# Patient Record
Sex: MALE | Race: WHITE | NOT HISPANIC OR LATINO | Employment: OTHER | URBAN - METROPOLITAN AREA
[De-identification: names, ages, dates, MRNs, and addresses within clinical notes are randomized per-mention and may not be internally consistent; named-entity substitution may affect disease eponyms.]

---

## 2017-01-16 ENCOUNTER — HOSPITAL ENCOUNTER (OUTPATIENT)
Dept: LAB | Facility: MEDICAL CENTER | Age: 82
End: 2017-01-16
Attending: NURSE PRACTITIONER
Payer: MEDICARE

## 2017-01-16 LAB
25(OH)D3 SERPL-MCNC: 36 NG/ML (ref 30–100)
ALBUMIN SERPL BCP-MCNC: 4.1 G/DL (ref 3.2–4.9)
ALBUMIN/GLOB SERPL: 1.4 G/DL
ALP SERPL-CCNC: 37 U/L (ref 30–99)
ALT SERPL-CCNC: 23 U/L (ref 2–50)
ANION GAP SERPL CALC-SCNC: 6 MMOL/L (ref 0–11.9)
APPEARANCE UR: CLEAR
AST SERPL-CCNC: 17 U/L (ref 12–45)
BASOPHILS # BLD AUTO: 0.04 K/UL (ref 0–0.12)
BASOPHILS NFR BLD AUTO: 0.7 % (ref 0–1.8)
BILIRUB SERPL-MCNC: 0.7 MG/DL (ref 0.1–1.5)
BILIRUB UR QL STRIP.AUTO: NEGATIVE
BUN SERPL-MCNC: 30 MG/DL (ref 8–22)
CALCIUM SERPL-MCNC: 8.9 MG/DL (ref 8.5–10.5)
CHLORIDE SERPL-SCNC: 106 MMOL/L (ref 96–112)
CHOLEST SERPL-MCNC: 223 MG/DL (ref 100–199)
CO2 SERPL-SCNC: 27 MMOL/L (ref 20–33)
COLOR UR AUTO: YELLOW
CREAT SERPL-MCNC: 1.23 MG/DL (ref 0.5–1.4)
CREAT UR-MCNC: 141.7 MG/DL
CULTURE IF INDICATED INDCX: NO UA CULTURE
EOSINOPHIL # BLD: 0.1 K/UL (ref 0–0.51)
EOSINOPHIL NFR BLD AUTO: 1.6 % (ref 0–6.9)
ERYTHROCYTE [DISTWIDTH] IN BLOOD BY AUTOMATED COUNT: 44.7 FL (ref 35.9–50)
GLOBULIN SER CALC-MCNC: 3 G/DL (ref 1.9–3.5)
GLUCOSE SERPL-MCNC: 106 MG/DL (ref 65–99)
GLUCOSE UR STRIP.AUTO-MCNC: NEGATIVE MG/DL
HCT VFR BLD AUTO: 45.9 % (ref 42–52)
HDLC SERPL-MCNC: 43 MG/DL
HGB BLD-MCNC: 15.1 G/DL (ref 14–18)
IMM GRANULOCYTES # BLD AUTO: 0.01 K/UL (ref 0–0.11)
IMM GRANULOCYTES NFR BLD AUTO: 0.2 % (ref 0–0.9)
KETONES UR STRIP.AUTO-MCNC: NEGATIVE MG/DL
LDLC SERPL CALC-MCNC: 137 MG/DL
LEUKOCYTE ESTERASE UR QL STRIP.AUTO: NEGATIVE
LYMPHOCYTES # BLD: 2.46 K/UL (ref 1–4.8)
LYMPHOCYTES NFR BLD AUTO: 40 % (ref 22–41)
MCH RBC QN AUTO: 30.9 PG (ref 27–33)
MCHC RBC AUTO-ENTMCNC: 32.9 G/DL (ref 33.7–35.3)
MCV RBC AUTO: 94.1 FL (ref 81.4–97.8)
MICRO URNS: NORMAL
MICROALBUMIN UR-MCNC: <0.7 MG/DL
MICROALBUMIN/CREAT UR: NORMAL MG/G (ref 0–30)
MONOCYTES # BLD: 0.57 K/UL (ref 0–0.85)
MONOCYTES NFR BLD AUTO: 9.3 % (ref 0–13.4)
NEUTROPHILS # BLD: 2.97 K/UL (ref 1.82–7.42)
NEUTROPHILS NFR BLD AUTO: 48.2 % (ref 44–72)
NITRITE UR QL STRIP.AUTO: NEGATIVE
NRBC # BLD AUTO: 0 K/UL
NRBC BLD-RTO: 0 /100 WBC
PH UR: 5.5 [PH]
PLATELET # BLD AUTO: 202 K/UL (ref 164–446)
PMV BLD AUTO: 10.2 FL (ref 9–12.9)
POTASSIUM SERPL-SCNC: 4.2 MMOL/L (ref 3.6–5.5)
PROT SERPL-MCNC: 7.1 G/DL (ref 6–8.2)
PROT UR QL STRIP: NEGATIVE MG/DL
PSA SERPL DL<=0.01 NG/ML-MCNC: 0.79 NG/ML (ref 0–4)
RBC # BLD AUTO: 4.88 M/UL (ref 4.7–6.1)
RBC UR QL AUTO: NEGATIVE
SODIUM SERPL-SCNC: 139 MMOL/L (ref 135–145)
SP GR UR STRIP.AUTO: 1.02
TRIGL SERPL-MCNC: 217 MG/DL (ref 0–149)
TSH SERPL DL<=0.005 MIU/L-ACNC: 1.26 UIU/ML (ref 0.3–3.7)
WBC # BLD AUTO: 6.2 K/UL (ref 4.8–10.8)

## 2017-01-16 PROCEDURE — 80061 LIPID PANEL: CPT

## 2017-01-16 PROCEDURE — 82043 UR ALBUMIN QUANTITATIVE: CPT

## 2017-01-16 PROCEDURE — 80053 COMPREHEN METABOLIC PANEL: CPT

## 2017-01-16 PROCEDURE — 36415 COLL VENOUS BLD VENIPUNCTURE: CPT

## 2017-01-16 PROCEDURE — 85025 COMPLETE CBC W/AUTO DIFF WBC: CPT

## 2017-01-16 PROCEDURE — 84153 ASSAY OF PSA TOTAL: CPT

## 2017-01-16 PROCEDURE — 81003 URINALYSIS AUTO W/O SCOPE: CPT

## 2017-01-16 PROCEDURE — 82570 ASSAY OF URINE CREATININE: CPT

## 2017-01-16 PROCEDURE — 84443 ASSAY THYROID STIM HORMONE: CPT

## 2017-01-16 PROCEDURE — 82306 VITAMIN D 25 HYDROXY: CPT

## 2017-07-31 ENCOUNTER — HOSPITAL ENCOUNTER (OUTPATIENT)
Dept: LAB | Facility: MEDICAL CENTER | Age: 82
End: 2017-07-31
Attending: NURSE PRACTITIONER
Payer: MEDICARE

## 2017-07-31 LAB
ALBUMIN SERPL BCP-MCNC: 4.3 G/DL (ref 3.2–4.9)
ALBUMIN/GLOB SERPL: 1.3 G/DL
ALP SERPL-CCNC: 46 U/L (ref 30–99)
ALT SERPL-CCNC: 23 U/L (ref 2–50)
ANION GAP SERPL CALC-SCNC: 8 MMOL/L (ref 0–11.9)
APPEARANCE UR: CLEAR
AST SERPL-CCNC: 24 U/L (ref 12–45)
BACTERIA #/AREA URNS HPF: NEGATIVE /HPF
BILIRUB SERPL-MCNC: 0.9 MG/DL (ref 0.1–1.5)
BILIRUB UR QL STRIP.AUTO: NEGATIVE
BUN SERPL-MCNC: 19 MG/DL (ref 8–22)
CALCIUM SERPL-MCNC: 9.5 MG/DL (ref 8.5–10.5)
CHLORIDE SERPL-SCNC: 104 MMOL/L (ref 96–112)
CO2 SERPL-SCNC: 29 MMOL/L (ref 20–33)
COLOR UR: YELLOW
CREAT SERPL-MCNC: 1.1 MG/DL (ref 0.5–1.4)
CREAT UR-MCNC: 239.5 MG/DL
CULTURE IF INDICATED INDCX: NO UA CULTURE
EPI CELLS #/AREA URNS HPF: NEGATIVE /HPF
GFR SERPL CREATININE-BSD FRML MDRD: >60 ML/MIN/1.73 M 2
GLOBULIN SER CALC-MCNC: 3.2 G/DL (ref 1.9–3.5)
GLUCOSE SERPL-MCNC: 92 MG/DL (ref 65–99)
GLUCOSE UR STRIP.AUTO-MCNC: NEGATIVE MG/DL
HYALINE CASTS #/AREA URNS LPF: ABNORMAL /LPF
KETONES UR STRIP.AUTO-MCNC: NEGATIVE MG/DL
LEUKOCYTE ESTERASE UR QL STRIP.AUTO: NEGATIVE
MICRO URNS: ABNORMAL
MICROALBUMIN UR-MCNC: 3.3 MG/DL
MICROALBUMIN/CREAT UR: 14 MG/G (ref 0–30)
NITRITE UR QL STRIP.AUTO: NEGATIVE
PH UR STRIP.AUTO: 6 [PH]
POTASSIUM SERPL-SCNC: 4.1 MMOL/L (ref 3.6–5.5)
PROT SERPL-MCNC: 7.5 G/DL (ref 6–8.2)
PROT UR QL STRIP: 30 MG/DL
RBC # URNS HPF: ABNORMAL /HPF
RBC UR QL AUTO: NEGATIVE
SODIUM SERPL-SCNC: 141 MMOL/L (ref 135–145)
SP GR UR STRIP.AUTO: 1.02
UROBILINOGEN UR STRIP.AUTO-MCNC: NORMAL MG/DL
WBC #/AREA URNS HPF: ABNORMAL /HPF

## 2017-07-31 PROCEDURE — 36415 COLL VENOUS BLD VENIPUNCTURE: CPT

## 2017-07-31 PROCEDURE — 82043 UR ALBUMIN QUANTITATIVE: CPT

## 2017-07-31 PROCEDURE — 81001 URINALYSIS AUTO W/SCOPE: CPT

## 2017-07-31 PROCEDURE — 80053 COMPREHEN METABOLIC PANEL: CPT

## 2017-07-31 PROCEDURE — 82570 ASSAY OF URINE CREATININE: CPT

## 2017-11-01 ENCOUNTER — APPOINTMENT (RX ONLY)
Dept: URBAN - METROPOLITAN AREA CLINIC 4 | Facility: CLINIC | Age: 82
Setting detail: DERMATOLOGY
End: 2017-11-01

## 2017-11-01 DIAGNOSIS — L82.0 INFLAMED SEBORRHEIC KERATOSIS: ICD-10-CM

## 2017-11-01 DIAGNOSIS — L81.4 OTHER MELANIN HYPERPIGMENTATION: ICD-10-CM

## 2017-11-01 DIAGNOSIS — L82.1 OTHER SEBORRHEIC KERATOSIS: ICD-10-CM

## 2017-11-01 DIAGNOSIS — L30.9 DERMATITIS, UNSPECIFIED: ICD-10-CM

## 2017-11-01 PROBLEM — H91.90 UNSPECIFIED HEARING LOSS, UNSPECIFIED EAR: Status: ACTIVE | Noted: 2017-11-01

## 2017-11-01 PROCEDURE — ? MEDICATION COUNSELING

## 2017-11-01 PROCEDURE — ? LIQUID NITROGEN

## 2017-11-01 PROCEDURE — ? PRESCRIPTION

## 2017-11-01 PROCEDURE — ? COUNSELING

## 2017-11-01 PROCEDURE — 99202 OFFICE O/P NEW SF 15 MIN: CPT

## 2017-11-01 RX ORDER — TRIAMCINOLONE ACETONIDE 1 MG/G
CREAM TOPICAL BID
Qty: 1 | Refills: 6 | Status: ERX | COMMUNITY
Start: 2017-11-01

## 2017-11-01 RX ADMIN — TRIAMCINOLONE ACETONIDE: 1 CREAM TOPICAL at 00:00

## 2017-11-01 ASSESSMENT — LOCATION ZONE DERM
LOCATION ZONE: FACE
LOCATION ZONE: ARM
LOCATION ZONE: LEG
LOCATION ZONE: TRUNK
LOCATION ZONE: EAR

## 2017-11-01 ASSESSMENT — LOCATION DETAILED DESCRIPTION DERM
LOCATION DETAILED: LEFT ANTERIOR DISTAL THIGH
LOCATION DETAILED: LEFT CENTRAL MALAR CHEEK
LOCATION DETAILED: LEFT PROXIMAL DORSAL FOREARM
LOCATION DETAILED: RIGHT ANTERIOR DISTAL THIGH
LOCATION DETAILED: RIGHT SUPERIOR UPPER BACK
LOCATION DETAILED: RIGHT INFERIOR LATERAL MALAR CHEEK
LOCATION DETAILED: RIGHT DISTAL DORSAL FOREARM
LOCATION DETAILED: STERNUM
LOCATION DETAILED: LEFT DISTAL DORSAL FOREARM
LOCATION DETAILED: LEFT INFERIOR POSTERIOR HELIX
LOCATION DETAILED: RIGHT PROXIMAL DORSAL FOREARM
LOCATION DETAILED: LEFT SUPERIOR MEDIAL UPPER BACK
LOCATION DETAILED: LEFT MEDIAL SUPERIOR CHEST
LOCATION DETAILED: LEFT DISTAL LATERAL PRETIBIAL REGION

## 2017-11-01 ASSESSMENT — LOCATION SIMPLE DESCRIPTION DERM
LOCATION SIMPLE: LEFT EAR
LOCATION SIMPLE: RIGHT FOREARM
LOCATION SIMPLE: RIGHT CHEEK
LOCATION SIMPLE: RIGHT THIGH
LOCATION SIMPLE: CHEST
LOCATION SIMPLE: LEFT THIGH
LOCATION SIMPLE: LEFT FOREARM
LOCATION SIMPLE: LEFT UPPER BACK
LOCATION SIMPLE: LEFT LOWER LEG
LOCATION SIMPLE: LEFT CHEEK
LOCATION SIMPLE: RIGHT UPPER BACK

## 2018-01-29 ENCOUNTER — APPOINTMENT (OUTPATIENT)
Dept: RADIOLOGY | Facility: MEDICAL CENTER | Age: 83
End: 2018-01-29
Attending: EMERGENCY MEDICINE
Payer: MEDICARE

## 2018-01-29 ENCOUNTER — HOSPITAL ENCOUNTER (OUTPATIENT)
Facility: MEDICAL CENTER | Age: 83
End: 2018-01-30
Attending: EMERGENCY MEDICINE | Admitting: HOSPITALIST
Payer: MEDICARE

## 2018-01-29 ENCOUNTER — RESOLUTE PROFESSIONAL BILLING HOSPITAL PROF FEE (OUTPATIENT)
Dept: HOSPITALIST | Facility: MEDICAL CENTER | Age: 83
End: 2018-01-29
Payer: MEDICARE

## 2018-01-29 DIAGNOSIS — S43.005A DISLOCATION OF LEFT SHOULDER JOINT, INITIAL ENCOUNTER: ICD-10-CM

## 2018-01-29 DIAGNOSIS — S80.812A ABRASION OF LEFT LOWER EXTREMITY, INITIAL ENCOUNTER: ICD-10-CM

## 2018-01-29 DIAGNOSIS — I95.9 HYPOTENSION, UNSPECIFIED HYPOTENSION TYPE: ICD-10-CM

## 2018-01-29 DIAGNOSIS — R55 SYNCOPE, UNSPECIFIED SYNCOPE TYPE: ICD-10-CM

## 2018-01-29 DIAGNOSIS — M54.2 CERVICAL PAIN: ICD-10-CM
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
## 2018-01-29 PROBLEM — I10 HTN (HYPERTENSION): Status: ACTIVE | Noted: 2018-01-29

## 2018-01-29 LAB
ALBUMIN SERPL BCP-MCNC: 5 G/DL (ref 3.2–4.9)
ALBUMIN/GLOB SERPL: 2.1 G/DL
ALP SERPL-CCNC: 44 U/L (ref 30–99)
ALT SERPL-CCNC: 19 U/L (ref 2–50)
ANION GAP SERPL CALC-SCNC: 10 MMOL/L (ref 0–11.9)
APTT PPP: 29.2 SEC (ref 24.7–36)
AST SERPL-CCNC: 21 U/L (ref 12–45)
BASOPHILS # BLD AUTO: 0.2 % (ref 0–1.8)
BASOPHILS # BLD: 0.02 K/UL (ref 0–0.12)
BILIRUB SERPL-MCNC: 0.7 MG/DL (ref 0.1–1.5)
BUN SERPL-MCNC: 25 MG/DL (ref 8–22)
CALCIUM SERPL-MCNC: 9.3 MG/DL (ref 8.5–10.5)
CHLORIDE SERPL-SCNC: 104 MMOL/L (ref 96–112)
CO2 SERPL-SCNC: 23 MMOL/L (ref 20–33)
CREAT SERPL-MCNC: 1.29 MG/DL (ref 0.5–1.4)
EKG IMPRESSION: NORMAL
EOSINOPHIL # BLD AUTO: 0 K/UL (ref 0–0.51)
EOSINOPHIL NFR BLD: 0 % (ref 0–6.9)
ERYTHROCYTE [DISTWIDTH] IN BLOOD BY AUTOMATED COUNT: 45 FL (ref 35.9–50)
GLOBULIN SER CALC-MCNC: 2.4 G/DL (ref 1.9–3.5)
GLUCOSE SERPL-MCNC: 130 MG/DL (ref 65–99)
HCT VFR BLD AUTO: 48 % (ref 42–52)
HGB BLD-MCNC: 16.3 G/DL (ref 14–18)
IMM GRANULOCYTES # BLD AUTO: 0.06 K/UL (ref 0–0.11)
IMM GRANULOCYTES NFR BLD AUTO: 0.5 % (ref 0–0.9)
INR PPP: 1.02 (ref 0.87–1.13)
LYMPHOCYTES # BLD AUTO: 1.39 K/UL (ref 1–4.8)
LYMPHOCYTES NFR BLD: 11.3 % (ref 22–41)
MCH RBC QN AUTO: 31.8 PG (ref 27–33)
MCHC RBC AUTO-ENTMCNC: 34 G/DL (ref 33.7–35.3)
MCV RBC AUTO: 93.8 FL (ref 81.4–97.8)
MONOCYTES # BLD AUTO: 0.58 K/UL (ref 0–0.85)
MONOCYTES NFR BLD AUTO: 4.7 % (ref 0–13.4)
NEUTROPHILS # BLD AUTO: 10.25 K/UL (ref 1.82–7.42)
NEUTROPHILS NFR BLD: 83.3 % (ref 44–72)
NRBC # BLD AUTO: 0 K/UL
NRBC BLD-RTO: 0 /100 WBC
PLATELET # BLD AUTO: 153 K/UL (ref 164–446)
PMV BLD AUTO: 10.4 FL (ref 9–12.9)
POTASSIUM SERPL-SCNC: 4.3 MMOL/L (ref 3.6–5.5)
PROT SERPL-MCNC: 7.4 G/DL (ref 6–8.2)
PROTHROMBIN TIME: 13.1 SEC (ref 12–14.6)
RBC # BLD AUTO: 5.12 M/UL (ref 4.7–6.1)
SODIUM SERPL-SCNC: 137 MMOL/L (ref 135–145)
TROPONIN I SERPL-MCNC: 0.02 NG/ML (ref 0–0.04)
WBC # BLD AUTO: 12.3 K/UL (ref 4.8–10.8)

## 2018-01-29 PROCEDURE — G0378 HOSPITAL OBSERVATION PER HR: HCPCS

## 2018-01-29 PROCEDURE — 85730 THROMBOPLASTIN TIME PARTIAL: CPT

## 2018-01-29 PROCEDURE — 80053 COMPREHEN METABOLIC PANEL: CPT

## 2018-01-29 PROCEDURE — 73030 X-RAY EXAM OF SHOULDER: CPT | Mod: LT

## 2018-01-29 PROCEDURE — 71260 CT THORAX DX C+: CPT

## 2018-01-29 PROCEDURE — 700111 HCHG RX REV CODE 636 W/ 250 OVERRIDE (IP): Performed by: EMERGENCY MEDICINE

## 2018-01-29 PROCEDURE — 99219 PR INITIAL OBSERVATION CARE,LEVL II: CPT | Performed by: HOSPITALIST

## 2018-01-29 PROCEDURE — 99285 EMERGENCY DEPT VISIT HI MDM: CPT

## 2018-01-29 PROCEDURE — 700117 HCHG RX CONTRAST REV CODE 255: Performed by: EMERGENCY MEDICINE

## 2018-01-29 PROCEDURE — 700101 HCHG RX REV CODE 250: Performed by: EMERGENCY MEDICINE

## 2018-01-29 PROCEDURE — 93005 ELECTROCARDIOGRAM TRACING: CPT

## 2018-01-29 PROCEDURE — 99152 MOD SED SAME PHYS/QHP 5/>YRS: CPT

## 2018-01-29 PROCEDURE — 94760 N-INVAS EAR/PLS OXIMETRY 1: CPT

## 2018-01-29 PROCEDURE — 72125 CT NECK SPINE W/O DYE: CPT

## 2018-01-29 PROCEDURE — 85025 COMPLETE CBC W/AUTO DIFF WBC: CPT

## 2018-01-29 PROCEDURE — 85610 PROTHROMBIN TIME: CPT

## 2018-01-29 PROCEDURE — 23650 CLTX SHO DSLC W/MNPJ WO ANES: CPT

## 2018-01-29 PROCEDURE — 93005 ELECTROCARDIOGRAM TRACING: CPT | Performed by: EMERGENCY MEDICINE

## 2018-01-29 PROCEDURE — 84484 ASSAY OF TROPONIN QUANT: CPT

## 2018-01-29 PROCEDURE — 70450 CT HEAD/BRAIN W/O DYE: CPT

## 2018-01-29 RX ORDER — ASPIRIN 81 MG/1
81 TABLET, CHEWABLE ORAL
Status: DISCONTINUED | OUTPATIENT
Start: 2018-01-29 | End: 2018-01-30 | Stop reason: HOSPADM

## 2018-01-29 RX ORDER — POLYETHYLENE GLYCOL 3350 17 G/17G
1 POWDER, FOR SOLUTION ORAL
Status: DISCONTINUED | OUTPATIENT
Start: 2018-01-29 | End: 2018-01-30 | Stop reason: HOSPADM

## 2018-01-29 RX ORDER — HEPARIN SODIUM 5000 [USP'U]/ML
5000 INJECTION, SOLUTION INTRAVENOUS; SUBCUTANEOUS EVERY 8 HOURS
Status: DISCONTINUED | OUTPATIENT
Start: 2018-01-29 | End: 2018-01-30 | Stop reason: HOSPADM

## 2018-01-29 RX ORDER — ASPIRIN 81 MG/1
81 TABLET, CHEWABLE ORAL
COMMUNITY
End: 2018-03-13

## 2018-01-29 RX ORDER — BENAZEPRIL HYDROCHLORIDE 20 MG/1
20 TABLET ORAL 2 TIMES DAILY
Status: DISCONTINUED | OUTPATIENT
Start: 2018-01-29 | End: 2018-01-30 | Stop reason: HOSPADM

## 2018-01-29 RX ORDER — LIDOCAINE HYDROCHLORIDE 20 MG/ML
20 INJECTION, SOLUTION INFILTRATION; PERINEURAL ONCE
Status: COMPLETED | OUTPATIENT
Start: 2018-01-29 | End: 2018-01-29

## 2018-01-29 RX ORDER — BISACODYL 10 MG
10 SUPPOSITORY, RECTAL RECTAL
Status: DISCONTINUED | OUTPATIENT
Start: 2018-01-29 | End: 2018-01-30 | Stop reason: HOSPADM

## 2018-01-29 RX ORDER — ONDANSETRON 2 MG/ML
4 INJECTION INTRAMUSCULAR; INTRAVENOUS ONCE
Status: DISCONTINUED | OUTPATIENT
Start: 2018-01-29 | End: 2018-01-30 | Stop reason: HOSPADM

## 2018-01-29 RX ORDER — ONDANSETRON 4 MG/1
4 TABLET, ORALLY DISINTEGRATING ORAL EVERY 4 HOURS PRN
Status: DISCONTINUED | OUTPATIENT
Start: 2018-01-29 | End: 2018-01-30 | Stop reason: HOSPADM

## 2018-01-29 RX ORDER — MORPHINE SULFATE 4 MG/ML
4 INJECTION, SOLUTION INTRAMUSCULAR; INTRAVENOUS ONCE
Status: DISCONTINUED | OUTPATIENT
Start: 2018-01-29 | End: 2018-01-30

## 2018-01-29 RX ORDER — LORATADINE 10 MG/1
10 TABLET ORAL DAILY
COMMUNITY
End: 2018-03-13

## 2018-01-29 RX ORDER — ONDANSETRON 2 MG/ML
4 INJECTION INTRAMUSCULAR; INTRAVENOUS EVERY 4 HOURS PRN
Status: DISCONTINUED | OUTPATIENT
Start: 2018-01-29 | End: 2018-01-30 | Stop reason: HOSPADM

## 2018-01-29 RX ORDER — SODIUM CHLORIDE 9 MG/ML
INJECTION, SOLUTION INTRAVENOUS CONTINUOUS
Status: DISCONTINUED | OUTPATIENT
Start: 2018-01-29 | End: 2018-01-30 | Stop reason: HOSPADM

## 2018-01-29 RX ORDER — BENAZEPRIL HYDROCHLORIDE 20 MG/1
20 TABLET ORAL 2 TIMES DAILY
COMMUNITY
End: 2018-03-13

## 2018-01-29 RX ORDER — AMOXICILLIN 250 MG
2 CAPSULE ORAL 2 TIMES DAILY
Status: DISCONTINUED | OUTPATIENT
Start: 2018-01-29 | End: 2018-01-30 | Stop reason: HOSPADM

## 2018-01-29 RX ADMIN — PROPOFOL 80 MG: 10 INJECTION, EMULSION INTRAVENOUS at 17:56

## 2018-01-29 RX ADMIN — LIDOCAINE HYDROCHLORIDE 20 ML: 20 INJECTION, SOLUTION INFILTRATION; PERINEURAL at 17:11

## 2018-01-29 RX ADMIN — IOHEXOL 100 ML: 350 INJECTION, SOLUTION INTRAVENOUS at 20:03

## 2018-01-29 ASSESSMENT — PAIN SCALES - GENERAL: PAINLEVEL_OUTOF10: 5

## 2018-01-29 NOTE — ED TRIAGE NOTES
Alonso Holder 82 y.o. male to triage with wife via w/c for     Chief Complaint   Patient presents with   • T-5000 FALL     pt slipped in the steam room approx 2 hours ago.  Pt does not think he hit his head, unknown LOC.   • Shoulder Injury     pt has L shoulder deformity, possible dislocation.     Pt a/o x 4.  Pt slightly wet from steam room and cold.  Provided warm blankets. Charge RN aware of patient.    Pt directed to the senior lounge to await bed assignment (will receive next bed available).  Advised to return to triage desk for any changes/concerns.

## 2018-01-30 ENCOUNTER — PATIENT OUTREACH (OUTPATIENT)
Dept: HEALTH INFORMATION MANAGEMENT | Facility: OTHER | Age: 83
End: 2018-01-30

## 2018-01-30 VITALS
DIASTOLIC BLOOD PRESSURE: 72 MMHG | RESPIRATION RATE: 18 BRPM | TEMPERATURE: 98.1 F | HEIGHT: 70 IN | WEIGHT: 216.49 LBS | SYSTOLIC BLOOD PRESSURE: 133 MMHG | BODY MASS INDEX: 30.99 KG/M2 | OXYGEN SATURATION: 93 % | HEART RATE: 85 BPM

## 2018-01-30 LAB
ANION GAP SERPL CALC-SCNC: 9 MMOL/L (ref 0–11.9)
BASOPHILS # BLD AUTO: 0.3 % (ref 0–1.8)
BASOPHILS # BLD: 0.03 K/UL (ref 0–0.12)
BUN SERPL-MCNC: 23 MG/DL (ref 8–22)
CALCIUM SERPL-MCNC: 9.1 MG/DL (ref 8.5–10.5)
CHLORIDE SERPL-SCNC: 108 MMOL/L (ref 96–112)
CO2 SERPL-SCNC: 25 MMOL/L (ref 20–33)
CREAT SERPL-MCNC: 1.02 MG/DL (ref 0.5–1.4)
EOSINOPHIL # BLD AUTO: 0.02 K/UL (ref 0–0.51)
EOSINOPHIL NFR BLD: 0.2 % (ref 0–6.9)
ERYTHROCYTE [DISTWIDTH] IN BLOOD BY AUTOMATED COUNT: 45.4 FL (ref 35.9–50)
GLUCOSE SERPL-MCNC: 109 MG/DL (ref 65–99)
HCT VFR BLD AUTO: 42 % (ref 42–52)
HGB BLD-MCNC: 14.1 G/DL (ref 14–18)
IMM GRANULOCYTES # BLD AUTO: 0.02 K/UL (ref 0–0.11)
IMM GRANULOCYTES NFR BLD AUTO: 0.2 % (ref 0–0.9)
LV EJECT FRACT  99904: 60
LV EJECT FRACT MOD 2C 99903: 58.84
LV EJECT FRACT MOD 4C 99902: 57.62
LV EJECT FRACT MOD BP 99901: 58.23
LYMPHOCYTES # BLD AUTO: 1.92 K/UL (ref 1–4.8)
LYMPHOCYTES NFR BLD: 21.5 % (ref 22–41)
MCH RBC QN AUTO: 31.2 PG (ref 27–33)
MCHC RBC AUTO-ENTMCNC: 33.6 G/DL (ref 33.7–35.3)
MCV RBC AUTO: 92.9 FL (ref 81.4–97.8)
MONOCYTES # BLD AUTO: 0.92 K/UL (ref 0–0.85)
MONOCYTES NFR BLD AUTO: 10.3 % (ref 0–13.4)
NEUTROPHILS # BLD AUTO: 6.03 K/UL (ref 1.82–7.42)
NEUTROPHILS NFR BLD: 67.5 % (ref 44–72)
NRBC # BLD AUTO: 0 K/UL
NRBC BLD-RTO: 0 /100 WBC
PLATELET # BLD AUTO: 140 K/UL (ref 164–446)
PMV BLD AUTO: 10.3 FL (ref 9–12.9)
POTASSIUM SERPL-SCNC: 3.7 MMOL/L (ref 3.6–5.5)
RBC # BLD AUTO: 4.52 M/UL (ref 4.7–6.1)
SODIUM SERPL-SCNC: 142 MMOL/L (ref 135–145)
WBC # BLD AUTO: 8.9 K/UL (ref 4.8–10.8)

## 2018-01-30 PROCEDURE — 93306 TTE W/DOPPLER COMPLETE: CPT

## 2018-01-30 PROCEDURE — 99217 PR OBSERVATION CARE DISCHARGE: CPT | Performed by: INTERNAL MEDICINE

## 2018-01-30 PROCEDURE — G0378 HOSPITAL OBSERVATION PER HR: HCPCS

## 2018-01-30 PROCEDURE — A9270 NON-COVERED ITEM OR SERVICE: HCPCS | Performed by: HOSPITALIST

## 2018-01-30 PROCEDURE — 36415 COLL VENOUS BLD VENIPUNCTURE: CPT

## 2018-01-30 PROCEDURE — 93306 TTE W/DOPPLER COMPLETE: CPT | Mod: 26 | Performed by: INTERNAL MEDICINE

## 2018-01-30 PROCEDURE — 90471 IMMUNIZATION ADMIN: CPT

## 2018-01-30 PROCEDURE — 96372 THER/PROPH/DIAG INJ SC/IM: CPT

## 2018-01-30 PROCEDURE — 700102 HCHG RX REV CODE 250 W/ 637 OVERRIDE(OP): Performed by: HOSPITALIST

## 2018-01-30 PROCEDURE — 90670 PCV13 VACCINE IM: CPT | Performed by: HOSPITALIST

## 2018-01-30 PROCEDURE — 700111 HCHG RX REV CODE 636 W/ 250 OVERRIDE (IP): Performed by: HOSPITALIST

## 2018-01-30 PROCEDURE — 85025 COMPLETE CBC W/AUTO DIFF WBC: CPT

## 2018-01-30 PROCEDURE — 80048 BASIC METABOLIC PNL TOTAL CA: CPT

## 2018-01-30 PROCEDURE — 700102 HCHG RX REV CODE 250 W/ 637 OVERRIDE(OP): Performed by: INTERNAL MEDICINE

## 2018-01-30 PROCEDURE — 700105 HCHG RX REV CODE 258: Performed by: HOSPITALIST

## 2018-01-30 PROCEDURE — A9270 NON-COVERED ITEM OR SERVICE: HCPCS | Performed by: INTERNAL MEDICINE

## 2018-01-30 RX ORDER — POLYETHYLENE GLYCOL 3350 17 G/17G
POWDER, FOR SOLUTION ORAL
Refills: 3 | COMMUNITY
Start: 2018-01-30 | End: 2018-03-13

## 2018-01-30 RX ORDER — AMOXICILLIN 250 MG
2 CAPSULE ORAL 2 TIMES DAILY
Qty: 30 TAB | Refills: 0 | Status: SHIPPED | OUTPATIENT
Start: 2018-01-30 | End: 2018-03-13

## 2018-01-30 RX ORDER — OXYCODONE HYDROCHLORIDE AND ACETAMINOPHEN 5; 325 MG/1; MG/1
1 TABLET ORAL EVERY 4 HOURS PRN
Qty: 10 TAB | Refills: 0 | Status: SHIPPED | OUTPATIENT
Start: 2018-01-30 | End: 2018-02-02

## 2018-01-30 RX ORDER — OXYCODONE HYDROCHLORIDE AND ACETAMINOPHEN 5; 325 MG/1; MG/1
1 TABLET ORAL EVERY 4 HOURS PRN
Status: DISCONTINUED | OUTPATIENT
Start: 2018-01-30 | End: 2018-01-30 | Stop reason: HOSPADM

## 2018-01-30 RX ADMIN — ASPIRIN 81 MG: 81 TABLET, CHEWABLE ORAL at 00:56

## 2018-01-30 RX ADMIN — HEPARIN SODIUM 5000 UNITS: 5000 INJECTION, SOLUTION INTRAVENOUS; SUBCUTANEOUS at 00:57

## 2018-01-30 RX ADMIN — OXYCODONE HYDROCHLORIDE AND ACETAMINOPHEN 1 TABLET: 5; 325 TABLET ORAL at 08:14

## 2018-01-30 RX ADMIN — HEPARIN SODIUM 5000 UNITS: 5000 INJECTION, SOLUTION INTRAVENOUS; SUBCUTANEOUS at 06:14

## 2018-01-30 RX ADMIN — BENAZEPRIL HYDROCHLORIDE 20 MG: 20 TABLET, COATED ORAL at 08:15

## 2018-01-30 RX ADMIN — SODIUM CHLORIDE: 9 INJECTION, SOLUTION INTRAVENOUS at 00:57

## 2018-01-30 RX ADMIN — BENAZEPRIL HYDROCHLORIDE 20 MG: 20 TABLET, COATED ORAL at 00:56

## 2018-01-30 RX ADMIN — PNEUMOCOCCAL 13-VALENT CONJUGATE VACCINE 0.5 ML: 2.2; 2.2; 2.2; 2.2; 2.2; 4.4; 2.2; 2.2; 2.2; 2.2; 2.2; 2.2; 2.2 INJECTION, SUSPENSION INTRAMUSCULAR at 06:15

## 2018-01-30 ASSESSMENT — LIFESTYLE VARIABLES
HAVE YOU EVER FELT YOU SHOULD CUT DOWN ON YOUR DRINKING: NO
TOTAL SCORE: 0
HAVE PEOPLE ANNOYED YOU BY CRITICIZING YOUR DRINKING: NO
CONSUMPTION TOTAL: NEGATIVE
ALCOHOL_USE: YES
ON A TYPICAL DAY WHEN YOU DRINK ALCOHOL HOW MANY DRINKS DO YOU HAVE: 1
EVER_SMOKED: NEVER
HOW MANY TIMES IN THE PAST YEAR HAVE YOU HAD 5 OR MORE DRINKS IN A DAY: 0
TOTAL SCORE: 0
AVERAGE NUMBER OF DAYS PER WEEK YOU HAVE A DRINK CONTAINING ALCOHOL: 5
EVER HAD A DRINK FIRST THING IN THE MORNING TO STEADY YOUR NERVES TO GET RID OF A HANGOVER: NO
EVER FELT BAD OR GUILTY ABOUT YOUR DRINKING: NO
TOTAL SCORE: 0

## 2018-01-30 ASSESSMENT — PAIN SCALES - GENERAL
PAINLEVEL_OUTOF10: 7
PAINLEVEL_OUTOF10: 7
PAINLEVEL_OUTOF10: 0
PAINLEVEL_OUTOF10: 2

## 2018-01-30 ASSESSMENT — PATIENT HEALTH QUESTIONNAIRE - PHQ9
SUM OF ALL RESPONSES TO PHQ9 QUESTIONS 1 AND 2: 0
SUM OF ALL RESPONSES TO PHQ QUESTIONS 1-9: 0
2. FEELING DOWN, DEPRESSED, IRRITABLE, OR HOPELESS: NOT AT ALL
1. LITTLE INTEREST OR PLEASURE IN DOING THINGS: NOT AT ALL

## 2018-01-30 NOTE — PROGRESS NOTES
Discharge instructions given to patient at bedside, verbalizes understanding and states plans for follow-up. Telemetry monitor/IV cathlon removed. All belongings accounted for, all questions answered at this time. Pt w/o c/o. Pt taken out via WC by this RN. Wife @ side. Helped into wife's car. Denies further needs.

## 2018-01-30 NOTE — PROGRESS NOTES
Pt resting quietly in bed.  Respirations even and unlabored.  NAD noted.  Wife at bedside.  No needs to address.

## 2018-01-30 NOTE — PROGRESS NOTES
Admit profile and assessment completed.  Pt medicated per MAR.      Pt eating food from home.      LLE wound cleaned with NS and thin layer of antibiotic ointment applied.      No other needs to address at this time.

## 2018-01-30 NOTE — DISCHARGE INSTRUCTIONS
Discharge Instructions    Discharged to home by car with relative. Discharged via wheelchair, hospital escort: Yes.  Special equipment needed: Not Applicable    Be sure to schedule a follow-up appointment with your primary care doctor or any specialists as instructed.     Discharge Plan:   Diet Plan: Discussed - regular diet  Activity Level: Discussed - activity as tolerated.   Confirmed Follow up Appointment: FOLLOW UP WITH DR. DOMINGUEZ IN ONE WEEK  Confirmed Symptoms Management: Discussed  Medication Reconciliation Updated: Yes  Pneumococcal Vaccine Given - only chart on this line when given: Given - RECEIVED 1-30-18  Influenza Vaccine Indication: Not indicated: Previously immunized this influenza season and > 8 years of age    I understand that a diet low in cholesterol, fat, and sodium is recommended for good health. Unless I have been given specific instructions below for another diet, I accept this instruction as my diet prescription.       Special Instructions: Follow up with Sumanth Dominguez M.D. In 1 week. Referral for Holter monitor; referral for ENT referral for audiology and vestibular PT if vertigo persists.    · Is patient discharged on Warfarin / Coumadin?   No     Depression / Suicide Risk    As you are discharged from this [facility] facility, it is important to learn how to keep safe from harming yourself.    Recognize the warning signs:  · Abrupt changes in personality, positive or negative- including increase in energy   · Giving away possessions  · Change in eating patterns- significant weight changes-  positive or negative  · Change in sleeping patterns- unable to sleep or sleeping all the time   · Unwillingness or inability to communicate  · Depression  · Unusual sadness, discouragement and loneliness  · Talk of wanting to die  · Neglect of personal appearance   · Rebelliousness- reckless behavior  · Withdrawal from people/activities they love  · Confusion- inability to concentrate     If you or a  loved one observes any of these behaviors or has concerns about self-harm, here's what you can do:  · Talk about it- your feelings and reasons for harming yourself  · Remove any means that you might use to hurt yourself (examples: pills, rope, extension cords, firearm)  · Get professional help from the community (Mental Health, Substance Abuse, psychological counseling)  · Do not be alone:Call your Safe Contact- someone whom you trust who will be there for you.  · Call your local CRISIS HOTLINE 458-2300 or 370-050-4976  · Call your local Children's Mobile Crisis Response Team [place] (166) 050-5383 or www.Dragonplay  · Call the toll free National Suicide Prevention Hotlines   · National Suicide Prevention Lifeline 690-570-DTDI (2084)  · National Hope Line Network 800-SUICIDE (298-2144)

## 2018-01-30 NOTE — PROGRESS NOTES
Pt arrived to unit via gurney with ED RN on monitor.  NAD noted.  Pt ambulatory with stand by assist to scale then to bed.  VS stable.  Will complete admit profile and assessment.  No questions or concerns to address.

## 2018-01-30 NOTE — DISCHARGE PLANNING
Care Transition Team Assessment    Information Source  Orientation : Oriented x 4  Information Given By: Patient  Informant's Name: Alonso  Who is responsible for making decisions for patient? : Patient    Readmission Evaluation  Is this a readmission?: No    Elopement Risk  Legal Hold: No  Ambulatory or Self Mobile in Wheelchair: No-Not an Elopement Risk    Interdisciplinary Discharge Planning  Does Admitting Nurse Feel This Could be a Complex Discharge?: No  Primary Care Physician: Sumanth Fairbanks MD  Lives with - Patient's Self Care Capacity: Spouse  Support Systems: Family Member(s), Spouse / Significant Other  Housing / Facility: [address]  Do You Take your Prescribed Medications Regularly: Yes  Able to Return to Previous ADL's: Yes  Mobility Issues: No  Prior Services: None  Patient Expects to be Discharged to:: Home  Assistance Needed: No  Durable Medical Equipment: Not Applicable    Discharge Preparedness  Prior Functional Level: Ambulatory, Drives Self, Independent with Activities of Daily Living    Functional Assesment  Prior Functional Level: Ambulatory, Drives Self, Independent with Activities of Daily Living    Finances  Financial Barriers to Discharge: No  Prescription Coverage: Yes    Vision / Hearing Impairment  Vision Impairment :  (wears glasses)  Hearing Impairment :  (bilateral hearing aids (not with pt))    Values / Beliefs / Concerns  Values / Beliefs Concerns : No    Advance Directive  Advance Directive?: None  Advance Directive offered?: AD Booklet given    Domestic Abuse  Have you ever been the victim of abuse or violence?: No    Psychological Assessment  History of Substance Abuse: None  History of Psychiatric Problems: No    Discharge Risks or Barriers  Discharge risks or barriers?: No    Anticipated Discharge Information  Anticipated discharge disposition: Home  Discharge Address:  ([address] [place] [address])  Discharge Contact Phone Number:  (Madelin (spouse) [phone])

## 2018-01-30 NOTE — DISCHARGE SUMMARY
CHIEF COMPLAINT ON ADMISSION  Chief Complaint   Patient presents with   • T-5000 FALL     pt slipped in the steam room approx 2 hours ago.  Pt does not think he hit his head, unknown LOC.   • Shoulder Injury     pt has L shoulder deformity, possible dislocation.       CODE STATUS  Full Code    HPI & HOSPITAL COURSE  Please review Dr. Vidal Scott M.D. notes for further details of history of present illness, past medical/social/family histories, allergies and medications.                  Near syncope   Assessment & Plan    Presented with near syncope and dizziness  Likely from dehydration per admitting physician  Also has mild vertigo. He has been recovering from sinus infection.  Did fall so presentation complicated by pain and L shoulder.   Echo normal EF, no evidence of aortic stenosis.  CT head neg for bleed or infarct  CT C-spine neg for fracture  CT negative for injury or bleed (see below)  At discharge date, he was ambulating, tolerating food. He did not complain of vertigo but did wonder what it could be caused by. Likley viral but if it persists, advised him to follow up.   He wants to go home.  Follow up with Sumanth Fairbanks M.D. In 1 week. Defer Holter monitor; defer ENT referral for audiology and vestibular PT if vertigo persists.       HTN (hypertension)   Assessment & Plan    Stable. Continue Lotensin.  Advise Alonso Holder to check blood pressure at home at least twice a day and have a log for primary provider to review  Follow up with Sumanth Fairbanks M.D.     Dislocation of left shoulder  Reduced at the ED, sling in place, resolved  Pain control.     Discharge Physical Exam  General/Constitutional: No acute distress.   Head: Normocephalic, atraumatic  ENT: Oral mucosa is moist. No obvious pharyngeal exudates  Eyes: Pink conjunctiva, no scleral icterus  Neck: Supple, no lymphadenopathy  Cardiovascular: Normal rate and regular rhythm. S1,2 noted. No murmurs, gallops or rubs.  Pulmonary: Clear to  auscultation bilaterally. No wheezes, rales or rhonchi.  Abdominal: Soft, nontender, not distended, bowel sounds normoactive. No guarding or peritoneal signs.  Musculoskeletal: No tenderness to palpation of chest wall. Mild tenderness shoulder, sling in place. Mild rib tenderness.  Neurologic: Alert and oriented. Grossly nonfocal, moving all extremities.  Genitourinary: No gross hematuria  Skin: No obvious rash.  Psychiatric: Pleasant, cooperative.  Vitals Reviewed  Labs Reviewed  Imaging reviewed  Nursing notes reviewed    Therefore, he is discharged in good and stable condition with close outpatient follow-up.    SPECIFIC OUTPATIENT FOLLOW-UP  Follow up with Sumanth Fairbanks M.D. In 1 week. Defer Holter monitor; defer ENT referral for audiology and vestibular PT if vertigo persists.    FOLLOW UP  No future appointments.  Sumanth Fairbanks M.D.  [address] [facility] [facility]  [address]  [facility] [address]  [phone]            MEDICATIONS ON DISCHARGE   DAVIDAlonso michel   Home Medication Instructions EILEEN:[id number]    Printed on:01/30/18 1120   Medication Information                      aspirin (ASA) 81 MG Chew Tab chewable tablet  Take 81 mg by mouth every bedtime.             benazepril (LOTENSIN) 20 MG Tab  Take 20 mg by mouth 2 Times a Day.             loratadine (CLARITIN) 10 MG Tab  Take 10 mg by mouth every day.             oxycodone-acetaminophen (PERCOCET) 5-325 MG Tab  Take 1 Tab by mouth every four hours as needed for Severe Pain for up to 3 days.             polyethylene glycol/lytes (MIRALAX) Pack  Take  by mouth 1 time daily as needed (if sennosides and docusate ineffective after 24 hours).             senna-docusate (PERICOLACE OR SENOKOT S) 8.6-50 MG Tab  Take 2 Tabs by mouth 2 Times a Day.                   DIET  Orders Placed This Encounter   Procedures   • Diet Order     Standing Status:   Standing     Number of Occurrences:   1     Order Specific Question:   Diet:     Answer:   Regular [1]       ACTIVITY  No heavy  lifting more than 25 lbs  No strenuous activity      CONSULTATIONS      PROCEDURES  Ct-cspine Without Plus Recons    Result Date: 1/29/2018 1/29/2018 7:22 PM HISTORY/REASON FOR EXAM: trauma Slip and fall TECHNIQUE/EXAM DESCRIPTION: CT cervical spine without contrast, with reconstructions. The study was performed on a helical multidetector CT scanner. Thin-section helical scanning was performed from the skull base through T1. Sagittal and coronal multiplanar reconstructions were generated from the axial images. Low dose optimization technique was utilized for this CT exam including automated exposure control and adjustment of the mA and/or kV according to patient size. COMPARISON:  None. FINDINGS: Alignment is preserved. Vertebral body heights are preserved. Multilevel loss of disc height and osteophyte formation. Facets are normally aligned.  Multilevel facet hypertrophy present. Axial images show no acute fracture. Well-corticated ossific density at the tip of C7 spinous processes consistent with old injury. Cervicothoracic junction is intact. Prevertebral soft tissues are within normal limits. Visualized lung apices are unremarkable. Calcifications of the carotid arteries noted. Congenital incomplete posterior arch of C1.     1.  No acute cervical spine fracture or subluxation. 2.  Multilevel degenerative changes.    Ct-chest,abdomen,pelvis With    Result Date: 1/29/2018 1/29/2018 7:23 PM HISTORY/REASON FOR EXAM:  Pain Following Trauma. Slip and fall TECHNIQUE/EXAM DESCRIPTION: CT scan of the chest, abdomen and pelvis with contrast. Thin-section helical scanning was obtained with intravenous contrast from the lung apices through the pubic symphysis to include the chest, abdomen and pelvis.  100 mL of Omnipaque 350 nonionic contrast was administered intravenously without complication. Low dose optimization technique was utilized for this CT exam including automated exposure control and adjustment of the mA and/or  kV according to patient size. COMPARISON: None. FINDINGS: CT Chest: Thoracic aorta shows mild atherosclerotic changes.  No mediastinal hematoma. Lungs show minimal dependent atelectasis. No pleural fluid collection or pneumothorax. No displaced rib fracture. Multilevel degenerative change of thoracic spine. CT Abdomen: Liver shows no focal lesion. The spleen is unremarkable. Adrenal glands are unremarkable. LEFT kidney is atrophic. RIGHT kidney shows cortical cyst. Pancreas is atrophic. The gallbladder is unremarkable. CT Pelvis: Bladder is unremarkable. Prior LEFT inguinal hernia repair. Colonic diverticula. Appendix has a normal appearance. No peritoneal fluid or pneumoperitoneum. Diffuse described change abdominal aorta with mild ectasia. Lumbar spine degenerative changes. No pelvic fracture.     1.  No acute intrathoracic injury. 2.  No evidence for acute intra-abdominal trauma. 3.  No gross thoracic or lumbar spine fracture.    Ct-head W/o    Result Date: 1/29/2018 1/29/2018 7:22 PM HISTORY/REASON FOR EXAM:  Head Injury. Ground-level fall TECHNIQUE/EXAM DESCRIPTION AND NUMBER OF VIEWS: CT of the head without contrast. The study was performed on a helical multidetector CT scanner. Contiguous 2.5 mm axial sections were obtained from the skull base through the vertex. Up to date radiation dose reduction adjustments have been utilized to meet ALARA standards for radiation dose reduction. COMPARISON:  None available FINDINGS: Lateral ventricles are enlarged, RIGHT greater than LEFT. Cortical sulci are enlarged. No significant mass effect or midline shift. Basal cisterns are patent. No evidence for intracranial hemorrhage. Calvaria are intact. Orbits show bilateral proptosis. Visualized mastoid air cells are clear. Mild inferior frontal and ethmoid sinus mucosal thickening. Calcifications of the carotid arteries noted.     1.  Diffuse atrophy. 2.  No acute intracranial hemorrhage or territorial infarct.      Dx-shoulder 2+ Left    Result Date: 2018  HISTORY/REASON FOR EXAM:  Status post left shoulder dislocation reduction TECHNIQUE/EXAM DESCRIPTION AND NUMBER OF VIEWS:  2 views of the left shoulder, 2018 6:40 PM. COMPARISON: Exam from 5:43 PM FINDINGS: There has been reduction of previously noted left shoulder dislocation. There is probable Hill-Sachs deformity.     Reduction of left shoulder dislocation. INTERPRETING LOCATION:  [address] [place] [address] [place], [id number]    Dx-shoulder 2+ Left    Result Date: 2018 5:22 PM HISTORY/REASON FOR EXAM:  Left shoulder pain after ground level fall today TECHNIQUE/EXAM DESCRIPTION AND NUMBER OF VIEWS:  2 views of the LEFT shoulder. COMPARISON: None FINDINGS: There is acute anterior subcoracoid dislocation of the left humeral head. No fracture is identified. There appears to be a Hill-Sachs deformity of the left humeral head.     Acute anterior subcoracoid dislocation of the left humeral head.    Echocardiogram Comp W/o Cont    Result Date: 2018  Transthoracic Echo Report Echocardiography Laboratory CONCLUSIONS Left ventricular ejection fraction is visually estimated to be 60%. Normal diastolic function. Trace mitral regurgitation. Estimated right ventricular systolic pressure is 32 mmHg. Right atrial pressure is estimated to be 3 mmHg. Dilated aortic root, measuring 4.0 cm. ERUM ALEX Exam Date:         2018                    07:27 Exam Location:     Inpatient Priority:          Routine Ordering Physician:        AUSTYN FELIZ Referring Physician:       [id number] Angela Sonographer:               Love Aceves RVT, RDCS Age:    82     Gender:    M MRN:    [id number] :    [id number] BSA:    2.15   Ht (in):    70     Wt (lb):    215 Exam Type:     Complete Indications:     Syncope ICD Codes:       780.2 CPT Codes:       73048 BP:   137    /   89     HR: Technical Quality:       Fair MEASUREMENTS  (Male / Female)  Normal Values 2D ECHO LV Diastolic Diameter PLAX        3.9 cm                4.2 - 5.9 / 3.9 - 5.3 cm LV Systolic Diameter PLAX         2.5 cm                2.1 - 4.0 cm IVS Diastolic Thickness           1 cm                  LVPW Diastolic Thickness          0.92 cm               LVOT Diameter                     2 cm                  Estimated LV Ejection Fraction    60 %                  LV Ejection Fraction MOD BP       58.2 %                >= 55  % LV Ejection Fraction MOD 4C       57.6 %                LV Ejection Fraction MOD 2C       58.8 %                M-MODE Aortic Root Diameter MM           3.7 cm                DOPPLER AV Peak Velocity                  1.5 m/s               AV Peak Gradient                  8.6 mmHg              AV Mean Gradient                  4.9 mmHg              LVOT Peak Velocity                1.3 m/s               AV Area Cont Eq vti               3 cm²                 Mitral E Point Velocity           0.77 m/s              Mitral E to A Ratio               0.93                  Mitral A Duration                 135 ms                MV Pressure Half Time             85.5 ms               MV Area PHT                       2.6 cm²               MV Deceleration Time              295 ms                Pulmonary Vein Systolic Velocity  0.54 m/s              Pulmonary Vein Diastolic Velocit  0.38 m/s              Pulmonary Vein S/D Ratio          1.4                   Pulmonary Vein A Velocity         0.21 m/s              Pulmonary Vein A Duration         72.7 ms               Pulm Vein-MV A Duration           -62 ms                TV Peak E Velocity                0.49 m/s              TR Peak Velocity                  274 cm/s              PV Peak Velocity                  0.9 m/s               PV Peak Gradient                  3.2 mmHg              RVOT Peak Velocity                0.72 m/s              * Indicates values subject to auto-interpretation LV EF:  60    %  FINDINGS Left Ventricle The left ventricle was normal in size and thickness. Normal left ventricular systolic function. Normal regional wall motion. Left ventricular ejection fraction is visually estimated to be 60%. Normal diastolic function. Right Ventricle Mildly dilated right ventricle. Normal right ventricular systolic function. Right Atrium The right atrium is normal in size. Normal inferior vena cava size and inspiratory collapse. Left Atrium The left atrium is normal in size.  Left atrial volume index is 30 mL/sq m. Mitral Valve Thickened mitral valve leaflets. No mitral stenosis. Trace mitral regurgitation. Aortic Valve Tricuspid aortic valve. Aortic sclerosis without stenosis. No aortic insufficiency. Tricuspid Valve Structurally normal tricuspid valve without significant stenosis. Mild tricuspid regurgitation. Estimated right ventricular systolic pressure is 32 mmHg. Right atrial pressure is estimated to be 3 mmHg. Pulmonic Valve The pulmonic valve is not well visualized. No stenosis or regurgitation seen. Pericardium Normal pericardium without effusion. Aorta Dilated aortic root, measuring 4.0 cm. Ascending aorta diameter is 3.1 cm. Flavia Bentley M.D. (Electronically Signed) Final Date:     30 January 2018                 10:06      LABORATORY  Lab Results   Component Value Date/Time    SODIUM 142 01/30/2018 06:25 AM    POTASSIUM 3.7 01/30/2018 06:25 AM    CHLORIDE 108 01/30/2018 06:25 AM    CO2 25 01/30/2018 06:25 AM    GLUCOSE 109 (H) 01/30/2018 06:25 AM    BUN 23 (H) 01/30/2018 06:25 AM    CREATININE 1.02 01/30/2018 06:25 AM        Lab Results   Component Value Date/Time    WBC 8.9 01/30/2018 06:25 AM    HEMOGLOBIN 14.1 01/30/2018 06:25 AM    HEMATOCRIT 42.0 01/30/2018 06:25 AM    PLATELETCT 140 (L) 01/30/2018 06:25 AM        Total time of the discharge process exceeds 40 minutes Spoke with Sumanth Fairbanks M.D. And wife

## 2018-01-30 NOTE — ED PROVIDER NOTES
"ED Provider Note    Scribed for Regina Ryan M.D. by Sanjuanita Schaffer. 1/29/2018  5:02 PM    Primary care provider: Sumanth Fairbanks M.D.  Means of arrival: Walk-in  History obtained from: Patient  History limited by: None     CHIEF COMPLAINT  Chief Complaint   Patient presents with   • T-5000 FALL     pt slipped in the steam room approx 2 hours ago.  Pt does not think he hit his head, unknown LOC.   • Shoulder Injury     pt has L shoulder deformity, possible dislocation.     HPI  Alonso Holder is a 82 y.o. male who presents to the Emergency Department for left shoulder pain and deformity secondary to a fall which occurred approximately two hours ago. Per wife, the patient was at the gym and fell while on the second step in the sauna. The cause of his fall is unknown and is consistent with the patient \"passing out\" , per wife. Patient does not recall what happened. Other gentlemen in the sauna helped him up. Patient does not believe he hit his head and has no complaints of headache or contusions to his head. He also denies nausea, vomiting or numbness. Patient denies any past medical history.    REVIEW OF SYSTEMS  EYES: no vision changes. He does not remember losing consciousness but is unsure if he hit his head  NECK: Positive neck pain  CARDIAC: Positive chest pain on the left side  GI: no vomiting, nausea.   Neuro: Positive for loss of consciousness. no numbness.   Musculoskeletal: positive for left shoulder pain and deformity.   See history of present illness. All other systems have been reviewed and are negative.   C.     PAST MEDICAL HISTORY   No chronic medical conditions.     SURGICAL HISTORY  patient denies any surgical history    SOCIAL HISTORY  Patient lives with his wife.     FAMILY HISTORY  None noted during this encounter.     CURRENT MEDICATIONS  Home Medications     Reviewed by Suzi Simeon R.N. (Registered Nurse) on 01/29/18 at 1650  Med List Status: Partial   Medication Last Dose Status " "  aspirin (ASA) 81 MG Chew Tab chewable tablet 1/28/2018 Active   benazepril (LOTENSIN) 20 MG Tab 1/29/2018 Active   loratadine (CLARITIN) 10 MG Tab 1/29/2018 Active                ALLERGIES  No Known Allergies    PHYSICAL EXAM  VITAL SIGNS: /89   Pulse 72   Resp 16   Ht 1.778 m (5' 10\")   Wt 97.5 kg (215 lb)   SpO2 99%   BMI 30.85 kg/m²     Constitutional: Well developed, Well nourished, No acute distress, Non-toxic appearance. Speaking in full sentences.   HEENT: Normocephalic, Atraumatic. No contusions. No abrasions. No facial injuries. external ears normal, pharynx pink,  Mucous  Membranes moist, No rhinorrhea or mucosal edema  Eyes: PERRL, EOMI, Conjunctiva normal, No discharge.   Neck: Normal range of motion, No tenderness, Supple, No stridor.   Lymphatic: No lymphadenopathy    Cardiovascular: Regular Rate and Rhythm, No murmurs,  rubs, or gallops.   Thorax & Lungs: Lungs clear to auscultation bilaterally, No respiratory distress, No wheezes, rhales or rhonchi, No chest wall tenderness.   Abdomen: Bowel sounds normal, Soft, non tender, non distended,  No pulsatile masses., no rebound guarding or peritoneal signs.   Skin: Warm, Dry, No erythema, No rash,   Extremities: Equal, intact distal pulses, No cyanosis, No tenderness.   Musculoskeletal: Hips and pelvis stable. Normal range of motion to to right arm and bilateral lower extremities. Left shoulder with decreased range of motion and obvious deformity with anterior fullness. Normal  strength. Sensation intact. Contusion to right anterior lower leg with a small superficial abrasion about 2 centimeter's in length without active bleeding  Neurologic: Alert & awake, no focal deficits  Psychiatric: Affect normal      DIAGNOSTIC STUDIES / PROCEDURES    LABS  Results for orders placed or performed during the hospital encounter of 01/29/18   CBC WITH DIFFERENTIAL   Result Value Ref Range    WBC 12.3 (H) 4.8 - 10.8 K/uL    RBC 5.12 4.70 - 6.10 M/uL    " Hemoglobin 16.3 14.0 - 18.0 g/dL    Hematocrit 48.0 42.0 - 52.0 %    MCV 93.8 81.4 - 97.8 fL    MCH 31.8 27.0 - 33.0 pg    MCHC 34.0 33.7 - 35.3 g/dL    RDW 45.0 35.9 - 50.0 fL    Platelet Count 153 (L) 164 - 446 K/uL    MPV 10.4 9.0 - 12.9 fL    Neutrophils-Polys 83.30 (H) 44.00 - 72.00 %    Lymphocytes 11.30 (L) 22.00 - 41.00 %    Monocytes 4.70 0.00 - 13.40 %    Eosinophils 0.00 0.00 - 6.90 %    Basophils 0.20 0.00 - 1.80 %    Immature Granulocytes 0.50 0.00 - 0.90 %    Nucleated RBC 0.00 /100 WBC    Neutrophils (Absolute) 10.25 (H) 1.82 - 7.42 K/uL    Lymphs (Absolute) 1.39 1.00 - 4.80 K/uL    Monos (Absolute) 0.58 0.00 - 0.85 K/uL    Eos (Absolute) 0.00 0.00 - 0.51 K/uL    Baso (Absolute) 0.02 0.00 - 0.12 K/uL    Immature Granulocytes (abs) 0.06 0.00 - 0.11 K/uL    NRBC (Absolute) 0.00 K/uL   COMP METABOLIC PANEL   Result Value Ref Range    Sodium 137 135 - 145 mmol/L    Potassium 4.3 3.6 - 5.5 mmol/L    Chloride 104 96 - 112 mmol/L    Co2 23 20 - 33 mmol/L    Anion Gap 10.0 0.0 - 11.9    Glucose 130 (H) 65 - 99 mg/dL    Bun 25 (H) 8 - 22 mg/dL    Creatinine 1.29 0.50 - 1.40 mg/dL    Calcium 9.3 8.5 - 10.5 mg/dL    AST(SGOT) 21 12 - 45 U/L    ALT(SGPT) 19 2 - 50 U/L    Alkaline Phosphatase 44 30 - 99 U/L    Total Bilirubin 0.7 0.1 - 1.5 mg/dL    Albumin 5.0 (H) 3.2 - 4.9 g/dL    Total Protein 7.4 6.0 - 8.2 g/dL    Globulin 2.4 1.9 - 3.5 g/dL    A-G Ratio 2.1 g/dL   PROTHROMBIN TIME   Result Value Ref Range    PT 13.1 12.0 - 14.6 sec    INR 1.02 0.87 - 1.13   APTT   Result Value Ref Range    APTT 29.2 24.7 - 36.0 sec   TROPONIN   Result Value Ref Range    Troponin I 0.02 0.00 - 0.04 ng/mL   ESTIMATED GFR   Result Value Ref Range    GFR If African American >60 >60 mL/min/1.73 m 2    GFR If Non  53 (A) >60 mL/min/1.73 m 2   EKG (ER)   Result Value Ref Range    Report       [facility] Emergency Dept.    Test Date:  2018-01-29  Pt Name:    ERUM ALEX               Department:  ER  MRN:        [id number]                      Room:  Gender:     Male                         Technician: [id number]  :        [id number]                   Requested By:ER TRIAGE PROTOCOL  Order #:    [id number]                    Reading MD:    Measurements  Intervals                                Axis  Rate:       84                           P:          -33  FL:         192                          QRS:        -29  QRSD:       100                          T:          60  QT:         372  QTc:        440    Interpretive Statements  SINUS RHYTHM  ATRIAL PREMATURE COMPLEX  BORDERLINE LEFT AXIS DEVIATION  EARLY PRECORDIAL R/S TRANSITION  No previous ECG available for comparison        All labs reviewed by me.    EKG  12 lead EKG; Interpreted by emergency department physician    Rhythm: Normal Sinus with PAC  Rate: 84  Axis: Normal  R Wave: Normal R wave progression  Normal ST-T segments  ST Segments: No ST segment elevation   T waves: Normal  Q waves: None    Clinical Impression: No acute changes    RADIOLOGY  CT-CHEST,ABDOMEN,PELVIS WITH   Final Result      1.  No acute intrathoracic injury.   2.  No evidence for acute intra-abdominal trauma.   3.  No gross thoracic or lumbar spine fracture.      CT-CSPINE WITHOUT PLUS RECONS   Final Result      1.  No acute cervical spine fracture or subluxation.   2.  Multilevel degenerative changes.      CT-HEAD W/O   Final Result      1.  Diffuse atrophy.   2.  No acute intracranial hemorrhage or territorial infarct.         DX-SHOULDER 2+ LEFT   Final Result      Reduction of left shoulder dislocation.               INTERPRETING LOCATION:  [facility], located within [facility], [address]      DX-SHOULDER 2+ LEFT   Final Result      Acute anterior subcoracoid dislocation of the left humeral head.        The radiologist's interpretation of all radiological studies have been reviewed by me.    Conscious Sedation Procedure Note    Indication: shoulder dislocation    Consent: I have discussed with the  patient and/or the patient representative the indication, alternatives, and the possible risks and/or complications of the planned procedure and the anesthesia methods. The patient and/or patient representative appear to understand and agree to proceed.    Physician Involvement: The attending physician was present and supervising this procedure.    Pre-Sedation Documentation and Exam: I have personally completed a history, physical exam & review of systems for this patient (see notes).  Airway Assessment: normal    Prior History of Anesthesia Complications: none    ASA Classification: Class 1 - A normal healthy patient    Sedation/ Anesthesia Plan: intravenous sedation    Medications Used: propofol 80 mg intravenously    Monitoring and Safety: The patient was placed on a cardiac monitor and vital signs, pulse oximetry and level of consciousness were continuously evaluated throughout the procedure. The patient was closely monitored until recovery from the medications was complete and the patient had returned to baseline status. Respiratory therapy was on standby at all times during the procedure.    (The following sections must be completed)  Post-Sedation Vital Signs: Vital signs were reviewed and were stable after the procedure (see flow sheet for vitals)            Post-Sedation Exam: Lungs: clear           Complications: none    Joint Reduction Procedure Note    Indication: Joint dislocation    Consent: The patient provided verbal consent for this procedure.    Procedure: The pre-reduction exam showed distal perfusion & neurologic function to be normal. The patient was placed in the appropriate position. Anesthesia/pain control was obtained using conscious sedation -SEE CONSCIOUS SEDATION NOTE FOR DETAILS. Reduction of the left shoulder was performed by traction and counter traction. Post reduction films were obtained and revealed satisfactory reduction. A post-reduction exam revealed distal perfusion &  "neurologic function to be normal. The affected area was immobilized with a shoulder immobilizer.    The patient tolerated the procedure well.    Complications: None      COURSE & MEDICAL DECISION MAKING  Nursing notes, VS, PMSFHx reviewed in chart.     5:02 PM - Patient seen and examined at bedside. Patient will be treated with 4 mg morphine and 4 mg Zofran. Ordered CT head, CT C-s[ine, CT chest and left shoulder x-ray to evaluate his symptoms. Ordered troponin, CBC, CMP, prothrombin time, APTT and EKGSecondary to the patient's episode of loss of consciousness and mechanism, he will have a full medical work-up performed. Differential diagnoses include but not limited to: Arrhythmia, vasovagal syncope, rib fracture, shoulder dislocation vs fracture    5:10 PM Consulted with nursing to have radiology called to have shoulder x-ray obtained as soon as possible.     5:13 PM Administered xylocaine at bedside.     5:53 PM Rechecked patient at bedside. I informed the patient I will perform a joint reduction and he was agreeable to the conscious sedation and procedure. Family members were at bedside as well. Patient states he has \"one martini per night\".     5:54 PM Performed successful shoulder reduction with conscious sedation at bedside. See procedure notes above.     5:59 PM Ordered left shoulder x-ray.     7:15 PM Reviewed patient's radiology results, which are shown above.     7:22 PM Reviewed patient's EKG. See interpretation above.     7:27 PM Recheck: Patient re-evaluated at beside.     8:20 PM Awaiting patient's CT.     9:03 PM the patient's CTs are negative for acute pathology. He will be admitted to the hospitalist service for cardiac monitoring for his syncopal episode.    DISPOSITION:  Patient will be admitted to hospitalist,  in guarded condition.    FINAL IMPRESSION  1. Syncope, unspecified syncope type    2. Fall, initial encounter    3. Dislocation of left shoulder joint, initial encounter    4. Abrasion of " left lower extremity, initial encounter    5. Cervical pain    6. Hypotension, unspecified hypotension type          I, Sanjuanita Schaffer (Scribe), am scribing for, and in the presence of, Regina Ryan M.D..    Electronically signed by: Sanjuanita Schaffer (Scribe), 1/29/2018    IRegina M.D. personally performed the services described in this documentation, as scribed by Sanjuanita Schaffer in my presence, and it is both accurate and complete.    The note accurately reflects work and decisions made by me.  Regina Ryan  1/29/2018  9:03 PM

## 2018-01-30 NOTE — ED NOTES
1750.  Pt signed consent for sedation. Pt placed on full cardiac monitor. Suction at bedside, code cart at bedside. Respiratory at bedside.

## 2018-01-30 NOTE — H&P
DATE OF ADMISSION:  01/29/2018    PRIMARY CARE PHYSICIAN:  Sumanth Fairbanks MD    CHIEF COMPLAINT:  Passing out.    HISTORY OF PRESENT ILLNESS:  Patient is a very pleasant 82-year-old male that   only has a past medical history of hypertension.  He presents to the hospital   after he passed out.  Patient reports that this morning he awakened in his   normal state of health.  At approximately 8:30 a.m. he had some oatmeal with a   small amount of _____ and then at 11:00, he went to the gym and worked out   for about an hour.  This included weight training and running.  Following   exercise, he went into the hot tub for 15 minutes and following that he went   in to steam room.  While in the same room he states he was in there for about   3-5 minutes and then he passed out and landed on his left shoulder and was   subsequently brought here.  He denies any palpitations or prodrome leading to   this.  Patient does report he had some type of upper respiratory infection   over the past week, but that has subsided.  He has no other complaints at this   time.  Denies palpitations, chest pain, shortness of breath.    REVIEW OF SYSTEMS:  As per HPI.  All other systems have been reviewed and are   negative.    PAST MEDICAL HISTORY:  Hypertension.    PAST SURGICAL HISTORY:  Elbow surgery and tonsillectomy.    SOCIAL HISTORY:  Negative for tobacco or drug use, occasional alcohol use.  He   worked in casino industry many years ago.    FAMILY HISTORY:  Has been reviewed and is not pertinent to his current   presentation.    ALLERGIES:  No known drug allergies.    PHYSICAL EXAMINATION:  VITAL SIGNS:  Blood pressure 137/89, pulse of 81, respirations 15, temperature   not documented, weight is 97 kg and oxygen saturation 98% on room air.  GENERAL:  Patient is pleasant, resting comfortably, not in any acute distress.  HEENT:  Moist mucous membranes.  No oropharyngeal exudates.  Eyes, EOMI,   PERRLA.  NECK:  No lymphadenopathy, no  JVD.  CARDIOVASCULAR:  Regular rate and rhythm.  No murmurs.  LUNGS:  Clear to auscultation bilaterally.  No rales, rhonchi, or wheezes.  ABDOMEN:  Positive bowel sounds, soft, nontender, nondistended.  NEUROLOGIC:  Awake, alert, and oriented to person, place, time, and situation.  MUSCULOSKELETAL AND EXTREMITIES:  No clubbing, cyanosis, or edema.  He has   swelling in his left shoulder.  He fell dislocated it.  Decreased range of   motion secondary to pain.    LABORATORY DATA:  CBC is unremarkable.  CMP is remarkable for a GFR of 53.    ASSESSMENT AND PLAN:  Patient is an 82-year-old male that was brought to the   hospital after he had a syncopal event while at the gym.  1.  Syncope.  This is likely secondary to dehydration.  He does appear to be   slightly dry.  We will treat him with IV fluids and monitor him on telemetry   overnight.  We will obtain an echocardiogram as well for tomorrow.  We will   also check orthostatic vital signs.  He worked up for an hour and then was in   the hot tub for 15 minutes and then went into the steam room.  I advised him   against doing that again.  2.  History of hypertension, continue with his home medications.  3.  Deep venous thrombosis prophylaxis, heparin 5000 units t.i.d.  4.  He is a full code.       ____________________________________     MD MERCED Joseph / ERNESTINE    DD:  01/29/2018 21:30:47  DT:  01/29/2018 21:52:25    D#:  [id number]  Job#:  [id number]

## 2018-01-30 NOTE — PROGRESS NOTES
Pt medicated per MAR.  Blood drawn, labeled at bedside, and sent to lab.  Wife at bedside.  No further needs to address.

## 2018-01-30 NOTE — ASSESSMENT & PLAN NOTE
Presented with near syncope and dizziness  Likely from dehydration per admitting physician  Also has mild vertigo. He has been recovering from sinus infection.   Echo normal EF, no evidence of aortic stenosis.  CT head neg for bleed or infarct  CT C-spine neg for fracture  CT negative for injury or bleed (see below)  At discharge date, he was ambulating, tolerating food. He did not complain of vertigo but did wonder what it could be caused by. Likley viral but if it persists, advised him to follow up.   He wants to go home.  Follow up with Sumanth Fairbanks M.D. In 1 week. Defer Holter monitor; defer ENT referral for audiology and vestibular PT if vertigo persists.

## 2018-01-30 NOTE — PROGRESS NOTES
Received care of pt from NOC RN this am. Pt is A+O. Medicated for pain control. Awaiting ECHO, hospitalist rounds.

## 2018-02-05 ENCOUNTER — HOSPITAL ENCOUNTER (OUTPATIENT)
Dept: LAB | Facility: MEDICAL CENTER | Age: 83
End: 2018-02-05
Attending: NURSE PRACTITIONER
Payer: MEDICARE

## 2018-02-05 LAB
25(OH)D3 SERPL-MCNC: 18 NG/ML (ref 30–100)
ALBUMIN SERPL BCP-MCNC: 4.6 G/DL (ref 3.2–4.9)
ALBUMIN/GLOB SERPL: 1.7 G/DL
ALP SERPL-CCNC: 43 U/L (ref 30–99)
ALT SERPL-CCNC: 18 U/L (ref 2–50)
ANION GAP SERPL CALC-SCNC: 6 MMOL/L (ref 0–11.9)
APPEARANCE UR: CLEAR
AST SERPL-CCNC: 17 U/L (ref 12–45)
BASOPHILS # BLD AUTO: 0.8 % (ref 0–1.8)
BASOPHILS # BLD: 0.05 K/UL (ref 0–0.12)
BILIRUB SERPL-MCNC: 1.2 MG/DL (ref 0.1–1.5)
BILIRUB UR QL STRIP.AUTO: NEGATIVE
BUN SERPL-MCNC: 24 MG/DL (ref 8–22)
CALCIUM SERPL-MCNC: 9.4 MG/DL (ref 8.5–10.5)
CHLORIDE SERPL-SCNC: 104 MMOL/L (ref 96–112)
CHOLEST SERPL-MCNC: 195 MG/DL (ref 100–199)
CO2 SERPL-SCNC: 29 MMOL/L (ref 20–33)
COLOR UR: YELLOW
CREAT SERPL-MCNC: 1.22 MG/DL (ref 0.5–1.4)
CREAT UR-MCNC: 132.9 MG/DL
CULTURE IF INDICATED INDCX: NO UA CULTURE
EOSINOPHIL # BLD AUTO: 0.13 K/UL (ref 0–0.51)
EOSINOPHIL NFR BLD: 2 % (ref 0–6.9)
ERYTHROCYTE [DISTWIDTH] IN BLOOD BY AUTOMATED COUNT: 45.1 FL (ref 35.9–50)
FOLATE SERPL-MCNC: 6.9 NG/ML
GLOBULIN SER CALC-MCNC: 2.7 G/DL (ref 1.9–3.5)
GLUCOSE SERPL-MCNC: 101 MG/DL (ref 65–99)
GLUCOSE UR STRIP.AUTO-MCNC: NEGATIVE MG/DL
HCT VFR BLD AUTO: 48.5 % (ref 42–52)
HDLC SERPL-MCNC: 41 MG/DL
HGB BLD-MCNC: 15.6 G/DL (ref 14–18)
IMM GRANULOCYTES # BLD AUTO: 0.01 K/UL (ref 0–0.11)
IMM GRANULOCYTES NFR BLD AUTO: 0.2 % (ref 0–0.9)
KETONES UR STRIP.AUTO-MCNC: NEGATIVE MG/DL
LDLC SERPL CALC-MCNC: 106 MG/DL
LEUKOCYTE ESTERASE UR QL STRIP.AUTO: NEGATIVE
LYMPHOCYTES # BLD AUTO: 1.79 K/UL (ref 1–4.8)
LYMPHOCYTES NFR BLD: 27 % (ref 22–41)
MCH RBC QN AUTO: 30.5 PG (ref 27–33)
MCHC RBC AUTO-ENTMCNC: 32.2 G/DL (ref 33.7–35.3)
MCV RBC AUTO: 94.9 FL (ref 81.4–97.8)
MICRO URNS: NORMAL
MICROALBUMIN UR-MCNC: 1.4 MG/DL
MICROALBUMIN/CREAT UR: 11 MG/G (ref 0–30)
MONOCYTES # BLD AUTO: 0.51 K/UL (ref 0–0.85)
MONOCYTES NFR BLD AUTO: 7.7 % (ref 0–13.4)
NEUTROPHILS # BLD AUTO: 4.14 K/UL (ref 1.82–7.42)
NEUTROPHILS NFR BLD: 62.3 % (ref 44–72)
NITRITE UR QL STRIP.AUTO: NEGATIVE
NRBC # BLD AUTO: 0 K/UL
NRBC BLD-RTO: 0 /100 WBC
PH UR STRIP.AUTO: 5.5 [PH]
PLATELET # BLD AUTO: 202 K/UL (ref 164–446)
PMV BLD AUTO: 10.7 FL (ref 9–12.9)
POTASSIUM SERPL-SCNC: 4.3 MMOL/L (ref 3.6–5.5)
PROT SERPL-MCNC: 7.3 G/DL (ref 6–8.2)
PROT UR QL STRIP: NEGATIVE MG/DL
PSA SERPL-MCNC: 1.2 NG/ML (ref 0–4)
RBC # BLD AUTO: 5.11 M/UL (ref 4.7–6.1)
RBC UR QL AUTO: NEGATIVE
SODIUM SERPL-SCNC: 139 MMOL/L (ref 135–145)
SP GR UR STRIP.AUTO: 1.02
TRIGL SERPL-MCNC: 239 MG/DL (ref 0–149)
TSH SERPL DL<=0.005 MIU/L-ACNC: 1.92 UIU/ML (ref 0.38–5.33)
UROBILINOGEN UR STRIP.AUTO-MCNC: 1 MG/DL
VIT B12 SERPL-MCNC: 563 PG/ML (ref 211–911)
WBC # BLD AUTO: 6.6 K/UL (ref 4.8–10.8)

## 2018-02-05 PROCEDURE — 82607 VITAMIN B-12: CPT

## 2018-02-05 PROCEDURE — 82570 ASSAY OF URINE CREATININE: CPT

## 2018-02-05 PROCEDURE — 82746 ASSAY OF FOLIC ACID SERUM: CPT

## 2018-02-05 PROCEDURE — 84207 ASSAY OF VITAMIN B-6: CPT

## 2018-02-05 PROCEDURE — 36415 COLL VENOUS BLD VENIPUNCTURE: CPT

## 2018-02-05 PROCEDURE — 80053 COMPREHEN METABOLIC PANEL: CPT

## 2018-02-05 PROCEDURE — 84425 ASSAY OF VITAMIN B-1: CPT

## 2018-02-05 PROCEDURE — 82306 VITAMIN D 25 HYDROXY: CPT

## 2018-02-05 PROCEDURE — 82043 UR ALBUMIN QUANTITATIVE: CPT

## 2018-02-05 PROCEDURE — 80061 LIPID PANEL: CPT

## 2018-02-05 PROCEDURE — 85025 COMPLETE CBC W/AUTO DIFF WBC: CPT

## 2018-02-05 PROCEDURE — 84443 ASSAY THYROID STIM HORMONE: CPT

## 2018-02-05 PROCEDURE — 81003 URINALYSIS AUTO W/O SCOPE: CPT

## 2018-02-05 PROCEDURE — 84153 ASSAY OF PSA TOTAL: CPT

## 2018-02-08 LAB — VIT B6 SERPL-MCNC: <2 NMOL/L (ref 20–125)

## 2018-02-09 LAB — VIT B1 BLD-MCNC: 102 NMOL/L (ref 70–180)

## 2018-02-15 ENCOUNTER — HOSPITAL ENCOUNTER (OUTPATIENT)
Dept: RADIOLOGY | Facility: MEDICAL CENTER | Age: 83
End: 2018-02-15
Attending: INTERNAL MEDICINE
Payer: MEDICARE

## 2018-02-15 DIAGNOSIS — R42 DIZZINESS AND GIDDINESS: ICD-10-CM

## 2018-02-15 PROCEDURE — 93880 EXTRACRANIAL BILAT STUDY: CPT

## 2018-03-13 ENCOUNTER — OFFICE VISIT (OUTPATIENT)
Dept: CARDIOLOGY | Facility: MEDICAL CENTER | Age: 83
End: 2018-03-13
Payer: MEDICARE

## 2018-03-13 VITALS
DIASTOLIC BLOOD PRESSURE: 70 MMHG | HEIGHT: 70 IN | BODY MASS INDEX: 30.35 KG/M2 | SYSTOLIC BLOOD PRESSURE: 130 MMHG | WEIGHT: 212 LBS

## 2018-03-13 DIAGNOSIS — I10 ESSENTIAL HYPERTENSION: ICD-10-CM

## 2018-03-13 DIAGNOSIS — E86.0 DEHYDRATION: ICD-10-CM
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
## 2018-03-13 PROCEDURE — 99204 OFFICE O/P NEW MOD 45 MIN: CPT | Performed by: INTERNAL MEDICINE

## 2018-03-13 RX ORDER — UBIDECARENONE 50 MG
CAPSULE ORAL
COMMUNITY
End: 2020-02-05

## 2018-03-13 RX ORDER — TADALAFIL 20 MG/1
20 TABLET ORAL PRN
COMMUNITY
End: 2020-03-19

## 2018-03-13 RX ORDER — CHOLECALCIFEROL (VITAMIN D3) 125 MCG
CAPSULE ORAL
COMMUNITY
End: 2020-02-05

## 2018-03-13 RX ORDER — BENAZEPRIL HYDROCHLORIDE 20 MG/1
20 TABLET ORAL 2 TIMES DAILY
COMMUNITY
End: 2020-02-05

## 2018-03-13 NOTE — PROGRESS NOTES
Subjective:   Alonso Holder is a 82 y.o. male who presents today for initial consultation regarding a history of fall. His active healthy and has no precocious coronary disease in his family, is a nonsmoker who drinks 1 alcoholic beverage per night. No other drug use. Presented to the emergency department after mechanical fall when he bent over while dehydrated after being in and out of the hot tub and the steam room. He had no prodrome. He dislocated his shoulder. He has not felt poorly otherwise. Evaluation in the hospital was unremarkable with a normal echocardiogram PACs and an otherwise normal ECG. Currently feels well and is back to exercising.    Past Medical History:   Diagnosis Date   • Essential hypertension 1/29/2018     History reviewed. No pertinent surgical history.  Family History   Problem Relation Age of Onset   • Heart Disease Neg Hx      History   Smoking Status   • Never Smoker   Smokeless Tobacco   • Never Used     No Known Allergies  Outpatient Encounter Prescriptions as of 3/13/2018   Medication Sig Dispense Refill   • benazepril (LOTENSIN) 20 MG Tab Take 20 mg by mouth 2 Times a Day.     • Coenzyme Q10 (CO Q 10) 100 MG Cap Take  by mouth.     • Cholecalciferol (VITAMIN D3) 2000 units Tab Take  by mouth.     • Probiotic Product (PROBIOTIC ADVANCED PO) Take  by mouth.     • Red Yeast Rice 600 MG Tab Take  by mouth.     • CALCIUM PO Take 600 mg by mouth every day.     • aspirin 81 MG tablet Take 81 mg by mouth every day.     • tadalafil (CIALIS) 20 MG tablet Take 20 mg by mouth as needed for Erectile Dysfunction.     • [DISCONTINUED] senna-docusate (PERICOLACE OR SENOKOT S) 8.6-50 MG Tab Take 2 Tabs by mouth 2 Times a Day. 30 Tab 0   • [DISCONTINUED] polyethylene glycol/lytes (MIRALAX) Pack Take  by mouth 1 time daily as needed (if sennosides and docusate ineffective after 24 hours).  3   • [DISCONTINUED] aspirin (ASA) 81 MG Chew Tab chewable tablet Take 81 mg by mouth every bedtime.    "  • [DISCONTINUED] benazepril (LOTENSIN) 20 MG Tab Take 20 mg by mouth 2 Times a Day.     • [DISCONTINUED] loratadine (CLARITIN) 10 MG Tab Take 10 mg by mouth every day.       No facility-administered encounter medications on file as of 3/13/2018.      Review of Systems   All other systems reviewed and are negative.       Objective:   /70   Ht 1.778 m (5' 10\")   Wt 96.2 kg (212 lb)   BMI 30.42 kg/m²     Physical Exam   Constitutional: He is oriented to person, place, and time. He appears well-developed and well-nourished. No distress.   HENT:   Head: Normocephalic and atraumatic.   Mouth/Throat: Oropharynx is clear and moist.   Eyes: Conjunctivae are normal. Pupils are equal, round, and reactive to light. No scleral icterus.   Neck: No JVD present. No tracheal deviation present. No thyromegaly present.   Cardiovascular: Normal rate, regular rhythm, S1 normal, normal heart sounds and intact distal pulses.   Extrasystoles are present. PMI is not displaced.  Exam reveals no gallop and no friction rub.    No murmur heard.  Pulses:       Carotid pulses are 2+ on the right side, and 2+ on the left side.       Radial pulses are 2+ on the right side, and 2+ on the left side.        Dorsalis pedis pulses are 2+ on the right side, and 2+ on the left side.        Posterior tibial pulses are 2+ on the right side, and 2+ on the left side.   Pulmonary/Chest: Effort normal and breath sounds normal. He has no wheezes. He has no rales.   Abdominal: Soft. Bowel sounds are normal. He exhibits no distension and no pulsatile midline mass. There is no tenderness. There is no guarding.   Musculoskeletal: He exhibits no edema.   Neurological: He is alert and oriented to person, place, and time. No cranial nerve deficit (cranial nerves II through XII grossly intact).   Skin: Skin is warm and dry. No rash noted. He is not diaphoretic. No erythema.   Psychiatric: He has a normal mood and affect. His behavior is normal. Thought " content normal.     EKG (1/29/2018):  I have personally reviewed the EKG this visit and discussed with the patient. It shows sinus rhythm with premature atrial contractions.    ECHO CONCLUSIONS (1/30/2018):  Left ventricular ejection fraction is visually estimated to be 60%.   Normal diastolic function.   Trace mitral regurgitation.  Estimated right ventricular systolic pressure is 32 mmHg. Right atrial   pressure is estimated to be 3 mmHg.   Dilated aortic root, measuring 4.0 cm.        Assessment:     1. Essential hypertension     2. Dehydration     3. Fall, sequela         Medical Decision Making:  Today's Assessment / Status / Plan:     He had an episode of near syncope while profoundly dehydrated. He has not had a recurrence and is paying closer attention to his hydration status. His cardiac evaluation is completely benign aside from PACs. I do recommend cholesterol management and he is on red yeast Rice due to statin intolerance which is appropriate given his nonobstructive carotid plaquing. Otherwise she should follow guidelines directed recommendations for cholesterol and hypertension management with his primary care physician and return to cardiology if needed.

## 2018-06-07 ENCOUNTER — HOSPITAL ENCOUNTER (OUTPATIENT)
Dept: LAB | Facility: MEDICAL CENTER | Age: 83
End: 2018-06-07
Attending: NURSE PRACTITIONER
Payer: MEDICARE

## 2018-06-07 LAB
25(OH)D3 SERPL-MCNC: 41 NG/ML (ref 30–100)
ALBUMIN SERPL BCP-MCNC: 4.2 G/DL (ref 3.2–4.9)
ALBUMIN/GLOB SERPL: 1.5 G/DL
ALP SERPL-CCNC: 48 U/L (ref 30–99)
ALT SERPL-CCNC: 17 U/L (ref 2–50)
ANION GAP SERPL CALC-SCNC: 6 MMOL/L (ref 0–11.9)
APPEARANCE UR: CLEAR
AST SERPL-CCNC: 18 U/L (ref 12–45)
BASOPHILS # BLD AUTO: 0.6 % (ref 0–1.8)
BASOPHILS # BLD: 0.03 K/UL (ref 0–0.12)
BILIRUB SERPL-MCNC: 0.8 MG/DL (ref 0.1–1.5)
BILIRUB UR QL STRIP.AUTO: NEGATIVE
BUN SERPL-MCNC: 20 MG/DL (ref 8–22)
CALCIUM SERPL-MCNC: 9.3 MG/DL (ref 8.5–10.5)
CHLORIDE SERPL-SCNC: 107 MMOL/L (ref 96–112)
CHOLEST SERPL-MCNC: 197 MG/DL (ref 100–199)
CO2 SERPL-SCNC: 28 MMOL/L (ref 20–33)
COLOR UR: YELLOW
CREAT SERPL-MCNC: 1.21 MG/DL (ref 0.5–1.4)
CREAT UR-MCNC: 136.8 MG/DL
EOSINOPHIL # BLD AUTO: 0.09 K/UL (ref 0–0.51)
EOSINOPHIL NFR BLD: 1.7 % (ref 0–6.9)
ERYTHROCYTE [DISTWIDTH] IN BLOOD BY AUTOMATED COUNT: 46.5 FL (ref 35.9–50)
GLOBULIN SER CALC-MCNC: 2.8 G/DL (ref 1.9–3.5)
GLUCOSE SERPL-MCNC: 99 MG/DL (ref 65–99)
GLUCOSE UR STRIP.AUTO-MCNC: NEGATIVE MG/DL
HCT VFR BLD AUTO: 45.8 % (ref 42–52)
HDLC SERPL-MCNC: 43 MG/DL
HGB BLD-MCNC: 15.3 G/DL (ref 14–18)
IMM GRANULOCYTES # BLD AUTO: 0.01 K/UL (ref 0–0.11)
IMM GRANULOCYTES NFR BLD AUTO: 0.2 % (ref 0–0.9)
KETONES UR STRIP.AUTO-MCNC: NEGATIVE MG/DL
LDLC SERPL CALC-MCNC: 112 MG/DL
LEUKOCYTE ESTERASE UR QL STRIP.AUTO: NEGATIVE
LYMPHOCYTES # BLD AUTO: 1.8 K/UL (ref 1–4.8)
LYMPHOCYTES NFR BLD: 33.6 % (ref 22–41)
MCH RBC QN AUTO: 32 PG (ref 27–33)
MCHC RBC AUTO-ENTMCNC: 33.4 G/DL (ref 33.7–35.3)
MCV RBC AUTO: 95.8 FL (ref 81.4–97.8)
MICRO URNS: NORMAL
MICROALBUMIN UR-MCNC: 1.7 MG/DL
MICROALBUMIN/CREAT UR: 12 MG/G (ref 0–30)
MONOCYTES # BLD AUTO: 0.51 K/UL (ref 0–0.85)
MONOCYTES NFR BLD AUTO: 9.5 % (ref 0–13.4)
NEUTROPHILS # BLD AUTO: 2.91 K/UL (ref 1.82–7.42)
NEUTROPHILS NFR BLD: 54.4 % (ref 44–72)
NITRITE UR QL STRIP.AUTO: NEGATIVE
NRBC # BLD AUTO: 0 K/UL
NRBC BLD-RTO: 0 /100 WBC
PH UR STRIP.AUTO: 6 [PH]
PLATELET # BLD AUTO: 140 K/UL (ref 164–446)
PMV BLD AUTO: 11.7 FL (ref 9–12.9)
POTASSIUM SERPL-SCNC: 5 MMOL/L (ref 3.6–5.5)
PROT SERPL-MCNC: 7 G/DL (ref 6–8.2)
PROT UR QL STRIP: NEGATIVE MG/DL
PSA SERPL-MCNC: 1.51 NG/ML (ref 0–4)
RBC # BLD AUTO: 4.78 M/UL (ref 4.7–6.1)
RBC UR QL AUTO: NEGATIVE
SODIUM SERPL-SCNC: 141 MMOL/L (ref 135–145)
SP GR UR STRIP.AUTO: 1.02
T3 SERPL-MCNC: 91.3 NG/DL (ref 60–181)
T4 FREE SERPL-MCNC: 0.68 NG/DL (ref 0.53–1.43)
T4 SERPL-MCNC: 5.5 UG/DL (ref 4–12)
TRIGL SERPL-MCNC: 210 MG/DL (ref 0–149)
TSH SERPL DL<=0.005 MIU/L-ACNC: 0.95 UIU/ML (ref 0.38–5.33)
URATE SERPL-MCNC: 8.5 MG/DL (ref 2.5–8.3)
UROBILINOGEN UR STRIP.AUTO-MCNC: 0.2 MG/DL
WBC # BLD AUTO: 5.4 K/UL (ref 4.8–10.8)

## 2018-06-07 PROCEDURE — 82043 UR ALBUMIN QUANTITATIVE: CPT

## 2018-06-07 PROCEDURE — 85025 COMPLETE CBC W/AUTO DIFF WBC: CPT

## 2018-06-07 PROCEDURE — 36415 COLL VENOUS BLD VENIPUNCTURE: CPT

## 2018-06-07 PROCEDURE — 83036 HEMOGLOBIN GLYCOSYLATED A1C: CPT

## 2018-06-07 PROCEDURE — 84550 ASSAY OF BLOOD/URIC ACID: CPT

## 2018-06-07 PROCEDURE — 80061 LIPID PANEL: CPT

## 2018-06-07 PROCEDURE — 84439 ASSAY OF FREE THYROXINE: CPT

## 2018-06-07 PROCEDURE — 82306 VITAMIN D 25 HYDROXY: CPT

## 2018-06-07 PROCEDURE — 84153 ASSAY OF PSA TOTAL: CPT

## 2018-06-07 PROCEDURE — 84443 ASSAY THYROID STIM HORMONE: CPT

## 2018-06-07 PROCEDURE — 80053 COMPREHEN METABOLIC PANEL: CPT

## 2018-06-07 PROCEDURE — 81003 URINALYSIS AUTO W/O SCOPE: CPT

## 2018-06-07 PROCEDURE — 84480 ASSAY TRIIODOTHYRONINE (T3): CPT

## 2018-06-07 PROCEDURE — 82570 ASSAY OF URINE CREATININE: CPT

## 2018-06-08 LAB
EST. AVERAGE GLUCOSE BLD GHB EST-MCNC: 114 MG/DL
HBA1C MFR BLD: 5.6 % (ref 0–5.6)

## 2018-08-28 ENCOUNTER — APPOINTMENT (RX ONLY)
Dept: URBAN - METROPOLITAN AREA CLINIC 4 | Facility: CLINIC | Age: 83
Setting detail: DERMATOLOGY
End: 2018-08-28

## 2018-08-28 DIAGNOSIS — L82.1 OTHER SEBORRHEIC KERATOSIS: ICD-10-CM

## 2018-08-28 PROCEDURE — ? COUNSELING

## 2018-08-28 PROCEDURE — 99212 OFFICE O/P EST SF 10 MIN: CPT

## 2018-08-28 ASSESSMENT — LOCATION ZONE DERM: LOCATION ZONE: LEG

## 2018-08-28 ASSESSMENT — LOCATION SIMPLE DESCRIPTION DERM: LOCATION SIMPLE: RIGHT PRETIBIAL REGION

## 2018-08-28 ASSESSMENT — LOCATION DETAILED DESCRIPTION DERM: LOCATION DETAILED: RIGHT PROXIMAL PRETIBIAL REGION

## 2018-09-11 ENCOUNTER — HOSPITAL ENCOUNTER (OUTPATIENT)
Dept: LAB | Facility: MEDICAL CENTER | Age: 83
End: 2018-09-11
Attending: NURSE PRACTITIONER
Payer: MEDICARE

## 2018-09-11 LAB
25(OH)D3 SERPL-MCNC: 50 NG/ML (ref 30–100)
ALBUMIN SERPL BCP-MCNC: 4.2 G/DL (ref 3.2–4.9)
ALBUMIN/GLOB SERPL: 1.4 G/DL
ALP SERPL-CCNC: 50 U/L (ref 30–99)
ALT SERPL-CCNC: 16 U/L (ref 2–50)
ANION GAP SERPL CALC-SCNC: 7 MMOL/L (ref 0–11.9)
APPEARANCE UR: CLEAR
AST SERPL-CCNC: 16 U/L (ref 12–45)
BASOPHILS # BLD AUTO: 0.7 % (ref 0–1.8)
BASOPHILS # BLD: 0.04 K/UL (ref 0–0.12)
BILIRUB SERPL-MCNC: 1 MG/DL (ref 0.1–1.5)
BILIRUB UR QL STRIP.AUTO: NEGATIVE
BUN SERPL-MCNC: 28 MG/DL (ref 8–22)
CALCIUM SERPL-MCNC: 8.9 MG/DL (ref 8.5–10.5)
CHLORIDE SERPL-SCNC: 104 MMOL/L (ref 96–112)
CHOLEST SERPL-MCNC: 208 MG/DL (ref 100–199)
CO2 SERPL-SCNC: 27 MMOL/L (ref 20–33)
COLOR UR: YELLOW
CREAT SERPL-MCNC: 1.33 MG/DL (ref 0.5–1.4)
EOSINOPHIL # BLD AUTO: 0.07 K/UL (ref 0–0.51)
EOSINOPHIL NFR BLD: 1.3 % (ref 0–6.9)
ERYTHROCYTE [DISTWIDTH] IN BLOOD BY AUTOMATED COUNT: 43.8 FL (ref 35.9–50)
FASTING STATUS PATIENT QL REPORTED: NORMAL
GLOBULIN SER CALC-MCNC: 3 G/DL (ref 1.9–3.5)
GLUCOSE SERPL-MCNC: 100 MG/DL (ref 65–99)
GLUCOSE UR STRIP.AUTO-MCNC: NEGATIVE MG/DL
HCT VFR BLD AUTO: 45.6 % (ref 42–52)
HDLC SERPL-MCNC: 40 MG/DL
HGB BLD-MCNC: 15.1 G/DL (ref 14–18)
IMM GRANULOCYTES # BLD AUTO: 0.02 K/UL (ref 0–0.11)
IMM GRANULOCYTES NFR BLD AUTO: 0.4 % (ref 0–0.9)
KETONES UR STRIP.AUTO-MCNC: NEGATIVE MG/DL
LDLC SERPL CALC-MCNC: 123 MG/DL
LEUKOCYTE ESTERASE UR QL STRIP.AUTO: NEGATIVE
LYMPHOCYTES # BLD AUTO: 1.98 K/UL (ref 1–4.8)
LYMPHOCYTES NFR BLD: 35.4 % (ref 22–41)
MCH RBC QN AUTO: 31.2 PG (ref 27–33)
MCHC RBC AUTO-ENTMCNC: 33.1 G/DL (ref 33.7–35.3)
MCV RBC AUTO: 94.2 FL (ref 81.4–97.8)
MICRO URNS: NORMAL
MONOCYTES # BLD AUTO: 0.54 K/UL (ref 0–0.85)
MONOCYTES NFR BLD AUTO: 9.6 % (ref 0–13.4)
NEUTROPHILS # BLD AUTO: 2.95 K/UL (ref 1.82–7.42)
NEUTROPHILS NFR BLD: 52.6 % (ref 44–72)
NITRITE UR QL STRIP.AUTO: NEGATIVE
NRBC # BLD AUTO: 0 K/UL
NRBC BLD-RTO: 0 /100 WBC
PH UR STRIP.AUTO: 6 [PH]
PLATELET # BLD AUTO: 158 K/UL (ref 164–446)
PMV BLD AUTO: 11 FL (ref 9–12.9)
POTASSIUM SERPL-SCNC: 4.3 MMOL/L (ref 3.6–5.5)
PROT SERPL-MCNC: 7.2 G/DL (ref 6–8.2)
PROT UR QL STRIP: NEGATIVE MG/DL
RBC # BLD AUTO: 4.84 M/UL (ref 4.7–6.1)
RBC UR QL AUTO: NEGATIVE
SODIUM SERPL-SCNC: 138 MMOL/L (ref 135–145)
SP GR UR STRIP.AUTO: 1.02
TRIGL SERPL-MCNC: 223 MG/DL (ref 0–149)
URATE SERPL-MCNC: 10.1 MG/DL (ref 2.5–8.3)
UROBILINOGEN UR STRIP.AUTO-MCNC: 0.2 MG/DL
WBC # BLD AUTO: 5.6 K/UL (ref 4.8–10.8)

## 2018-09-11 PROCEDURE — 80061 LIPID PANEL: CPT

## 2018-09-11 PROCEDURE — 85025 COMPLETE CBC W/AUTO DIFF WBC: CPT

## 2018-09-11 PROCEDURE — 81003 URINALYSIS AUTO W/O SCOPE: CPT

## 2018-09-11 PROCEDURE — 84153 ASSAY OF PSA TOTAL: CPT

## 2018-09-11 PROCEDURE — 36415 COLL VENOUS BLD VENIPUNCTURE: CPT

## 2018-09-11 PROCEDURE — 82306 VITAMIN D 25 HYDROXY: CPT

## 2018-09-11 PROCEDURE — 84550 ASSAY OF BLOOD/URIC ACID: CPT

## 2018-09-11 PROCEDURE — 80053 COMPREHEN METABOLIC PANEL: CPT

## 2018-09-12 LAB — PSA SERPL-MCNC: 2.06 NG/ML (ref 0–4)

## 2019-04-19 ENCOUNTER — HOSPITAL ENCOUNTER (OUTPATIENT)
Dept: LAB | Facility: MEDICAL CENTER | Age: 84
End: 2019-04-19
Attending: NURSE PRACTITIONER
Payer: MEDICARE

## 2019-04-19 LAB
25(OH)D3 SERPL-MCNC: 44 NG/ML (ref 30–100)
ALBUMIN SERPL BCP-MCNC: 4.2 G/DL (ref 3.2–4.9)
ALBUMIN/GLOB SERPL: 1.5 G/DL
ALP SERPL-CCNC: 48 U/L (ref 30–99)
ALT SERPL-CCNC: 17 U/L (ref 2–50)
ANION GAP SERPL CALC-SCNC: 6 MMOL/L (ref 0–11.9)
APPEARANCE UR: CLEAR
AST SERPL-CCNC: 14 U/L (ref 12–45)
BASOPHILS # BLD AUTO: 0.3 % (ref 0–1.8)
BASOPHILS # BLD: 0.02 K/UL (ref 0–0.12)
BILIRUB SERPL-MCNC: 0.5 MG/DL (ref 0.1–1.5)
BILIRUB UR QL STRIP.AUTO: NEGATIVE
BUN SERPL-MCNC: 33 MG/DL (ref 8–22)
CALCIUM SERPL-MCNC: 9.3 MG/DL (ref 8.5–10.5)
CHLORIDE SERPL-SCNC: 106 MMOL/L (ref 96–112)
CHOLEST SERPL-MCNC: 213 MG/DL (ref 100–199)
CO2 SERPL-SCNC: 28 MMOL/L (ref 20–33)
COLOR UR: YELLOW
CREAT SERPL-MCNC: 1.38 MG/DL (ref 0.5–1.4)
EOSINOPHIL # BLD AUTO: 0.1 K/UL (ref 0–0.51)
EOSINOPHIL NFR BLD: 1.6 % (ref 0–6.9)
ERYTHROCYTE [DISTWIDTH] IN BLOOD BY AUTOMATED COUNT: 49.4 FL (ref 35.9–50)
EST. AVERAGE GLUCOSE BLD GHB EST-MCNC: 120 MG/DL
FOLATE SERPL-MCNC: 11.9 NG/ML
GLOBULIN SER CALC-MCNC: 2.8 G/DL (ref 1.9–3.5)
GLUCOSE SERPL-MCNC: 110 MG/DL (ref 65–99)
GLUCOSE UR STRIP.AUTO-MCNC: NEGATIVE MG/DL
HBA1C MFR BLD: 5.8 % (ref 0–5.6)
HCT VFR BLD AUTO: 48.1 % (ref 42–52)
HDLC SERPL-MCNC: 35 MG/DL
HGB BLD-MCNC: 15.4 G/DL (ref 14–18)
IMM GRANULOCYTES # BLD AUTO: 0.02 K/UL (ref 0–0.11)
IMM GRANULOCYTES NFR BLD AUTO: 0.3 % (ref 0–0.9)
KETONES UR STRIP.AUTO-MCNC: NEGATIVE MG/DL
LDLC SERPL CALC-MCNC: ABNORMAL MG/DL
LEUKOCYTE ESTERASE UR QL STRIP.AUTO: NEGATIVE
LYMPHOCYTES # BLD AUTO: 2.24 K/UL (ref 1–4.8)
LYMPHOCYTES NFR BLD: 36.4 % (ref 22–41)
MCH RBC QN AUTO: 32.2 PG (ref 27–33)
MCHC RBC AUTO-ENTMCNC: 32 G/DL (ref 33.7–35.3)
MCV RBC AUTO: 100.6 FL (ref 81.4–97.8)
MICRO URNS: NORMAL
MONOCYTES # BLD AUTO: 0.55 K/UL (ref 0–0.85)
MONOCYTES NFR BLD AUTO: 8.9 % (ref 0–13.4)
NEUTROPHILS # BLD AUTO: 3.23 K/UL (ref 1.82–7.42)
NEUTROPHILS NFR BLD: 52.5 % (ref 44–72)
NITRITE UR QL STRIP.AUTO: NEGATIVE
NRBC # BLD AUTO: 0 K/UL
NRBC BLD-RTO: 0 /100 WBC
PH UR STRIP.AUTO: 5.5 [PH]
PLATELET # BLD AUTO: 159 K/UL (ref 164–446)
PMV BLD AUTO: 11.2 FL (ref 9–12.9)
POTASSIUM SERPL-SCNC: 4.5 MMOL/L (ref 3.6–5.5)
PROT SERPL-MCNC: 7 G/DL (ref 6–8.2)
PROT UR QL STRIP: NEGATIVE MG/DL
PSA SERPL-MCNC: 1.77 NG/ML (ref 0–4)
RBC # BLD AUTO: 4.78 M/UL (ref 4.7–6.1)
RBC UR QL AUTO: NEGATIVE
SODIUM SERPL-SCNC: 140 MMOL/L (ref 135–145)
SP GR UR STRIP.AUTO: 1.02
T3FREE SERPL-MCNC: 3.55 PG/ML (ref 2.4–4.2)
TRIGL SERPL-MCNC: 402 MG/DL (ref 0–149)
TSH SERPL DL<=0.005 MIU/L-ACNC: 1.5 UIU/ML (ref 0.38–5.33)
URATE SERPL-MCNC: 10 MG/DL (ref 2.5–8.3)
UROBILINOGEN UR STRIP.AUTO-MCNC: 0.2 MG/DL
VIT B12 SERPL-MCNC: 412 PG/ML (ref 211–911)
WBC # BLD AUTO: 6.2 K/UL (ref 4.8–10.8)

## 2019-04-19 PROCEDURE — 84207 ASSAY OF VITAMIN B-6: CPT

## 2019-04-19 PROCEDURE — 80053 COMPREHEN METABOLIC PANEL: CPT

## 2019-04-19 PROCEDURE — 82607 VITAMIN B-12: CPT

## 2019-04-19 PROCEDURE — 82306 VITAMIN D 25 HYDROXY: CPT

## 2019-04-19 PROCEDURE — 85025 COMPLETE CBC W/AUTO DIFF WBC: CPT

## 2019-04-19 PROCEDURE — 84439 ASSAY OF FREE THYROXINE: CPT

## 2019-04-19 PROCEDURE — 80061 LIPID PANEL: CPT

## 2019-04-19 PROCEDURE — 82746 ASSAY OF FOLIC ACID SERUM: CPT

## 2019-04-19 PROCEDURE — 83036 HEMOGLOBIN GLYCOSYLATED A1C: CPT

## 2019-04-19 PROCEDURE — 36415 COLL VENOUS BLD VENIPUNCTURE: CPT

## 2019-04-19 PROCEDURE — 84550 ASSAY OF BLOOD/URIC ACID: CPT

## 2019-04-19 PROCEDURE — 81003 URINALYSIS AUTO W/O SCOPE: CPT

## 2019-04-19 PROCEDURE — 84481 FREE ASSAY (FT-3): CPT

## 2019-04-19 PROCEDURE — 84425 ASSAY OF VITAMIN B-1: CPT

## 2019-04-19 PROCEDURE — 84153 ASSAY OF PSA TOTAL: CPT

## 2019-04-19 PROCEDURE — 84443 ASSAY THYROID STIM HORMONE: CPT

## 2019-04-23 LAB
VIT B1 BLD-MCNC: 49 NMOL/L (ref 70–180)
VIT B6 SERPL-MCNC: 49.4 NMOL/L (ref 20–125)

## 2019-04-24 LAB — T4 FREE SERPL DIALY-MCNC: 1.5 NG/DL (ref 1.1–2.4)

## 2019-06-05 ENCOUNTER — APPOINTMENT (OUTPATIENT)
Dept: RADIOLOGY | Facility: MEDICAL CENTER | Age: 84
End: 2019-06-05
Attending: NURSE PRACTITIONER
Payer: MEDICARE

## 2019-06-27 ENCOUNTER — APPOINTMENT (OUTPATIENT)
Dept: RADIOLOGY | Facility: MEDICAL CENTER | Age: 84
End: 2019-06-27
Attending: NURSE PRACTITIONER
Payer: MEDICARE

## 2019-07-23 ENCOUNTER — HOSPITAL ENCOUNTER (OUTPATIENT)
Dept: LAB | Facility: MEDICAL CENTER | Age: 84
End: 2019-07-23
Attending: NURSE PRACTITIONER
Payer: MEDICARE

## 2019-07-23 LAB
ALBUMIN SERPL BCP-MCNC: 4 G/DL (ref 3.2–4.9)
ALBUMIN/GLOB SERPL: 1.3 G/DL
ALP SERPL-CCNC: 44 U/L (ref 30–99)
ALT SERPL-CCNC: 18 U/L (ref 2–50)
ANION GAP SERPL CALC-SCNC: 9 MMOL/L (ref 0–11.9)
AST SERPL-CCNC: 20 U/L (ref 12–45)
BILIRUB SERPL-MCNC: 0.7 MG/DL (ref 0.1–1.5)
BUN SERPL-MCNC: 20 MG/DL (ref 8–22)
CALCIUM SERPL-MCNC: 8.8 MG/DL (ref 8.5–10.5)
CHLORIDE SERPL-SCNC: 108 MMOL/L (ref 96–112)
CO2 SERPL-SCNC: 25 MMOL/L (ref 20–33)
CREAT SERPL-MCNC: 1.46 MG/DL (ref 0.5–1.4)
GLOBULIN SER CALC-MCNC: 3 G/DL (ref 1.9–3.5)
GLUCOSE SERPL-MCNC: 94 MG/DL (ref 65–99)
POTASSIUM SERPL-SCNC: 4 MMOL/L (ref 3.6–5.5)
PROT SERPL-MCNC: 7 G/DL (ref 6–8.2)
SODIUM SERPL-SCNC: 142 MMOL/L (ref 135–145)

## 2019-07-23 PROCEDURE — 80053 COMPREHEN METABOLIC PANEL: CPT

## 2019-07-23 PROCEDURE — 36415 COLL VENOUS BLD VENIPUNCTURE: CPT

## 2019-12-09 ENCOUNTER — HOSPITAL ENCOUNTER (OUTPATIENT)
Dept: LAB | Facility: MEDICAL CENTER | Age: 84
End: 2019-12-09
Attending: NURSE PRACTITIONER
Payer: MEDICARE

## 2019-12-09 LAB
ALBUMIN SERPL BCP-MCNC: 4 G/DL (ref 3.2–4.9)
ALBUMIN/GLOB SERPL: 1.5 G/DL
ALP SERPL-CCNC: 50 U/L (ref 30–99)
ALT SERPL-CCNC: 34 U/L (ref 2–50)
ANION GAP SERPL CALC-SCNC: 7 MMOL/L (ref 0–11.9)
AST SERPL-CCNC: 26 U/L (ref 12–45)
BASOPHILS # BLD AUTO: 0.8 % (ref 0–1.8)
BASOPHILS # BLD: 0.05 K/UL (ref 0–0.12)
BILIRUB SERPL-MCNC: 0.7 MG/DL (ref 0.1–1.5)
BUN SERPL-MCNC: 17 MG/DL (ref 8–22)
CALCIUM SERPL-MCNC: 9.2 MG/DL (ref 8.5–10.5)
CHLORIDE SERPL-SCNC: 105 MMOL/L (ref 96–112)
CHOLEST SERPL-MCNC: 189 MG/DL (ref 100–199)
CO2 SERPL-SCNC: 30 MMOL/L (ref 20–33)
CREAT SERPL-MCNC: 1.29 MG/DL (ref 0.5–1.4)
CREAT UR-MCNC: 113.4 MG/DL
EOSINOPHIL # BLD AUTO: 0.2 K/UL (ref 0–0.51)
EOSINOPHIL NFR BLD: 3.3 % (ref 0–6.9)
ERYTHROCYTE [DISTWIDTH] IN BLOOD BY AUTOMATED COUNT: 47.4 FL (ref 35.9–50)
EST. AVERAGE GLUCOSE BLD GHB EST-MCNC: 120 MG/DL
GLOBULIN SER CALC-MCNC: 2.6 G/DL (ref 1.9–3.5)
GLUCOSE SERPL-MCNC: 107 MG/DL (ref 65–99)
HBA1C MFR BLD: 5.8 % (ref 0–5.6)
HCT VFR BLD AUTO: 43.1 % (ref 42–52)
HDLC SERPL-MCNC: 35 MG/DL
HGB BLD-MCNC: 14.1 G/DL (ref 14–18)
IMM GRANULOCYTES # BLD AUTO: 0.01 K/UL (ref 0–0.11)
IMM GRANULOCYTES NFR BLD AUTO: 0.2 % (ref 0–0.9)
LDLC SERPL CALC-MCNC: 103 MG/DL
LYMPHOCYTES # BLD AUTO: 2.31 K/UL (ref 1–4.8)
LYMPHOCYTES NFR BLD: 38.6 % (ref 22–41)
MCH RBC QN AUTO: 31.5 PG (ref 27–33)
MCHC RBC AUTO-ENTMCNC: 32.7 G/DL (ref 33.7–35.3)
MCV RBC AUTO: 96.2 FL (ref 81.4–97.8)
MICROALBUMIN UR-MCNC: 3.4 MG/DL
MICROALBUMIN/CREAT UR: 30 MG/G (ref 0–30)
MONOCYTES # BLD AUTO: 0.66 K/UL (ref 0–0.85)
MONOCYTES NFR BLD AUTO: 11 % (ref 0–13.4)
NEUTROPHILS # BLD AUTO: 2.75 K/UL (ref 1.82–7.42)
NEUTROPHILS NFR BLD: 46.1 % (ref 44–72)
NRBC # BLD AUTO: 0 K/UL
NRBC BLD-RTO: 0 /100 WBC
PLATELET # BLD AUTO: 160 K/UL (ref 164–446)
PMV BLD AUTO: 10.7 FL (ref 9–12.9)
POTASSIUM SERPL-SCNC: 3.7 MMOL/L (ref 3.6–5.5)
PROT SERPL-MCNC: 6.6 G/DL (ref 6–8.2)
RBC # BLD AUTO: 4.48 M/UL (ref 4.7–6.1)
SODIUM SERPL-SCNC: 142 MMOL/L (ref 135–145)
TRIGL SERPL-MCNC: 255 MG/DL (ref 0–149)
URATE SERPL-MCNC: 8.8 MG/DL (ref 2.5–8.3)
WBC # BLD AUTO: 6 K/UL (ref 4.8–10.8)

## 2019-12-09 PROCEDURE — 83036 HEMOGLOBIN GLYCOSYLATED A1C: CPT

## 2019-12-09 PROCEDURE — 82570 ASSAY OF URINE CREATININE: CPT

## 2019-12-09 PROCEDURE — 84550 ASSAY OF BLOOD/URIC ACID: CPT

## 2019-12-09 PROCEDURE — 85025 COMPLETE CBC W/AUTO DIFF WBC: CPT

## 2019-12-09 PROCEDURE — 80061 LIPID PANEL: CPT

## 2019-12-09 PROCEDURE — 82043 UR ALBUMIN QUANTITATIVE: CPT

## 2019-12-09 PROCEDURE — 36415 COLL VENOUS BLD VENIPUNCTURE: CPT

## 2019-12-09 PROCEDURE — 84425 ASSAY OF VITAMIN B-1: CPT

## 2019-12-09 PROCEDURE — 80053 COMPREHEN METABOLIC PANEL: CPT

## 2019-12-13 LAB — VIT B1 BLD-MCNC: 79 NMOL/L (ref 70–180)

## 2020-01-07 PROCEDURE — 93005 ELECTROCARDIOGRAM TRACING: CPT | Performed by: INTERNAL MEDICINE

## 2020-01-23 ENCOUNTER — HOSPITAL ENCOUNTER (OUTPATIENT)
Dept: RADIOLOGY | Facility: MEDICAL CENTER | Age: 85
End: 2020-01-23
Attending: NURSE PRACTITIONER
Payer: MEDICARE

## 2020-01-23 ENCOUNTER — HOSPITAL ENCOUNTER (OUTPATIENT)
Dept: CARDIOLOGY | Facility: MEDICAL CENTER | Age: 85
End: 2020-01-23
Attending: NURSE PRACTITIONER
Payer: MEDICARE

## 2020-01-23 DIAGNOSIS — I48.91 ATRIAL FIBRILLATION, UNSPECIFIED TYPE (HCC): ICD-10-CM

## 2020-01-23 DIAGNOSIS — R94.31 NONSPECIFIC ABNORMAL ELECTROCARDIOGRAM (ECG) (EKG): ICD-10-CM

## 2020-01-23 LAB
LV EJECT FRACT  99904: 35
LV EJECT FRACT MOD 2C 99903: 10.76
LV EJECT FRACT MOD 4C 99902: 30.87
LV EJECT FRACT MOD BP 99901: 27.11

## 2020-01-23 PROCEDURE — 93306 TTE W/DOPPLER COMPLETE: CPT | Mod: 26 | Performed by: INTERNAL MEDICINE

## 2020-01-23 PROCEDURE — 700111 HCHG RX REV CODE 636 W/ 250 OVERRIDE (IP)

## 2020-01-23 PROCEDURE — 93306 TTE W/DOPPLER COMPLETE: CPT

## 2020-01-23 PROCEDURE — A9502 TC99M TETROFOSMIN: HCPCS

## 2020-01-23 RX ORDER — REGADENOSON 0.08 MG/ML
INJECTION, SOLUTION INTRAVENOUS
Status: COMPLETED
Start: 2020-01-23 | End: 2020-01-23

## 2020-01-23 RX ORDER — REGADENOSON 0.08 MG/ML
0.4 INJECTION, SOLUTION INTRAVENOUS ONCE
Status: COMPLETED | OUTPATIENT
Start: 2020-01-23 | End: 2020-01-23

## 2020-01-23 RX ADMIN — REGADENOSON 0.4 MG: 0.08 INJECTION, SOLUTION INTRAVENOUS at 10:44

## 2020-01-28 ENCOUNTER — TELEPHONE (OUTPATIENT)
Dept: CARDIOLOGY | Facility: MEDICAL CENTER | Age: 85
End: 2020-01-28

## 2020-01-28 ENCOUNTER — OFFICE VISIT (OUTPATIENT)
Dept: CARDIOLOGY | Facility: MEDICAL CENTER | Age: 85
End: 2020-01-28
Payer: MEDICARE

## 2020-01-28 VITALS
HEIGHT: 70 IN | BODY MASS INDEX: 33.04 KG/M2 | DIASTOLIC BLOOD PRESSURE: 66 MMHG | OXYGEN SATURATION: 94 % | WEIGHT: 230.8 LBS | SYSTOLIC BLOOD PRESSURE: 132 MMHG | HEART RATE: 100 BPM

## 2020-01-28 DIAGNOSIS — I10 ESSENTIAL HYPERTENSION: ICD-10-CM

## 2020-01-28 DIAGNOSIS — I48.19 PERSISTENT ATRIAL FIBRILLATION (HCC): ICD-10-CM

## 2020-01-28 DIAGNOSIS — I42.9 CARDIOMYOPATHY, UNSPECIFIED TYPE (HCC): ICD-10-CM

## 2020-01-28 PROCEDURE — 99215 OFFICE O/P EST HI 40 MIN: CPT | Performed by: INTERNAL MEDICINE

## 2020-01-28 RX ORDER — METHYLPREDNISOLONE 4 MG/1
TABLET ORAL
COMMUNITY
Start: 2019-11-12 | End: 2020-02-05

## 2020-01-28 RX ORDER — AMLODIPINE BESYLATE 5 MG/1
TABLET ORAL
COMMUNITY
Start: 2019-11-12 | End: 2020-01-28

## 2020-01-28 RX ORDER — METOPROLOL SUCCINATE 25 MG/1
TABLET, EXTENDED RELEASE ORAL
COMMUNITY
Start: 2019-12-18 | End: 2020-02-05

## 2020-01-28 RX ORDER — RIVAROXABAN 20 MG/1
TABLET, FILM COATED ORAL DAILY
COMMUNITY
Start: 2020-01-09 | End: 2020-03-19 | Stop reason: SDUPTHER

## 2020-01-28 RX ORDER — BENAZEPRIL HYDROCHLORIDE 10 MG/1
10 TABLET ORAL DAILY
Qty: 180 TAB | Refills: 3 | Status: ON HOLD
Start: 2020-01-28 | End: 2020-12-16

## 2020-01-28 RX ORDER — AZITHROMYCIN 250 MG/1
TABLET, FILM COATED ORAL
COMMUNITY
Start: 2019-11-13 | End: 2020-02-05

## 2020-01-28 RX ORDER — AMIODARONE HYDROCHLORIDE 200 MG/1
200 TABLET ORAL DAILY
Qty: 90 TAB | Refills: 3 | Status: SHIPPED | OUTPATIENT
Start: 2020-01-28 | End: 2021-01-26

## 2020-01-28 NOTE — PATIENT INSTRUCTIONS
DX: ATRIAL FIBRILLATION, CARDIOMYOPATHY    1. STOP AMLODIPINE/NORVASC    2. START AMIODARONE    3. START BENAZEPRIL    4. CONTINUE XARELTO AND TOPROL XL    5. CARDIOVERSION    6. ANGIOGRAM AFTER ABOVE

## 2020-01-28 NOTE — TELEPHONE ENCOUNTER
Patient is scheduled on 2-28-20 for a [facility] w/poss with Dr. Blas. Patient was told to hold cialis for 72hrs prior and to hold xarelto for 48hrs prior. Patient to check in at 6:30 for an 8:30 procedure. H&P was done on 1-28-20 by Dr. Blas. Pre admit to call patient.

## 2020-01-28 NOTE — TELEPHONE ENCOUNTER
Patient is scheduled on 2-7-20 for a Cardioversion with Dr. Blas with anesthesia. Carlat was told to hold cialis for 72hrs prior and to check in at 6:30 for an 8:30 procedure,. H&P was done on 1-28-20 by . Pre admit to call patient.

## 2020-01-28 NOTE — PROGRESS NOTES
Subjective:   Alonso Holder is a 84 y.o. male who presents today for initial evaluation of atrial fibrillation.  I saw him years ago for evaluation of PACs and syncope which was related to dehydration.  He has not had recurrent syncope.  He went on a long trip 3 months ago and subsequently has felt significantly fatigued.  This also coincided with a change in his medications with discontinuation of his ACE inhibitor for renal dysfunction and initiation of amlodipine.  Subsequently he was noted to be in atrial fibrillation with rapid ventricular spots for an unknown duration of time.  Before arrival today he received a stress test and echocardiogram.  Stress was wed is no ischemia but there may be infarctions on the imaging was suboptimal, read by radiology, and the echocardiogram shows moderate to mild mitral vegetation and a new reduction in EF from normal to 35%.  Atrial fibrillation is present.  He was initiated on Xarelto and beta-blocker.  He is tolerating these well.  He does have a history of bleeding problems with even minor surgery such as polypectomy and skin removal from his back.  They are interested in a watchman device as well.  He has NYHA class II-III on a dyspnea on exertion and fatigue.  These are new.    Past Medical History:   Diagnosis Date   • Essential hypertension 1/29/2018   • Persistent atrial fibrillation 1/28/2020     History reviewed. No pertinent surgical history.  Family History   Problem Relation Age of Onset   • Heart Disease Neg Hx      Social History     Socioeconomic History   • Marital status:      Spouse name: Not on file   • Number of children: Not on file   • Years of education: Not on file   • Highest education level: Not on file   Occupational History   • Not on file   Social Needs   • Financial resource strain: Not on file   • Food insecurity:     Worry: Not on file     Inability: Not on file   • Transportation needs:     Medical: Not on file      Non-medical: Not on file   Tobacco Use   • Smoking status: Never Smoker   • Smokeless tobacco: Never Used   Substance and Sexual Activity   • Alcohol use: Yes     Alcohol/week: 0.6 oz     Types: 1 Cans of beer per week     Comment: Not every day.   • Drug use: No   • Sexual activity: Not on file   Lifestyle   • Physical activity:     Days per week: Not on file     Minutes per session: Not on file   • Stress: Not on file   Relationships   • Social connections:     Talks on phone: Not on file     Gets together: Not on file     Attends Manda[employer] service: Not on file     Active member of club or organization: Not on file     Attends meetings of clubs or organizations: Not on file     Relationship status: Not on file   • Intimate partner violence:     Fear of current or ex partner: Not on file     Emotionally abused: Not on file     Physically abused: Not on file     Forced sexual activity: Not on file   Other Topics Concern   • Not on file   Social History Narrative   • Not on file     No Known Allergies  Outpatient Encounter Medications as of 1/28/2020   Medication Sig Dispense Refill   • XARELTO 20 MG Tab tablet      • metoprolol SR (TOPROL XL) 25 MG TABLET SR 24 HR      • benazepril (LOTENSIN) 10 MG Tab Take 1 Tab by mouth every day. 180 Tab 3   • amiodarone (CORDARONE) 200 MG Tab Take 1 Tab by mouth every day. 90 Tab 3   • aspirin 81 MG tablet Take 81 mg by mouth every day.     • methylPREDNISolone (MEDROL DOSEPAK) 4 MG Tablet Therapy Pack      • azithromycin (ZITHROMAX) 250 MG Tab      • [DISCONTINUED] amLODIPine (NORVASC) 5 MG Tab      • benazepril (LOTENSIN) 20 MG Tab Take 20 mg by mouth 2 Times a Day.     • Coenzyme Q10 (CO Q 10) 100 MG Cap Take  by mouth.     • Cholecalciferol (VITAMIN D3) 2000 units Tab Take  by mouth.     • Probiotic Product (PROBIOTIC ADVANCED PO) Take  by mouth.     • Red Yeast Rice 600 MG Tab Take  by mouth.     • CALCIUM PO Take 600 mg by mouth every day.     • tadalafil (CIALIS) 20 MG  "tablet Take 20 mg by mouth as needed for Erectile Dysfunction.       No facility-administered encounter medications on file as of 1/28/2020.      Review of Systems   All other systems reviewed and are negative.       Objective:   /66 (BP Location: Left arm, Patient Position: Sitting, BP Cuff Size: Adult)   Pulse 100   Ht 1.778 m (5' 10\")   Wt 104.7 kg (230 lb 12.8 oz)   SpO2 94%   BMI 33.12 kg/m²     Physical Exam   Constitutional: He is oriented to person, place, and time. He appears well-developed and well-nourished. No distress.   HENT:   Head: Normocephalic and atraumatic.   Right Ear: External ear normal.   Left Ear: External ear normal.   Eyes: Pupils are equal, round, and reactive to light. Conjunctivae and EOM are normal. Right eye exhibits no discharge. Left eye exhibits no discharge. No scleral icterus.   Neck: Normal range of motion. Neck supple. No JVD present. No tracheal deviation present. No thyromegaly present.   Cardiovascular: Intact distal pulses. An irregularly irregular rhythm present. Tachycardia present. PMI is not displaced. Exam reveals no gallop and no friction rub.   Murmur ( Soft 2 out of 6 systolic murmur) heard.  Pulses:       Carotid pulses are 2+ on the right side and 2+ on the left side.       Radial pulses are 2+ on the left side.        Popliteal pulses are 2+ on the right side and 2+ on the left side.        Dorsalis pedis pulses are 2+ on the right side and 2+ on the left side.        Posterior tibial pulses are 2+ on the right side and 2+ on the left side.   Pulmonary/Chest: Effort normal and breath sounds normal. No respiratory distress. He has no wheezes. He has no rales. He exhibits no tenderness.   Abdominal: Soft. Bowel sounds are normal. He exhibits no distension. There is no tenderness.   Musculoskeletal: Normal range of motion.         General: Edema ( 1+ bilateral lower extremities to knees) present. No tenderness or deformity.   Neurological: He is alert and " oriented to person, place, and time. No cranial nerve deficit (cranial nerves II through XII grossly intact). Coordination normal.   Skin: Skin is warm and dry. No rash noted. He is not diaphoretic. No erythema. No pallor.   Psychiatric: He has a normal mood and affect. His behavior is normal. Thought content normal.   Vitals reviewed.    LABS:  Lab Results   Component Value Date/Time    CHOLSTRLTOT 189 12/09/2019 08:17 AM     (H) 12/09/2019 08:17 AM    HDL 35 (A) 12/09/2019 08:17 AM    TRIGLYCERIDE 255 (H) 12/09/2019 08:17 AM       Lab Results   Component Value Date/Time    WBC 6.0 12/09/2019 08:17 AM    RBC 4.48 (L) 12/09/2019 08:17 AM    HEMOGLOBIN 14.1 12/09/2019 08:17 AM    HEMATOCRIT 43.1 12/09/2019 08:17 AM    MCV 96.2 12/09/2019 08:17 AM    NEUTSPOLYS 46.10 12/09/2019 08:17 AM    LYMPHOCYTES 38.60 12/09/2019 08:17 AM    MONOCYTES 11.00 12/09/2019 08:17 AM    EOSINOPHILS 3.30 12/09/2019 08:17 AM    BASOPHILS 0.80 12/09/2019 08:17 AM     Lab Results   Component Value Date/Time    SODIUM 142 12/09/2019 08:17 AM    POTASSIUM 3.7 12/09/2019 08:17 AM    CHLORIDE 105 12/09/2019 08:17 AM    CO2 30 12/09/2019 08:17 AM    GLUCOSE 107 (H) 12/09/2019 08:17 AM    BUN 17 12/09/2019 08:17 AM    CREATININE 1.29 12/09/2019 08:17 AM     Lab Results   Component Value Date    HBA1C 5.8 (H) 12/09/2019      Lab Results   Component Value Date/Time    ALKPHOSPHAT 50 12/09/2019 08:17 AM    ASTSGOT 26 12/09/2019 08:17 AM    ALTSGPT 34 12/09/2019 08:17 AM    TBILIRUBIN 0.7 12/09/2019 08:17 AM      No results found for: BNPBTYPENAT   No results found for: TSH  Lab Results   Component Value Date/Time    PROTHROMBTM 13.1 01/29/2018 05:48 PM    INR 1.02 01/29/2018 05:48 PM        ECHO CONCLUSIONS (1/23/2020):  Compared to the report of the study done 01/30/2018, EF is now   moderately reduced, patient noted to be in atrial fibrillation.  Moderately reduced left ventricular systolic function.  Left ventricular ejection fraction is  visually estimated to be 35%.  Global hypokinesis with severe hypokinesis of the basal to mid inferior   wall and inferior septum.  Diastolic function is difficult to assess with atrial fibrillation.  Mildly dilated left atrium.  Mild mitral regurgitation.  Aortic sclerosis without stenosis.  Mild to moderate tricuspid regurgitation.  Estimated right ventricular systolic pressure  is 33 mmHg.  Ascending aorta diameter is 3.9 cm, borderline mildly dilated.  Pt has appointment with cardiology.  Personally reviewed this visit 2020     STRESS (2020):   No evidence of significant jeopardized viable myocardium or prior myocardial    infarction. LVEF low at 23%.  Personally reviewed this visit 2020.  There are areas of fixed photopenia in the inferior base to mid and anterior septum.  It is a suboptimal study.  This was official read by radiology not cardiology.      Assessment:     1. Persistent atrial fibrillation  CL-CARDIOVERSION    benazepril (LOTENSIN) 10 MG Tab    amiodarone (CORDARONE) 200 MG Tab    Comp Metabolic Panel   2. Essential hypertension  Comp Metabolic Panel   3. Cardiomyopathy, unspecified type (HCC)  CL-LEFT HEART CATHETERIZATION WITH POSSIBLE INTERVENTION    Comp Metabolic Panel       Medical Decision Making:  Today's Assessment / Status / Plan:     New onset atrial fibrillation.  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  Suboptimal stress testing result and multiple risk factors I would recommend further ruling out ischemic underlying etiology as well.  Currently his heart rates are somewhat fast and he feels fatigued which may be a combination of medical therapy, low EF and the presence of A. fib.  We discussed these things a great length.  Recommend the followin.  Discontinue Norvasc  2.  Add benazepril 10 mg twice daily which is a lower dose than previous  3.  Continue Xarelto and Toprol-XL  4.  Add amiodarone 200 mg daily  5.  CMP 2 weeks  after initiation of therapy  6.  Anesthesia assisted DC cardioversion without SCOTTIE after amiodarone loading.  7.  Recommend coronary angiography with possible PCI 2 to 3 weeks after DCCV  8.  Recommend reassessing LV function 3 months after the above interventions.    Follow-up after testing.    I spent 45 minutes with Aolnso Holder, over fifty percent was spent counseling the patient on their condition, best management practices, reviewing test results, risks and benefits of treatment and coordinating care.

## 2020-02-05 DIAGNOSIS — Z01.812 PRE-OPERATIVE LABORATORY EXAMINATION: ICD-10-CM

## 2020-02-05 LAB
ANION GAP SERPL CALC-SCNC: 9 MMOL/L (ref 0–11.9)
BUN SERPL-MCNC: 24 MG/DL (ref 8–22)
CALCIUM SERPL-MCNC: 9.5 MG/DL (ref 8.5–10.5)
CHLORIDE SERPL-SCNC: 107 MMOL/L (ref 96–112)
CO2 SERPL-SCNC: 25 MMOL/L (ref 20–33)
CREAT SERPL-MCNC: 1.28 MG/DL (ref 0.5–1.4)
ERYTHROCYTE [DISTWIDTH] IN BLOOD BY AUTOMATED COUNT: 45.5 FL (ref 35.9–50)
GLUCOSE SERPL-MCNC: 97 MG/DL (ref 65–99)
HCT VFR BLD AUTO: 44.9 % (ref 42–52)
HGB BLD-MCNC: 15 G/DL (ref 14–18)
INR PPP: 2.89 (ref 0.87–1.13)
MCH RBC QN AUTO: 31.3 PG (ref 27–33)
MCHC RBC AUTO-ENTMCNC: 33.4 G/DL (ref 33.7–35.3)
MCV RBC AUTO: 93.7 FL (ref 81.4–97.8)
PLATELET # BLD AUTO: 180 K/UL (ref 164–446)
PMV BLD AUTO: 10.1 FL (ref 9–12.9)
POTASSIUM SERPL-SCNC: 4.2 MMOL/L (ref 3.6–5.5)
PROTHROMBIN TIME: 31.3 SEC (ref 12–14.6)
RBC # BLD AUTO: 4.79 M/UL (ref 4.7–6.1)
SODIUM SERPL-SCNC: 141 MMOL/L (ref 135–145)
WBC # BLD AUTO: 6.2 K/UL (ref 4.8–10.8)

## 2020-02-05 PROCEDURE — 36415 COLL VENOUS BLD VENIPUNCTURE: CPT

## 2020-02-05 PROCEDURE — 80048 BASIC METABOLIC PNL TOTAL CA: CPT

## 2020-02-05 PROCEDURE — 85610 PROTHROMBIN TIME: CPT

## 2020-02-05 PROCEDURE — 85027 COMPLETE CBC AUTOMATED: CPT

## 2020-02-05 SDOH — HEALTH STABILITY: MENTAL HEALTH: HOW MANY STANDARD DRINKS CONTAINING ALCOHOL DO YOU HAVE ON A TYPICAL DAY?: 1 OR 2

## 2020-02-05 SDOH — HEALTH STABILITY: MENTAL HEALTH: HOW OFTEN DO YOU HAVE A DRINK CONTAINING ALCOHOL?: 4 OR MORE TIMES A WEEK

## 2020-02-07 ENCOUNTER — HOSPITAL ENCOUNTER (OUTPATIENT)
Facility: MEDICAL CENTER | Age: 85
End: 2020-02-07
Attending: INTERNAL MEDICINE | Admitting: INTERNAL MEDICINE
Payer: MEDICARE

## 2020-02-07 ENCOUNTER — APPOINTMENT (OUTPATIENT)
Dept: CARDIOLOGY | Facility: MEDICAL CENTER | Age: 85
End: 2020-02-07
Attending: INTERNAL MEDICINE
Payer: MEDICARE

## 2020-02-07 VITALS
RESPIRATION RATE: 18 BRPM | SYSTOLIC BLOOD PRESSURE: 136 MMHG | WEIGHT: 230.6 LBS | TEMPERATURE: 97.2 F | BODY MASS INDEX: 33.01 KG/M2 | OXYGEN SATURATION: 96 % | HEIGHT: 70 IN | HEART RATE: 74 BPM | DIASTOLIC BLOOD PRESSURE: 79 MMHG

## 2020-02-07 DIAGNOSIS — I48.19 PERSISTENT ATRIAL FIBRILLATION (HCC): ICD-10-CM

## 2020-02-07 DIAGNOSIS — I42.9 CARDIOMYOPATHY, UNSPECIFIED TYPE (HCC): ICD-10-CM

## 2020-02-07 LAB
EKG IMPRESSION: NORMAL
EKG IMPRESSION: NORMAL
INR PPP: 2.22 (ref 0.87–1.13)
PROTHROMBIN TIME: 25.4 SEC (ref 12–14.6)

## 2020-02-07 PROCEDURE — 92960 CARDIOVERSION ELECTRIC EXT: CPT | Performed by: INTERNAL MEDICINE

## 2020-02-07 PROCEDURE — 700111 HCHG RX REV CODE 636 W/ 250 OVERRIDE (IP)

## 2020-02-07 PROCEDURE — 93010 ELECTROCARDIOGRAM REPORT: CPT | Mod: 59 | Performed by: INTERNAL MEDICINE

## 2020-02-07 PROCEDURE — 93005 ELECTROCARDIOGRAM TRACING: CPT | Performed by: INTERNAL MEDICINE

## 2020-02-07 PROCEDURE — 85610 PROTHROMBIN TIME: CPT

## 2020-02-07 PROCEDURE — 700101 HCHG RX REV CODE 250

## 2020-02-07 PROCEDURE — 92960 CARDIOVERSION ELECTRIC EXT: CPT

## 2020-02-07 PROCEDURE — 160002 HCHG RECOVERY MINUTES (STAT)

## 2020-02-07 NOTE — PROCEDURES
REFERRING PHYSICIAN: Dr. Blas    PREOPERATIVE DIAGNOSIS:  1. Atrial fibrillation     POSTOPERATIVE DIAGNOSIS:  1. Successful cardioversion from atrial fibrillation to sinus rhythm    DESCRIPTION OF PROCEDURE:    I have discussed the risks and benefits of electrical cardioversion as well as the procedure itself, rationale and appropriateness in detail with the patient today. Complications including but not limited to death, stroke, MI, ACLS, aspiration and complications related to anesthesia were explained to the patient. The potential outcomes associated with the procedure were also discussed at length. The patient agrees to proceed.    The patient was transported to the procedure area in the fasting state. The anticoagulation status of the patient was verified prior to the procedure. Anterior-posterior cardioversion pads were placed. With the assistance of anesthesia, the patient was sedated. Following achievement of adequate sedation, a single  synchronized 200 J shock was administered with immediate reversion to sinus rhythm. Patient recovered well from anesthesia. No immediate complications were noted.    CONCLUSIONS:  1. Successful cardioversion from atrial fibrillation to sinus rhythm

## 2020-02-07 NOTE — OR NURSING
0841 Pt arrived to PPU s/p cardioversion. Pt A&O x 4. No C/O pain, no N/V at this time. VSS. Plan for recovery and DC discussed.     0900 Personal belongings returned to pt. Pt tolerating PO fluid intake with no c/o N/V.     0920 Pt resting comfortably at this time.     0940 Message left for pt's contact regarding ride home.     1000 Family to bedside.    1035 Pt meets discharge criteria. Discharge instructions reviewed with pt and family. Pt verbalizes understanding of all education and plan for F/U. PIV removed. RN to transport pt off unit.

## 2020-02-07 NOTE — DISCHARGE INSTRUCTIONS
ACTIVITY: Rest and take it easy for the first 24 hours.  A responsible adult is recommended to remain with you during that time.  It is normal to feel sleepy.  We encourage you to not do anything that requires balance, judgment or coordination.    MILD FLU-LIKE SYMPTOMS ARE NORMAL. YOU MAY EXPERIENCE GENERALIZED MUSCLE ACHES, THROAT IRRITATION, HEADACHE AND/OR SOME NAUSEA.    FOR 24 HOURS DO NOT:  Drive, operate machinery or run household appliances.  Drink beer or alcoholic beverages.   Make important decisions or sign legal documents.    SPECIAL INSTRUCTIONS:   See attached paper for instructions post-cardioversion.   Resume home medications as you normally take them.  For skin tenderness, ok to use hydrocortisone cream, aloe vera gel, or lotion for sunburns.     DIET: To avoid nausea, slowly advance diet as tolerated, avoiding spicy or greasy foods for the first day.  Add more substantial food to your diet according to your physician's instructions.  Babies can be fed formula or breast milk as soon as they are hungry.  INCREASE FLUIDS AND FIBER TO AVOID CONSTIPATION.    SURGICAL DRESSING/BATHING: n/a    FOLLOW-UP APPOINTMENT:  A follow-up appointment should be arranged with your doctor; call to schedule.    You should CALL YOUR PHYSICIAN if you develop:  Fever greater than 101 degrees F.  Pain not relieved by medication, or persistent nausea or vomiting.  Excessive bleeding (blood soaking through dressing) or unexpected drainage from the wound.  Extreme redness or swelling around the incision site, drainage of pus or foul smelling drainage.  Inability to urinate or empty your bladder within 8 hours.  Problems with breathing or chest pain.    You should call 911 if you develop problems with breathing or chest pain.  If you are unable to contact your doctor or surgical center, you should go to the nearest emergency room or urgent care center.  Physician's telephone #: [phone]    If any questions arise, call  your doctor.  If your doctor is not available, please feel free to call the Surgical Center at [phone].  The Center is open Monday through Friday from 7AM to 7PM.  You can also call the HEALTH HOTLINE open 24 hours/day, 7 days/week and speak to a nurse at [phone], or toll free at [phone].    A registered nurse may call you a few days after your surgery to see how you are doing after your procedure.    MEDICATIONS: Resume taking daily medication.  Take prescribed pain medication with food.  If no medication is prescribed, you may take non-aspirin pain medication if needed.  PAIN MEDICATION CAN BE VERY CONSTIPATING.  Take a stool softener or laxative such as senokot, pericolace, or milk of magnesia if needed.      If your physician has prescribed pain medication that includes Acetaminophen (Tylenol), do not take additional Acetaminophen (Tylenol) while taking the prescribed medication.    Depression / Suicide Risk    As you are discharged from this [facility] facility, it is important to learn how to keep safe from harming yourself.    Recognize the warning signs:  · Abrupt changes in personality, positive or negative- including increase in energy   · Giving away possessions  · Change in eating patterns- significant weight changes-  positive or negative  · Change in sleeping patterns- unable to sleep or sleeping all the time   · Unwillingness or inability to communicate  · Depression  · Unusual sadness, discouragement and loneliness  · Talk of wanting to die  · Neglect of personal appearance   · Rebelliousness- reckless behavior  · Withdrawal from people/activities they love  · Confusion- inability to concentrate     If you or a loved one observes any of these behaviors or has concerns about self-harm, here's what you can do:  · Talk about it- your feelings and reasons for harming yourself  · Remove any means that you might use to hurt yourself (examples: pills, rope, extension cords,  firearm)  · Get professional help from the community (Mental Health, Substance Abuse, psychological counseling)  · Do not be alone:Call your Safe Contact- someone whom you trust who will be there for you.  · Call your local CRISIS HOTLINE 797-0909 or 857-767-8253  · Call your local Children's Mobile Crisis Response Team [place] (215) 410-3333 or www.mySociety  · Call the toll free National Suicide Prevention Hotlines   · National Suicide Prevention Lifeline 896-107-LQXQ (9576)  · National Hope Line Network 800-SUICIDE (801-1767)

## 2020-02-27 DIAGNOSIS — Z01.810 PRE-OPERATIVE CARDIOVASCULAR EXAMINATION: ICD-10-CM

## 2020-02-27 DIAGNOSIS — Z01.812 PRE-PROCEDURAL LABORATORY EXAMINATION: ICD-10-CM

## 2020-02-27 LAB
ALBUMIN SERPL BCP-MCNC: 4.2 G/DL (ref 3.2–4.9)
ALBUMIN/GLOB SERPL: 1.6 G/DL
ALP SERPL-CCNC: 50 U/L (ref 30–99)
ALT SERPL-CCNC: 31 U/L (ref 2–50)
ANION GAP SERPL CALC-SCNC: 5 MMOL/L (ref 0–11.9)
APTT PPP: 34 SEC (ref 24.7–36)
AST SERPL-CCNC: 32 U/L (ref 12–45)
BILIRUB SERPL-MCNC: 0.7 MG/DL (ref 0.1–1.5)
BUN SERPL-MCNC: 23 MG/DL (ref 8–22)
CALCIUM SERPL-MCNC: 9 MG/DL (ref 8.5–10.5)
CHLORIDE SERPL-SCNC: 106 MMOL/L (ref 96–112)
CO2 SERPL-SCNC: 29 MMOL/L (ref 20–33)
CREAT SERPL-MCNC: 1.25 MG/DL (ref 0.5–1.4)
EKG IMPRESSION: NORMAL
ERYTHROCYTE [DISTWIDTH] IN BLOOD BY AUTOMATED COUNT: 47.1 FL (ref 35.9–50)
GLOBULIN SER CALC-MCNC: 2.7 G/DL (ref 1.9–3.5)
GLUCOSE SERPL-MCNC: 108 MG/DL (ref 65–99)
HCT VFR BLD AUTO: 41.5 % (ref 42–52)
HGB BLD-MCNC: 13.7 G/DL (ref 14–18)
INR PPP: 1.02 (ref 0.87–1.13)
MCH RBC QN AUTO: 31.5 PG (ref 27–33)
MCHC RBC AUTO-ENTMCNC: 33 G/DL (ref 33.7–35.3)
MCV RBC AUTO: 95.4 FL (ref 81.4–97.8)
PLATELET # BLD AUTO: 152 K/UL (ref 164–446)
PMV BLD AUTO: 9.9 FL (ref 9–12.9)
POTASSIUM SERPL-SCNC: 4 MMOL/L (ref 3.6–5.5)
PROT SERPL-MCNC: 6.9 G/DL (ref 6–8.2)
PROTHROMBIN TIME: 13.7 SEC (ref 12–14.6)
RBC # BLD AUTO: 4.35 M/UL (ref 4.7–6.1)
SODIUM SERPL-SCNC: 140 MMOL/L (ref 135–145)
WBC # BLD AUTO: 6.4 K/UL (ref 4.8–10.8)

## 2020-02-27 PROCEDURE — 85027 COMPLETE CBC AUTOMATED: CPT

## 2020-02-27 PROCEDURE — 93010 ELECTROCARDIOGRAM REPORT: CPT | Performed by: INTERNAL MEDICINE

## 2020-02-27 PROCEDURE — 93005 ELECTROCARDIOGRAM TRACING: CPT

## 2020-02-27 PROCEDURE — 85730 THROMBOPLASTIN TIME PARTIAL: CPT

## 2020-02-27 PROCEDURE — 85610 PROTHROMBIN TIME: CPT

## 2020-02-27 PROCEDURE — 36415 COLL VENOUS BLD VENIPUNCTURE: CPT

## 2020-02-27 PROCEDURE — 80053 COMPREHEN METABOLIC PANEL: CPT

## 2020-02-28 ENCOUNTER — APPOINTMENT (OUTPATIENT)
Dept: RADIOLOGY | Facility: MEDICAL CENTER | Age: 85
End: 2020-02-28
Attending: CLINICAL NURSE SPECIALIST
Payer: MEDICARE

## 2020-02-28 ENCOUNTER — APPOINTMENT (OUTPATIENT)
Dept: CARDIOLOGY | Facility: MEDICAL CENTER | Age: 85
End: 2020-02-28
Attending: INTERNAL MEDICINE
Payer: MEDICARE

## 2020-02-28 ENCOUNTER — HOSPITAL ENCOUNTER (OUTPATIENT)
Facility: MEDICAL CENTER | Age: 85
End: 2020-02-28
Attending: INTERNAL MEDICINE | Admitting: INTERNAL MEDICINE
Payer: MEDICARE

## 2020-02-28 VITALS
HEART RATE: 62 BPM | RESPIRATION RATE: 16 BRPM | OXYGEN SATURATION: 96 % | BODY MASS INDEX: 33.3 KG/M2 | HEIGHT: 70 IN | SYSTOLIC BLOOD PRESSURE: 111 MMHG | DIASTOLIC BLOOD PRESSURE: 79 MMHG | WEIGHT: 232.59 LBS | TEMPERATURE: 97.6 F

## 2020-02-28 DIAGNOSIS — I42.9 CARDIOMYOPATHY, UNSPECIFIED TYPE (HCC): ICD-10-CM

## 2020-02-28 PROCEDURE — 93970 EXTREMITY STUDY: CPT

## 2020-02-28 PROCEDURE — 700105 HCHG RX REV CODE 258: Performed by: INTERNAL MEDICINE

## 2020-02-28 PROCEDURE — 99205 OFFICE O/P NEW HI 60 MIN: CPT | Performed by: THORACIC SURGERY (CARDIOTHORACIC VASCULAR SURGERY)

## 2020-02-28 PROCEDURE — 700111 HCHG RX REV CODE 636 W/ 250 OVERRIDE (IP)

## 2020-02-28 PROCEDURE — 93880 EXTRACRANIAL BILAT STUDY: CPT | Mod: 26 | Performed by: INTERNAL MEDICINE

## 2020-02-28 PROCEDURE — 700102 HCHG RX REV CODE 250 W/ 637 OVERRIDE(OP)

## 2020-02-28 PROCEDURE — 99152 MOD SED SAME PHYS/QHP 5/>YRS: CPT | Performed by: INTERNAL MEDICINE

## 2020-02-28 PROCEDURE — 160002 HCHG RECOVERY MINUTES (STAT)

## 2020-02-28 PROCEDURE — A9270 NON-COVERED ITEM OR SERVICE: HCPCS

## 2020-02-28 PROCEDURE — 93880 EXTRACRANIAL BILAT STUDY: CPT

## 2020-02-28 PROCEDURE — 700101 HCHG RX REV CODE 250

## 2020-02-28 PROCEDURE — 700117 HCHG RX CONTRAST REV CODE 255: Performed by: INTERNAL MEDICINE

## 2020-02-28 PROCEDURE — 93458 L HRT ARTERY/VENTRICLE ANGIO: CPT | Mod: 26 | Performed by: INTERNAL MEDICINE

## 2020-02-28 PROCEDURE — 99152 MOD SED SAME PHYS/QHP 5/>YRS: CPT

## 2020-02-28 PROCEDURE — 93970 EXTREMITY STUDY: CPT | Mod: 26 | Performed by: INTERNAL MEDICINE

## 2020-02-28 RX ORDER — HEPARIN SODIUM,PORCINE 1000/ML
VIAL (ML) INJECTION
Status: COMPLETED
Start: 2020-02-28 | End: 2020-02-28

## 2020-02-28 RX ORDER — LIDOCAINE HYDROCHLORIDE 20 MG/ML
INJECTION, SOLUTION INFILTRATION; PERINEURAL
Status: COMPLETED
Start: 2020-02-28 | End: 2020-02-28

## 2020-02-28 RX ORDER — MIDAZOLAM HYDROCHLORIDE 1 MG/ML
INJECTION INTRAMUSCULAR; INTRAVENOUS
Status: COMPLETED
Start: 2020-02-28 | End: 2020-02-28

## 2020-02-28 RX ORDER — SODIUM CHLORIDE 9 MG/ML
INJECTION, SOLUTION INTRAVENOUS CONTINUOUS
Status: DISCONTINUED | OUTPATIENT
Start: 2020-02-28 | End: 2020-02-28 | Stop reason: HOSPADM

## 2020-02-28 RX ORDER — VERAPAMIL HYDROCHLORIDE 2.5 MG/ML
INJECTION, SOLUTION INTRAVENOUS
Status: COMPLETED
Start: 2020-02-28 | End: 2020-02-28

## 2020-02-28 RX ORDER — HEPARIN SODIUM 200 [USP'U]/100ML
INJECTION, SOLUTION INTRAVENOUS
Status: COMPLETED
Start: 2020-02-28 | End: 2020-02-28

## 2020-02-28 RX ORDER — SODIUM CHLORIDE 9 MG/ML
INJECTION, SOLUTION INTRAVENOUS
Status: DISCONTINUED | OUTPATIENT
Start: 2020-02-28 | End: 2020-02-28 | Stop reason: HOSPADM

## 2020-02-28 RX ORDER — ASPIRIN 81 MG/1
TABLET, CHEWABLE ORAL
Status: COMPLETED
Start: 2020-02-28 | End: 2020-02-28

## 2020-02-28 RX ADMIN — HEPARIN SODIUM: 1000 INJECTION, SOLUTION INTRAVENOUS; SUBCUTANEOUS at 08:57

## 2020-02-28 RX ADMIN — NITROGLYCERIN 10 ML: 20 INJECTION INTRAVENOUS at 08:57

## 2020-02-28 RX ADMIN — HEPARIN SODIUM 2000 UNITS: 200 INJECTION, SOLUTION INTRAVENOUS at 08:57

## 2020-02-28 RX ADMIN — IOHEXOL 200 ML: 350 INJECTION, SOLUTION INTRAVENOUS at 08:57

## 2020-02-28 RX ADMIN — SODIUM CHLORIDE: 9 INJECTION, SOLUTION INTRAVENOUS at 07:13

## 2020-02-28 RX ADMIN — VERAPAMIL HYDROCHLORIDE 2.5 MG: 2.5 INJECTION, SOLUTION INTRAVENOUS at 08:57

## 2020-02-28 RX ADMIN — ASPIRIN 81 MG 324 MG: 81 TABLET ORAL at 08:58

## 2020-02-28 RX ADMIN — FENTANYL CITRATE 50 MCG: 0.05 INJECTION, SOLUTION INTRAMUSCULAR; INTRAVENOUS at 09:02

## 2020-02-28 RX ADMIN — MIDAZOLAM HYDROCHLORIDE 1 MG: 1 INJECTION, SOLUTION INTRAMUSCULAR; INTRAVENOUS at 09:02

## 2020-02-28 RX ADMIN — LIDOCAINE HYDROCHLORIDE: 20 INJECTION, SOLUTION INFILTRATION; PERINEURAL at 08:57

## 2020-02-28 ASSESSMENT — ENCOUNTER SYMPTOMS
SHORTNESS OF BREATH: 1
NEUROLOGICAL NEGATIVE: 1
PND: 0
PALPITATIONS: 1
MUSCULOSKELETAL NEGATIVE: 1
ORTHOPNEA: 0
EYES NEGATIVE: 1
GASTROINTESTINAL NEGATIVE: 1
CLAUDICATION: 0
PSYCHIATRIC NEGATIVE: 1
PALPITATIONS: 0

## 2020-02-28 NOTE — OR NURSING
0930 Assumed care of pt and report received from SRIDHAR Perry.     1000 No C/O pain, no N/V at this time. TR band in place, site assessed, CDI. CMS intact. VSS. Family at bedside. Plan for recovery and DC discussed.     1010 Personal belongings returned to pt. Pt tolerating PO fluid intake with no c/o N/V. Lunch box given.     1106 1cc air removed from TR band. No s/s bleeding noted at this time.     1120 2cc air removed from TR band.     1135 2cc air removed from TR band.     1150 2cc air removed from TR band.     1205 3cc air removed from TR band.     1220 3cc air removed from TR band.     1238 2cc air removed from TR band.     1300 TR band removed. Gauze and tegaderm placed over site.  Pt meets discharge criteria. Awaiting consult from Dr. Lemus prior to DC. Discharge instructions reviewed with pt and family. Pt verbalizes understanding of all education and plan for F/U. PIV removed.    1340 MD to bedside; family present in room.     1355 RN to transport pt off unit.

## 2020-02-28 NOTE — DISCHARGE INSTRUCTIONS
ACTIVITY: Rest and take it easy for the first 24 hours.  A responsible adult is recommended to remain with you during that time.  It is normal to feel sleepy.  We encourage you to not do anything that requires balance, judgment or coordination.    MILD FLU-LIKE SYMPTOMS ARE NORMAL. YOU MAY EXPERIENCE GENERALIZED MUSCLE ACHES, THROAT IRRITATION, HEADACHE AND/OR SOME NAUSEA.    FOR 24 HOURS DO NOT:  Drive, operate machinery or run household appliances.  Drink beer or alcoholic beverages.   Make important decisions or sign legal documents.    DIET: To avoid nausea, slowly advance diet as tolerated, avoiding spicy or greasy foods for the first day.  Add more substantial food to your diet according to your physician's instructions.  Babies can be fed formula or breast milk as soon as they are hungry.  INCREASE FLUIDS AND FIBER TO AVOID CONSTIPATION.    SURGICAL DRESSING/BATHING: Keep dressing dry for 24 hours. May remove dressing and shower after 12pm on 2/19, do not need to replace dressing. Do not submerge in water or bath for 7 days.     FOLLOW-UP APPOINTMENT:  A follow-up appointment should be arranged with your doctor; call to schedule.    You should CALL YOUR PHYSICIAN if you develop:  Fever greater than 101 degrees F.  Pain not relieved by medication, or persistent nausea or vomiting.  Excessive bleeding (blood soaking through dressing) or unexpected drainage from the wound.  Extreme redness or swelling around the incision site, drainage of pus or foul smelling drainage.  Inability to urinate or empty your bladder within 8 hours.  Problems with breathing or chest pain.    You should call 911 if you develop problems with breathing or chest pain.  If you are unable to contact your doctor or surgical center, you should go to the nearest emergency room or urgent care center.  Physician's telephone #: [phone] Cardiology    If any questions arise, call your doctor.  If your doctor is not available, please feel free  to call the Surgical Center at [phone].  The Center is open Monday through Friday from 7AM to 7PM.  You can also call the HEALTH HOTLINE open 24 hours/day, 7 days/week and speak to a nurse at [phone], or toll free at [phone].    A registered nurse may call you a few days after your surgery to see how you are doing after your procedure.    MEDICATIONS: Resume taking daily medication.  Take prescribed pain medication with food.  If no medication is prescribed, you may take non-aspirin pain medication if needed.  PAIN MEDICATION CAN BE VERY CONSTIPATING.  Take a stool softener or laxative such as senokot, pericolace, or milk of magnesia if needed.    If your physician has prescribed pain medication that includes Acetaminophen (Tylenol), do not take additional Acetaminophen (Tylenol) while taking the prescribed medication.    Depression / Suicide Risk    As you are discharged from this [facility] facility, it is important to learn how to keep safe from harming yourself.    Recognize the warning signs:  · Abrupt changes in personality, positive or negative- including increase in energy   · Giving away possessions  · Change in eating patterns- significant weight changes-  positive or negative  · Change in sleeping patterns- unable to sleep or sleeping all the time   · Unwillingness or inability to communicate  · Depression  · Unusual sadness, discouragement and loneliness  · Talk of wanting to die  · Neglect of personal appearance   · Rebelliousness- reckless behavior  · Withdrawal from people/activities they love  · Confusion- inability to concentrate     If you or a loved one observes any of these behaviors or has concerns about self-harm, here's what you can do:  · Talk about it- your feelings and reasons for harming yourself  · Remove any means that you might use to hurt yourself (examples: pills, rope, extension cords, firearm)  · Get professional help from the community (Mental Health,  Substance Abuse, psychological counseling)  · Do not be alone:Call your Safe Contact- someone whom you trust who will be there for you.  · Call your local CRISIS HOTLINE 605-6926 or 723-584-2211  · Call your local Children's Mobile Crisis Response Team [place] (670) 523-1392 or www.Experiment  · Call the toll free National Suicide Prevention Hotlines   · National Suicide Prevention Lifeline 628-047-KZGP (8349)  · [facility] Line Network 800-SUICIDE (135-0623)    POST ANGIOGRAM  General Care Instructions  • Maintain a bandage over the incision site for 24 hours.  It's normal to find a small bruise or dime-sized lump at the insertion site. This should disappear within a few weeks.  • Do not apply lotions or powders to the site.  Do not immerse the catheter insertion site in water (bathtub/swimming) for five days. It is ok to shower 24 hours after the procedure.  • You may resume your normal diet immediately; on the day of your procedure, drink 6-10 glasses of water to help flush the contrast liquid out of your system.  • If the doctor inserted the catheter in your arm:  o For 24 hours, DO NOT lift anything heavier than 10 pounds (approximately a gallon of milk). DO NOT do any heavy pushing, pulling, or twisting.    Medications  • If your current medications need to be changed, you will be provided with an updated list of your medications prior to discharge.  • If you take warfarin (Coumadin), resume taking your usual dose the evening after the procedure.  • DO NOT STOP taking prescribed blood thinning (anti-platelet) medications unless instructed by your cardiologist.  These medications include:  o Aspirin, Clopidogrel (Plavix), Ticagrelor (Brilinta), or Prasugrel (Effient)   • If you take one of the following anticoagulants, RESUME 24 HOURS after your procedure:  o Apixiban (Eliquis), Rivaroxaban (Xarelto), Dabigatran (Pradaxa), Edoxaban (Savaysa)  • If you take metformin (Glucophage), RESUME 48 HOURS  after your procedure.    When to call your healthcare provider  Call your cardiologist right away at [phone] if you have any of the following:  ?  Problems/Concerns taking any of your prescribed heart medicines.  ?  The insertion site has increasing pain, swelling, redness, bleeding, or drainage.  ?  Your arm or leg below where the insertion site changes color, is cool, or is numb.  ?  You have chest pain or shortness of breath that does not go away with rest.  ?  Your pulse feels irregular -- very slow (less than 60 beats/minute) or very fast (over 100 beats/minute).  ?  You have dizziness, fainting, or you are very tired.  ?  You are coughing up blood or yellow or green mucus.  ?  You have chills or a fever over 101°F (38.3°C).   ?  If there is bleeding at the catheter insertion site, apply pressure for 10 minutes.  If bleeding persists, call 911, and continue to hold pressure until advanced medical support arrives.        Exercising Safely After Percutaneous Coronary Intervention (PCI)  After percutaneous coronary intervention (PCI), which involves angioplasty and often stenting, it's important to focus on your heart health. Exercise can help strengthen your heart. It can also help you feel good and improve your overall health. Talk with your health care provider or cardiac rehab team member about good options for you.  • Start slowly. Work up to more vigorous exercise as you get stronger. Aim for at least 150 minutes of exercise each week.  • Include aerobic activities. These make the heart beat faster. They work the heart and lungs, and improve the body's ability to use oxygen. Good choices include walking, swimming, and biking .  Always follow your doctor's recommendation for exercise.   You have been referred to cardiac rehabilitation, which is important for your recovery.  You may contact [facility] at [phone] to learn more and schedule a visit.        Lifestyle Management  After Percutaneous Coronary Intervention (PCI)  Percutaneous coronary intervention (PCI)  involves angioplasty and often stenting. This procedure can open arteries and relieve symptoms. But, it doesn't cure coronary artery disease. New blockages can still form. You need to take steps to prevent this by managing risk factors. Doing so will help make your heart and arteries healthier. Your doctor may prescribe cardiac rehabilitation to help with this lifelong process.  Understanding risk factors  Some risk factors for coronary artery disease can be controlled. These include smoking, high blood pressure, cholesterol, diabetes, and obesity. They can be managed with medication, diet, and exercise. Support and counseling can also play a role. The effort will pay off! Managing risk factors can help you be more active, feel better, and reduce the risk of heart attack.    If you smoke, quit!  If your doctor has been urging you to quit smoking, it's for good reasons. Smoking damages your heart, blood vessels, and lungs. The good news is that quitting can halt or even reverse the damage of smoking. To quit now:  • Get medical help. Ask your doctor for advice on stop-smoking programs. Also ask about medications or nicotine replacement therapy products that may help you quit smoking.  • Get support. Join a support group. Ask for help from your family and friends.  • Don't give up. It often takes several tries to succeed in quitting smoking.  • Avoid secondhand smoke. Ask family and friends not to smoke around you.

## 2020-02-28 NOTE — PROCEDURES
PREOPERATIVE DIAGNOSIS:  1.  Cardiomyopathy  2.  Atrial fibrillation  3.  Hyperlipidemia    POSTOPERATIVE DIAGNOSIS:  1.  Severe multivessel coronary artery disease  2.  LVEF 30%      PROCEDURE PERFORMED:  Selective coronary angiography of the native vessels  Left heart catheterization  Left ventriculogram    DESCRIPTION OF PROCEDURE:  The risks and benefits of cardiac catheterization as well as the procedure itself, rationale and appropriateness were discussed with the patient today. Complications including but not limited to death, stroke, MI, urgent bypass surgery, contrast nephropathy, vascular complications, bleeding and infection were explained to the patient. The potential outcomes associated with the procedure (possible PCI, possible CABG, possible medical Rx only) were also discussed at length. The patient agreed to proceed.    The patient was transported to the catheterization laboratory in the fasting state. The right radial area and right groin were prepped and draped in the usual fashion. Right radial area was entered with a single through and through puncture under direct ultrasound guidance and a 6F glide sheath was placed. An intra-arterial cocktail of heparin, verapamil and nitroglycerine was administered. Over a wire, a 5F Ariana catheter was passed to the aortic root and used to engage the right and left coronaries without difficulty. Contrast was administered and multiple images obtained. This catheter was then used to cross the aortic valve for LHC and contrast was administered at 8cc/s for 24cc's for left ventriculogram. All catheters and guidewires were removed and a TR band was applied to achieve patent hemostasis. Patient left the cath lab in stable condition.      Moderate sedation directly monitored by me during the case while supervising the administration of the sedation medication by an independent trained RN to assist in the monitoring of the patient's level of consciousness and  physiological status. I, the supervising physician was present the entire time from beginning of medication administration until the end of the procedure from 0 850 until 0901. For detailed administration records please see the moderate sedation documentation in the median tab.      FINDINGS:  I. HEMODYNAMICS:    Ao: 113/83 mmHg   LEDP: 20 mmHg   Gradient on LV pullback: No    II. LEFT VENTRICULOGRAM:   LVEF SAN PROJECTION: 30 %     III. CORONARY ANGIOGRAPHY:  Left Main: Large vessel bifurcating no CAD.  Left Anterior Descending: Large vessel diffusely diseased.  80% moderate length eccentric mid LAD stenosis and a [place] 1, 1, 1 configuration with a  of the D1.  There are ipsilateral collaterals.  Left Circumflex: Large caliber vessel with a large OM1.  The OM1 has a 98% focal concentric stenosis in its proximal portion.    Right Coronary: Moderate caliber vessel.  100% chronic totally occluded in its proximal portion with an unfavorable  crossing profile.  There are ipsilateral right to right collaterals as well as contralateral left to right collaterals.    COMPLICATIONS: none apparent    CONCLUSIONS:  1.  Severe multivessel coronary artery disease (mid LAD, mid large OM1,  D1,  RCA)  2.  LVEF 30%    RECOMMENDATIONS:  CABG  If he is not found to be a reasonable CABG candidate due to age and comorbidities, then I would recommend an interval staged PCI of the LAD and OM1.  His  of the diagonal and RCA are unfavorable for successful  PCI.

## 2020-03-04 ENCOUNTER — TELEPHONE (OUTPATIENT)
Dept: CARDIOLOGY | Facility: MEDICAL CENTER | Age: 85
End: 2020-03-04

## 2020-03-04 DIAGNOSIS — I42.9 CARDIOMYOPATHY, UNSPECIFIED TYPE (HCC): ICD-10-CM

## 2020-03-04 NOTE — TELEPHONE ENCOUNTER
pt called regarding angio with stent   Received: Yesterday   Message Contents   Claudia Caal, Abhilash Ass't  Tati Lai R.N.             Pt called and left message in regards to being scheduled for a Angio with Stent ? I have no information on this patient in regards to this procedure. Please check with TW to see what he wants to do.  Thank you.          Called pt to discuss, spoke with spouse Madelin. She states after pt's [facility] procedure on 02/28/20, they were advised to speak with Dr. Lemus for evaluation for stent placement. Per Madelin, Dr. Lemus stated pt is a good candidate for stent placement and so they called and LM to see if procedure will need to be scheduled. She is unsure if TW was made aware and she is inquiring what the next step is for plan of care.    To Dr. Blas - please advise. Thank you!

## 2020-03-04 NOTE — TELEPHONE ENCOUNTER
Pt's wife Madelin requesting a call back about procedure, no additional info provided. #[phone]

## 2020-03-05 ENCOUNTER — TELEPHONE (OUTPATIENT)
Dept: CARDIOLOGY | Facility: MEDICAL CENTER | Age: 85
End: 2020-03-05

## 2020-03-05 ENCOUNTER — APPOINTMENT (OUTPATIENT)
Dept: CARDIOTHORACIC SURGERY | Facility: MEDICAL CENTER | Age: 85
End: 2020-03-05
Payer: MEDICARE

## 2020-03-05 NOTE — TELEPHONE ENCOUNTER
Pt is scheduled for a  C w/PCI on 3/10/2020 with Dr. Blas.  Pt to check in at 8:30am for a 10:30am procedure.  PT instructed to to stop Cialis 3 days before procedure.  H & P done by ALEJA Dean on 2/28/2020.  Pre-admit scheduled for 3/9/2020 check in 8:45am.

## 2020-03-05 NOTE — TELEPHONE ENCOUNTER
Per TW, pt should be scheduled for LHC with PCI next week. Order placed and discussed with Elizabeth to call pt for scheduling.

## 2020-03-05 NOTE — TELEPHONE ENCOUNTER
Called pt's wife Madelin. She states that they have been waiting for recommendations since Friday after pt's LHC and evaluation with Dr. Lemus. She is eager for follow up and is getting upset that they have not received recommendations yet. TigerText sent to Dr. Blas.

## 2020-03-09 DIAGNOSIS — Z01.810 PRE-OPERATIVE CARDIOVASCULAR EXAMINATION: ICD-10-CM

## 2020-03-09 DIAGNOSIS — Z01.812 PRE-OPERATIVE LABORATORY EXAMINATION: ICD-10-CM

## 2020-03-09 LAB
ALBUMIN SERPL BCP-MCNC: 4.2 G/DL (ref 3.2–4.9)
ALBUMIN/GLOB SERPL: 1.4 G/DL
ALP SERPL-CCNC: 52 U/L (ref 30–99)
ALT SERPL-CCNC: 24 U/L (ref 2–50)
ANION GAP SERPL CALC-SCNC: 7 MMOL/L (ref 0–11.9)
APTT PPP: 36.6 SEC (ref 24.7–36)
AST SERPL-CCNC: 22 U/L (ref 12–45)
BILIRUB SERPL-MCNC: 0.7 MG/DL (ref 0.1–1.5)
BUN SERPL-MCNC: 24 MG/DL (ref 8–22)
CALCIUM SERPL-MCNC: 9.5 MG/DL (ref 8.5–10.5)
CHLORIDE SERPL-SCNC: 107 MMOL/L (ref 96–112)
CO2 SERPL-SCNC: 27 MMOL/L (ref 20–33)
CREAT SERPL-MCNC: 1.61 MG/DL (ref 0.5–1.4)
EKG IMPRESSION: NORMAL
ERYTHROCYTE [DISTWIDTH] IN BLOOD BY AUTOMATED COUNT: 46.5 FL (ref 35.9–50)
GLOBULIN SER CALC-MCNC: 3 G/DL (ref 1.9–3.5)
GLUCOSE SERPL-MCNC: 111 MG/DL (ref 65–99)
HCT VFR BLD AUTO: 42.6 % (ref 42–52)
HGB BLD-MCNC: 14.2 G/DL (ref 14–18)
INR PPP: 1.07 (ref 0.87–1.13)
MCH RBC QN AUTO: 31.1 PG (ref 27–33)
MCHC RBC AUTO-ENTMCNC: 33.3 G/DL (ref 33.7–35.3)
MCV RBC AUTO: 93.2 FL (ref 81.4–97.8)
PLATELET # BLD AUTO: 156 K/UL (ref 164–446)
PMV BLD AUTO: 10.1 FL (ref 9–12.9)
POTASSIUM SERPL-SCNC: 4.4 MMOL/L (ref 3.6–5.5)
PROT SERPL-MCNC: 7.2 G/DL (ref 6–8.2)
PROTHROMBIN TIME: 14.2 SEC (ref 12–14.6)
RBC # BLD AUTO: 4.57 M/UL (ref 4.7–6.1)
SODIUM SERPL-SCNC: 141 MMOL/L (ref 135–145)
WBC # BLD AUTO: 5.2 K/UL (ref 4.8–10.8)

## 2020-03-09 PROCEDURE — 93010 ELECTROCARDIOGRAM REPORT: CPT | Performed by: INTERNAL MEDICINE

## 2020-03-09 PROCEDURE — 85730 THROMBOPLASTIN TIME PARTIAL: CPT

## 2020-03-09 PROCEDURE — 85027 COMPLETE CBC AUTOMATED: CPT

## 2020-03-09 PROCEDURE — 36415 COLL VENOUS BLD VENIPUNCTURE: CPT

## 2020-03-09 PROCEDURE — 80053 COMPREHEN METABOLIC PANEL: CPT

## 2020-03-09 PROCEDURE — 93005 ELECTROCARDIOGRAM TRACING: CPT

## 2020-03-09 PROCEDURE — 85610 PROTHROMBIN TIME: CPT

## 2020-03-09 ASSESSMENT — FIBROSIS 4 INDEX: FIB4 SCORE: 3.14

## 2020-03-10 ENCOUNTER — APPOINTMENT (OUTPATIENT)
Dept: CARDIOLOGY | Facility: MEDICAL CENTER | Age: 85
End: 2020-03-10
Attending: INTERNAL MEDICINE
Payer: MEDICARE

## 2020-03-10 ENCOUNTER — HOSPITAL ENCOUNTER (OUTPATIENT)
Facility: MEDICAL CENTER | Age: 85
End: 2020-03-11
Attending: INTERNAL MEDICINE | Admitting: INTERNAL MEDICINE
Payer: MEDICARE

## 2020-03-10 DIAGNOSIS — I42.9 CARDIOMYOPATHY, UNSPECIFIED TYPE (HCC): ICD-10-CM

## 2020-03-10 LAB — EKG IMPRESSION: NORMAL

## 2020-03-10 PROCEDURE — A9270 NON-COVERED ITEM OR SERVICE: HCPCS | Performed by: PHYSICIAN ASSISTANT

## 2020-03-10 PROCEDURE — 160002 HCHG RECOVERY MINUTES (STAT)

## 2020-03-10 PROCEDURE — 700101 HCHG RX REV CODE 250

## 2020-03-10 PROCEDURE — 92978 ENDOLUMINL IVUS OCT C 1ST: CPT | Mod: 26,LD | Performed by: INTERNAL MEDICINE

## 2020-03-10 PROCEDURE — 93005 ELECTROCARDIOGRAM TRACING: CPT | Performed by: INTERNAL MEDICINE

## 2020-03-10 PROCEDURE — 93010 ELECTROCARDIOGRAM REPORT: CPT | Mod: 59 | Performed by: INTERNAL MEDICINE

## 2020-03-10 PROCEDURE — 700105 HCHG RX REV CODE 258: Performed by: INTERNAL MEDICINE

## 2020-03-10 PROCEDURE — 99152 MOD SED SAME PHYS/QHP 5/>YRS: CPT | Performed by: INTERNAL MEDICINE

## 2020-03-10 PROCEDURE — G0378 HOSPITAL OBSERVATION PER HR: HCPCS

## 2020-03-10 PROCEDURE — 700117 HCHG RX CONTRAST REV CODE 255: Performed by: INTERNAL MEDICINE

## 2020-03-10 PROCEDURE — A9270 NON-COVERED ITEM OR SERVICE: HCPCS

## 2020-03-10 PROCEDURE — 700111 HCHG RX REV CODE 636 W/ 250 OVERRIDE (IP)

## 2020-03-10 PROCEDURE — 700102 HCHG RX REV CODE 250 W/ 637 OVERRIDE(OP): Performed by: PHYSICIAN ASSISTANT

## 2020-03-10 PROCEDURE — 700102 HCHG RX REV CODE 250 W/ 637 OVERRIDE(OP)

## 2020-03-10 PROCEDURE — 92978 ENDOLUMINL IVUS OCT C 1ST: CPT

## 2020-03-10 PROCEDURE — 92928 PRQ TCAT PLMT NTRAC ST 1 LES: CPT | Mod: 59,LD | Performed by: INTERNAL MEDICINE

## 2020-03-10 PROCEDURE — 93452 LEFT HRT CATH W/VENTRCLGRPHY: CPT | Mod: 26,59 | Performed by: INTERNAL MEDICINE

## 2020-03-10 RX ORDER — LIDOCAINE HYDROCHLORIDE 20 MG/ML
INJECTION, SOLUTION INFILTRATION; PERINEURAL
Status: COMPLETED
Start: 2020-03-10 | End: 2020-03-10

## 2020-03-10 RX ORDER — SODIUM CHLORIDE 9 MG/ML
1000 INJECTION, SOLUTION INTRAVENOUS
Status: DISCONTINUED | OUTPATIENT
Start: 2020-03-10 | End: 2020-03-10

## 2020-03-10 RX ORDER — BIVALIRUDIN 250 MG/5ML
INJECTION, POWDER, LYOPHILIZED, FOR SOLUTION INTRAVENOUS
Status: COMPLETED
Start: 2020-03-10 | End: 2020-03-10

## 2020-03-10 RX ORDER — HEPARIN SODIUM,PORCINE 1000/ML
VIAL (ML) INJECTION
Status: COMPLETED
Start: 2020-03-10 | End: 2020-03-10

## 2020-03-10 RX ORDER — CLOPIDOGREL 300 MG/1
TABLET, FILM COATED ORAL
Status: COMPLETED
Start: 2020-03-10 | End: 2020-03-10

## 2020-03-10 RX ORDER — CLOPIDOGREL 300 MG/1
600 TABLET, FILM COATED ORAL ONCE
Status: DISCONTINUED | OUTPATIENT
Start: 2020-03-10 | End: 2020-03-10

## 2020-03-10 RX ORDER — AMIODARONE HYDROCHLORIDE 200 MG/1
200 TABLET ORAL DAILY
Status: DISCONTINUED | OUTPATIENT
Start: 2020-03-11 | End: 2020-03-11 | Stop reason: HOSPADM

## 2020-03-10 RX ORDER — VERAPAMIL HYDROCHLORIDE 2.5 MG/ML
INJECTION, SOLUTION INTRAVENOUS
Status: COMPLETED
Start: 2020-03-10 | End: 2020-03-10

## 2020-03-10 RX ORDER — HEPARIN SODIUM 200 [USP'U]/100ML
INJECTION, SOLUTION INTRAVENOUS
Status: COMPLETED
Start: 2020-03-10 | End: 2020-03-10

## 2020-03-10 RX ORDER — CLOPIDOGREL BISULFATE 75 MG/1
75 TABLET ORAL DAILY
Status: DISCONTINUED | OUTPATIENT
Start: 2020-03-11 | End: 2020-03-11 | Stop reason: HOSPADM

## 2020-03-10 RX ORDER — ASPIRIN 81 MG/1
TABLET, CHEWABLE ORAL
Status: COMPLETED
Start: 2020-03-10 | End: 2020-03-10

## 2020-03-10 RX ORDER — BENAZEPRIL HYDROCHLORIDE 10 MG/1
10 TABLET ORAL DAILY
Status: DISCONTINUED | OUTPATIENT
Start: 2020-03-11 | End: 2020-03-11 | Stop reason: HOSPADM

## 2020-03-10 RX ORDER — ACETAMINOPHEN 325 MG/1
650 TABLET ORAL EVERY 6 HOURS PRN
Status: DISCONTINUED | OUTPATIENT
Start: 2020-03-10 | End: 2020-03-11 | Stop reason: HOSPADM

## 2020-03-10 RX ORDER — SODIUM CHLORIDE 9 MG/ML
INJECTION, SOLUTION INTRAVENOUS CONTINUOUS
Status: ACTIVE | OUTPATIENT
Start: 2020-03-10 | End: 2020-03-10

## 2020-03-10 RX ORDER — MIDAZOLAM HYDROCHLORIDE 1 MG/ML
INJECTION INTRAMUSCULAR; INTRAVENOUS
Status: COMPLETED
Start: 2020-03-10 | End: 2020-03-10

## 2020-03-10 RX ADMIN — SODIUM CHLORIDE 1000 ML: 9 INJECTION, SOLUTION INTRAVENOUS at 07:50

## 2020-03-10 RX ADMIN — ASPIRIN 81 MG 81 MG: 81 TABLET ORAL at 08:40

## 2020-03-10 RX ADMIN — MIDAZOLAM HYDROCHLORIDE 2 MG: 1 INJECTION, SOLUTION INTRAMUSCULAR; INTRAVENOUS at 09:14

## 2020-03-10 RX ADMIN — BIVALIRUDIN 250 MG: 250 INJECTION INTRACAVERNOUS at 09:14

## 2020-03-10 RX ADMIN — HEPARIN SODIUM: 1000 INJECTION, SOLUTION INTRAVENOUS; SUBCUTANEOUS at 08:39

## 2020-03-10 RX ADMIN — IOHEXOL 102 ML: 350 INJECTION, SOLUTION INTRAVENOUS at 09:44

## 2020-03-10 RX ADMIN — BIVALIRUDIN 0.2 MG/KG/HR: 250 INJECTION, POWDER, LYOPHILIZED, FOR SOLUTION INTRAVENOUS at 09:44

## 2020-03-10 RX ADMIN — HEPARIN SODIUM 2000 UNITS: 200 INJECTION, SOLUTION INTRAVENOUS at 08:39

## 2020-03-10 RX ADMIN — METOPROLOL TARTRATE 25 MG: 25 TABLET ORAL at 18:13

## 2020-03-10 RX ADMIN — VERAPAMIL HYDROCHLORIDE 2.5 MG: 2.5 INJECTION, SOLUTION INTRAVENOUS at 08:39

## 2020-03-10 RX ADMIN — NITROGLYCERIN 10 ML: 20 INJECTION INTRAVENOUS at 08:38

## 2020-03-10 RX ADMIN — FENTANYL CITRATE 100 MCG: 50 INJECTION, SOLUTION INTRAMUSCULAR; INTRAVENOUS at 09:37

## 2020-03-10 RX ADMIN — LIDOCAINE HYDROCHLORIDE: 20 INJECTION, SOLUTION INFILTRATION; PERINEURAL at 08:38

## 2020-03-10 RX ADMIN — CLOPIDOGREL BISULFATE 600 MG: 300 TABLET, FILM COATED ORAL at 09:50

## 2020-03-10 ASSESSMENT — COGNITIVE AND FUNCTIONAL STATUS - GENERAL
DAILY ACTIVITIY SCORE: 24
MOBILITY SCORE: 24
SUGGESTED CMS G CODE MODIFIER MOBILITY: CH
SUGGESTED CMS G CODE MODIFIER DAILY ACTIVITY: CH

## 2020-03-10 ASSESSMENT — LIFESTYLE VARIABLES
EVER_SMOKED: NEVER
CONSUMPTION TOTAL: NEGATIVE
HAVE PEOPLE ANNOYED YOU BY CRITICIZING YOUR DRINKING: NO
EVER_SMOKED: NEVER
EVER FELT BAD OR GUILTY ABOUT YOUR DRINKING: NO
AVERAGE NUMBER OF DAYS PER WEEK YOU HAVE A DRINK CONTAINING ALCOHOL: 7
ALCOHOL_USE: YES
TOTAL SCORE: 0
DOES PATIENT WANT TO STOP DRINKING: NO
HOW MANY TIMES IN THE PAST YEAR HAVE YOU HAD 5 OR MORE DRINKS IN A DAY: 0
TOTAL SCORE: 0
ON A TYPICAL DAY WHEN YOU DRINK ALCOHOL HOW MANY DRINKS DO YOU HAVE: 1
TOTAL SCORE: 0
HAVE YOU EVER FELT YOU SHOULD CUT DOWN ON YOUR DRINKING: NO
EVER HAD A DRINK FIRST THING IN THE MORNING TO STEADY YOUR NERVES TO GET RID OF A HANGOVER: NO

## 2020-03-10 ASSESSMENT — PATIENT HEALTH QUESTIONNAIRE - PHQ9
SUM OF ALL RESPONSES TO PHQ9 QUESTIONS 1 AND 2: 0
2. FEELING DOWN, DEPRESSED, IRRITABLE, OR HOPELESS: NOT AT ALL
1. LITTLE INTEREST OR PLEASURE IN DOING THINGS: NOT AT ALL

## 2020-03-10 NOTE — PROCEDURES
REFERRING PHYSICIAN: Dr. Anderson cardiothoracic surgery    PREOPERATIVE DIAGNOSIS:  1.  Multivessel CAD  2.  Ischemic cardiomyopathy  3.  Surgical turndown    POSTOPERATIVE DIAGNOSIS:  1.  Successful PCI 90% OM 1 stenosis (2.5 x 30 mm [place] GREG), excellent result  2.  Successful PCI 80% long proximal and ostial LAD stenosis (3.5 x 38 mm [place] GREG, postdilated 3.75 mm, IVUS guided), excellent result  3.  LVEDP 15 mmHg      PROCEDURE PERFORMED:  Left heart catheterization  Supervision moderate sedation  Intravascular ultrasound assessment LAD  PCI of LAD GREG  PCI OM/LCx GREG    DESCRIPTION OF PROCEDURE:  The risks and benefits of cardiac catheterization as well as the procedure itself, rationale and appropriateness were discussed with the patient today. Complications including but not limited to death, stroke, MI, urgent bypass surgery, contrast nephropathy, vascular complications, bleeding and infection were explained to the patient. The potential outcomes associated with the procedure (possible PCI, possible CABG, possible medical Rx only) were also discussed at length. The patient agreed to proceed.    The patient was transported to the catheterization laboratory in the fasting state. The right radial area and right groin were prepped and draped in the usual fashion. Right radial area was entered with a single through and through puncture under direct ultrasound guidance and a 6F glide sheath was placed. An intra-arterial cocktail of heparin, verapamil and nitroglycerine was administered. Over a wire, a 4 [place] pigtail catheter was used to cross the aortic valve for LHC.  Following this we exchanged over wire for an EBU 3.5 6 [place] guide seated in the left main.  Running views were obtained confirming presence of priorly known stenoses.     DESCRIPTION OF PCI:  Bivalirudin bolus infusion was initiated.   A BMW 0.014 floppy tip wire was navigated across the OM/LCx stenosis. A 2.5 mm balloon was used to predilate the  lesion. A 2.5 x 30 mm [place] GREG was then positioned and deployed at nominal pressure. Following this a 2.5 mm noncompliant balloon was used to post dilate the stent. There was an excellent angiographic result with MAGDA-3 flow and no residual stenosis in the stented segment.     Attention was then turned to the LAD.  The wire was renegotiated into the distal LAD past the stenosis.  A 3.0 mm balloon was used to predilate the lesion.  Following this an [place] eye intravascular sound catheter was passed distal to the lesion and manual pullback was recorded for vessel sizing.  Following this a 3.5 x 38 mm [place] GREG was positioned and applied a high pressure.  Following this a 3.5 mm noncompliant balloon was used to post dilate the distal segment.  Subsequently a 3.75 mm noncompliant balloon was used to post dilate the ostial and proximal segments.  The Hillman eye KVNG catheter was then reintroduced and manual pullback again recorded showing excellent stent expansion and apposition.  There was an excellent angiographic result with MAGDA-3 flow.  All catheters and guidewires were removed and the patient left the cath lab in stable condition.   All catheters and guidewires were removed and a TR band was applied to achieve patent hemostasis. Patient left the cath lab in stable condition.      Moderate sedation directly monitored by me during the case while supervising the administration of the sedation medication by an independent trained RN to assist in the monitoring of the patient's level of consciousness and physiological status. I, the supervising physician was present the entire time from beginning of medication administration until the end of the procedure from 0 855 until 0 942. For detailed administration records please see the moderate sedation documentation in the median tab.      FINDINGS:  I. HEMODYNAMICS:    Ao: 107/79 mmHg   LEDP: 15 mmHg   Gradient on LV pullback: No    COMPLICATIONS: none  apparent    CONCLUSIONS:  1.  Successful PCI 90% OM 1 stenosis (2.5 x 30 mm [place] GREG), excellent result  2.  Successful PCI 80% long proximal and ostial LAD stenosis (3.5 x 38 mm [place] GREG, postdilated 3.75 mm, IVUS guided), excellent result  3.  LVEDP 15 mmHg    RECOMMENDATIONS:  Postoperative IVF  DAPT  OMT CAD  Admit for hobs overnight

## 2020-03-10 NOTE — OR NURSING
1004: [facility] stent placement. Patient is awake. VSS. RUE CMS intact. R radial TR band intact. Vascular access care management per MD order (see assessment). No c/o pain or nausea at this time. Will monitor closely. Vital signs per MD order. Angiomax infusing at 4.2 ml/hr x 4 hrs (started in cath lab at 0945).  1020: Patient tolerated PO fluids well.   1115: EKG done at bedside. R radial TR band intact.   1125: Air completely off hemoband per MD order. Gauze and tegaderm dressing to R radial site. RUE circulation, movement and sensation intact.   1200: VSS. Patient met criteria to transfer out of PPU. R radial site intact. Angiomax infusing.

## 2020-03-10 NOTE — OR NURSING
1400: VSS. R radial site intact. Wife at bedside. Angiomax gtt stopped.   1500: Patient ambulated to and from the bathroom without a problem. Wife at stand by.

## 2020-03-11 VITALS
TEMPERATURE: 97.1 F | SYSTOLIC BLOOD PRESSURE: 136 MMHG | DIASTOLIC BLOOD PRESSURE: 98 MMHG | WEIGHT: 230.82 LBS | RESPIRATION RATE: 18 BRPM | OXYGEN SATURATION: 95 % | HEIGHT: 70 IN | BODY MASS INDEX: 33.05 KG/M2 | HEART RATE: 71 BPM

## 2020-03-11 PROBLEM — Z95.5 S/P DRUG ELUTING CORONARY STENT PLACEMENT: Status: ACTIVE | Noted: 2020-03-11

## 2020-03-11 PROBLEM — I25.83 CORONARY ARTERY DISEASE DUE TO LIPID RICH PLAQUE: Status: ACTIVE | Noted: 2020-03-11

## 2020-03-11 PROBLEM — I25.10 CORONARY ARTERY DISEASE DUE TO LIPID RICH PLAQUE: Status: ACTIVE | Noted: 2020-03-11

## 2020-03-11 PROBLEM — I25.5 ISCHEMIC CARDIOMYOPATHY: Status: ACTIVE | Noted: 2020-01-28

## 2020-03-11 LAB
ANION GAP SERPL CALC-SCNC: 7 MMOL/L (ref 0–11.9)
BUN SERPL-MCNC: 19 MG/DL (ref 8–22)
CALCIUM SERPL-MCNC: 9 MG/DL (ref 8.5–10.5)
CHLORIDE SERPL-SCNC: 105 MMOL/L (ref 96–112)
CO2 SERPL-SCNC: 26 MMOL/L (ref 20–33)
CREAT SERPL-MCNC: 1.42 MG/DL (ref 0.5–1.4)
ERYTHROCYTE [DISTWIDTH] IN BLOOD BY AUTOMATED COUNT: 45.4 FL (ref 35.9–50)
GLUCOSE SERPL-MCNC: 103 MG/DL (ref 65–99)
HCT VFR BLD AUTO: 39.2 % (ref 42–52)
HGB BLD-MCNC: 13.3 G/DL (ref 14–18)
MCH RBC QN AUTO: 31.1 PG (ref 27–33)
MCHC RBC AUTO-ENTMCNC: 33.9 G/DL (ref 33.7–35.3)
MCV RBC AUTO: 91.6 FL (ref 81.4–97.8)
PLATELET # BLD AUTO: 139 K/UL (ref 164–446)
PMV BLD AUTO: 10.1 FL (ref 9–12.9)
POTASSIUM SERPL-SCNC: 4.1 MMOL/L (ref 3.6–5.5)
RBC # BLD AUTO: 4.28 M/UL (ref 4.7–6.1)
SODIUM SERPL-SCNC: 138 MMOL/L (ref 135–145)
WBC # BLD AUTO: 7.7 K/UL (ref 4.8–10.8)

## 2020-03-11 PROCEDURE — 700102 HCHG RX REV CODE 250 W/ 637 OVERRIDE(OP): Performed by: INTERNAL MEDICINE

## 2020-03-11 PROCEDURE — A9270 NON-COVERED ITEM OR SERVICE: HCPCS | Performed by: INTERNAL MEDICINE

## 2020-03-11 PROCEDURE — A9270 NON-COVERED ITEM OR SERVICE: HCPCS | Performed by: PHYSICIAN ASSISTANT

## 2020-03-11 PROCEDURE — 700102 HCHG RX REV CODE 250 W/ 637 OVERRIDE(OP): Performed by: PHYSICIAN ASSISTANT

## 2020-03-11 PROCEDURE — 80048 BASIC METABOLIC PNL TOTAL CA: CPT

## 2020-03-11 PROCEDURE — G0378 HOSPITAL OBSERVATION PER HR: HCPCS

## 2020-03-11 PROCEDURE — 36415 COLL VENOUS BLD VENIPUNCTURE: CPT

## 2020-03-11 PROCEDURE — 85027 COMPLETE CBC AUTOMATED: CPT

## 2020-03-11 RX ORDER — CLOPIDOGREL BISULFATE 75 MG/1
75 TABLET ORAL DAILY
Qty: 30 TAB | Refills: 11 | Status: SHIPPED | OUTPATIENT
Start: 2020-03-12 | End: 2020-03-19 | Stop reason: SDUPTHER

## 2020-03-11 RX ADMIN — ASPIRIN 81 MG: 81 TABLET, COATED ORAL at 05:46

## 2020-03-11 RX ADMIN — CLOPIDOGREL BISULFATE 75 MG: 75 TABLET ORAL at 05:47

## 2020-03-11 RX ADMIN — AMIODARONE HYDROCHLORIDE 200 MG: 200 TABLET ORAL at 05:46

## 2020-03-11 RX ADMIN — BENAZEPRIL HYDROCHLORIDE 10 MG: 10 TABLET ORAL at 05:47

## 2020-03-11 RX ADMIN — METOPROLOL TARTRATE 25 MG: 25 TABLET ORAL at 05:47

## 2020-03-11 NOTE — CARE PLAN
Problem: Knowledge Deficit  Goal: Knowledge of disease process/condition, treatment plan, diagnostic tests, and medications will improve  Note: Pt mobility assessed at beginning of shift. Pt is up self. Fall precautions in place. Non-slip socks on. Bed in lowest locked position. Bed alarm on. Call light within reach.   Goal: Knowledge of the prescribed therapeutic regimen will improve  Note: Pt educated about disease process. Reason why medications are taken. And informed about treatment plan.

## 2020-03-11 NOTE — DISCHARGE SUMMARY
[facility] Discharge Summary    Admission Date: 3/10/20  Discharge Date:3/11/2020    CHIEF COMPLAINT ON ADMISSION: CMO, CAD    DISCHARGE DIAGNOSIS:  1. Ischemic cardiomyopathy, EF 30%  2. Coronary artery disease  3. S/p stent to LAD and OM1 3/10/20  4. Persistent atrial fibrillation, despite amio and DCCV on 2/28/20.  5. Chronic anticoagulation, Xarelto  6. Essential hypertension.  7. Dyslipidemia    PROCEDURES:  [facility] 3/10/2020  POSTOPERATIVE DIAGNOSIS:  1.  Successful PCI 90% OM 1 stenosis (2.5 x 30 mm David GREG), excellent result  2.  Successful PCI 80% long proximal and ostial LAD stenosis (3.5 x 38 mm David GREG, postdilated 3.75 mm, IVUS guided), excellent result  3.  LVEDP 15 mmHg    HPI & HOSPITAL COURSE:  Mr Holder has PMH as outlined above.  He presented for elective coronary angiogram.  Found to have significant double vessel disease, underwent GREG to the OM1 and ostial, proximal LAD.  He is doing well post procedure without complication.  He will continue on triple therapy with ASA, Plavix and Xarelto x 1 month.  Patient will be d/c to home in stable condition this morning.  He has follow up with us in 1-2 weeks.     CONSULTATIONS:  none    MEDICATIONS ON DISCHARGE   Alonso Holder   Home Medication Instructions EILEEN:[id number]    Printed on:03/11/20 3754   Medication Information                      amiodarone (CORDARONE) 200 MG Tab  Take 1 Tab by mouth every day.             aspirin 81 MG tablet  Take 81 mg by mouth every day.             benazepril (LOTENSIN) 10 MG Tab  Take 1 Tab by mouth every day.             cholecalciferol (VITAMIN D3) 400 UNIT Tab  Take 400 Units by mouth every day.             clopidogrel (PLAVIX) 75 MG Tab  Take 1 Tab by mouth every day.             coenzyme Q-10 30 MG capsule  Take 60 mg by mouth every day.             metoprolol (LOPRESSOR) 25 MG Tab  Take 25 mg by mouth 2 times a day.             tadalafil (CIALIS) 20 MG tablet  Take 20 mg by mouth as needed for Erectile  "Dysfunction.             XARELTO 20 MG Tab tablet  every day.                 PHYSICAL EXAM  Body mass index is 33.12 kg/m². /98   Pulse 71   Temp 36.2 °C (97.1 °F) (Temporal)   Resp 18   Ht 1.778 m (5' 10\")   Wt 104.7 kg (230 lb 13.2 oz)   SpO2 95%    Vitals:    03/10/20 2054 03/11/20 0000 03/11/20 0400 03/11/20 0800   BP: 122/88 124/86 139/98 136/98   Pulse: 71 80 91 71   Resp: 18 18 18 18   Temp: 36.7 °C (98 °F) 36.6 °C (97.9 °F) 36.9 °C (98.4 °F) 36.2 °C (97.1 °F)   TempSrc: Temporal Temporal Temporal Temporal   SpO2: 95% 95% 96% 95%   Weight:       Height:        Oxygen Therapy:  Pulse Oximetry: 95 %, O2 (LPM): 0, O2 Delivery Device: None - Room Air    General: no apparent distress.  Lungs: normal respiratory effort,  without crackles, no wheezing or rhonchi.  Heart: normal rate, irregularly irregular rhythm, no murmur.  EXT: no edema bilateral lower extremities.  Right radial access site soft, non-tender, no hematoma. 2+ R radial pulse palpable.  Abdomen: soft, non tender, non distended,  Neurological: No focal deficits, no facial asymmetry.  Normal speech.  Skin: Warm extremities, no rash.    LABORATORY:  Lab Results   Component Value Date/Time    SODIUM 138 03/11/2020 05:28 AM    POTASSIUM 4.1 03/11/2020 05:28 AM    CHLORIDE 105 03/11/2020 05:28 AM    CO2 26 03/11/2020 05:28 AM    GLUCOSE 103 (H) 03/11/2020 05:28 AM    BUN 19 03/11/2020 05:28 AM    CREATININE 1.42 (H) 03/11/2020 05:28 AM        Lab Results   Component Value Date/Time    WBC 7.7 03/11/2020 05:28 AM    HEMOGLOBIN 13.3 (L) 03/11/2020 05:28 AM    HEMATOCRIT 39.2 (L) 03/11/2020 05:28 AM    PLATELETCT 139 (L) 03/11/2020 05:28 AM        FOLLOW UP:  Future Appointments   Date Time Provider Department Center   3/19/2020 10:45 AM Patricia Reardon P.A.-C. [facility] None       DISCHARGE DISPOSITION: Home in stable condition.    DISCHARGE INSTRUCTIONS  -  Continue DAPT + Xarelto x 1 month, then d/c aspirin.  -  Patient will be referred to Phase II " out patient cardiac rehab.  -  Access site care instructions were discussed with patient; patient voiced understanding.      Patricia Reardon PA-C  3/11/2020

## 2020-03-11 NOTE — PROGRESS NOTES
Discharge instructions give to patient at bedside. Pt verbalizes understanding and states plans for follow up. New and home medications reviewed, post discharge activity level and worsening of symptoms needing follow up care discussed. Telemetry monitor and IV cathlon removed. All belongings accounted for, all questions answered at this time. Pt walked with wife to car.

## 2020-03-11 NOTE — DISCHARGE INSTRUCTIONS
Discharge Instructions    Discharged to home by car with relative. Discharged via walking, hospital escort: Refused.  Special equipment needed: Not Applicable    Be sure to schedule a follow-up appointment with your primary care doctor or any specialists as instructed.     Discharge Plan:   Diet Plan: Discussed  Activity Level: Discussed  Confirmed Follow up Appointment: Appointment Scheduled  Confirmed Symptoms Management: Discussed  Medication Reconciliation Updated: Yes  Influenza Vaccine Indication: Not indicated: Previously immunized this influenza season and > 8 years of age    I understand that a diet low in cholesterol, fat, and sodium is recommended for good health. Unless I have been given specific instructions below for another diet, I accept this instruction as my diet prescription.       Special Instructions: None    · Is patient discharged on Warfarin / Coumadin?   No       Coronary Angiogram With Stent  Coronary angiogram with stent placement is a procedure to widen or open a narrow blood vessel of the heart (coronary artery). Arteries may become blocked by cholesterol buildup (plaques) in the lining or wall. When a coronary artery becomes partially blocked, blood flow to that area decreases. This may lead to chest pain or a heart attack (myocardial infarction).  A stent is a small piece of metal that looks like mesh or a spring. Stent placement may be done as treatment for a heart attack or right after a coronary angiogram in which a blocked artery is found.  Let your health care provider know about:  · Any allergies you have.  · All medicines you are taking, including vitamins, herbs, eye drops, creams, and over-the-counter medicines.  · Any problems you or family members have had with anesthetic medicines.  · Any blood disorders you have.  · Any surgeries you have had.  · Any medical conditions you have.  · Whether you are pregnant or may be pregnant.  What are the risks?  Generally, this is a safe  procedure. However, problems may occur, including:  · Damage to the heart or its blood vessels.  · A return of blockage.  · Bleeding, infection, or bruising at the insertion site.  · A collection of blood under the skin (hematoma) at the insertion site.  · A blood clot in another part of the body.  · Kidney injury.  · Allergic reaction to the dye or contrast that is used.  · Bleeding into the abdomen (retroperitoneal bleeding).  What happens before the procedure?  Staying hydrated   Follow instructions from your health care provider about hydration, which may include:  · Up to 2 hours before the procedure - you may continue to drink clear liquids, such as water, clear fruit juice, black coffee, and plain tea.  Eating and drinking restrictions   Follow instructions from your health care provider about eating and drinking, which may include:  · 8 hours before the procedure - stop eating heavy meals or foods such as meat, fried foods, or fatty foods.  · 6 hours before the procedure - stop eating light meals or foods, such as toast or cereal.  · 2 hours before the procedure - stop drinking clear liquids.  Ask your health care provider about:  · Changing or stopping your regular medicines. This is especially important if you are taking diabetes medicines or blood thinners.  · Taking medicines such as ibuprofen. These medicines can thin your blood. Do not take these medicines before your procedure if your health care provider instructs you not to. Generally, aspirin is recommended before a procedure of passing a small, thin tube (catheter) through a blood vessel and into the heart (cardiac catheterization).  What happens during the procedure?  · An IV tube will be inserted into one of your veins.  · You will be given one or more of the following:  ¨ A medicine to help you relax (sedative).  ¨ A medicine to numb the area where the catheter will be inserted into an artery (local anesthetic).  · To reduce your risk of  infection:  ¨ Your health care team will wash or sanitize their hands.  ¨ Your skin will be washed with soap.  ¨ Hair may be removed from the area where the catheter will be inserted.  · Using a guide wire, the catheter will be inserted into an artery. The location may be in your groin, in your wrist, or in the fold of your arm (near your elbow).  · A type of X-ray (fluoroscopy) will be used to help guide the catheter to the opening of the arteries in the heart.  · A dye will be injected into the catheter, and X-rays will be taken. The dye will help to show where any narrowing or blockages are located in the arteries.  · A tiny wire will be guided to the blocked spot, and a balloon will be inflated to make the artery wider.  · The stent will be expanded and will crush the plaques into the wall of the vessel. The stent will hold the area open and improve the blood flow. Most stents have a drug coating to reduce the risk of the stent narrowing over time.  · The artery may be made wider using a drill, laser, or other tools to remove plaques.  · When the blood flow is better, the catheter will be removed. The lining of the artery will grow over the stent, which stays where it was placed.  This procedure may vary among health care providers and hospitals.  What happens after the procedure?  · If the procedure is done through the leg, you will be kept in bed lying flat for about 6 hours. You will be instructed to not bend and not cross your legs.  · The insertion site will be checked frequently.  · The pulse in your foot or wrist will be checked frequently.  · You may have additional blood tests, X-rays, and a test that records the electrical activity of your heart (electrocardiogram, or ECG).  This information is not intended to replace advice given to you by your health care provider. Make sure you discuss any questions you have with your health care provider.  Document Released: 06/23/2004 Document Revised: 08/17/2017  Document Reviewed: 07/23/2017  [employer] Interactive Patient Education © 2017 [employer]      Rivaroxaban oral tablets  What is this medicine?  RIVAROXABAN (ri va MANA a ban) is an anticoagulant (blood thinner). It is used to treat blood clots in the lungs or in the veins. It is also used after knee or hip surgeries to prevent blood clots. It is also used to lower the chance of stroke in people with a medical condition called atrial fibrillation.  This medicine may be used for other purposes; ask your health care provider or pharmacist if you have questions.  COMMON BRAND NAME(S): Xarelto, Xarelto Starter Pack  What should I tell my health care provider before I take this medicine?  They need to know if you have any of these conditions:  -bleeding disorders  -bleeding in the brain  -blood in your stools (black or tarry stools) or if you have blood in your vomit  -history of stomach bleeding  -kidney disease  -liver disease  -low blood counts, like low white cell, platelet, or red cell counts  -recent or planned spinal or epidural procedure  -take medicines that treat or prevent blood clots  -an unusual or allergic reaction to rivaroxaban, other medicines, foods, dyes, or preservatives  -pregnant or trying to get pregnant  -breast-feeding  How should I use this medicine?  Take this medicine by mouth with a glass of water. Follow the directions on the prescription label. Take your medicine at regular intervals. Do not take it more often than directed. Do not stop taking except on your doctor's advice. Stopping this medicine may increase your risk of a blood clot. Be sure to refill your prescription before you run out of medicine.  If you are taking this medicine after hip or knee replacement surgery, take it with or without food. If you are taking this medicine for atrial fibrillation, take it with your evening meal. If you are taking this medicine to treat blood clots, take it with food at the same time each day. If  you are unable to swallow your tablet, you may crush the tablet and mix it in applesauce. Then, immediately eat the applesauce. You should eat more food right after you eat the applesauce containing the crushed tablet.  Talk to your pediatrician regarding the use of this medicine in children. Special care may be needed.  Overdosage: If you think you have taken too much of this medicine contact a poison control center or emergency room at once.  NOTE: This medicine is only for you. Do not share this medicine with others.  What if I miss a dose?  If you take your medicine once a day and miss a dose, take the missed dose as soon as you remember. If you take your medicine twice a day and miss a dose, take the missed dose immediately. In this instance, 2 tablets may be taken at the same time. The next day you should take 1 tablet twice a day as directed.  What may interact with this medicine?  Do not take this medicine with any of the following medications:  -defibrotide  This medicine may also interact with the following medications:  -aspirin and aspirin-like medicines  -certain antibiotics like erythromycin, azithromycin, and clarithromycin  -certain medicines for fungal infections like ketoconazole and itraconazole  -certain medicines for irregular heart beat like amiodarone, quinidine, dronedarone  -certain medicines for seizures like carbamazepine, phenytoin  -certain medicines that treat or prevent blood clots like warfarin, enoxaparin, and dalteparin  -conivaptan  -diltiazem  -felodipine  -indinavir  -lopinavir; ritonavir  -NSAIDS, medicines for pain and inflammation, like ibuprofen or naproxen  -ranolazine  -rifampin  -ritonavir  -SNRIs, medicines for depression, like desvenlafaxine, duloxetine, levomilnacipran, venlafaxine  -SSRIs, medicines for depression, like citalopram, escitalopram, fluoxetine, fluvoxamine, paroxetine, sertraline  -Lisman's wort  -verapamil  This list may not describe all possible  interactions. Give your health care provider a list of all the medicines, herbs, non-prescription drugs, or dietary supplements you use. Also tell them if you smoke, drink alcohol, or use illegal drugs. Some items may interact with your medicine.  What should I watch for while using this medicine?  Visit your doctor or health care professional for regular checks on your progress.  Notify your doctor or health care professional and seek emergency treatment if you develop breathing problems; changes in vision; chest pain; severe, sudden headache; pain, swelling, warmth in the leg; trouble speaking; sudden numbness or weakness of the face, arm or leg. These can be signs that your condition has gotten worse.  If you are going to have surgery or other procedure, tell your doctor that you are taking this medicine.  What side effects may I notice from receiving this medicine?  Side effects that you should report to your doctor or health care professional as soon as possible:  -allergic reactions like skin rash, itching or hives, swelling of the face, lips, or tongue  -back pain  -redness, blistering, peeling or loosening of the skin, including inside the mouth  -signs and symptoms of bleeding such as bloody or black, tarry stools; red or dark-brown urine; spitting up blood or brown material that looks like coffee grounds; red spots on the skin; unusual bruising or bleeding from the eye, gums, or nose  Side effects that usually do not require medical attention (report to your doctor or health care professional if they continue or are bothersome):  -dizziness  -muscle pain  This list may not describe all possible side effects. Call your doctor for medical advice about side effects. You may report side effects to FDA at 6-206-FDA-1077.  Where should I keep my medicine?  Keep out of the reach of children.  Store at room temperature between 15 and 30 degrees C (59 and 86 degrees F). Throw away any unused medicine after the  expiration date.  NOTE: This sheet is a summary. It may not cover all possible information. If you have questions about this medicine, talk to your doctor, pharmacist, or health care provider.  © 2018 [employer] (2017-09-06 16:29:33)      Clopidogrel tablets  What is this medicine?  CLOPIDOGREL (kloh PID oh grel) helps to prevent blood clots. This medicine is used to prevent heart attack, stroke, or other vascular events in people who are at high risk.  This medicine may be used for other purposes; ask your health care provider or pharmacist if you have questions.  COMMON BRAND NAME(S): Plavix  What should I tell my health care provider before I take this medicine?  They need to know if you have any of the following conditions:  -bleeding disorders  -bleeding in the brain  -having surgery  -history of stomach bleeding  -an unusual or allergic reaction to clopidogrel, other medicines, foods, dyes, or preservatives  -pregnant or trying to get pregnant  -breast-feeding  How should I use this medicine?  Take this medicine by mouth with a glass of water. Follow the directions on the prescription label. You may take this medicine with or without food. If it upsets your stomach, take it with food. Take your medicine at regular intervals. Do not take it more often than directed. Do not stop taking except on your doctor's advice.  A special MedGuide will be given to you by the pharmacist with each prescription and refill. Be sure to read this information carefully each time.  Talk to your pediatrician regarding the use of this medicine in children. Special care may be needed.  Overdosage: If you think you have taken too much of this medicine contact a poison control center or emergency room at once.  NOTE: This medicine is only for you. Do not share this medicine with others.  What if I miss a dose?  If you miss a dose, take it as soon as you can. If it is almost time for your next dose, take only that dose. Do  not take double or extra doses.  What may interact with this medicine?  Do not take this medicine with the following medications:  -dasabuvir; ombitasvir; paritaprevir; ritonavir  -defibrotide  This medicine may also interact with the following medications:  -antiviral medicines for HIV or AIDS  -aspirin  -certain medicines for depression like citalopram, fluoxetine, fluvoxamine  -certain medicines for fungal infections like ketoconazole, fluconazole, voriconazole  -certain medicines for seizures like felbamate, oxcarbazepine, phenytoin  -certain medicines for stomach problems like cimetidine, omeprazole, esomeprazole  -certain medicines that treat or prevent blood clots like warfarin, enoxaparin, dalteparin, apixaban, dabigatran, rivaroxaban, ticlopidine  -chloramphenicol  -cilostazol  -fluvastatin  -isoniazid  -modafinil  -nicardipine  -NSAIDS, medicines for pain and inflammation, like ibuprofen or naproxen  -quinine  -repaglinide  -tamoxifen  -tolbutamide  -topiramate  -torsemide  This list may not describe all possible interactions. Give your health care provider a list of all the medicines, herbs, non-prescription drugs, or dietary supplements you use. Also tell them if you smoke, drink alcohol, or use illegal drugs. Some items may interact with your medicine.  What should I watch for while using this medicine?  Visit your doctor or health care professional for regular check ups. Do not stop taking your medicine unless your doctor tells you to.  Notify your doctor or health care professional and seek emergency treatment if you develop breathing problems; changes in vision; chest pain; severe, sudden headache; pain, swelling, warmth in the leg; trouble speaking; sudden numbness or weakness of the face, arm or leg. These can be signs that your condition has gotten worse.  If you are going to have surgery or dental work, tell your doctor or health care professional that you are taking this medicine.  Certain  genetic factors may reduce the effect of this medicine. Your doctor may use genetic tests to determine treatment.  What side effects may I notice from receiving this medicine?  Side effects that you should report to your doctor or health care professional as soon as possible:  -allergic reactions like skin rash, itching or hives, swelling of the face, lips, or tongue  -signs and symptoms of bleeding such as bloody or black, tarry stools; red or dark-brown urine; spitting up blood or brown material that looks like coffee grounds; red spots on the skin; unusual bruising or bleeding from the eye, gums, or nose  -signs and symptoms of a blood clot such as breathing problems; changes in vision; chest pain; severe, sudden headache; pain, swelling, warmth in the leg; trouble speaking; sudden numbness or weakness of the face, arm or leg  Side effects that usually do not require medical attention (report to your doctor or health care professional if they continue or are bothersome):  -constipation  -diarrhea  -headache  -upset stomach  This list may not describe all possible side effects. Call your doctor for medical advice about side effects. You may report side effects to FDA at 1-380-FDA-8137.  Where should I keep my medicine?  Keep out of the reach of children.  Store at room temperature of 59 to 86 degrees F (15 to 30 degrees C). Throw away any unused medicine after the expiration date.  NOTE: This sheet is a summary. It may not cover all possible information. If you have questions about this medicine, talk to your doctor, pharmacist, or health care provider.  © 2018 [employer] Standard (2016-09-22 10:00:44)        Depression / Suicide Risk    As you are discharged from this [facility] facility, it is important to learn how to keep safe from harming yourself.    Recognize the warning signs:  · Abrupt changes in personality, positive or negative- including increase in energy   · Giving away possessions  · Change in  eating patterns- significant weight changes-  positive or negative  · Change in sleeping patterns- unable to sleep or sleeping all the time   · Unwillingness or inability to communicate  · Depression  · Unusual sadness, discouragement and loneliness  · Talk of wanting to die  · Neglect of personal appearance   · Rebelliousness- reckless behavior  · Withdrawal from people/activities they love  · Confusion- inability to concentrate     If you or a loved one observes any of these behaviors or has concerns about self-harm, here's what you can do:  · Talk about it- your feelings and reasons for harming yourself  · Remove any means that you might use to hurt yourself (examples: pills, rope, extension cords, firearm)  · Get professional help from the community (Mental Health, Substance Abuse, psychological counseling)  · Do not be alone:Call your Safe Contact- someone whom you trust who will be there for you.  · Call your local CRISIS HOTLINE 592-8942 or 804-806-0742  · Call your local Children's Mobile Crisis Response Team [place] (633) 052-1297 or wwwLocalMaven.com  · Call the toll free National Suicide Prevention Hotlines   · National Suicide Prevention Lifeline 990-486-NYML (1367)  · National Hope Line Network 800-SUICIDE (578-5300)    1.   Resume Xarelto tomorrow (Thursday) morning.    2.  Continue Aspirin, Plavix and Xarelto x 1 month, then stop aspirin.  Continue plavix (clopidogrel) and Xarelto.

## 2020-03-11 NOTE — PROGRESS NOTES
PT arrived to unit via gurney escorted by PPU RN. VSS Pt is in a.fib. PT A&Ox4 on RA. Monitor leads in place. Monitor room notified. PT is orientated to the unit, call light, phone system, fall precautions in place, appropriate signs in place, bed in low and locked positions, bed alarm on. Pt educated regarded fall precautions and importance of calling staff for assistance. Pt denies further needs at the time. Will continue to monitor. Right radial cath site is CDI no hematoma noted.

## 2020-03-12 ENCOUNTER — TELEPHONE (OUTPATIENT)
Dept: CARDIOLOGY | Facility: MEDICAL CENTER | Age: 85
End: 2020-03-12

## 2020-03-12 NOTE — TELEPHONE ENCOUNTER
Received message pharmacist had questions regarding plan for DAPT/NOAC.  I called pharmacy at [facility].   Instructed pharmacist patient is to take - ASA 81, Plavix 75 and Xarelto 20 x 1 month, then stop aspirin.    This was instructed to patient and written in AVS 3/10/20.  However, after the review of the AVS, there are so many pages and instructions, I am sure the patient could have trouble finding clear instructions regarding these meds.     I called patient and also re-iterated instructions.   Will assure these instructions are clearly outlined in the AVS in up-coming appointment next week.     He will be followed closely.    Patricia Reardon PA-C  3/12/2020

## 2020-03-18 ENCOUNTER — PATIENT OUTREACH (OUTPATIENT)
Dept: HEALTH INFORMATION MANAGEMENT | Facility: OTHER | Age: 85
End: 2020-03-18

## 2020-03-18 NOTE — PROGRESS NOTES
Cardiology Clinic Follow-up Note    Date of note:    3/19/2020  Primary Care Provider: Sumanth Fairbanks M.D.    Name:             Alonso Holder  YOB: 1935  MRN:               [id number]    CC: CMO, CAD, AFib    Primary Cardiologist: Dr. Blas    Patient HPI:   Alonso Holder is a 84 y.o. male patient who was recently seen by Dr. Blas on 1/28/20 with symptoms of fatigue.  He underwent echo and stress test, EF found to be 35% (which is new).  He was referred for C on 3/10/20 and found to have CAD s/p stents to the OM and LAD.  He was also recently dx with atrial fibrillation, had been started on Xarelto.    Mr Holder is here for follow up after d/c from the hospital.    He is doing quite well since d/c.   He had some confusion about taking his metoprolol, had only been taking it once a day.    Asks for 90 days with refills [place].   Denies LE swelling, no CP, no shortness of breath  Would like to start walking with his wife.   Denies palpitations, no syncope or pre-syncope.      Review of Systems   Constitution: Negative for decreased appetite, diaphoresis, fever and malaise/fatigue.   Eyes: Negative for blurred vision.   Cardiovascular: Negative for chest pain, dyspnea on exertion, leg swelling, near-syncope, orthopnea, palpitations and syncope.   Respiratory: Negative for shortness of breath and snoring.    Hematologic/Lymphatic: Negative for bleeding problem. Does not bruise/bleed easily.   Skin: Negative for rash.   Musculoskeletal: Negative for joint pain and myalgias.   Gastrointestinal: Negative for bloating, abdominal pain, nausea and vomiting.   Genitourinary: Negative for frequency.   Neurological: Negative for dizziness and weakness.       Past Medical History:   Diagnosis Date   • Arthritis     rheumatoid hands   • Essential hypertension 1/29/2018   • Hemorrhagic disorder (HCC)     xarelto   • High cholesterol    •  Persistent atrial fibrillation 1/28/2020       Family History   Problem Relation Age of Onset   • Heart Disease Neg Hx        Social History     Socioeconomic History   • Marital status:      Spouse name: Not on file   • Number of children: Not on file   • Years of education: Not on file   • Highest education level: Not on file   Occupational History   • Not on file   Social Needs   • Financial resource strain: Not on file   • Food insecurity     Worry: Not on file     Inability: Not on file   • Transportation needs     Medical: Not on file     Non-medical: Not on file   Tobacco Use   • Smoking status: Never Smoker   • Smokeless tobacco: Never Used   Substance and Sexual Activity   • Alcohol use: Yes     Alcohol/week: 0.6 oz     Types: 1 Cans of beer per week     Frequency: 4 or more times a week     Drinks per session: 1 or 2   • Drug use: No   • Sexual activity: Not on file   Lifestyle   • Physical activity     Days per week: Not on file     Minutes per session: Not on file   • Stress: Not on file   Relationships   • Social connections     Talks on phone: Not on file     Gets together: Not on file     Attends Advent service: Not on file     Active member of club or organization: Not on file     Attends meetings of clubs or organizations: Not on file     Relationship status: Not on file   • Intimate partner violence     Fear of current or ex partner: Not on file     Emotionally abused: Not on file     Physically abused: Not on file     Forced sexual activity: Not on file   Other Topics Concern   • Not on file   Social History Narrative   • Not on file       Current Outpatient Medications   Medication Sig Dispense Refill   • clopidogrel (PLAVIX) 75 MG Tab Take 1 Tab by mouth every day. 30 Tab 11   • cholecalciferol (VITAMIN D3) 400 UNIT Tab Take 400 Units by mouth every day.     • metoprolol (LOPRESSOR) 25 MG Tab Take 25 mg by mouth 2 times a day.     • XARELTO 20 MG Tab tablet every day.     • benazepril  "(LOTENSIN) 10 MG Tab Take 1 Tab by mouth every day. 180 Tab 3   • amiodarone (CORDARONE) 200 MG Tab Take 1 Tab by mouth every day. 90 Tab 3   • aspirin 81 MG tablet Take 81 mg by mouth every day.       No current facility-administered medications for this visit.        Allergies   Allergen Reactions   • Codeine      Nausea         Physical Exam:  Ambulatory Vitals  /80 (BP Location: Left arm, Patient Position: Sitting)   Pulse 88   Ht 1.778 m (5' 10\")   Wt 102.5 kg (226 lb)   SpO2 96%    BP Readings from Last 4 Encounters:   03/19/20 116/80   03/11/20 136/98   02/28/20 111/79   02/07/20 136/79       Weight/BMI: Body mass index is 32.43 kg/m².  Wt Readings from Last 4 Encounters:   03/19/20 102.5 kg (226 lb)   03/09/20 104.7 kg (230 lb 13.2 oz)   02/28/20 105.5 kg (232 lb 9.4 oz)   02/07/20 104.6 kg (230 lb 9.6 oz)       General: no apparent distress  Eyes: normal conjunctiva  ENT: OP clear  Neck: no JVD   Lungs: normal respiratory effort, clear without crackles, no wheezing or rhonchi.  Heart: normal rate, irregular rhythm, no murmur, no rub or gallop,   Ext:  no edema bilateral lower extremities. no cyanosis. Right radial access site soft, non-tender, no hematoma. 2+ R radial pulse palpable.  Abdomen: soft, non tender, non distended,  Neurological: No focal deficits, no facial asymmetry.  Normal speech.  Psychiatric: Appropriate affect, alert and oriented x 3.   Skin: Warm extremities, no rash.      Lab Data Review:  Lab Results   Component Value Date/Time    CHOLSTRLTOT 189 12/09/2019 08:17 AM     (H) 12/09/2019 08:17 AM    HDL 35 (A) 12/09/2019 08:17 AM    TRIGLYCERIDE 255 (H) 12/09/2019 08:17 AM       Lab Results   Component Value Date/Time    SODIUM 138 03/11/2020 05:28 AM    POTASSIUM 4.1 03/11/2020 05:28 AM    CHLORIDE 105 03/11/2020 05:28 AM    CO2 26 03/11/2020 05:28 AM    GLUCOSE 103 (H) 03/11/2020 05:28 AM    BUN 19 03/11/2020 05:28 AM    CREATININE 1.42 (H) 03/11/2020 05:28 AM     Lab " Results   Component Value Date/Time    ALKPHOSPHAT 52 2020 09:02 AM    ASTSGOT 22 2020 09:02 AM    ALTSGPT 24 2020 09:02 AM    TBILIRUBIN 0.7 2020 09:02 AM      Lab Results   Component Value Date/Time    WBC 7.7 2020 05:28 AM         Cardiac Imaging and Procedures Review:      Echo 20:  CONCLUSIONS  Compared to the report of the study done 2018, EF is now   moderately reduced, patient noted to be in atrial fibrillation.  Moderately reduced left ventricular systolic function.  Left ventricular ejection fraction is visually estimated to be 35%.  Global hypokinesis with severe hypokinesis of the basal to mid inferior   wall and inferior septum.  Diastolic function is difficult to assess with atrial fibrillation.  Mildly dilated left atrium.  Mild mitral regurgitation.  Aortic sclerosis without stenosis.  Mild to moderate tricuspid regurgitation.  Estimated right ventricular systolic pressure  is 33 mmHg.  Ascending aorta diameter is 3.9 cm, borderline mildly dilated.  Pt has appointment with cardiology.    Stress Test 20:   NUCLEAR IMAGING INTERPRETATION   No evidence of significant jeopardized viable myocardium or prior myocardial    infarction. LVEF low at 23%.    [facility] 3/10/20:  [facility] 3/10/2020  POSTOPERATIVE DIAGNOSIS:  1.  Successful PCI 90% OM 1 stenosis (2.5 x 30 mm [place] GREG), excellent result  2.  Successful PCI 80% long proximal and ostial LAD stenosis (3.5 x 38 mm [place] GREG, postdilated 3.75 mm, IVUS guided), excellent result  3.  LVEDP 15 mmHg        Assessment and Clinical Decision Makin. Ischemic cardiomyopathy, EF 30%.  Continue GDMT with benzapril 10 mg, I changed lopressor to Toprol XL 50.  Re-assess EF in approx 3 months.  Briefly discussed indication for AICD for primary prevention, if EF does not improved despite revascularization and maximally tolerated GDMT at 3 months.    2. Coronary artery disease.   Known  of the RCA, declined for surgery, thus  recent stents to LAD and OM 3/10/20.    3. S/p GREG to LAD and OM1 3/10/20.  Doing well post procedure.  Continue Plavix and Xarelto.  D/c aspirin now.   He has propensity for bleeding per his wife.  But denies any issues at this time.  Continue BB, but no statin due to hx of intolerance.    4. Persistent atrial fibrillation, despite DCCV on 2/28/20, on amio.  Continue amiodarone 200 mg daily for now.  We may consider repeat DCCV in the future? given he has been revascularized and loaded with amio.  Can discuss this further with Dr. Blas at his follow up.     5. Chronic anticoagulation, Xarelto 20 mg.    6. Essential hypertension.  BP at goal 116/80.  Continue Toprol XL 50, benzapril 10 mg.      7. Dyslipidemia.    12/2019.  He is not on statin as he has tried 2 different HMG CoA reductase inhibitors (specifice names unknown) in the past - both causes severe myalgias, leg cramping and he just felt unwell while taking them.    Recommend follow up in 3 months with Dr. Blas.      Future Appointments   Date Time Provider Department Center   6/24/2020 10:00 AM Rober Blas M.D. [facility] None       Patricia Reardon PA-C     [facility]  [place] [place] [facility]  [address] [address]  Phone: [phone]  Fax: [phone]    Collaborating Physician: Dr. Blas.

## 2020-03-19 ENCOUNTER — OFFICE VISIT (OUTPATIENT)
Dept: CARDIOLOGY | Facility: MEDICAL CENTER | Age: 85
End: 2020-03-19
Payer: MEDICARE

## 2020-03-19 VITALS
BODY MASS INDEX: 32.35 KG/M2 | SYSTOLIC BLOOD PRESSURE: 116 MMHG | DIASTOLIC BLOOD PRESSURE: 80 MMHG | HEART RATE: 88 BPM | HEIGHT: 70 IN | OXYGEN SATURATION: 96 % | WEIGHT: 226 LBS

## 2020-03-19 DIAGNOSIS — Z95.5 S/P DRUG ELUTING CORONARY STENT PLACEMENT: ICD-10-CM

## 2020-03-19 DIAGNOSIS — I48.19 PERSISTENT ATRIAL FIBRILLATION (HCC): ICD-10-CM

## 2020-03-19 DIAGNOSIS — I25.10 CORONARY ARTERY DISEASE DUE TO LIPID RICH PLAQUE: ICD-10-CM

## 2020-03-19 DIAGNOSIS — Z78.9 STATIN INTOLERANCE: ICD-10-CM

## 2020-03-19 DIAGNOSIS — I25.5 ISCHEMIC CARDIOMYOPATHY: ICD-10-CM

## 2020-03-19 DIAGNOSIS — I10 ESSENTIAL HYPERTENSION: ICD-10-CM

## 2020-03-19 DIAGNOSIS — I25.83 CORONARY ARTERY DISEASE DUE TO LIPID RICH PLAQUE: ICD-10-CM

## 2020-03-19 PROCEDURE — 99214 OFFICE O/P EST MOD 30 MIN: CPT | Performed by: PHYSICIAN ASSISTANT

## 2020-03-19 RX ORDER — METOPROLOL SUCCINATE 50 MG/1
50 TABLET, EXTENDED RELEASE ORAL DAILY
Qty: 90 TAB | Refills: 3 | Status: ON HOLD
Start: 2020-03-19 | End: 2020-12-16

## 2020-03-19 RX ORDER — RIVAROXABAN 20 MG/1
20 TABLET, FILM COATED ORAL DAILY
Qty: 90 TAB | Refills: 3 | Status: SHIPPED | OUTPATIENT
Start: 2020-03-19 | End: 2021-02-23 | Stop reason: SDUPTHER

## 2020-03-19 RX ORDER — CLOPIDOGREL BISULFATE 75 MG/1
75 TABLET ORAL DAILY
Qty: 90 TAB | Refills: 3 | Status: SHIPPED | OUTPATIENT
Start: 2020-03-19 | End: 2021-05-26

## 2020-03-19 ASSESSMENT — ENCOUNTER SYMPTOMS
SYNCOPE: 0
BLURRED VISION: 0
NEAR-SYNCOPE: 0
DECREASED APPETITE: 0
DIAPHORESIS: 0
MYALGIAS: 0
BLOATING: 0
SNORING: 0
FEVER: 0
ORTHOPNEA: 0
DYSPNEA ON EXERTION: 0
BRUISES/BLEEDS EASILY: 0
PALPITATIONS: 0
ABDOMINAL PAIN: 0
DIZZINESS: 0
NAUSEA: 0
VOMITING: 0
WEAKNESS: 0
SHORTNESS OF BREATH: 0

## 2020-03-19 ASSESSMENT — FIBROSIS 4 INDEX: FIB4 SCORE: 2.68

## 2020-03-19 NOTE — PATIENT INSTRUCTIONS
1   Stop aspirin now.     2   Will need to continue Xarelto and plavix (clopidogrel)    3  Change to long acting Toprol XL 50 mg.

## 2020-04-10 ENCOUNTER — PATIENT OUTREACH (OUTPATIENT)
Dept: HEALTH INFORMATION MANAGEMENT | Facility: OTHER | Age: 85
End: 2020-04-10

## 2020-04-15 NOTE — PROGRESS NOTES
An 84-year-old male was an elective admission to [facility] from 3/10/2020 to 3/11/2020 to treat Cardiomyopathy, unspecified. The Patient Navigator did not visit the patient bedside. The patient was discharged Home.     The patient was ordered to start/continue to take the following medication: Clopidogrel Bisulfate (Plavix). The patient successfully filled all medications.     The patient was ordered to follow-up with Specialist and PCP. The patient had the following appointments:     1) 3/17/2020 @ 12:00 Sumanth Fairbanks, internal medicine - PATIENT CANCELLED     2) 3/19/2020 @ 10:45 Patricia Reardon, cardiology - CONFIRMED AS KEPT  The patient has no future appointments scheduled.    The Patient Navigator identified that the patient had Healthcare Benefits barriers. Information Provided to Patient for General Inquiry with obtaining 90 days supply of his medications from his pharmacy. The Patient Navigator informed him how to obtain 90 days supply from his cardiologist to fulfill his request from his pharmacy.     The Patient Navigator followed the patient for a total of 30 days. In summary, the Patient Navigator Provided education to patient (health edu, benefits, resources, etc.). As a result, IHD removed Medication Barriers and Patient successfully recovered or avoided further complications.

## 2020-06-24 ENCOUNTER — OFFICE VISIT (OUTPATIENT)
Dept: CARDIOLOGY | Facility: MEDICAL CENTER | Age: 85
End: 2020-06-24
Payer: MEDICARE

## 2020-06-24 ENCOUNTER — TELEPHONE (OUTPATIENT)
Dept: CARDIOLOGY | Facility: MEDICAL CENTER | Age: 85
End: 2020-06-24

## 2020-06-24 VITALS
HEIGHT: 70 IN | BODY MASS INDEX: 32.04 KG/M2 | WEIGHT: 223.8 LBS | OXYGEN SATURATION: 96 % | DIASTOLIC BLOOD PRESSURE: 80 MMHG | SYSTOLIC BLOOD PRESSURE: 130 MMHG | HEART RATE: 87 BPM

## 2020-06-24 DIAGNOSIS — Z79.01 ON CONTINUOUS ORAL ANTICOAGULATION: ICD-10-CM

## 2020-06-24 DIAGNOSIS — Z79.899 HIGH RISK MEDICATION USE: ICD-10-CM

## 2020-06-24 DIAGNOSIS — Z78.9 STATIN INTOLERANCE: ICD-10-CM

## 2020-06-24 DIAGNOSIS — N18.30 STAGE 3 CHRONIC KIDNEY DISEASE: ICD-10-CM

## 2020-06-24 DIAGNOSIS — Z95.5 S/P DRUG ELUTING CORONARY STENT PLACEMENT: ICD-10-CM

## 2020-06-24 DIAGNOSIS — I10 ESSENTIAL HYPERTENSION: ICD-10-CM

## 2020-06-24 DIAGNOSIS — I48.19 PERSISTENT ATRIAL FIBRILLATION (HCC): ICD-10-CM

## 2020-06-24 DIAGNOSIS — R53.83 FATIGUE, UNSPECIFIED TYPE: ICD-10-CM

## 2020-06-24 DIAGNOSIS — I25.5 ISCHEMIC CARDIOMYOPATHY: ICD-10-CM

## 2020-06-24 PROBLEM — R55 NEAR SYNCOPE: Status: RESOLVED | Noted: 2018-01-29 | Resolved: 2020-06-24

## 2020-06-24 PROCEDURE — 99215 OFFICE O/P EST HI 40 MIN: CPT | Performed by: INTERNAL MEDICINE

## 2020-06-24 ASSESSMENT — FIBROSIS 4 INDEX: FIB4 SCORE: 2.68

## 2020-06-24 NOTE — PROGRESS NOTES
Subjective:   Alonso Holder is a 84 y.o. male who presents today for follow-up of atrial fibrillation and newly noted ischemic cardiomyopathy.  Underwent evaluation and status post DCCV which was initially successful but reversion to sinus rhythm now on amiodarone, positive stress testing and subsequent coronary angiography revealing multivessel CAD.  Declined CABG and underwent multivessel PCI.  He is following up after this and notes ongoing NYHA class II dyspnea on exertion and mostly fatigue.  Remains in atrial fibrillation.  Taking his medications including anticoagulants and antiplatelets as directed.    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     Family History   Problem Relation Age of Onset   • Heart Disease Neg Hx      Social History     Socioeconomic History   • Marital status:      Spouse name: Not on file   • Number of children: Not on file   • Years of education: Not on file   • Highest education level: Not on file   Occupational History   • Not on file   Social Needs   • Financial resource strain: Not on file   • Food insecurity     Worry: Not on file     Inability: Not on file   • Transportation needs     Medical: Not on file     Non-medical: Not on file   Tobacco Use   • Smoking status: Never Smoker   • Smokeless tobacco: Never Used   Substance and Sexual Activity   • Alcohol use: Yes     Alcohol/week: 0.6 oz     Types: 1 Cans of beer per week     Frequency: 4 or more times a week     Drinks per session: 1 or 2   • Drug use: No   • Sexual activity: Not on file   Lifestyle   • Physical activity     Days per week: Not on file     Minutes per session: Not on file   • Stress: Not on file   Relationships   •  "Social connections     Talks on phone: Not on file     Gets together: Not on file     Attends Hinduism service: Not on file     Active member of club or organization: Not on file     Attends meetings of clubs or organizations: Not on file     Relationship status: Not on file   • Intimate partner violence     Fear of current or ex partner: Not on file     Emotionally abused: Not on file     Physically abused: Not on file     Forced sexual activity: Not on file   Other Topics Concern   • Not on file   Social History Narrative   • Not on file     Allergies   Allergen Reactions   • Atorvastatin      Severe myalgias, generally feeling unwell.   • Codeine      Nausea     Outpatient Encounter Medications as of 6/24/2020   Medication Sig Dispense Refill   • metoprolol SR (TOPROL XL) 50 MG TABLET SR 24 HR Take 1 Tab by mouth every day. 90 Tab 3   • clopidogrel (PLAVIX) 75 MG Tab Take 1 Tab by mouth every day. 90 Tab 3   • XARELTO 20 MG Tab tablet Take 1 Tab by mouth every day. 90 Tab 3   • cholecalciferol (VITAMIN D3) 400 UNIT Tab Take 400 Units by mouth every day.     • benazepril (LOTENSIN) 10 MG Tab Take 1 Tab by mouth every day. 180 Tab 3   • amiodarone (CORDARONE) 200 MG Tab Take 1 Tab by mouth every day. 90 Tab 3     No facility-administered encounter medications on file as of 6/24/2020.      Review of Systems   All other systems reviewed and are negative.       Objective:   /80 (BP Location: Left arm, Patient Position: Sitting, BP Cuff Size: Adult)   Pulse 87   Ht 1.778 m (5' 10\")   Wt 101.5 kg (223 lb 12.8 oz)   SpO2 96%   BMI 32.11 kg/m²     Physical Exam   Constitutional: He is oriented to person, place, and time. He appears well-developed and well-nourished. No distress.   HENT:   Head: Normocephalic and atraumatic.   Right Ear: External ear normal.   Left Ear: External ear normal.   Eyes: Pupils are equal, round, and reactive to light. Conjunctivae and EOM are normal. Right eye exhibits no discharge. " Left eye exhibits no discharge. No scleral icterus.   Neck: Normal range of motion. Neck supple. No JVD present. No tracheal deviation present. No thyromegaly present.   Cardiovascular: Intact distal pulses. An irregularly irregular rhythm present. Tachycardia present. PMI is not displaced. Exam reveals no gallop and no friction rub.   Murmur ( Soft 2 out of 6 systolic murmur) heard.  Pulses:       Carotid pulses are 2+ on the right side and 2+ on the left side.       Radial pulses are 2+ on the left side.        Popliteal pulses are 2+ on the right side and 2+ on the left side.        Dorsalis pedis pulses are 2+ on the right side and 2+ on the left side.        Posterior tibial pulses are 2+ on the right side and 2+ on the left side.   Pulmonary/Chest: Effort normal and breath sounds normal. No respiratory distress. He has no wheezes. He has no rales. He exhibits no tenderness.   Abdominal: Soft. Bowel sounds are normal. He exhibits no distension. There is no abdominal tenderness.   Musculoskeletal: Normal range of motion.         General: Edema (1+ left lower extremity, trace right lower extremity.  Improved from prior visit.) present. No tenderness or deformity.   Neurological: He is alert and oriented to person, place, and time. No cranial nerve deficit (cranial nerves II through XII grossly intact). Coordination normal.   Skin: Skin is warm and dry. No rash noted. He is not diaphoretic. No erythema. No pallor.   Psychiatric: He has a normal mood and affect. His behavior is normal. Thought content normal.   Vitals reviewed.    LABS:  Lab Results   Component Value Date/Time    CHOLSTRLTOT 189 12/09/2019 08:17 AM     (H) 12/09/2019 08:17 AM    HDL 35 (A) 12/09/2019 08:17 AM    TRIGLYCERIDE 255 (H) 12/09/2019 08:17 AM       Lab Results   Component Value Date/Time    WBC 7.7 03/11/2020 05:28 AM    RBC 4.28 (L) 03/11/2020 05:28 AM    HEMOGLOBIN 13.3 (L) 03/11/2020 05:28 AM    HEMATOCRIT 39.2 (L) 03/11/2020  05:28 AM    MCV 91.6 03/11/2020 05:28 AM    NEUTSPOLYS 46.10 12/09/2019 08:17 AM    LYMPHOCYTES 38.60 12/09/2019 08:17 AM    MONOCYTES 11.00 12/09/2019 08:17 AM    EOSINOPHILS 3.30 12/09/2019 08:17 AM    BASOPHILS 0.80 12/09/2019 08:17 AM     Lab Results   Component Value Date/Time    SODIUM 138 03/11/2020 05:28 AM    POTASSIUM 4.1 03/11/2020 05:28 AM    CHLORIDE 105 03/11/2020 05:28 AM    CO2 26 03/11/2020 05:28 AM    GLUCOSE 103 (H) 03/11/2020 05:28 AM    BUN 19 03/11/2020 05:28 AM    CREATININE 1.42 (H) 03/11/2020 05:28 AM     Lab Results   Component Value Date    HBA1C 5.8 (H) 12/09/2019      Lab Results   Component Value Date/Time    ALKPHOSPHAT 52 03/09/2020 09:02 AM    ASTSGOT 22 03/09/2020 09:02 AM    ALTSGPT 24 03/09/2020 09:02 AM    TBILIRUBIN 0.7 03/09/2020 09:02 AM      No results found for: BNPBTYPENAT   No results found for: TSH  Lab Results   Component Value Date/Time    PROTHROMBTM 14.2 03/09/2020 09:02 AM    INR 1.07 03/09/2020 09:02 AM      CORONARY ANGIOGRAM(3/10/2020):  1.  Successful PCI 90% OM 1 stenosis (2.5 x 30 mm [place] GREG), excellent result  2.  Successful PCI 80% long proximal and ostial LAD stenosis (3.5 x 38 mm [place] GREG, postdilated 3.75 mm, IVUS guided), excellent result  3.  LVEDP 15 mmHg    ECHO CONCLUSIONS (1/23/2020):  Compared to the report of the study done 01/30/2018, EF is now   moderately reduced, patient noted to be in atrial fibrillation.  Moderately reduced left ventricular systolic function.  Left ventricular ejection fraction is visually estimated to be 35%.  Global hypokinesis with severe hypokinesis of the basal to mid inferior   wall and inferior septum.  Diastolic function is difficult to assess with atrial fibrillation.  Mildly dilated left atrium.  Mild mitral regurgitation.  Aortic sclerosis without stenosis.  Mild to moderate tricuspid regurgitation.  Estimated right ventricular systolic pressure  is 33 mmHg.  Ascending aorta diameter is 3.9 cm, borderline mildly  dilated.  Pt has appointment with cardiology.  Personally reviewed this visit 2020     STRESS (2020):   No evidence of significant jeopardized viable myocardium or prior myocardial    infarction. LVEF low at 23%.  Personally reviewed this visit 2020.  There are areas of fixed photopenia in the inferior base to mid and anterior septum.  It is a suboptimal study.  This was official read by radiology not cardiology.      Assessment:     1. Ischemic cardiomyopathy  OVERNIGHT PULSE OXIMETRY   2. S/P drug eluting coronary stent placement     3. Persistent atrial fibrillation (HCC)  OVERNIGHT PULSE OXIMETRY    CL-CARDIOVERSION   4. Essential hypertension     5. Statin intolerance     6. Fatigue, unspecified type  OVERNIGHT PULSE OXIMETRY   7. On continuous oral anticoagulation     8. High risk medication use     9. Stage 3 chronic kidney disease (HCC)         Medical Decision Making:  Today's Assessment / Status / Plan:     We have not reevaluated his ejection fraction but he remains in atrial fibrillation although it is now rate controlled.  He likely has an ischemic and tachycardia mediated cardiomyopathy.  It is unclear how much of this is going to recover however I am hopeful that with appropriate medical therapy and hopefully restoration of sinus rhythm he will have the optimal chances for improved LV function and adduction of symptoms.  We discussed the possibilities for his ongoing fatigue.  Recommend the followin.  Continue amiodarone, Xarelto and Plavix.  Full dose Xarelto remains appropriate given his renal function.  2.  Second attempt at cardioversion now that he is on antiarrhythmic therapy maintenance.  Should he maintain sinus rhythm for duration and feel improved and this will be the goal, if he feels no different with improved heart rhythm then atrial fibrillation would be currently noncontributory as long as it is rate controlled.  3.  Continue other cardiac medications.  We  discussed that metoprolol may be making him fatigued although his fatigue did predate the initiation of these medications.  4.  Overnight oximetry  5.  Recommend follow-up in 3 months with a repeat echocardiogram to assess for left-ventricular recovery and need for primary prevention ICD.  6.  We discussed his upcoming hearing aid initiation and have encouraged him to use it.    Follow-up in 3 months with an echo

## 2020-06-24 NOTE — TELEPHONE ENCOUNTER
OPO order completed and faxed to [facility] to f# [phone] P# [phone]. Confirmation fax sent to scan

## 2020-06-30 ENCOUNTER — TELEPHONE (OUTPATIENT)
Dept: CARDIOLOGY | Facility: MEDICAL CENTER | Age: 85
End: 2020-06-30

## 2020-06-30 NOTE — TELEPHONE ENCOUNTER
Patient is scheduled for a Cardioversion w/Anesthesia on 7/20/2020 with Dr. Maynard.  Patient to check in at 10am for a 12 noon procedure.  No medications for pt to stop.  Updated H & P done by TW on 6/24/2020.  Pre-admit to call pt.

## 2020-07-15 ENCOUNTER — OFFICE VISIT (OUTPATIENT)
Dept: ADMISSIONS | Facility: MEDICAL CENTER | Age: 85
End: 2020-07-15
Attending: INTERNAL MEDICINE
Payer: MEDICARE

## 2020-07-15 DIAGNOSIS — Z01.812 PRE-OPERATIVE LABORATORY EXAMINATION: ICD-10-CM

## 2020-07-15 LAB
ANION GAP SERPL CALC-SCNC: 13 MMOL/L (ref 7–16)
BUN SERPL-MCNC: 23 MG/DL (ref 8–22)
CALCIUM SERPL-MCNC: 8.7 MG/DL (ref 8.5–10.5)
CHLORIDE SERPL-SCNC: 106 MMOL/L (ref 96–112)
CO2 SERPL-SCNC: 24 MMOL/L (ref 20–33)
COVID ORDER STATUS COVID19: NORMAL
CREAT SERPL-MCNC: 1.41 MG/DL (ref 0.5–1.4)
ERYTHROCYTE [DISTWIDTH] IN BLOOD BY AUTOMATED COUNT: 52.4 FL (ref 35.9–50)
GLUCOSE SERPL-MCNC: 136 MG/DL (ref 65–99)
HCT VFR BLD AUTO: 43.2 % (ref 42–52)
HGB BLD-MCNC: 13.9 G/DL (ref 14–18)
INR PPP: 2.54 (ref 0.87–1.13)
MCH RBC QN AUTO: 30 PG (ref 27–33)
MCHC RBC AUTO-ENTMCNC: 32.2 G/DL (ref 33.7–35.3)
MCV RBC AUTO: 93.1 FL (ref 81.4–97.8)
PLATELET # BLD AUTO: 168 K/UL (ref 164–446)
PMV BLD AUTO: 10.1 FL (ref 9–12.9)
POTASSIUM SERPL-SCNC: 4 MMOL/L (ref 3.6–5.5)
PROTHROMBIN TIME: 28.2 SEC (ref 12–14.6)
RBC # BLD AUTO: 4.64 M/UL (ref 4.7–6.1)
SODIUM SERPL-SCNC: 143 MMOL/L (ref 135–145)
WBC # BLD AUTO: 5.8 K/UL (ref 4.8–10.8)

## 2020-07-15 PROCEDURE — U0003 INFECTIOUS AGENT DETECTION BY NUCLEIC ACID (DNA OR RNA); SEVERE ACUTE RESPIRATORY SYNDROME CORONAVIRUS 2 (SARS-COV-2) (CORONAVIRUS DISEASE [COVID-19]), AMPLIFIED PROBE TECHNIQUE, MAKING USE OF HIGH THROUGHPUT TECHNOLOGIES AS DESCRIBED BY CMS-2020-01-R: HCPCS

## 2020-07-15 PROCEDURE — 85027 COMPLETE CBC AUTOMATED: CPT

## 2020-07-15 PROCEDURE — 85610 PROTHROMBIN TIME: CPT

## 2020-07-15 PROCEDURE — 80048 BASIC METABOLIC PNL TOTAL CA: CPT

## 2020-07-15 PROCEDURE — 36415 COLL VENOUS BLD VENIPUNCTURE: CPT

## 2020-07-15 SDOH — HEALTH STABILITY: MENTAL HEALTH: HOW MANY STANDARD DRINKS CONTAINING ALCOHOL DO YOU HAVE ON A TYPICAL DAY?: NOT ASKED

## 2020-07-15 SDOH — HEALTH STABILITY: MENTAL HEALTH: HOW OFTEN DO YOU HAVE A DRINK CONTAINING ALCOHOL?: NOT ASKED

## 2020-07-15 ASSESSMENT — FIBROSIS 4 INDEX: FIB4 SCORE: 2.71

## 2020-07-16 LAB
SARS-COV-2 RNA RESP QL NAA+PROBE: NOTDETECTED
SPECIMEN SOURCE: NORMAL

## 2020-07-20 ENCOUNTER — APPOINTMENT (OUTPATIENT)
Dept: CARDIOLOGY | Facility: MEDICAL CENTER | Age: 85
End: 2020-07-20
Attending: INTERNAL MEDICINE
Payer: MEDICARE

## 2020-07-20 ENCOUNTER — ANESTHESIA EVENT (OUTPATIENT)
Dept: CARDIOLOGY | Facility: MEDICAL CENTER | Age: 85
End: 2020-07-20
Payer: MEDICARE

## 2020-07-20 ENCOUNTER — ANESTHESIA (OUTPATIENT)
Dept: CARDIOLOGY | Facility: MEDICAL CENTER | Age: 85
End: 2020-07-20
Payer: MEDICARE

## 2020-07-20 ENCOUNTER — HOSPITAL ENCOUNTER (OUTPATIENT)
Facility: MEDICAL CENTER | Age: 85
End: 2020-07-20
Attending: INTERNAL MEDICINE | Admitting: INTERNAL MEDICINE
Payer: MEDICARE

## 2020-07-20 VITALS
OXYGEN SATURATION: 98 % | DIASTOLIC BLOOD PRESSURE: 79 MMHG | SYSTOLIC BLOOD PRESSURE: 133 MMHG | WEIGHT: 224.65 LBS | TEMPERATURE: 97.3 F | BODY MASS INDEX: 32.16 KG/M2 | HEART RATE: 50 BPM | HEIGHT: 70 IN | RESPIRATION RATE: 16 BRPM

## 2020-07-20 DIAGNOSIS — I48.19 PERSISTENT ATRIAL FIBRILLATION (HCC): ICD-10-CM

## 2020-07-20 LAB
EKG IMPRESSION: NORMAL
EKG IMPRESSION: NORMAL

## 2020-07-20 PROCEDURE — 93010 ELECTROCARDIOGRAM REPORT: CPT | Mod: 59,76 | Performed by: INTERNAL MEDICINE

## 2020-07-20 PROCEDURE — 700111 HCHG RX REV CODE 636 W/ 250 OVERRIDE (IP): Performed by: ANESTHESIOLOGY

## 2020-07-20 PROCEDURE — 700102 HCHG RX REV CODE 250 W/ 637 OVERRIDE(OP)

## 2020-07-20 PROCEDURE — 93005 ELECTROCARDIOGRAM TRACING: CPT | Performed by: INTERNAL MEDICINE

## 2020-07-20 PROCEDURE — 160002 HCHG RECOVERY MINUTES (STAT)

## 2020-07-20 PROCEDURE — 700105 HCHG RX REV CODE 258: Performed by: INTERNAL MEDICINE

## 2020-07-20 PROCEDURE — A9270 NON-COVERED ITEM OR SERVICE: HCPCS | Performed by: INTERNAL MEDICINE

## 2020-07-20 PROCEDURE — 700102 HCHG RX REV CODE 250 W/ 637 OVERRIDE(OP): Performed by: INTERNAL MEDICINE

## 2020-07-20 PROCEDURE — A9270 NON-COVERED ITEM OR SERVICE: HCPCS

## 2020-07-20 PROCEDURE — 93010 ELECTROCARDIOGRAM REPORT: CPT | Mod: 59 | Performed by: INTERNAL MEDICINE

## 2020-07-20 PROCEDURE — 92960 CARDIOVERSION ELECTRIC EXT: CPT | Performed by: INTERNAL MEDICINE

## 2020-07-20 PROCEDURE — 92960 CARDIOVERSION ELECTRIC EXT: CPT

## 2020-07-20 RX ORDER — SODIUM CHLORIDE, SODIUM LACTATE, POTASSIUM CHLORIDE, CALCIUM CHLORIDE 600; 310; 30; 20 MG/100ML; MG/100ML; MG/100ML; MG/100ML
INJECTION, SOLUTION INTRAVENOUS CONTINUOUS
Status: DISCONTINUED | OUTPATIENT
Start: 2020-07-20 | End: 2020-07-20 | Stop reason: HOSPADM

## 2020-07-20 RX ADMIN — POVIDONE-IODINE 15 ML: 10 SOLUTION TOPICAL at 11:15

## 2020-07-20 RX ADMIN — POVIDONE-IODINE 15 ML: 10 SOLUTION TOPICAL at 11:00

## 2020-07-20 RX ADMIN — SODIUM CHLORIDE, POTASSIUM CHLORIDE, SODIUM LACTATE AND CALCIUM CHLORIDE: 600; 310; 30; 20 INJECTION, SOLUTION INTRAVENOUS at 11:00

## 2020-07-20 RX ADMIN — PROPOFOL 70 MG: 10 INJECTION, EMULSION INTRAVENOUS at 12:26

## 2020-07-20 ASSESSMENT — FIBROSIS 4 INDEX: FIB4 SCORE: 2.25

## 2020-07-20 ASSESSMENT — PAIN SCALES - GENERAL: PAIN_LEVEL: 0

## 2020-07-20 NOTE — ANESTHESIA POSTPROCEDURE EVALUATION
Patient: Alonso Holder    Procedure Summary     Date:  07/20/20 Room / Location:  [facility] - ECHOCARDIOLOGY [facility]    Anesthesia Start:  1223 Anesthesia Stop:  1239    Procedure:  CL-CARDIOVERSION Diagnosis:       Persistent atrial fibrillation (HCC)      (With Dr. Blas in 3 weeks or so)    Scheduled Providers:  Nicolás Maynard M.D.; Arslan Mccormick M.D. Responsible Provider:  Arslan Mccormick M.D.    Anesthesia Type:  general ASA Status:  3          Final Anesthesia Type: general  Last vitals  BP   Blood Pressure : 143/84    Temp   36.1 °C (97 °F)    Pulse   Pulse: 84   Resp   16    SpO2   95 %      Anesthesia Post Evaluation    Patient location during evaluation: PACU  Patient participation: complete - patient participated  Level of consciousness: awake and alert  Pain score: 0    Airway patency: patent  Anesthetic complications: no  Cardiovascular status: hemodynamically stable  Respiratory status: acceptable  Hydration status: euvolemic    PONV: none           Nurse Pain Score: 0 (NPRS)

## 2020-07-20 NOTE — ANESTHESIA QCDR
2019 [facility] Clinical Data Registry (for Quality Improvement)     Postoperative nausea/vomiting risk protocol (Adult = 18 yrs and Pediatric 3-17 yrs)- (430 and 463)  General inhalation anesthetic (NOT TIVA) with PONV risk factors: No  Provision of anti-emetic therapy with at least 2 different classes of agents: N/A  Patient DID NOT receive anti-emetic therapy and reason is documented in Medical Record: N/A    Multimodal Pain Management- (477)  Non-emergent surgery AND patient age >= 18: No  Use of Multimodal Pain Management, two or more drugs and/or interventions, NOT including systemic opioids:   Exception: Documented allergy to multiple classes of analgesics:     Smoking Abstinence (404)  Patient is current smoker (cigarette, pipe, e-cig, marijuanna): No  Elective Surgery:   Abstinence instructions provided prior to day of surgery:   Patient abstained from smoking on day of surgery:     Pre-Op Beta-Blocker in Isolated CABG (44)  Isolated CABG AND patient age >= 18: No  Beta-blocker admin within 24 hours of surgical incision:   Exception:of medical reason(s) for not administering beta blocker within 24 hours prior to surgical incision (e.g., not  indicated,other medical reason):     PACU assessment of acute postoperative pain prior to Anesthesia Care End- Applies to Patients Age = 18- (ABG7)  Initial PACU pain score is which of the following: < 7/10  Patient unable to report pain score: N/A    Post-anesthetic transfer of care checklist/protocol to PACU/ICU- (426 and 427)  Upon conclusion of case, patient transferred to which of the following locations: PACU/Non-ICU  Use of transfer checklist/protocol: Yes  Exclusion: Service Performed in Patient Hospital Room (and thus did not require transfer): N/A  Unplanned admission to ICU related to anesthesia service up through end of PACU care- (MD51)  Unplanned admission to ICU (not initially anticipated at anesthesia start time): No

## 2020-07-20 NOTE — PROCEDURES
PROCEDURE: Electrical Cardioversion    INDICATION: Symptomatic atrial fibrillation    PROCEDURE: After establishing consistent anticoagulation, informed consent was obtained.  The patient received sedation by Dr. Arslan Mccormick, anesetheiologist with IV propofol. The patient then received a single synchonized  shock of 200 joules with return to normal sinus rhythm.  The patient recovered uneventfully.  Post procedure ECG is pending.

## 2020-07-20 NOTE — ANESTHESIA PREPROCEDURE EVALUATION
Relevant Problems   CARDIAC   (+) Coronary artery disease due to lipid rich plaque   (+) Essential hypertension   (+) Persistent atrial fibrillation (HCC)         (+) Stage 3 chronic kidney disease (HCC)       Physical Exam    Airway   Mallampati: II  TM distance: >3 FB  Neck ROM: full       Cardiovascular - normal exam  Rhythm: irregular  Rate: normal  (-) murmur     Dental - normal exam           Pulmonary - normal exam  Breath sounds clear to auscultation     Abdominal    Neurological - normal exam                 Anesthesia Plan    ASA 3   ASA physical status 3 criteria: moderate reduction of ejection fraction    Plan - general       Airway plan will be natural airway      Plan Factors:   Patient was previously instructed to abstain from smoking on day of procedure.  Patient did not smoke on day of procedure.      Induction: intravenous          Informed Consent:    Anesthetic plan and risks discussed with patient.    Use of blood products discussed with: patient whom.

## 2020-07-20 NOTE — ANESTHESIA TIME REPORT
Anesthesia Start and Stop Event Times     Date Time Event    7/20/2020 1211 Ready for Procedure     1223 Anesthesia Start     1239 Anesthesia Stop        Responsible Staff  07/20/20    Name Role Begin End    Arslan Mccormick M.D. Anesth 1223 1239        Preop Diagnosis (Free Text):  Pre-op Diagnosis             Preop Diagnosis (Codes):    Post op Diagnosis  Atrial fibrillation (HCC)      Premium Reason  Non-Premium    Comments:

## 2020-07-20 NOTE — DISCHARGE INSTRUCTIONS
ACTIVITY: Rest and take it easy for the first 24 hours.  A responsible adult is recommended to remain with you during that time.  It is normal to feel sleepy.  We encourage you to not do anything that requires balance, judgment or coordination.    MILD FLU-LIKE SYMPTOMS ARE NORMAL. YOU MAY EXPERIENCE GENERALIZED MUSCLE ACHES, THROAT IRRITATION, HEADACHE AND/OR SOME NAUSEA.    FOR 24 HOURS DO NOT:  Drive, operate machinery or run household appliances.  Drink beer or alcoholic beverages.   Make important decisions or sign legal documents.    SPECIAL INSTRUCTIONS:     Electrical Cardioversion, Care After  This sheet gives you information about how to care for yourself after your procedure. Your health care provider may also give you more specific instructions. If you have problems or questions, contact your health care provider.  What can I expect after the procedure?  After the procedure, it is common to have:  · Some redness on the skin where the shocks were given.  Follow these instructions at home:    · Do not drive for 24 hours if you were given a medicine to help you relax (sedative).  · Take over-the-counter and prescription medicines only as told by your health care provider.  · Ask your health care provider how to check your pulse. Check it often.  · Rest for 48 hours after the procedure or as told by your health care provider.  · Avoid or limit your caffeine use as told by your health care provider.  Contact a health care provider if:  · You feel like your heart is beating too quickly or your pulse is not regular.  · You have a serious muscle cramp that does not go away.  Get help right away if:    · You have discomfort in your chest.  · You are dizzy or you feel faint.  · You have trouble breathing or you are short of breath.  · Your speech is slurred.  · You have trouble moving an arm or leg on one side of your body.  · Your fingers or toes turn cold or blue.  This information is not intended to replace  advice given to you by your health care provider. Make sure you discuss any questions you have with your health care provider.  Document Released: 10/08/2014 Document Revised: 11/30/2018 Document Reviewed: 06/23/2017  [employer] Patient Education © 2020 [employer]      DIET: To avoid nausea, slowly advance diet as tolerated, avoiding spicy or greasy foods for the first day.  Add more substantial food to your diet according to your physician's instructions.  INCREASE FLUIDS AND FIBER TO AVOID CONSTIPATION.      FOLLOW-UP APPOINTMENT:  A follow-up appointment should be arranged with your cardiologist; call to schedule.    You should CALL YOUR PHYSICIAN if you develop:  Fever greater than 101 degrees F.  Pain not relieved by medication, or persistent nausea or vomiting.  Excessive bleeding (blood soaking through dressing) or unexpected drainage from the wound.  Extreme redness or swelling around the incision site, drainage of pus or foul smelling drainage.  Inability to urinate or empty your bladder within 8 hours.  Problems with breathing or chest pain.    You should call 911 if you develop problems with breathing or chest pain.  If you are unable to contact your doctor or surgical center, you should go to the nearest emergency room or urgent care center.  Physician's telephone #: [phone]    If any questions arise, call your doctor.  If your doctor is not available, please feel free to call the Surgical Center at [phone].  The Center is open Monday through Friday from 7AM to 7PM.  You can also call the HEALTH HOTLINE open 24 hours/day, 7 days/week and speak to a nurse at [phone], or toll free at [phone].    A registered nurse may call you a few days after your surgery to see how you are doing after your procedure.    MEDICATIONS: Resume taking daily medication.  Take prescribed pain medication with food.  If no medication is prescribed, you may take non-aspirin pain medication if needed.  PAIN  MEDICATION CAN BE VERY CONSTIPATING.  Take a stool softener or laxative such as senokot, pericolace, or milk of magnesia if needed.    If your physician has prescribed pain medication that includes Acetaminophen (Tylenol), do not take additional Acetaminophen (Tylenol) while taking the prescribed medication.    Depression / Suicide Risk    As you are discharged from this [facility] facility, it is important to learn how to keep safe from harming yourself.    Recognize the warning signs:  · Abrupt changes in personality, positive or negative- including increase in energy   · Giving away possessions  · Change in eating patterns- significant weight changes-  positive or negative  · Change in sleeping patterns- unable to sleep or sleeping all the time   · Unwillingness or inability to communicate  · Depression  · Unusual sadness, discouragement and loneliness  · Talk of wanting to die  · Neglect of personal appearance   · Rebelliousness- reckless behavior  · Withdrawal from people/activities they love  · Confusion- inability to concentrate     If you or a loved one observes any of these behaviors or has concerns about self-harm, here's what you can do:  · Talk about it- your feelings and reasons for harming yourself  · Remove any means that you might use to hurt yourself (examples: pills, rope, extension cords, firearm)  · Get professional help from the community (Mental Health, Substance Abuse, psychological counseling)  · Do not be alone:Call your Safe Contact- someone whom you trust who will be there for you.  · Call your local CRISIS HOTLINE 475-6741 or 624-449-1417  · Call your local Children's Mobile Crisis Response Team [place] (015) 639-4269 or www.alaTest  · Call the toll free National Suicide Prevention Hotlines   · National Suicide Prevention Lifeline 232-587-KHGK (0969)  · National Hope Line Network 800-SUICIDE (201-0690)

## 2020-09-18 ENCOUNTER — OFFICE VISIT (OUTPATIENT)
Dept: CARDIOLOGY | Facility: MEDICAL CENTER | Age: 85
End: 2020-09-18
Payer: MEDICARE

## 2020-09-18 VITALS
HEIGHT: 70 IN | DIASTOLIC BLOOD PRESSURE: 62 MMHG | HEART RATE: 59 BPM | OXYGEN SATURATION: 92 % | SYSTOLIC BLOOD PRESSURE: 118 MMHG | WEIGHT: 229 LBS | BODY MASS INDEX: 32.78 KG/M2

## 2020-09-18 DIAGNOSIS — I10 ESSENTIAL HYPERTENSION: ICD-10-CM

## 2020-09-18 DIAGNOSIS — N18.30 STAGE 3 CHRONIC KIDNEY DISEASE: ICD-10-CM

## 2020-09-18 DIAGNOSIS — I48.19 PERSISTENT ATRIAL FIBRILLATION (HCC): ICD-10-CM

## 2020-09-18 DIAGNOSIS — I48.91 ATRIAL FIBRILLATION, UNSPECIFIED TYPE (HCC): ICD-10-CM

## 2020-09-18 DIAGNOSIS — Z79.01 ON CONTINUOUS ORAL ANTICOAGULATION: ICD-10-CM

## 2020-09-18 DIAGNOSIS — I25.5 ISCHEMIC CARDIOMYOPATHY: ICD-10-CM

## 2020-09-18 PROCEDURE — 99215 OFFICE O/P EST HI 40 MIN: CPT | Performed by: INTERNAL MEDICINE

## 2020-09-18 PROCEDURE — 93000 ELECTROCARDIOGRAM COMPLETE: CPT | Performed by: INTERNAL MEDICINE

## 2020-09-18 ASSESSMENT — FIBROSIS 4 INDEX: FIB4 SCORE: 2.25

## 2020-09-18 NOTE — PROGRESS NOTES
Subjective:   Alonso Holder is a 85y.o. male who presents today for follow-up of atrial fibrillation and newly noted ischemic cardiomyopathy.  Underwent evaluation and status post DCCV which was initially successful but reversion to AF now on amiodarone, positive stress testing and subsequent coronary angiography revealing multivessel CAD.  Declined CABG and underwent multivessel PCI.  He is following up after this and notes ongoing NYHA class II dyspnea on exertion and mostly fatigue.  Subsequently was loaded with his antiarrhythmic and repeat cardioversion was recently completed and has been successful in maintaining sinus rhythm with PACs and PVCs.      In the interim he feels better continues to maintain sinus rhythm on amiodarone therapy with slightly bradycardic rate but good heart rate responsiveness and improved symptomology.  He has not had a repeat check of his ejection fraction since he has had revascularization and restoration of sinus rhythm.    Past Medical History:   Diagnosis Date   • Arthritis     finger   • Essential hypertension 1/29/2018   • Hemorrhagic disorder (HCC)     xarelto   • High cholesterol    • Persistent atrial fibrillation (HCC) 1/28/2020    stents   • Snoring     no sleep study     Past Surgical History:   Procedure Laterality Date   • OTHER  1961    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   • TONSILLECTOMY       Family History   Problem Relation Age of Onset   • Heart Disease Neg Hx      Social History     Socioeconomic History   • Marital status:      Spouse name: Not on file   • Number of children: Not on file   • Years of education: Not on file   • Highest education level: Not on file   Occupational History   • Not on file   Social Needs   • Financial resource strain: Not on file   • Food insecurity     Worry: Not on file     Inability: Not on file   • Transportation needs     Medical: Not on file     Non-medical: Not on file   Tobacco Use   • Smoking status: Never Smoker   •  "Smokeless tobacco: Never Used   Substance and Sexual Activity   • Alcohol use: Yes     Comment: reports 1/day   • Drug use: No   • Sexual activity: Not on file   Lifestyle   • Physical activity     Days per week: Not on file     Minutes per session: Not on file   • Stress: Not on file   Relationships   • Social connections     Talks on phone: Not on file     Gets together: Not on file     Attends [employer] service: Not on file     Active member of club or organization: Not on file     Attends meetings of clubs or organizations: Not on file     Relationship status: Not on file   • Intimate partner violence     Fear of current or ex partner: Not on file     Emotionally abused: Not on file     Physically abused: Not on file     Forced sexual activity: Not on file   Other Topics Concern   • Not on file   Social History Narrative   • Not on file     Allergies   Allergen Reactions   • Atorvastatin Myalgia     Severe myalgias, generally feeling unwell.   • Codeine Nausea     Nausea     Outpatient Encounter Medications as of 9/18/2020   Medication Sig Dispense Refill   • metoprolol SR (TOPROL XL) 50 MG TABLET SR 24 HR Take 1 Tab by mouth every day. 90 Tab 3   • clopidogrel (PLAVIX) 75 MG Tab Take 1 Tab by mouth every day. 90 Tab 3   • XARELTO 20 MG Tab tablet Take 1 Tab by mouth every day. 90 Tab 3   • cholecalciferol (VITAMIN D3) 400 UNIT Tab Take 400 Units by mouth every day.     • benazepril (LOTENSIN) 10 MG Tab Take 1 Tab by mouth every day. 180 Tab 3   • amiodarone (CORDARONE) 200 MG Tab Take 1 Tab by mouth every day. 90 Tab 3     No facility-administered encounter medications on file as of 9/18/2020.      Review of Systems   All other systems reviewed and are negative.       Objective:   /62 (BP Location: Left arm, Patient Position: Sitting, BP Cuff Size: Adult)   Pulse (!) 59   Ht 1.778 m (5' 10\")   Wt 103.9 kg (229 lb)   SpO2 92%   BMI 32.86 kg/m²     Physical Exam   Constitutional: He is oriented to " person, place, and time. He appears well-developed and well-nourished. No distress.   HENT:   Head: Normocephalic and atraumatic.   Right Ear: External ear normal.   Left Ear: External ear normal.   Eyes: Pupils are equal, round, and reactive to light. Conjunctivae and EOM are normal. Right eye exhibits no discharge. Left eye exhibits no discharge. No scleral icterus.   Neck: Normal range of motion. Neck supple. No JVD present. No tracheal deviation present. No thyromegaly present.   Cardiovascular: Regular rhythm and intact distal pulses. Frequent extrasystoles are present. Bradycardia present. PMI is not displaced. Exam reveals no gallop and no friction rub.   Murmur ( Soft 2 out of 6 systolic murmur) heard.  Pulses:       Carotid pulses are 2+ on the right side and 2+ on the left side.       Radial pulses are 2+ on the left side.        Popliteal pulses are 2+ on the right side and 2+ on the left side.        Dorsalis pedis pulses are 2+ on the right side and 2+ on the left side.        Posterior tibial pulses are 2+ on the right side and 2+ on the left side.   Pulmonary/Chest: Effort normal and breath sounds normal. No respiratory distress. He has no wheezes. He has no rales. He exhibits no tenderness.   Abdominal: Soft. Bowel sounds are normal. He exhibits no distension. There is no abdominal tenderness.   Musculoskeletal: Normal range of motion.         General: Edema (1+ left lower extremity, trace right lower extremity.  Improved from prior visit.) present. No tenderness or deformity.   Neurological: He is alert and oriented to person, place, and time. No cranial nerve deficit (cranial nerves II through XII grossly intact). Coordination normal.   Skin: Skin is warm and dry. No rash noted. He is not diaphoretic. No erythema. No pallor.   Psychiatric: He has a normal mood and affect. His behavior is normal. Thought content normal.   Vitals reviewed.    LABS:  Lab Results   Component Value Date/Time     CHOLSTRLTOT 189 12/09/2019 08:17 AM     (H) 12/09/2019 08:17 AM    HDL 35 (A) 12/09/2019 08:17 AM    TRIGLYCERIDE 255 (H) 12/09/2019 08:17 AM       Lab Results   Component Value Date/Time    WBC 5.8 07/15/2020 01:10 PM    RBC 4.64 (L) 07/15/2020 01:10 PM    HEMOGLOBIN 13.9 (L) 07/15/2020 01:10 PM    HEMATOCRIT 43.2 07/15/2020 01:10 PM    MCV 93.1 07/15/2020 01:10 PM    NEUTSPOLYS 46.10 12/09/2019 08:17 AM    LYMPHOCYTES 38.60 12/09/2019 08:17 AM    MONOCYTES 11.00 12/09/2019 08:17 AM    EOSINOPHILS 3.30 12/09/2019 08:17 AM    BASOPHILS 0.80 12/09/2019 08:17 AM     Lab Results   Component Value Date/Time    SODIUM 143 07/15/2020 01:10 PM    POTASSIUM 4.0 07/15/2020 01:10 PM    CHLORIDE 106 07/15/2020 01:10 PM    CO2 24 07/15/2020 01:10 PM    GLUCOSE 136 (H) 07/15/2020 01:10 PM    BUN 23 (H) 07/15/2020 01:10 PM    CREATININE 1.41 (H) 07/15/2020 01:10 PM     Lab Results   Component Value Date    HBA1C 5.8 (H) 12/09/2019      Lab Results   Component Value Date/Time    ALKPHOSPHAT 52 03/09/2020 09:02 AM    ASTSGOT 22 03/09/2020 09:02 AM    ALTSGPT 24 03/09/2020 09:02 AM    TBILIRUBIN 0.7 03/09/2020 09:02 AM      No results found for: BNPBTYPENAT   No results found for: TSH  Lab Results   Component Value Date/Time    PROTHROMBTM 28.2 (H) 07/15/2020 01:10 PM    INR 2.54 (H) 07/15/2020 01:10 PM      EKG (9/18/2020):  I have personally reviewed the EKG this visit and discussed with the patient.  Sinus bradycardia PACs, PVCs.      CORONARY ANGIOGRAM(3/10/2020):  1.  Successful PCI 90% OM 1 stenosis (2.5 x 30 mm [place] GREG), excellent result  2.  Successful PCI 80% long proximal and ostial LAD stenosis (3.5 x 38 mm [place] GREG, postdilated 3.75 mm, IVUS guided), excellent result  3.  LVEDP 15 mmHg    ECHO CONCLUSIONS (1/23/2020):  Compared to the report of the study done 01/30/2018, EF is now   moderately reduced, patient noted to be in atrial fibrillation.  Moderately reduced left ventricular systolic function.  Left  ventricular ejection fraction is visually estimated to be 35%.  Global hypokinesis with severe hypokinesis of the basal to mid inferior   wall and inferior septum.  Diastolic function is difficult to assess with atrial fibrillation.  Mildly dilated left atrium.  Mild mitral regurgitation.  Aortic sclerosis without stenosis.  Mild to moderate tricuspid regurgitation.  Estimated right ventricular systolic pressure  is 33 mmHg.  Ascending aorta diameter is 3.9 cm, borderline mildly dilated.  Pt has appointment with cardiology.  Personally reviewed this visit 1/28/2020     STRESS (1/23/2020):   No evidence of significant jeopardized viable myocardium or prior myocardial    infarction. LVEF low at 23%.  Personally reviewed this visit 1/28/2020.  There are areas of fixed photopenia in the inferior base to mid and anterior septum.  It is a suboptimal study.  This was official read by radiology not cardiology.      Assessment:     1. Ischemic cardiomyopathy  EKG    EC-ECHOCARDIOGRAM COMPLETE W/O CONT    EKG   2. Persistent atrial fibrillation (HCC)  EKG    EKG   3. Essential hypertension     4. On continuous oral anticoagulation     5. Stage 3 chronic kidney disease (HCC)     6. Atrial fibrillation, unspecified type (HCC)         Medical Decision Making:  Today's Assessment / Status / Plan:     Renal function stable.  Xarelto appropriate dose.  Continue oral anticoagulation.  Recheck echocardiogram.  Continue antiarrhythmic therapy.  Continue other medical therapy.  He does feel better when in sinus rhythm compared with atrial fibrillation and we will endeavor to pursue a rhythm management strategy.

## 2020-09-19 LAB — EKG IMPRESSION: NORMAL

## 2020-10-13 ENCOUNTER — HOSPITAL ENCOUNTER (OUTPATIENT)
Dept: CARDIOLOGY | Facility: MEDICAL CENTER | Age: 85
End: 2020-10-13
Attending: INTERNAL MEDICINE
Payer: MEDICARE

## 2020-10-13 DIAGNOSIS — I25.5 ISCHEMIC CARDIOMYOPATHY: ICD-10-CM

## 2020-10-13 PROCEDURE — 93306 TTE W/DOPPLER COMPLETE: CPT

## 2020-10-14 LAB
LV EJECT FRACT  99904: 55
LV EJECT FRACT MOD 2C 99903: 55.49
LV EJECT FRACT MOD 4C 99902: 54.59
LV EJECT FRACT MOD BP 99901: 56.35

## 2020-10-14 PROCEDURE — 93306 TTE W/DOPPLER COMPLETE: CPT | Mod: 26 | Performed by: INTERNAL MEDICINE

## 2020-11-04 ENCOUNTER — HOSPITAL ENCOUNTER (INPATIENT)
Facility: MEDICAL CENTER | Age: 85
LOS: 4 days | DRG: 177 | End: 2020-11-08
Attending: EMERGENCY MEDICINE | Admitting: HOSPITALIST
Payer: MEDICARE

## 2020-11-04 ENCOUNTER — APPOINTMENT (OUTPATIENT)
Dept: RADIOLOGY | Facility: MEDICAL CENTER | Age: 85
DRG: 177 | End: 2020-11-04
Attending: EMERGENCY MEDICINE
Payer: MEDICARE

## 2020-11-04 DIAGNOSIS — Z95.5 S/P DRUG ELUTING CORONARY STENT PLACEMENT: ICD-10-CM

## 2020-11-04 DIAGNOSIS — J96.01 ACUTE RESPIRATORY FAILURE WITH HYPOXIA (HCC): ICD-10-CM

## 2020-11-04 DIAGNOSIS — E66.9 OBESITY (BMI 30.0-34.9): ICD-10-CM

## 2020-11-04 DIAGNOSIS — I10 ESSENTIAL HYPERTENSION: ICD-10-CM

## 2020-11-04 DIAGNOSIS — Z79.899 HIGH RISK MEDICATION USE: ICD-10-CM

## 2020-11-04 DIAGNOSIS — J22 LOWER RESPIRATORY TRACT INFECTION DUE TO COVID-19 VIRUS: ICD-10-CM

## 2020-11-04 DIAGNOSIS — Z78.9 STATIN INTOLERANCE: ICD-10-CM

## 2020-11-04 DIAGNOSIS — J12.82 PNEUMONIA DUE TO COVID-19 VIRUS: ICD-10-CM

## 2020-11-04 DIAGNOSIS — U07.1 COVID-19 VIRUS INFECTION: ICD-10-CM

## 2020-11-04 DIAGNOSIS — Z79.01 ON CONTINUOUS ORAL ANTICOAGULATION: ICD-10-CM

## 2020-11-04 DIAGNOSIS — U07.1 LOWER RESPIRATORY TRACT INFECTION DUE TO COVID-19 VIRUS: ICD-10-CM

## 2020-11-04 DIAGNOSIS — N18.31 STAGE 3A CHRONIC KIDNEY DISEASE: ICD-10-CM

## 2020-11-04 DIAGNOSIS — I25.5 ISCHEMIC CARDIOMYOPATHY: ICD-10-CM

## 2020-11-04 DIAGNOSIS — I48.19 PERSISTENT ATRIAL FIBRILLATION (HCC): ICD-10-CM

## 2020-11-04 DIAGNOSIS — R09.02 HYPOXIA: ICD-10-CM

## 2020-11-04 DIAGNOSIS — U07.1 PNEUMONIA DUE TO COVID-19 VIRUS: ICD-10-CM

## 2020-11-04 LAB
ALBUMIN SERPL BCP-MCNC: 3.3 G/DL (ref 3.2–4.9)
ALBUMIN/GLOB SERPL: 0.9 G/DL
ALP SERPL-CCNC: 47 U/L (ref 30–99)
ALT SERPL-CCNC: 26 U/L (ref 2–50)
ANION GAP SERPL CALC-SCNC: 11 MMOL/L (ref 7–16)
AST SERPL-CCNC: 41 U/L (ref 12–45)
BASOPHILS # BLD AUTO: 0.2 % (ref 0–1.8)
BASOPHILS # BLD: 0.01 K/UL (ref 0–0.12)
BILIRUB SERPL-MCNC: 0.7 MG/DL (ref 0.1–1.5)
BUN SERPL-MCNC: 22 MG/DL (ref 8–22)
CALCIUM SERPL-MCNC: 8.4 MG/DL (ref 8.4–10.2)
CHLORIDE SERPL-SCNC: 101 MMOL/L (ref 96–112)
CO2 SERPL-SCNC: 24 MMOL/L (ref 20–33)
COVID ORDER STATUS COVID19: NORMAL
CREAT SERPL-MCNC: 1.53 MG/DL (ref 0.5–1.4)
EKG IMPRESSION: NORMAL
EOSINOPHIL # BLD AUTO: 0 K/UL (ref 0–0.51)
EOSINOPHIL NFR BLD: 0 % (ref 0–6.9)
ERYTHROCYTE [DISTWIDTH] IN BLOOD BY AUTOMATED COUNT: 47.9 FL (ref 35.9–50)
GLOBULIN SER CALC-MCNC: 3.6 G/DL (ref 1.9–3.5)
GLUCOSE SERPL-MCNC: 108 MG/DL (ref 65–99)
HCT VFR BLD AUTO: 41.9 % (ref 42–52)
HGB BLD-MCNC: 14 G/DL (ref 14–18)
IMM GRANULOCYTES # BLD AUTO: 0.02 K/UL (ref 0–0.11)
IMM GRANULOCYTES NFR BLD AUTO: 0.4 % (ref 0–0.9)
LACTATE BLD-SCNC: 1.5 MMOL/L (ref 0.5–2)
LYMPHOCYTES # BLD AUTO: 1.2 K/UL (ref 1–4.8)
LYMPHOCYTES NFR BLD: 23.2 % (ref 22–41)
MCH RBC QN AUTO: 30.9 PG (ref 27–33)
MCHC RBC AUTO-ENTMCNC: 33.4 G/DL (ref 33.7–35.3)
MCV RBC AUTO: 92.5 FL (ref 81.4–97.8)
MONOCYTES # BLD AUTO: 0.5 K/UL (ref 0–0.85)
MONOCYTES NFR BLD AUTO: 9.7 % (ref 0–13.4)
NEUTROPHILS # BLD AUTO: 3.44 K/UL (ref 1.82–7.42)
NEUTROPHILS NFR BLD: 66.5 % (ref 44–72)
NRBC # BLD AUTO: 0 K/UL
NRBC BLD-RTO: 0 /100 WBC
PLATELET # BLD AUTO: 121 K/UL (ref 164–446)
PMV BLD AUTO: 9.7 FL (ref 9–12.9)
POTASSIUM SERPL-SCNC: 4.1 MMOL/L (ref 3.6–5.5)
PROT SERPL-MCNC: 6.9 G/DL (ref 6–8.2)
RBC # BLD AUTO: 4.53 M/UL (ref 4.7–6.1)
SARS-COV-2 RNA RESP QL NAA+PROBE: DETECTED
SODIUM SERPL-SCNC: 136 MMOL/L (ref 135–145)
SPECIMEN SOURCE: ABNORMAL
WBC # BLD AUTO: 5.2 K/UL (ref 4.8–10.8)

## 2020-11-04 PROCEDURE — 93005 ELECTROCARDIOGRAM TRACING: CPT | Performed by: EMERGENCY MEDICINE

## 2020-11-04 PROCEDURE — C9803 HOPD COVID-19 SPEC COLLECT: HCPCS | Performed by: EMERGENCY MEDICINE

## 2020-11-04 PROCEDURE — A9270 NON-COVERED ITEM OR SERVICE: HCPCS | Performed by: HOSPITALIST

## 2020-11-04 PROCEDURE — U0003 INFECTIOUS AGENT DETECTION BY NUCLEIC ACID (DNA OR RNA); SEVERE ACUTE RESPIRATORY SYNDROME CORONAVIRUS 2 (SARS-COV-2) (CORONAVIRUS DISEASE [COVID-19]), AMPLIFIED PROBE TECHNIQUE, MAKING USE OF HIGH THROUGHPUT TECHNOLOGIES AS DESCRIBED BY CMS-2020-01-R: HCPCS

## 2020-11-04 PROCEDURE — 99285 EMERGENCY DEPT VISIT HI MDM: CPT

## 2020-11-04 PROCEDURE — 83605 ASSAY OF LACTIC ACID: CPT

## 2020-11-04 PROCEDURE — 96367 TX/PROPH/DG ADDL SEQ IV INF: CPT

## 2020-11-04 PROCEDURE — 700102 HCHG RX REV CODE 250 W/ 637 OVERRIDE(OP): Performed by: HOSPITALIST

## 2020-11-04 PROCEDURE — 700105 HCHG RX REV CODE 258: Performed by: EMERGENCY MEDICINE

## 2020-11-04 PROCEDURE — 85025 COMPLETE CBC W/AUTO DIFF WBC: CPT

## 2020-11-04 PROCEDURE — 87040 BLOOD CULTURE FOR BACTERIA: CPT

## 2020-11-04 PROCEDURE — 700102 HCHG RX REV CODE 250 W/ 637 OVERRIDE(OP): Performed by: EMERGENCY MEDICINE

## 2020-11-04 PROCEDURE — 71045 X-RAY EXAM CHEST 1 VIEW: CPT

## 2020-11-04 PROCEDURE — 93005 ELECTROCARDIOGRAM TRACING: CPT

## 2020-11-04 PROCEDURE — 36415 COLL VENOUS BLD VENIPUNCTURE: CPT

## 2020-11-04 PROCEDURE — 96365 THER/PROPH/DIAG IV INF INIT: CPT

## 2020-11-04 PROCEDURE — 770021 HCHG ROOM/CARE - ISO PRIVATE

## 2020-11-04 PROCEDURE — 99221 1ST HOSP IP/OBS SF/LOW 40: CPT | Mod: AI | Performed by: HOSPITALIST

## 2020-11-04 PROCEDURE — 80053 COMPREHEN METABOLIC PANEL: CPT

## 2020-11-04 PROCEDURE — A9270 NON-COVERED ITEM OR SERVICE: HCPCS | Performed by: EMERGENCY MEDICINE

## 2020-11-04 PROCEDURE — 700111 HCHG RX REV CODE 636 W/ 250 OVERRIDE (IP): Performed by: EMERGENCY MEDICINE

## 2020-11-04 RX ORDER — ACETAMINOPHEN 325 MG/1
650 TABLET ORAL EVERY 6 HOURS PRN
Status: DISCONTINUED | OUTPATIENT
Start: 2020-11-04 | End: 2020-11-08 | Stop reason: HOSPADM

## 2020-11-04 RX ORDER — ZINC SULFATE 50(220)MG
220 CAPSULE ORAL DAILY
Status: DISCONTINUED | OUTPATIENT
Start: 2020-11-04 | End: 2020-11-08 | Stop reason: HOSPADM

## 2020-11-04 RX ORDER — CLOPIDOGREL BISULFATE 75 MG/1
75 TABLET ORAL DAILY
Status: DISCONTINUED | OUTPATIENT
Start: 2020-11-04 | End: 2020-11-08 | Stop reason: HOSPADM

## 2020-11-04 RX ORDER — GUAIFENESIN/DEXTROMETHORPHAN 100-10MG/5
5 SYRUP ORAL EVERY 6 HOURS PRN
Status: DISCONTINUED | OUTPATIENT
Start: 2020-11-04 | End: 2020-11-04

## 2020-11-04 RX ORDER — ERGOCALCIFEROL 1.25 MG/1
50000 CAPSULE ORAL
Status: DISCONTINUED | OUTPATIENT
Start: 2020-11-04 | End: 2020-11-08 | Stop reason: HOSPADM

## 2020-11-04 RX ORDER — ACETAMINOPHEN 500 MG
1000 TABLET ORAL ONCE
Status: COMPLETED | OUTPATIENT
Start: 2020-11-04 | End: 2020-11-04

## 2020-11-04 RX ORDER — ONDANSETRON 2 MG/ML
4 INJECTION INTRAMUSCULAR; INTRAVENOUS EVERY 6 HOURS PRN
Status: DISCONTINUED | OUTPATIENT
Start: 2020-11-04 | End: 2020-11-08 | Stop reason: HOSPADM

## 2020-11-04 RX ORDER — AZITHROMYCIN 500 MG/1
500 INJECTION, POWDER, LYOPHILIZED, FOR SOLUTION INTRAVENOUS ONCE
Status: COMPLETED | OUTPATIENT
Start: 2020-11-04 | End: 2020-11-04

## 2020-11-04 RX ORDER — AMIODARONE HYDROCHLORIDE 200 MG/1
200 TABLET ORAL DAILY
Status: DISCONTINUED | OUTPATIENT
Start: 2020-11-04 | End: 2020-11-08 | Stop reason: HOSPADM

## 2020-11-04 RX ORDER — METOPROLOL SUCCINATE 25 MG/1
50 TABLET, EXTENDED RELEASE ORAL DAILY
Status: DISCONTINUED | OUTPATIENT
Start: 2020-11-04 | End: 2020-11-08 | Stop reason: HOSPADM

## 2020-11-04 RX ORDER — ONDANSETRON 4 MG/1
4 TABLET, ORALLY DISINTEGRATING ORAL EVERY 6 HOURS PRN
Status: DISCONTINUED | OUTPATIENT
Start: 2020-11-04 | End: 2020-11-08 | Stop reason: HOSPADM

## 2020-11-04 RX ORDER — BENAZEPRIL HYDROCHLORIDE 10 MG/1
10 TABLET ORAL DAILY
Status: DISCONTINUED | OUTPATIENT
Start: 2020-11-04 | End: 2020-11-08 | Stop reason: HOSPADM

## 2020-11-04 RX ORDER — ASCORBIC ACID 500 MG
1000 TABLET ORAL 2 TIMES DAILY
Status: DISCONTINUED | OUTPATIENT
Start: 2020-11-04 | End: 2020-11-08 | Stop reason: HOSPADM

## 2020-11-04 RX ORDER — GUAIFENESIN/DEXTROMETHORPHAN 100-10MG/5
5 SYRUP ORAL EVERY 6 HOURS PRN
Status: DISCONTINUED | OUTPATIENT
Start: 2020-11-04 | End: 2020-11-06

## 2020-11-04 RX ADMIN — RIVAROXABAN 15 MG: 15 TABLET, FILM COATED ORAL at 20:13

## 2020-11-04 RX ADMIN — ACETAMINOPHEN 1000 MG: 500 TABLET, FILM COATED ORAL at 15:38

## 2020-11-04 RX ADMIN — GUAIFENESIN AND DEXTROMETHORPHAN 5 ML: 100; 10 SYRUP ORAL at 23:20

## 2020-11-04 RX ADMIN — AZITHROMYCIN DIHYDRATE 500 MG: 500 INJECTION, POWDER, LYOPHILIZED, FOR SOLUTION INTRAVENOUS at 17:12

## 2020-11-04 RX ADMIN — CLOPIDOGREL BISULFATE 75 MG: 75 TABLET ORAL at 20:14

## 2020-11-04 RX ADMIN — OXYCODONE HYDROCHLORIDE AND ACETAMINOPHEN 1000 MG: 500 TABLET ORAL at 20:10

## 2020-11-04 RX ADMIN — AMPICILLIN SODIUM AND SULBACTAM SODIUM 3 G: 2; 1 INJECTION, POWDER, FOR SOLUTION INTRAMUSCULAR; INTRAVENOUS at 16:40

## 2020-11-04 RX ADMIN — ZINC SULFATE 220 MG (50 MG) CAPSULE 220 MG: CAPSULE at 20:09

## 2020-11-04 RX ADMIN — METOPROLOL SUCCINATE 50 MG: 25 TABLET, EXTENDED RELEASE ORAL at 20:11

## 2020-11-04 RX ADMIN — ERGOCALCIFEROL 50000 UNITS: 1.25 CAPSULE ORAL at 20:09

## 2020-11-04 SDOH — ECONOMIC STABILITY: FOOD INSECURITY: WITHIN THE PAST 12 MONTHS, YOU WORRIED THAT YOUR FOOD WOULD RUN OUT BEFORE YOU GOT MONEY TO BUY MORE.: NEVER TRUE

## 2020-11-04 SDOH — ECONOMIC STABILITY: FOOD INSECURITY: WITHIN THE PAST 12 MONTHS, THE FOOD YOU BOUGHT JUST DIDN'T LAST AND YOU DIDN'T HAVE MONEY TO GET MORE.: NEVER TRUE

## 2020-11-04 SDOH — ECONOMIC STABILITY: TRANSPORTATION INSECURITY
IN THE PAST 12 MONTHS, HAS LACK OF TRANSPORTATION KEPT YOU FROM MEETINGS, WORK, OR FROM GETTING THINGS NEEDED FOR DAILY LIVING?: NO

## 2020-11-04 SDOH — ECONOMIC STABILITY: TRANSPORTATION INSECURITY
IN THE PAST 12 MONTHS, HAS THE LACK OF TRANSPORTATION KEPT YOU FROM MEDICAL APPOINTMENTS OR FROM GETTING MEDICATIONS?: NO

## 2020-11-04 ASSESSMENT — ENCOUNTER SYMPTOMS
SHORTNESS OF BREATH: 1
PSYCHIATRIC NEGATIVE: 1
DIAPHORESIS: 0
DIARRHEA: 0
COUGH: 0
FEVER: 0
DIZZINESS: 0
BACK PAIN: 1
FOCAL WEAKNESS: 0
CONSTIPATION: 0
PALPITATIONS: 0
EYES NEGATIVE: 1
ABDOMINAL PAIN: 0
WHEEZING: 0
BRUISES/BLEEDS EASILY: 0
HEMOPTYSIS: 0
VOMITING: 0
DOUBLE VISION: 0
GASTROINTESTINAL NEGATIVE: 1
HEADACHES: 1
SEIZURES: 0
HEARTBURN: 0
LOSS OF CONSCIOUSNESS: 0
NERVOUS/ANXIOUS: 0
BLOOD IN STOOL: 0
CHILLS: 0
CARDIOVASCULAR NEGATIVE: 1
NAUSEA: 0

## 2020-11-04 ASSESSMENT — PATIENT HEALTH QUESTIONNAIRE - PHQ9
2. FEELING DOWN, DEPRESSED, IRRITABLE, OR HOPELESS: NOT AT ALL
SUM OF ALL RESPONSES TO PHQ9 QUESTIONS 1 AND 2: 0
1. LITTLE INTEREST OR PLEASURE IN DOING THINGS: NOT AT ALL

## 2020-11-04 ASSESSMENT — COGNITIVE AND FUNCTIONAL STATUS - GENERAL
SUGGESTED CMS G CODE MODIFIER DAILY ACTIVITY: CH
SUGGESTED CMS G CODE MODIFIER MOBILITY: CH
MOBILITY SCORE: 24
DAILY ACTIVITIY SCORE: 24

## 2020-11-04 ASSESSMENT — LIFESTYLE VARIABLES
HOW MANY TIMES IN THE PAST YEAR HAVE YOU HAD 5 OR MORE DRINKS IN A DAY: 0
HAVE YOU EVER FELT YOU SHOULD CUT DOWN ON YOUR DRINKING: NO
AVERAGE NUMBER OF DAYS PER WEEK YOU HAVE A DRINK CONTAINING ALCOHOL: 1
TOTAL SCORE: 0
ON A TYPICAL DAY WHEN YOU DRINK ALCOHOL HOW MANY DRINKS DO YOU HAVE: 1
CONSUMPTION TOTAL: NEGATIVE
HAVE PEOPLE ANNOYED YOU BY CRITICIZING YOUR DRINKING: NO
EVER HAD A DRINK FIRST THING IN THE MORNING TO STEADY YOUR NERVES TO GET RID OF A HANGOVER: NO
EVER FELT BAD OR GUILTY ABOUT YOUR DRINKING: NO
ALCOHOL_USE: YES
TOTAL SCORE: 0
TOTAL SCORE: 0

## 2020-11-04 ASSESSMENT — FIBROSIS 4 INDEX: FIB4 SCORE: 5.58

## 2020-11-05 ENCOUNTER — PATIENT OUTREACH (OUTPATIENT)
Dept: HEALTH INFORMATION MANAGEMENT | Facility: OTHER | Age: 85
End: 2020-11-05

## 2020-11-05 PROCEDURE — 700102 HCHG RX REV CODE 250 W/ 637 OVERRIDE(OP): Performed by: STUDENT IN AN ORGANIZED HEALTH CARE EDUCATION/TRAINING PROGRAM

## 2020-11-05 PROCEDURE — A9270 NON-COVERED ITEM OR SERVICE: HCPCS | Performed by: STUDENT IN AN ORGANIZED HEALTH CARE EDUCATION/TRAINING PROGRAM

## 2020-11-05 PROCEDURE — 700102 HCHG RX REV CODE 250 W/ 637 OVERRIDE(OP): Performed by: HOSPITALIST

## 2020-11-05 PROCEDURE — 770020 HCHG ROOM/CARE - TELE (206)

## 2020-11-05 PROCEDURE — A9270 NON-COVERED ITEM OR SERVICE: HCPCS | Performed by: HOSPITALIST

## 2020-11-05 PROCEDURE — 99232 SBSQ HOSP IP/OBS MODERATE 35: CPT | Performed by: STUDENT IN AN ORGANIZED HEALTH CARE EDUCATION/TRAINING PROGRAM

## 2020-11-05 PROCEDURE — HZ2ZZZZ DETOXIFICATION SERVICES FOR SUBSTANCE ABUSE TREATMENT: ICD-10-PCS | Performed by: STUDENT IN AN ORGANIZED HEALTH CARE EDUCATION/TRAINING PROGRAM

## 2020-11-05 PROCEDURE — 700111 HCHG RX REV CODE 636 W/ 250 OVERRIDE (IP): Performed by: STUDENT IN AN ORGANIZED HEALTH CARE EDUCATION/TRAINING PROGRAM

## 2020-11-05 RX ORDER — LORAZEPAM 1 MG/1
4 TABLET ORAL
Status: DISCONTINUED | OUTPATIENT
Start: 2020-11-05 | End: 2020-11-08 | Stop reason: HOSPADM

## 2020-11-05 RX ORDER — LORAZEPAM 0.5 MG/1
0.5 TABLET ORAL EVERY 4 HOURS PRN
Status: DISCONTINUED | OUTPATIENT
Start: 2020-11-05 | End: 2020-11-08 | Stop reason: HOSPADM

## 2020-11-05 RX ORDER — LORAZEPAM 2 MG/ML
1.5 INJECTION INTRAMUSCULAR
Status: DISCONTINUED | OUTPATIENT
Start: 2020-11-05 | End: 2020-11-08 | Stop reason: HOSPADM

## 2020-11-05 RX ORDER — LORAZEPAM 1 MG/1
3 TABLET ORAL
Status: DISCONTINUED | OUTPATIENT
Start: 2020-11-05 | End: 2020-11-08 | Stop reason: HOSPADM

## 2020-11-05 RX ORDER — LORAZEPAM 1 MG/1
1 TABLET ORAL EVERY 4 HOURS PRN
Status: DISCONTINUED | OUTPATIENT
Start: 2020-11-05 | End: 2020-11-08 | Stop reason: HOSPADM

## 2020-11-05 RX ORDER — LORAZEPAM 1 MG/1
2 TABLET ORAL
Status: DISCONTINUED | OUTPATIENT
Start: 2020-11-05 | End: 2020-11-08 | Stop reason: HOSPADM

## 2020-11-05 RX ORDER — LORAZEPAM 2 MG/ML
2 INJECTION INTRAMUSCULAR
Status: DISCONTINUED | OUTPATIENT
Start: 2020-11-05 | End: 2020-11-08 | Stop reason: HOSPADM

## 2020-11-05 RX ORDER — LORAZEPAM 2 MG/ML
0.5 INJECTION INTRAMUSCULAR EVERY 4 HOURS PRN
Status: DISCONTINUED | OUTPATIENT
Start: 2020-11-05 | End: 2020-11-08 | Stop reason: HOSPADM

## 2020-11-05 RX ORDER — LORAZEPAM 2 MG/ML
1 INJECTION INTRAMUSCULAR
Status: DISCONTINUED | OUTPATIENT
Start: 2020-11-05 | End: 2020-11-08 | Stop reason: HOSPADM

## 2020-11-05 RX ADMIN — ACETAMINOPHEN 650 MG: 325 TABLET, FILM COATED ORAL at 20:32

## 2020-11-05 RX ADMIN — ACETAMINOPHEN 650 MG: 325 TABLET, FILM COATED ORAL at 02:32

## 2020-11-05 RX ADMIN — OXYCODONE HYDROCHLORIDE AND ACETAMINOPHEN 1000 MG: 500 TABLET ORAL at 17:46

## 2020-11-05 RX ADMIN — AMIODARONE HYDROCHLORIDE 200 MG: 200 TABLET ORAL at 17:46

## 2020-11-05 RX ADMIN — LORAZEPAM 1.5 MG: 2 INJECTION INTRAMUSCULAR; INTRAVENOUS at 11:42

## 2020-11-05 RX ADMIN — OXYCODONE HYDROCHLORIDE AND ACETAMINOPHEN 1000 MG: 500 TABLET ORAL at 06:14

## 2020-11-05 RX ADMIN — LORAZEPAM 1 MG: 1 TABLET ORAL at 20:32

## 2020-11-05 RX ADMIN — GUAIFENESIN AND DEXTROMETHORPHAN 5 ML: 100; 10 SYRUP ORAL at 14:14

## 2020-11-05 RX ADMIN — METOPROLOL SUCCINATE 50 MG: 25 TABLET, EXTENDED RELEASE ORAL at 17:47

## 2020-11-05 RX ADMIN — ZINC SULFATE 220 MG (50 MG) CAPSULE 220 MG: CAPSULE at 06:14

## 2020-11-05 RX ADMIN — RIVAROXABAN 15 MG: 15 TABLET, FILM COATED ORAL at 17:47

## 2020-11-05 RX ADMIN — LORAZEPAM 0.5 MG: 0.5 TABLET ORAL at 17:46

## 2020-11-05 RX ADMIN — GUAIFENESIN AND DEXTROMETHORPHAN 5 ML: 100; 10 SYRUP ORAL at 10:09

## 2020-11-05 RX ADMIN — ACETAMINOPHEN 650 MG: 325 TABLET, FILM COATED ORAL at 10:03

## 2020-11-05 RX ADMIN — CLOPIDOGREL BISULFATE 75 MG: 75 TABLET ORAL at 17:46

## 2020-11-05 ASSESSMENT — LIFESTYLE VARIABLES
AUDITORY DISTURBANCES: NOT PRESENT
AGITATION: NORMAL ACTIVITY
PAROXYSMAL SWEATS: *
VISUAL DISTURBANCES: NOT PRESENT
NAUSEA AND VOMITING: NO NAUSEA AND NO VOMITING
NAUSEA AND VOMITING: *
AUDITORY DISTURBANCES: NOT PRESENT
ORIENTATION AND CLOUDING OF SENSORIUM: ORIENTED AND CAN DO SERIAL ADDITIONS
HEADACHE, FULLNESS IN HEAD: NOT PRESENT
TOTAL SCORE: 8
NAUSEA AND VOMITING: NO NAUSEA AND NO VOMITING
AGITATION: *
TREMOR: MODERATE TREMOR WITH ARMS EXTENDED
AUDITORY DISTURBANCES: NOT PRESENT
PAROXYSMAL SWEATS: NO SWEAT VISIBLE
HEADACHE, FULLNESS IN HEAD: NOT PRESENT
VISUAL DISTURBANCES: NOT PRESENT
ORIENTATION AND CLOUDING OF SENSORIUM: ORIENTED AND CAN DO SERIAL ADDITIONS
ANXIETY: MILDLY ANXIOUS
AGITATION: NORMAL ACTIVITY
ORIENTATION AND CLOUDING OF SENSORIUM: ORIENTED AND CAN DO SERIAL ADDITIONS
VISUAL DISTURBANCES: NOT PRESENT
ANXIETY: MODERATELY ANXIOUS OR GUARDED, SO ANXIETY IS INFERRED
TOTAL SCORE: 6
PAROXYSMAL SWEATS: BARELY PERCEPTIBLE SWEATING. PALMS MOIST
ANXIETY: MILDLY ANXIOUS
NAUSEA AND VOMITING: NO NAUSEA AND NO VOMITING
AGITATION: NORMAL ACTIVITY
VISUAL DISTURBANCES: NOT PRESENT
TREMOR: *
TOTAL SCORE: 19
TREMOR: *
ANXIETY: MILDLY ANXIOUS
HEADACHE, FULLNESS IN HEAD: MODERATE
TREMOR: MODERATE TREMOR WITH ARMS EXTENDED
AUDITORY DISTURBANCES: NOT PRESENT
PAROXYSMAL SWEATS: *
TOTAL SCORE: 5
ORIENTATION AND CLOUDING OF SENSORIUM: ORIENTED AND CAN DO SERIAL ADDITIONS
HEADACHE, FULLNESS IN HEAD: NOT PRESENT

## 2020-11-05 ASSESSMENT — CHA2DS2 SCORE
VASCULAR DISEASE: NO
CHA2DS2 VASC SCORE: 6
PRIOR STROKE OR TIA OR THROMBOEMBOLISM: YES
AGE 65 TO 74: NO
AGE 75 OR GREATER: YES
DIABETES: NO
HYPERTENSION: YES
CHF OR LEFT VENTRICULAR DYSFUNCTION: YES
SEX: MALE

## 2020-11-05 ASSESSMENT — FIBROSIS 4 INDEX: FIB4 SCORE: 5.58

## 2020-11-05 NOTE — ASSESSMENT & PLAN NOTE
Patient has stage III chronic kidney disease continue to monitor renal functions most recently creatinine is 1.53.  We will not be giving judicious fluid resuscitation with the patient's current condition.

## 2020-11-05 NOTE — ASSESSMENT & PLAN NOTE
Patient has an extensive history of coronary artery disease he does have a stent in place.  Continue at this point with metoprolol XL, benazepril and amiodarone.  Continue with Plavix

## 2020-11-05 NOTE — ASSESSMENT & PLAN NOTE
Persistent atrial fibrillation continue at this point with rate control with a combination amiodarone and metoprolol and anticoagulation using Xarelto.

## 2020-11-05 NOTE — PROGRESS NOTES
2 RN skin check complete.   Devices in place nasal cannula.  Skin assessed under devices: yes wdl.  Confirmed pressure ulcers found on: n/a.  New potential pressure ulcers noted on n/a. Wound consult placed n/a.  The following interventions in place: Pt ambulatory and able to turn self.     Skin WDL

## 2020-11-05 NOTE — ASSESSMENT & PLAN NOTE
Patient will need outpatient weight loss management program  Continue at this point with monitoring of his weight while in house.

## 2020-11-05 NOTE — CARE PLAN
Problem: Communication  Goal: The ability to communicate needs accurately and effectively will improve  Outcome: PROGRESSING AS EXPECTED  Note: AO4 able to verbalize needs clearly. Demonstrates appropriate use of call light.        Problem: Safety  Goal: Will remain free from falls  Outcome: PROGRESSING AS EXPECTED  Intervention: Assess risk factors for falls  Flowsheets (Taken 11/5/2020 0150)  Pt Calls for Assistance: Yes  Fall Risk: Risk to Fall -  0 - 1 point  History of fall: 0  Mobility Status Assessment: 1-1 Healthcare Provider Required for Assistance with Ambulation & Transfer  Risk for Injury-Any positive answers results in the pt being at high risk for fall related injury: Not Applicable  Intervention: Implement fall precautions  Flowsheets (Taken 11/5/2020 0150)  Bed Alarm: Alarm Not On  Environmental Precautions:   Treaded Slipper Socks on Patient   Personal Belongings, Wastebasket, Call Bell etc. in Easy Reach   Transferred to Stronger Side   Bed in Low Position   Report Given to Other Health Care Providers Regarding Fall Risk   Communication Sign for Patients & Families   Mobility Assessed & Appropriate Sign Placed

## 2020-11-05 NOTE — PROGRESS NOTES
"Medicated pt PRN per MAR for cough. Pt appears more comfortable, less agitated, and calmer. Pt is sitting up in bed with reading glasses on while reading on his phone. Pt states, \"Wow, you're gonna fix 2 of my problems, huh?\" Pt states he feels better. Titrated pt's O2  Down to 2.5 L. Pt at 94%. Eager to go home  "

## 2020-11-05 NOTE — PROGRESS NOTES
Received patient from NOC RN. Assessment complete. A&Ox4, but care team has to keep repeating the questions several times. Denies pain. Tmax 102.7F, medicated PRN per MAR. MD aware. Pt's NC is only in one nostril, O2 89%. NC now in place. Pt SpO2 93%. Pt is positive for bilat UE tremors. The floor has large amount of spilled water. POC discussed. Call light within reach. Bed in low, locked position. All needs attended to at this time.

## 2020-11-05 NOTE — ED NOTES
Patient resting in bed, no noted needs at this time. Will continue to monitor. Patient tolerating NC 4L well, vital signs stable.

## 2020-11-05 NOTE — ED NOTES
PATIENT TAKEN UPSTAIRS VIA GURNEY, WITH O2 AND MONITOR. PATIENT TOLERATED WELL. PATIENT AMBULATED FROM GURNEY TO BED WITH ONE ASSIST. PATIENT PLACED BACK ON OXYGEN 4LNC. NO ADDITIONAL QUESTIONS FROM ADMISSION RN.

## 2020-11-05 NOTE — PROGRESS NOTES
2 RN skin check complete.   Devices in place nasal cannula.  Skin assessed under devices: yes wdl.  Confirmed pressure ulcers found on: n/a.  New potential pressure ulcers noted on n/a. Wound consult placed n/a.  The following interventions in place: Pt ambulatory and able to turn self.     Skin WDL      Second RN Cosign

## 2020-11-05 NOTE — ED NOTES
PATIENT'S MEDICATIONS WERE VERIFIED WITH PATIENT. PATIENT WAS ABLE TO VERBALIZED MEDICATIONS AND DOSAGES THAT HE TAKES AS WELL AS THE TIME OF DAY. PATIENT VERBALIZED UNDERSTANDING OF SELF CARE AND POSSIBLE NEEDS AT THIS TIME. SOME MEDICATIONS HELD RELATED TO PATIENT'S VITAL SIGNS, PATIENT HEART RATE HAS DECREASED SINCE ARRIVE. WILL CONTINUE TO MONITOR.

## 2020-11-05 NOTE — PROGRESS NOTES
[facility] Medicine Daily Progress Note    Date of Service  11/5/2020    Chief Complaint  85 y.o. male who presented 11/4/2020 with shortness of breath that started 2 days ago, back pain that started yesterday, headache that started today.  Patient is diagnosed with COVID-19 pneumonia.  Patient is also hypoxic on room air.  Patient will be admitted for oxygen therapy.  Patient be also given zinc vitamin C vitamin D for his COVID-19 infection.    Hospital Course  No notes on file    Interval Problem Update  On 4L, acting somewhat confused, possible ascites,   CIWA protocol initiated today  Covid-19 positive receiving supportive care.    Consultants/Specialty  None    Code Status  Full Code    Disposition  TBD    Review of Systems  ROS   Review of Systems   Constitutional: Positive for malaise/fatigue. Negative for chills, diaphoresis and fever.   HENT: Negative.    Eyes: Negative.  Negative for double vision.   Respiratory: Positive for shortness of breath. Negative for cough, hemoptysis and wheezing.    Cardiovascular: Negative.  Negative for chest pain, palpitations and leg swelling.   Gastrointestinal: Negative.  Negative for abdominal pain, blood in stool, constipation, diarrhea, heartburn, nausea and vomiting.   Genitourinary: Negative.  Negative for frequency, hematuria and urgency.   Musculoskeletal: Positive for back pain. Negative for joint pain.   Skin: Negative.  Negative for itching and rash.   Neurological: Positive for headaches. Negative for dizziness, focal weakness, seizures and loss of consciousness.   Endo/Heme/Allergies: Negative.  Does not bruise/bleed easily.   Psychiatric/Behavioral: Negative.  Negative for suicidal ideas. The patient is not nervous/anxious.    All other systems reviewed and are negative.    Physical Exam  Temp:  [36.3 °C (97.3 °F)-39.7 °C (103.4 °F)] 39.3 °C (102.7 °F)  Pulse:  [53-74] 60  Resp:  [13-37] 18  BP: (117-172)/(64-89) 139/72  SpO2:  [86 %-100 %] 95 %    Physical  Exam  Physical Exam  Vitals signs and nursing note reviewed.   Constitutional:       Appearance: He is well-developed. He is obese. He is ill-appearing.   HENT:      Head: Normocephalic and atraumatic.      Right Ear: External ear normal.      Left Ear: External ear normal.      Nose: Nose normal.      Mouth/Throat:      Mouth: Mucous membranes are moist.      Pharynx: Oropharynx is clear.   Eyes:      Extraocular Movements: Extraocular movements intact.      Conjunctiva/sclera: Conjunctivae normal.      Pupils: Pupils are equal, round, and reactive to light.   Neck:      Musculoskeletal: Normal range of motion and neck supple.      Thyroid: No thyromegaly.      Vascular: No JVD.   Cardiovascular:      Rate and Rhythm: Normal rate and regular rhythm.      Heart sounds: Murmur present.   Pulmonary:      Effort: Accessory muscle usage present.      Breath sounds: Decreased air movement present. Examination of the right-upper field reveals decreased breath sounds. Examination of the left-upper field reveals decreased breath sounds. Examination of the right-middle field reveals decreased breath sounds and rales. Examination of the left-middle field reveals decreased breath sounds and rales. Examination of the right-lower field reveals decreased breath sounds and rales. Examination of the left-lower field reveals decreased breath sounds and rales. Decreased breath sounds and rales present.   Chest:      Chest wall: No tenderness.   Abdominal:      General: Abdomen is flat. Bowel sounds are normal. There is no distension.      Palpations: Abdomen is soft. There is no mass.      Tenderness: There is no abdominal tenderness. There is no guarding or rebound.   Musculoskeletal:      Lumbar back: He exhibits decreased range of motion and tenderness.   Lymphadenopathy:      Cervical: No cervical adenopathy.   Skin:     General: Skin is warm and dry.      Capillary Refill: Capillary refill takes more than 3 seconds.       Findings: No rash.   Neurological:      General: No focal deficit present.      Mental Status: He is alert and oriented to person, place, and time. Mental status is at baseline.      Cranial Nerves: No cranial nerve deficit.      Deep Tendon Reflexes: Reflexes are normal and symmetric.   Psychiatric:         Mood and Affect: Mood normal.         Behavior: Behavior normal.         Thought Content: Thought content normal.         Judgment: Judgment normal.        Fluids    Intake/Output Summary (Last 24 hours) at 11/5/2020 1115  Last data filed at 11/5/2020 0200  Gross per 24 hour   Intake --   Output 300 ml   Net -300 ml       Laboratory  Recent Labs     11/04/20  1514   WBC 5.2   RBC 4.53*   HEMOGLOBIN 14.0   HEMATOCRIT 41.9*   MCV 92.5   MCH 30.9   MCHC 33.4*   RDW 47.9   PLATELETCT 121*   MPV 9.7     Recent Labs     11/04/20  1514   SODIUM 136   POTASSIUM 4.1   CHLORIDE 101   CO2 24   GLUCOSE 108*   BUN 22   CREATININE 1.53*   CALCIUM 8.4                   Imaging  DX-CHEST-PORTABLE (1 VIEW)   Final Result      1.  Patchy bilateral ill-defined peripheral pulmonary infiltrates. Imaging features can be seen with COVID-19 pneumonia, though are nonspecific and can occur with a variety of infectious and noninfectious processes.      2.  Cardiomegaly.           Assessment/Plan  * Pneumonia due to COVID-19 virus  Assessment & Plan  Patient says for the past couple of days he has not been feeling well.  He is developed back pain headache and shortness of breath.  He first developed the shortness of breath 2 days ago.  The back pain and came on yesterday and had a came on today.  The patient is came to the hospital for evaluation is found to have COVID-19 infection with pneumonia bilaterally.  Patient will be placed on Rocephin azithromycin as a secondary backup antibiotic management to prevent secondary infections.  Patient will be placed on oxygen as needed.  We will initiate him on vitamin D, zinc, vitamin C.  He will  be provided acetaminophen as needed for fever or pain.    Coronary artery disease due to lipid rich plaque- (present on admission)  Assessment & Plan  Patient has an extensive history of coronary artery disease he does have a stent in place.  Continue at this point with metoprolol XL, benazepril and amiodarone.  Continue with Plavix    Persistent atrial fibrillation (HCC)- (present on admission)  Assessment & Plan  Persistent atrial fibrillation continue at this point with rate control with a combination amiodarone and metoprolol and anticoagulation using Xarelto.    Essential hypertension- (present on admission)  Assessment & Plan  Blood pressure management optimization continue with benazepril and Toprol-XL    Obesity (BMI 30.0-34.9)  Assessment & Plan  Patient will need outpatient weight loss management program  Continue at this point with monitoring of his weight while in house.    Stage 3 chronic kidney disease- (present on admission)  Assessment & Plan  Patient has stage III chronic kidney disease continue to monitor renal functions most recently creatinine is 1.53.  We will not be giving judicious fluid resuscitation with the patient's current condition.         VTE prophylaxis: Clopidogrel 75mg, Xarelto 15mg

## 2020-11-05 NOTE — ED NOTES
Patient in bed; Patient updated on plan of care. Patient stated that his wife was on the phone with him prior to coming into the room. Vital signs stable at this time. Patient denies having any additional needs at this time. Will continue to monitor.    left

## 2020-11-05 NOTE — PROGRESS NOTES
"RN heard side table move abruptly. RN opened pt door. Pt is standing at the foot of the bed swaying left/right, then leaning back. RN grabbed pt by arm and sat pt down EOB. Pt uable to sit still. Pt is compliant with commands, but pt is fidgety. RN inquired about pt's etoh intake and frequency. Pt is avoiding inquiry and is hesitant to answer. RN educated pt on withdrawal s/s. Pt states he \"only has one martini a night\". Returned call to spouse, Madelin who is a per evelyn nurse at rehab.   "

## 2020-11-05 NOTE — ASSESSMENT & PLAN NOTE
Patient says for the past couple of days he has not been feeling well.  He is developed back pain headache and shortness of breath.  He first developed the shortness of breath 2 days ago.  The back pain and came on yesterday and had a came on today.  The patient is came to the hospital for evaluation is found to have COVID-19 infection with pneumonia bilaterally.  Patient will be placed on Rocephin azithromycin as a secondary backup antibiotic management to prevent secondary infections.  Patient will be placed on oxygen as needed.  We will initiate him on vitamin D, zinc, vitamin C.  He will be provided acetaminophen as needed for fever or pain.

## 2020-11-05 NOTE — H&P
"Hospital Medicine History & Physical Note    Date of Service  11/4/2020    Primary Care Physician  Sumanth Fairbanks M.D.    Consultants  None    Code Status  Full Code    Chief Complaint  Chief Complaint   Patient presents with   • Shortness of Breath     Denies CP.   • Cough     Reports being tested for COVID 1 month ago and \"never heard results\".    • Sore Throat     Reports fever and scratchy throat today.        History of Presenting Illness  85 y.o. male who presented 11/4/2020 with shortness of breath that started 2 days ago, back pain that started yesterday, headache that started today.  Patient is diagnosed with COVID-19 pneumonia.  Patient is also hypoxic on room air.  Patient will be admitted for oxygen therapy.  Patient be also given zinc vitamin C vitamin D for his COVID-19 infection.    Review of Systems  Review of Systems   Constitutional: Positive for malaise/fatigue. Negative for chills, diaphoresis and fever.   HENT: Negative.    Eyes: Negative.  Negative for double vision.   Respiratory: Positive for shortness of breath. Negative for cough, hemoptysis and wheezing.    Cardiovascular: Negative.  Negative for chest pain, palpitations and leg swelling.   Gastrointestinal: Negative.  Negative for abdominal pain, blood in stool, constipation, diarrhea, heartburn, nausea and vomiting.   Genitourinary: Negative.  Negative for frequency, hematuria and urgency.   Musculoskeletal: Positive for back pain. Negative for joint pain.   Skin: Negative.  Negative for itching and rash.   Neurological: Positive for headaches. Negative for dizziness, focal weakness, seizures and loss of consciousness.   Endo/Heme/Allergies: Negative.  Does not bruise/bleed easily.   Psychiatric/Behavioral: Negative.  Negative for suicidal ideas. The patient is not nervous/anxious.    All other systems reviewed and are negative.      Past Medical History   has a past medical history of Arthritis, Essential hypertension (1/29/2018), " Hemorrhagic disorder (HCC), High cholesterol, Persistent atrial fibrillation (HCC) (1/28/2020), and Snoring.    Surgical History   has a past surgical history that includes other (1961) and tonsillectomy.     Family History  Family history is reviewed and is noncontributory.    Social History   reports that he has never smoked. He has never used smokeless tobacco. He reports current alcohol use. He reports that he does not use drugs.    Allergies  Allergies   Allergen Reactions   • Atorvastatin Myalgia     Severe myalgias, generally feeling unwell.   • Codeine Nausea     Nausea       Medications  Prior to Admission Medications   Prescriptions Last Dose Informant Patient Reported? Taking?   Acetaminophen (TYLENOL PO) 11/4/2020 at 0800 Patient Yes Yes   Sig: Take 2 Tabs by mouth as needed (For pain). Pt is not sure the strength   Cholecalciferol (D3 VITAMIN PO) 11/3/2020 at 2000 Patient Yes Yes   Sig: Take 1 Cap by mouth every evening.   XARELTO 20 MG Tab tablet 11/3/2020 at 2000 Patient No No   Sig: Take 1 Tab by mouth every day.   Patient taking differently: Take 20 mg by mouth every evening.   amiodarone (CORDARONE) 200 MG Tab 11/3/2020 at 2000 Patient No No   Sig: Take 1 Tab by mouth every day.   Patient taking differently: Take 200 mg by mouth every evening.   benazepril (LOTENSIN) 10 MG Tab 11/3/2020 at 2000 Patient No No   Sig: Take 1 Tab by mouth every day.   Patient taking differently: Take 10 mg by mouth every evening.   clopidogrel (PLAVIX) 75 MG Tab 11/3/2020 at 2000 Patient No No   Sig: Take 1 Tab by mouth every day.   Patient taking differently: Take 75 mg by mouth every evening.   metoprolol SR (TOPROL XL) 50 MG TABLET SR 24 HR 11/3/2020 at 2000 Patient No No   Sig: Take 1 Tab by mouth every day.   Patient taking differently: Take 50 mg by mouth every evening.      Facility-Administered Medications: None       Physical Exam  Temp:  [37.1 °C (98.8 °F)-39.7 °C (103.4 °F)] 37.1 °C (98.8 °F)  Pulse:   [63-74] 63  Resp:  [14-37] 14  BP: (140-172)/(64-89) 161/67  SpO2:  [87 %-98 %] 97 %    Physical Exam  Vitals signs and nursing note reviewed.   Constitutional:       Appearance: He is well-developed. He is obese. He is ill-appearing.   HENT:      Head: Normocephalic and atraumatic.      Right Ear: External ear normal.      Left Ear: External ear normal.      Nose: Nose normal.      Mouth/Throat:      Mouth: Mucous membranes are moist.      Pharynx: Oropharynx is clear.   Eyes:      Extraocular Movements: Extraocular movements intact.      Conjunctiva/sclera: Conjunctivae normal.      Pupils: Pupils are equal, round, and reactive to light.   Neck:      Musculoskeletal: Normal range of motion and neck supple.      Thyroid: No thyromegaly.      Vascular: No JVD.   Cardiovascular:      Rate and Rhythm: Normal rate and regular rhythm.      Heart sounds: Murmur present.   Pulmonary:      Effort: Accessory muscle usage present.      Breath sounds: Decreased air movement present. Examination of the right-upper field reveals decreased breath sounds. Examination of the left-upper field reveals decreased breath sounds. Examination of the right-middle field reveals decreased breath sounds and rales. Examination of the left-middle field reveals decreased breath sounds and rales. Examination of the right-lower field reveals decreased breath sounds and rales. Examination of the left-lower field reveals decreased breath sounds and rales. Decreased breath sounds and rales present.   Chest:      Chest wall: No tenderness.   Abdominal:      General: Abdomen is flat. Bowel sounds are normal. There is no distension.      Palpations: Abdomen is soft. There is no mass.      Tenderness: There is no abdominal tenderness. There is no guarding or rebound.   Musculoskeletal:      Lumbar back: He exhibits decreased range of motion and tenderness.   Lymphadenopathy:      Cervical: No cervical adenopathy.   Skin:     General: Skin is warm and  dry.      Capillary Refill: Capillary refill takes more than 3 seconds.      Findings: No rash.   Neurological:      General: No focal deficit present.      Mental Status: He is alert and oriented to person, place, and time. Mental status is at baseline.      Cranial Nerves: No cranial nerve deficit.      Deep Tendon Reflexes: Reflexes are normal and symmetric.   Psychiatric:         Mood and Affect: Mood normal.         Behavior: Behavior normal.         Thought Content: Thought content normal.         Judgment: Judgment normal.         Laboratory:  Recent Labs     11/04/20  1514   WBC 5.2   RBC 4.53*   HEMOGLOBIN 14.0   HEMATOCRIT 41.9*   MCV 92.5   MCH 30.9   MCHC 33.4*   RDW 47.9   PLATELETCT 121*   MPV 9.7     Recent Labs     11/04/20  1514   SODIUM 136   POTASSIUM 4.1   CHLORIDE 101   CO2 24   GLUCOSE 108*   BUN 22   CREATININE 1.53*   CALCIUM 8.4     Recent Labs     11/04/20  1514   ALTSGPT 26   ASTSGOT 41   ALKPHOSPHAT 47   TBILIRUBIN 0.7   GLUCOSE 108*         No results for input(s): NTPROBNP in the last 72 hours.      No results for input(s): TROPONINT in the last 72 hours.    Imaging:  DX-CHEST-PORTABLE (1 VIEW)   Final Result      1.  Patchy bilateral ill-defined peripheral pulmonary infiltrates. Imaging features can be seen with COVID-19 pneumonia, though are nonspecific and can occur with a variety of infectious and noninfectious processes.      2.  Cardiomegaly.            Assessment/Plan:  I anticipate this patient will require at least two midnights for appropriate medical management, necessitating inpatient admission.    * Pneumonia due to COVID-19 virus  Assessment & Plan  Patient says for the past couple of days he has not been feeling well.  He is developed back pain headache and shortness of breath.  He first developed the shortness of breath 2 days ago.  The back pain and came on yesterday and had a came on today.  The patient is came to the hospital for evaluation is found to have COVID-19  infection with pneumonia bilaterally.  Patient will be placed on Rocephin azithromycin as a secondary backup antibiotic management to prevent secondary infections.  Patient will be placed on oxygen as needed.  We will initiate him on vitamin D, zinc, vitamin C.  He will be provided acetaminophen as needed for fever or pain.    Coronary artery disease due to lipid rich plaque- (present on admission)  Assessment & Plan  Patient has an extensive history of coronary artery disease he does have a stent in place.  Continue at this point with metoprolol XL, benazepril and amiodarone.  Continue with Plavix    Persistent atrial fibrillation (HCC)- (present on admission)  Assessment & Plan  Persistent atrial fibrillation continue at this point with rate control with a combination amiodarone and metoprolol and anticoagulation using Xarelto.    Essential hypertension- (present on admission)  Assessment & Plan  Blood pressure management optimization continue with benazepril and Toprol-XL    Obesity (BMI 30.0-34.9)  Assessment & Plan  Patient will need outpatient weight loss management program  Continue at this point with monitoring of his weight while in house.    Stage 3 chronic kidney disease- (present on admission)  Assessment & Plan  Patient has stage III chronic kidney disease continue to monitor renal functions most recently creatinine is 1.53.  We will not be giving judicious fluid resuscitation with the patient's current condition.

## 2020-11-05 NOTE — ED PROVIDER NOTES
"ED Provider Note    ED Provider    Means of arrival:  History obtained from: Patient  History limited by: None    CHIEF COMPLAINT  Chief Complaint   Patient presents with   • Shortness of Breath     Denies CP.   • Cough     Reports being tested for COVID 1 month ago and \"never heard results\".    • Sore Throat     Reports fever and scratchy throat today.        HPI  Alonso Holder is a 85 y.o. male who presents with complaints of cough, shortness of breath, cough is been ongoing for 3 days.  Spelt felt feverish.  He has generalized weakness he is unable to get up and walk at this time.  He lives at home with family.    He has no known recent exposure to Covid.  He has had no nausea vomiting diarrhea.  He does complain of lightheadedness.    REVIEW OF SYSTEMS  See HPI for further details. All other systems are negative.     PAST MEDICAL HISTORY   has a past medical history of Arthritis, Essential hypertension (1/29/2018), Hemorrhagic disorder (HCC), High cholesterol, Persistent atrial fibrillation (HCC) (1/28/2020), and Snoring.    SOCIAL HISTORY  Social History     Tobacco Use   • Smoking status: Never Smoker   • Smokeless tobacco: Never Used   Substance and Sexual Activity   • Alcohol use: Yes     Comment: reports 1/day   • Drug use: No   • Sexual activity: Not on file       SURGICAL HISTORY   has a past surgical history that includes other (1961) and tonsillectomy.    CURRENT MEDICATIONS  Home Medications     Reviewed by Nikita Meza M.D. (Physician) on 11/04/20 at 1720  Med List Status: Complete   Medication Last Dose Status   Acetaminophen (TYLENOL PO) 11/4/2020 Active   amiodarone (CORDARONE) 200 MG Tab 11/3/2020 Active   benazepril (LOTENSIN) 10 MG Tab 11/3/2020 Active   Cholecalciferol (D3 VITAMIN PO) 11/3/2020 Active   clopidogrel (PLAVIX) 75 MG Tab 11/3/2020 Active   metoprolol SR (TOPROL XL) 50 MG TABLET SR 24 HR 11/3/2020 Active   XARELTO 20 MG Tab tablet 11/3/2020 Active          " "      ALLERGIES  Allergies   Allergen Reactions   • Atorvastatin Myalgia     Severe myalgias, generally feeling unwell.   • Codeine Nausea     Nausea       PHYSICAL EXAM  VITAL SIGNS: BP (!) 161/67   Pulse 63   Temp 37.1 °C (98.8 °F) (Temporal)   Resp 14   Ht 1.778 m (5' 10\")   SpO2 97%   BMI 32.86 kg/m²   Constitutional: Alert in no apparent distress.  HENT: No signs of trauma, Mucous membranes are moist   Eyes:  Conjunctiva normal, Non-icteric.   Neck: Normal range of motion, No tenderness, Supple,  Lymphatic: No lymphadenopathy noted.   Cardiovascular: Regular rate and rhythm, no murmurs.   Thorax & Lungs: Normal breath sounds, No respiratory distress, No wheezing, No chest tenderness.   Abdomen: Bowel sounds normal, Soft, No tenderness, No masses, No pulsatile masses. No peritoneal signs.  Skin: Warm, Dry,Normal color  Back: No bony tenderness, No CVA tenderness.   Extremities:No edema, No tenderness, No cyanosis,    Musculoskeletal: Good range of motion in all major joints. No tenderness to palpation or major deformities noted.   Neurologic: Alert ,Oriented x4, Normal motor function, Normal sensory function, No focal deficits noted.   Psychiatric: Affect normal, Judgment normal, Mood normal.       MEDICAL DECISION MAKING  This is a 85 y.o. male who presents with a fever, cough and congestion.  Concerns for pneumonia, sepsis UTI pneumonia and Covid are all considered.  Lab tests are being done for this.  He presented he was hypoxic, with O2 at 4 L via nasal cannula saturations are 96%.  He has no respiratory distress at this time.    DIAGNOSTIC STUDIES / PROCEDURES    EKG      LABS  Results for orders placed or performed during the hospital encounter of 11/04/20   CBC WITH DIFFERENTIAL   Result Value Ref Range    WBC 5.2 4.8 - 10.8 K/uL    RBC 4.53 (L) 4.70 - 6.10 M/uL    Hemoglobin 14.0 14.0 - 18.0 g/dL    Hematocrit 41.9 (L) 42.0 - 52.0 %    MCV 92.5 81.4 - 97.8 fL    MCH 30.9 27.0 - 33.0 pg    MCHC 33.4 " (L) 33.7 - 35.3 g/dL    RDW 47.9 35.9 - 50.0 fL    Platelet Count 121 (L) 164 - 446 K/uL    MPV 9.7 9.0 - 12.9 fL    Neutrophils-Polys 66.50 44.00 - 72.00 %    Lymphocytes 23.20 22.00 - 41.00 %    Monocytes 9.70 0.00 - 13.40 %    Eosinophils 0.00 0.00 - 6.90 %    Basophils 0.20 0.00 - 1.80 %    Immature Granulocytes 0.40 0.00 - 0.90 %    Nucleated RBC 0.00 /100 WBC    Neutrophils (Absolute) 3.44 1.82 - 7.42 K/uL    Lymphs (Absolute) 1.20 1.00 - 4.80 K/uL    Monos (Absolute) 0.50 0.00 - 0.85 K/uL    Eos (Absolute) 0.00 0.00 - 0.51 K/uL    Baso (Absolute) 0.01 0.00 - 0.12 K/uL    Immature Granulocytes (abs) 0.02 0.00 - 0.11 K/uL    NRBC (Absolute) 0.00 K/uL   COMP METABOLIC PANEL   Result Value Ref Range    Sodium 136 135 - 145 mmol/L    Potassium 4.1 3.6 - 5.5 mmol/L    Chloride 101 96 - 112 mmol/L    Co2 24 20 - 33 mmol/L    Anion Gap 11.0 7.0 - 16.0    Glucose 108 (H) 65 - 99 mg/dL    Bun 22 8 - 22 mg/dL    Creatinine 1.53 (H) 0.50 - 1.40 mg/dL    Calcium 8.4 8.4 - 10.2 mg/dL    AST(SGOT) 41 12 - 45 U/L    ALT(SGPT) 26 2 - 50 U/L    Alkaline Phosphatase 47 30 - 99 U/L    Total Bilirubin 0.7 0.1 - 1.5 mg/dL    Albumin 3.3 3.2 - 4.9 g/dL    Total Protein 6.9 6.0 - 8.2 g/dL    Globulin 3.6 (H) 1.9 - 3.5 g/dL    A-G Ratio 0.9 g/dL   COVID/SARS CoV-2 PCR    Specimen: Nasopharyngeal; Respirate   Result Value Ref Range    COVID Order Status Received    LACTIC ACID   Result Value Ref Range    Lactic Acid 1.5 0.5 - 2.0 mmol/L   SARS-CoV-2, PCR (In-House)   Result Value Ref Range    SARS-CoV-2 Source NP Swab     SARS-CoV-2 by PCR DETECTED (AA)    ESTIMATED GFR   Result Value Ref Range    GFR If  53 (A) >60 mL/min/1.73 m 2    GFR If Non  44 (A) >60 mL/min/1.73 m 2   EKG   Result Value Ref Range    Report       [facility] Emergency Dept.    Test Date:  2020-11-04  Pt Name:    ERUM ALEX               Department: [facility]  MRN:        [id number]                       Room:  Gender:     Male                         Technician: ANAIS  :        [id number]                   Requested By:ER TRIAGE PROTOCOL  Order #:    [id number]                    Reading MD: JASSI ARMSTRONG D.O.    Measurements  Intervals                                Axis  Rate:       72                           P:          -21  UT:         202                          QRS:        -34  QRSD:       103                          T:          41  QT:         388  QTc:        425    Interpretive Statements  Sinus rhythm  Compared to ECG 2020 13:46:17  Sinus bradycardia no longer present  First degree AV block no longer present  Electronically Signed On 2020 16:59:51 PST by JASSI ARMSTRONG D.O.           RADIOLOGY  DX-CHEST-PORTABLE (1 VIEW)   Final Result      1.  Patchy bilateral ill-defined peripheral pulmonary infiltrates. Imaging features can be seen with COVID-19 pneumonia, though are nonspecific and can occur with a variety of infectious and noninfectious processes.      2.  Cardiomegaly.      Radiology films were independently visualized by me    COURSE  Pertinent Labs & Imaging studies reviewed. (See chart for details)    5:02 PM - Patient seen and examined at bedside. Discussed plan of care,    Patient is concerns for sepsis, he is hypoxic and responds well to oxygen.  Due to concerns of Covid, fluids have been withheld.    His chest x-ray is consistent with Covid.  With pneumonia antibiotics were empirically initiated.  He has not been hypotensive, or tachycardic.    He was febrile and medicated with Tylenol and this seems to improve his symptoms.  I spoke with hospitalist for admission he will be admitted for further evaluation and treatment.    Critical care time 30 minutes        Admitted in guarded condition    FINAL IMPRESSION  1. Hypoxia    2. Acute respiratory failure with hypoxia (HCC)    3. COVID-19 virus infection    4. Lower respiratory tract infection due to COVID-19 virus         The note accurately reflects work and decisions made by me.  Jose Pérez D.O.  11/4/2020  5:23 PM

## 2020-11-06 ENCOUNTER — APPOINTMENT (OUTPATIENT)
Dept: RADIOLOGY | Facility: MEDICAL CENTER | Age: 85
DRG: 177 | End: 2020-11-06
Attending: STUDENT IN AN ORGANIZED HEALTH CARE EDUCATION/TRAINING PROGRAM
Payer: MEDICARE

## 2020-11-06 LAB
ALBUMIN SERPL BCP-MCNC: 2.9 G/DL (ref 3.2–4.9)
ALBUMIN/GLOB SERPL: 0.8 G/DL
ALP SERPL-CCNC: 46 U/L (ref 30–99)
ALT SERPL-CCNC: 26 U/L (ref 2–50)
ANION GAP SERPL CALC-SCNC: 10 MMOL/L (ref 7–16)
AST SERPL-CCNC: 50 U/L (ref 12–45)
BASOPHILS # BLD AUTO: 0.2 % (ref 0–1.8)
BASOPHILS # BLD: 0.01 K/UL (ref 0–0.12)
BILIRUB SERPL-MCNC: 0.7 MG/DL (ref 0.1–1.5)
BUN SERPL-MCNC: 23 MG/DL (ref 8–22)
CALCIUM SERPL-MCNC: 8.4 MG/DL (ref 8.4–10.2)
CHLORIDE SERPL-SCNC: 100 MMOL/L (ref 96–112)
CO2 SERPL-SCNC: 27 MMOL/L (ref 20–33)
CREAT SERPL-MCNC: 1.3 MG/DL (ref 0.5–1.4)
D DIMER PPP IA.FEU-MCNC: 1.35 UG/ML (FEU) (ref 0–0.5)
EOSINOPHIL # BLD AUTO: 0 K/UL (ref 0–0.51)
EOSINOPHIL NFR BLD: 0 % (ref 0–6.9)
ERYTHROCYTE [DISTWIDTH] IN BLOOD BY AUTOMATED COUNT: 50 FL (ref 35.9–50)
GGT SERPL-CCNC: 19 U/L (ref 7–51)
GLOBULIN SER CALC-MCNC: 3.6 G/DL (ref 1.9–3.5)
GLUCOSE SERPL-MCNC: 109 MG/DL (ref 65–99)
HCT VFR BLD AUTO: 39.9 % (ref 42–52)
HGB BLD-MCNC: 13 G/DL (ref 14–18)
IMM GRANULOCYTES # BLD AUTO: 0.02 K/UL (ref 0–0.11)
IMM GRANULOCYTES NFR BLD AUTO: 0.4 % (ref 0–0.9)
LYMPHOCYTES # BLD AUTO: 1.04 K/UL (ref 1–4.8)
LYMPHOCYTES NFR BLD: 18.3 % (ref 22–41)
MCH RBC QN AUTO: 31.1 PG (ref 27–33)
MCHC RBC AUTO-ENTMCNC: 32.6 G/DL (ref 33.7–35.3)
MCV RBC AUTO: 95.5 FL (ref 81.4–97.8)
MONOCYTES # BLD AUTO: 0.33 K/UL (ref 0–0.85)
MONOCYTES NFR BLD AUTO: 5.8 % (ref 0–13.4)
NEUTROPHILS # BLD AUTO: 4.29 K/UL (ref 1.82–7.42)
NEUTROPHILS NFR BLD: 75.3 % (ref 44–72)
NRBC # BLD AUTO: 0 K/UL
NRBC BLD-RTO: 0 /100 WBC
PLATELET # BLD AUTO: 138 K/UL (ref 164–446)
PMV BLD AUTO: 9.5 FL (ref 9–12.9)
POTASSIUM SERPL-SCNC: 3.9 MMOL/L (ref 3.6–5.5)
PROT SERPL-MCNC: 6.5 G/DL (ref 6–8.2)
RBC # BLD AUTO: 4.18 M/UL (ref 4.7–6.1)
SODIUM SERPL-SCNC: 137 MMOL/L (ref 135–145)
WBC # BLD AUTO: 5.7 K/UL (ref 4.8–10.8)

## 2020-11-06 PROCEDURE — A9270 NON-COVERED ITEM OR SERVICE: HCPCS | Performed by: HOSPITALIST

## 2020-11-06 PROCEDURE — 700102 HCHG RX REV CODE 250 W/ 637 OVERRIDE(OP): Performed by: HOSPITALIST

## 2020-11-06 PROCEDURE — 80053 COMPREHEN METABOLIC PANEL: CPT

## 2020-11-06 PROCEDURE — 82977 ASSAY OF GGT: CPT

## 2020-11-06 PROCEDURE — 700102 HCHG RX REV CODE 250 W/ 637 OVERRIDE(OP): Performed by: STUDENT IN AN ORGANIZED HEALTH CARE EDUCATION/TRAINING PROGRAM

## 2020-11-06 PROCEDURE — 85379 FIBRIN DEGRADATION QUANT: CPT

## 2020-11-06 PROCEDURE — 70450 CT HEAD/BRAIN W/O DYE: CPT

## 2020-11-06 PROCEDURE — A9270 NON-COVERED ITEM OR SERVICE: HCPCS | Performed by: STUDENT IN AN ORGANIZED HEALTH CARE EDUCATION/TRAINING PROGRAM

## 2020-11-06 PROCEDURE — 770020 HCHG ROOM/CARE - TELE (206)

## 2020-11-06 PROCEDURE — 700111 HCHG RX REV CODE 636 W/ 250 OVERRIDE (IP): Performed by: STUDENT IN AN ORGANIZED HEALTH CARE EDUCATION/TRAINING PROGRAM

## 2020-11-06 PROCEDURE — 99232 SBSQ HOSP IP/OBS MODERATE 35: CPT | Performed by: STUDENT IN AN ORGANIZED HEALTH CARE EDUCATION/TRAINING PROGRAM

## 2020-11-06 PROCEDURE — 85025 COMPLETE CBC W/AUTO DIFF WBC: CPT

## 2020-11-06 RX ORDER — BENZONATATE 100 MG/1
100 CAPSULE ORAL 3 TIMES DAILY PRN
Status: DISCONTINUED | OUTPATIENT
Start: 2020-11-06 | End: 2020-11-08 | Stop reason: HOSPADM

## 2020-11-06 RX ORDER — GUAIFENESIN/DEXTROMETHORPHAN 100-10MG/5
5 SYRUP ORAL EVERY 4 HOURS PRN
Status: DISCONTINUED | OUTPATIENT
Start: 2020-11-06 | End: 2020-11-08 | Stop reason: HOSPADM

## 2020-11-06 RX ORDER — DEXAMETHASONE 4 MG/1
6 TABLET ORAL DAILY
Status: DISCONTINUED | OUTPATIENT
Start: 2020-11-06 | End: 2020-11-08 | Stop reason: HOSPADM

## 2020-11-06 RX ADMIN — LORAZEPAM 0.5 MG: 2 INJECTION INTRAMUSCULAR; INTRAVENOUS at 06:00

## 2020-11-06 RX ADMIN — CLOPIDOGREL BISULFATE 75 MG: 75 TABLET ORAL at 17:00

## 2020-11-06 RX ADMIN — RIVAROXABAN 15 MG: 15 TABLET, FILM COATED ORAL at 16:59

## 2020-11-06 RX ADMIN — GUAIFENESIN AND DEXTROMETHORPHAN 5 ML: 100; 10 SYRUP ORAL at 12:10

## 2020-11-06 RX ADMIN — OXYCODONE HYDROCHLORIDE AND ACETAMINOPHEN 1000 MG: 500 TABLET ORAL at 16:58

## 2020-11-06 RX ADMIN — DEXAMETHASONE 6 MG: 4 TABLET ORAL at 15:46

## 2020-11-06 RX ADMIN — BENZONATATE 100 MG: 100 CAPSULE ORAL at 17:00

## 2020-11-06 RX ADMIN — ACETAMINOPHEN 650 MG: 325 TABLET, FILM COATED ORAL at 08:35

## 2020-11-06 RX ADMIN — OXYCODONE HYDROCHLORIDE AND ACETAMINOPHEN 1000 MG: 500 TABLET ORAL at 05:25

## 2020-11-06 RX ADMIN — BENZOCAINE AND MENTHOL 1 LOZENGE: 15; 3.6 LOZENGE ORAL at 15:47

## 2020-11-06 RX ADMIN — METOPROLOL SUCCINATE 50 MG: 25 TABLET, EXTENDED RELEASE ORAL at 16:59

## 2020-11-06 RX ADMIN — ACETAMINOPHEN 650 MG: 325 TABLET, FILM COATED ORAL at 20:42

## 2020-11-06 RX ADMIN — GUAIFENESIN AND DEXTROMETHORPHAN 5 ML: 100; 10 SYRUP ORAL at 16:58

## 2020-11-06 RX ADMIN — GUAIFENESIN AND DEXTROMETHORPHAN 5 ML: 100; 10 SYRUP ORAL at 02:12

## 2020-11-06 RX ADMIN — AMIODARONE HYDROCHLORIDE 200 MG: 200 TABLET ORAL at 17:00

## 2020-11-06 RX ADMIN — ZINC SULFATE 220 MG (50 MG) CAPSULE 220 MG: CAPSULE at 05:26

## 2020-11-06 RX ADMIN — GUAIFENESIN AND DEXTROMETHORPHAN 5 ML: 100; 10 SYRUP ORAL at 08:14

## 2020-11-06 ASSESSMENT — LIFESTYLE VARIABLES
ANXIETY: MILDLY ANXIOUS
ORIENTATION AND CLOUDING OF SENSORIUM: ORIENTED AND CAN DO SERIAL ADDITIONS
HEADACHE, FULLNESS IN HEAD: NOT PRESENT
ORIENTATION AND CLOUDING OF SENSORIUM: ORIENTED AND CAN DO SERIAL ADDITIONS
NAUSEA AND VOMITING: NO NAUSEA AND NO VOMITING
TREMOR: TREMOR NOT VISIBLE BUT CAN BE FELT, FINGERTIP TO FINGERTIP
ORIENTATION AND CLOUDING OF SENSORIUM: ORIENTED AND CAN DO SERIAL ADDITIONS
TREMOR: NO TREMOR
PAROXYSMAL SWEATS: BARELY PERCEPTIBLE SWEATING. PALMS MOIST
AGITATION: NORMAL ACTIVITY
PAROXYSMAL SWEATS: BARELY PERCEPTIBLE SWEATING. PALMS MOIST
ANXIETY: NO ANXIETY (AT EASE)
VISUAL DISTURBANCES: NOT PRESENT
VISUAL DISTURBANCES: NOT PRESENT
TOTAL SCORE: 8
HEADACHE, FULLNESS IN HEAD: NOT PRESENT
TOTAL SCORE: 2
VISUAL DISTURBANCES: NOT PRESENT
HEADACHE, FULLNESS IN HEAD: NOT PRESENT
VISUAL DISTURBANCES: NOT PRESENT
ANXIETY: MILDLY ANXIOUS
PAROXYSMAL SWEATS: *
AUDITORY DISTURBANCES: NOT PRESENT
TREMOR: NO TREMOR
TOTAL SCORE: 2
AGITATION: NORMAL ACTIVITY
NAUSEA AND VOMITING: NO NAUSEA AND NO VOMITING
TOTAL SCORE: 7
PAROXYSMAL SWEATS: BARELY PERCEPTIBLE SWEATING. PALMS MOIST
AUDITORY DISTURBANCES: NOT PRESENT
TREMOR: NO TREMOR
VISUAL DISTURBANCES: NOT PRESENT
ORIENTATION AND CLOUDING OF SENSORIUM: ORIENTED AND CAN DO SERIAL ADDITIONS
TOTAL SCORE: 2
VISUAL DISTURBANCES: NOT PRESENT
AGITATION: NORMAL ACTIVITY
AUDITORY DISTURBANCES: NOT PRESENT
PAROXYSMAL SWEATS: BARELY PERCEPTIBLE SWEATING. PALMS MOIST
AGITATION: NORMAL ACTIVITY
HEADACHE, FULLNESS IN HEAD: NOT PRESENT
TREMOR: TREMOR NOT VISIBLE BUT CAN BE FELT, FINGERTIP TO FINGERTIP
HEADACHE, FULLNESS IN HEAD: NOT PRESENT
ANXIETY: NO ANXIETY (AT EASE)
ORIENTATION AND CLOUDING OF SENSORIUM: ORIENTED AND CAN DO SERIAL ADDITIONS
AUDITORY DISTURBANCES: NOT PRESENT
NAUSEA AND VOMITING: *
AGITATION: MODERATELY FIDGETY AND RESTLESS
AUDITORY DISTURBANCES: NOT PRESENT
ANXIETY: MILDLY ANXIOUS
NAUSEA AND VOMITING: NO NAUSEA AND NO VOMITING
TREMOR: NO TREMOR
ORIENTATION AND CLOUDING OF SENSORIUM: ORIENTED AND CAN DO SERIAL ADDITIONS
AUDITORY DISTURBANCES: NOT PRESENT
PAROXYSMAL SWEATS: BARELY PERCEPTIBLE SWEATING. PALMS MOIST
TOTAL SCORE: 2
NAUSEA AND VOMITING: NO NAUSEA AND NO VOMITING
ANXIETY: MILDLY ANXIOUS
AGITATION: NORMAL ACTIVITY
HEADACHE, FULLNESS IN HEAD: NOT PRESENT
NAUSEA AND VOMITING: NO NAUSEA AND NO VOMITING

## 2020-11-06 NOTE — PROGRESS NOTES
11/6 REFUGIO Olivas bedside call to pt due to COVID restrictions. He asked me to contact his wife for assessment.   11/9/2020 REFUGIO Olivas follow up call to pt wife Kaela for out patient assessment and SDOH screening. She states Alonso has a great support system in place as she (a nurse) and her daughters (also nurses) are managing his care. She will schedule Alonso's follow up visit with PCP when neccessary. She declined the need for CCM services/resources/ referral to [facility] at this time. Pt lives at home with his wife and is required the use of oxygen. She denies any home care follow up at this time. Kaela also denies the need for assistance with food/housing/transportation.Completed AVS review with Kaela and no questions asked.CCM contact info left with pt. She thanked me for the call.    Community Health Worker Intake  • Social determinates of health intake completed  • Identified barriers to none.   • Contact information provided to Alonso Cannon [place]. Yes   • Has PCP appointment scheduled for. Kaela ( pt wife) will schedule follow up when neccessary.   • Scheduled Food Delivery/Home Visit/Outpatient Visit: Declined  • Accepted/Declined Med's-To-Beds. N/A  • Inpatient/Outpatient assessment completed. Outpatient   • Did the patient receive medications post discharge: N/A    Plan:  D/C pt from [facility] services as all needs met.

## 2020-11-06 NOTE — PROGRESS NOTES
Report received by Guadalupe WALLER. Plan of care discussed. Safety measures in place, call light in reach.    Assessment complete. Patient found lying in bed without o2 in place, sao2 at 83%. Replaced nasal cannula at 4L, sao2 improved to 96%. Returned to 2L, sao2 maintained 95%. Patient AOx4 and answers all questions appropriately, however, requires more time for response.

## 2020-11-06 NOTE — PROGRESS NOTES
Telemetry Shift Summary    Rhythm SB/SR  HR Range 55-70  Ectopy rPVC  Measurements 0.20/0.08/0.40 Per Lupe, MT    Normal Values  Rhythm SR  HR Range    Measurements 0.12-0.20 / 0.06-0.10  / 0.30-0.52

## 2020-11-06 NOTE — CARE PLAN
Problem: Safety  Goal: Will remain free from injury  Outcome: PROGRESSING AS EXPECTED     Problem: Knowledge Deficit  Goal: Knowledge of disease process/condition, treatment plan, diagnostic tests, and medications will improve  Outcome: PROGRESSING AS EXPECTED     Pt will learn about disease process and all questions addressed and answered. Educated pt on safety precautions and pt has verbalized understanding. Pt has demonstrated proper use of the call light and calls appropriately. Pt is wearing non slip socks, in bed in the low locked position and the call light is within reach

## 2020-11-06 NOTE — CARE PLAN
Problem: Safety  Goal: Will remain free from injury  Outcome: PROGRESSING AS EXPECTED     Problem: Knowledge Deficit  Goal: Knowledge of disease process/condition, treatment plan, diagnostic tests, and medications will improve  Outcome: PROGRESSING AS EXPECTED     Problem: Respiratory:  Goal: Respiratory status will improve  Outcome: PROGRESSING AS EXPECTED    Assess pt's work of breathing during each pt interaction. Assess rate rhythm, depth, and effort; monitor SpO2. Apply O2 as needed. Encourage to turn, cough, and deep breathe. Encourage use of incentive spirometer.   Educated pt on safety precautions and pt has verbalized understanding. Pt has demonstrated proper use of the call light and calls appropriately. Pt is wearing non slip socks, in bed in the low locked position and the call light is within reach  Pt will learn about disease process and all questions addressed and answered.

## 2020-11-06 NOTE — PROGRESS NOTES
Telemetry summary:  Rhythm: SR     HR Range: 70s      Measurements from strip printed at 15:00:36   GA: 0.20   QRS 0.10   QT 0.34     Ectopies: None.

## 2020-11-06 NOTE — PROGRESS NOTES
Received patient from NOC RN. Assessment complete. A&Ox4. C/o pain. POC discussed. Call light within reach. Bed in low, locked position. All needs attended to at this time.

## 2020-11-07 LAB
ANION GAP SERPL CALC-SCNC: 11 MMOL/L (ref 7–16)
BASOPHILS # BLD AUTO: 0 % (ref 0–1.8)
BASOPHILS # BLD: 0 K/UL (ref 0–0.12)
BUN SERPL-MCNC: 24 MG/DL (ref 8–22)
CALCIUM SERPL-MCNC: 8.9 MG/DL (ref 8.4–10.2)
CHLORIDE SERPL-SCNC: 99 MMOL/L (ref 96–112)
CO2 SERPL-SCNC: 27 MMOL/L (ref 20–33)
CREAT SERPL-MCNC: 1.07 MG/DL (ref 0.5–1.4)
EKG IMPRESSION: NORMAL
EOSINOPHIL # BLD AUTO: 0 K/UL (ref 0–0.51)
EOSINOPHIL NFR BLD: 0 % (ref 0–6.9)
ERYTHROCYTE [DISTWIDTH] IN BLOOD BY AUTOMATED COUNT: 49.9 FL (ref 35.9–50)
GLUCOSE SERPL-MCNC: 178 MG/DL (ref 65–99)
HCT VFR BLD AUTO: 43.5 % (ref 42–52)
HGB BLD-MCNC: 13.7 G/DL (ref 14–18)
LG PLATELETS BLD QL SMEAR: NORMAL
LYMPHOCYTES # BLD AUTO: 0.95 K/UL (ref 1–4.8)
LYMPHOCYTES NFR BLD: 21 % (ref 22–41)
MANUAL DIFF BLD: ABNORMAL
MCH RBC QN AUTO: 30.6 PG (ref 27–33)
MCHC RBC AUTO-ENTMCNC: 31.5 G/DL (ref 33.7–35.3)
MCV RBC AUTO: 97.1 FL (ref 81.4–97.8)
MONOCYTES # BLD AUTO: 0.23 K/UL (ref 0–0.85)
MONOCYTES NFR BLD AUTO: 5 % (ref 0–13.4)
NEUTROPHILS # BLD AUTO: 3.33 K/UL (ref 1.82–7.42)
NEUTROPHILS NFR BLD: 48 % (ref 44–72)
NEUTS BAND NFR BLD MANUAL: 26 % (ref 0–10)
NRBC # BLD AUTO: 0 K/UL
NRBC BLD-RTO: 0 /100 WBC
PLATELET # BLD AUTO: 151 K/UL (ref 164–446)
PLATELET BLD QL SMEAR: NORMAL
PMV BLD AUTO: 9.7 FL (ref 9–12.9)
POTASSIUM SERPL-SCNC: 4.5 MMOL/L (ref 3.6–5.5)
RBC # BLD AUTO: 4.48 M/UL (ref 4.7–6.1)
RBC BLD AUTO: NORMAL
SODIUM SERPL-SCNC: 137 MMOL/L (ref 135–145)
TOXIC GRANULES BLD QL SMEAR: SLIGHT
VARIANT LYMPHS BLD QL SMEAR: NORMAL
WBC # BLD AUTO: 4.5 K/UL (ref 4.8–10.8)

## 2020-11-07 PROCEDURE — 85027 COMPLETE CBC AUTOMATED: CPT

## 2020-11-07 PROCEDURE — 80048 BASIC METABOLIC PNL TOTAL CA: CPT

## 2020-11-07 PROCEDURE — 85007 BL SMEAR W/DIFF WBC COUNT: CPT

## 2020-11-07 PROCEDURE — 93010 ELECTROCARDIOGRAM REPORT: CPT | Performed by: INTERNAL MEDICINE

## 2020-11-07 PROCEDURE — 700102 HCHG RX REV CODE 250 W/ 637 OVERRIDE(OP): Performed by: STUDENT IN AN ORGANIZED HEALTH CARE EDUCATION/TRAINING PROGRAM

## 2020-11-07 PROCEDURE — A9270 NON-COVERED ITEM OR SERVICE: HCPCS | Performed by: STUDENT IN AN ORGANIZED HEALTH CARE EDUCATION/TRAINING PROGRAM

## 2020-11-07 PROCEDURE — 93005 ELECTROCARDIOGRAM TRACING: CPT | Performed by: STUDENT IN AN ORGANIZED HEALTH CARE EDUCATION/TRAINING PROGRAM

## 2020-11-07 PROCEDURE — 700102 HCHG RX REV CODE 250 W/ 637 OVERRIDE(OP): Performed by: HOSPITALIST

## 2020-11-07 PROCEDURE — 99232 SBSQ HOSP IP/OBS MODERATE 35: CPT | Performed by: STUDENT IN AN ORGANIZED HEALTH CARE EDUCATION/TRAINING PROGRAM

## 2020-11-07 PROCEDURE — A9270 NON-COVERED ITEM OR SERVICE: HCPCS | Performed by: HOSPITALIST

## 2020-11-07 PROCEDURE — 770020 HCHG ROOM/CARE - TELE (206)

## 2020-11-07 RX ADMIN — BENZONATATE 100 MG: 100 CAPSULE ORAL at 19:12

## 2020-11-07 RX ADMIN — ACETAMINOPHEN 650 MG: 325 TABLET, FILM COATED ORAL at 12:23

## 2020-11-07 RX ADMIN — DEXAMETHASONE 6 MG: 4 TABLET ORAL at 05:21

## 2020-11-07 RX ADMIN — OXYCODONE HYDROCHLORIDE AND ACETAMINOPHEN 1000 MG: 500 TABLET ORAL at 19:12

## 2020-11-07 RX ADMIN — GUAIFENESIN AND DEXTROMETHORPHAN 5 ML: 100; 10 SYRUP ORAL at 19:12

## 2020-11-07 RX ADMIN — BENZONATATE 100 MG: 100 CAPSULE ORAL at 12:23

## 2020-11-07 RX ADMIN — CLOPIDOGREL BISULFATE 75 MG: 75 TABLET ORAL at 19:12

## 2020-11-07 RX ADMIN — GUAIFENESIN AND DEXTROMETHORPHAN 5 ML: 100; 10 SYRUP ORAL at 05:26

## 2020-11-07 RX ADMIN — RIVAROXABAN 20 MG: 20 TABLET, FILM COATED ORAL at 19:12

## 2020-11-07 RX ADMIN — AMIODARONE HYDROCHLORIDE 200 MG: 200 TABLET ORAL at 19:12

## 2020-11-07 RX ADMIN — METOPROLOL SUCCINATE 50 MG: 25 TABLET, EXTENDED RELEASE ORAL at 19:12

## 2020-11-07 RX ADMIN — ZINC SULFATE 220 MG (50 MG) CAPSULE 220 MG: CAPSULE at 05:21

## 2020-11-07 RX ADMIN — GUAIFENESIN AND DEXTROMETHORPHAN 5 ML: 100; 10 SYRUP ORAL at 12:23

## 2020-11-07 RX ADMIN — OXYCODONE HYDROCHLORIDE AND ACETAMINOPHEN 1000 MG: 500 TABLET ORAL at 05:21

## 2020-11-07 RX ADMIN — ACETAMINOPHEN 650 MG: 325 TABLET, FILM COATED ORAL at 19:12

## 2020-11-07 ASSESSMENT — LIFESTYLE VARIABLES
ORIENTATION AND CLOUDING OF SENSORIUM: ORIENTED AND CAN DO SERIAL ADDITIONS
VISUAL DISTURBANCES: NOT PRESENT
TREMOR: NO TREMOR
AGITATION: NORMAL ACTIVITY
ANXIETY: MILDLY ANXIOUS
NAUSEA AND VOMITING: NO NAUSEA AND NO VOMITING
TOTAL SCORE: 4
AUDITORY DISTURBANCES: NOT PRESENT
HEADACHE, FULLNESS IN HEAD: NOT PRESENT
NAUSEA AND VOMITING: NO NAUSEA AND NO VOMITING
AGITATION: NORMAL ACTIVITY
ORIENTATION AND CLOUDING OF SENSORIUM: ORIENTED AND CAN DO SERIAL ADDITIONS
PAROXYSMAL SWEATS: BARELY PERCEPTIBLE SWEATING. PALMS MOIST
PAROXYSMAL SWEATS: BARELY PERCEPTIBLE SWEATING. PALMS MOIST
AUDITORY DISTURBANCES: NOT PRESENT
ANXIETY: MILDLY ANXIOUS
TREMOR: *
TOTAL SCORE: 2
VISUAL DISTURBANCES: NOT PRESENT
HEADACHE, FULLNESS IN HEAD: NOT PRESENT

## 2020-11-07 ASSESSMENT — FIBROSIS 4 INDEX: FIB4 SCORE: 5.45

## 2020-11-07 NOTE — PROGRESS NOTES
"RN updated spouse. Spouse requests MD to give pt steroids. MD notified. This writer stated BS RN will update again at 1500 for further updates. Spouse states she is sending a family friend to check on pt. Family friend is Dr. Eastman. Mgr notified. Per MT mgr, pt is only allowed to have one visitor per hospital stay.     1150- Notified Dr. Eastman is on unit. RN did not see or interact with him.  1328- Pt's spouse called again. She states \"I haven't received any updates at all!\"     1401- Spouse called again and states she would like an update. RN informed spouse, hospitalist said he was planning on calling spouse after rounds.    1430- MD on phone w spouse.  "

## 2020-11-07 NOTE — PROGRESS NOTES
Notified by MT of rhythm changes. Pt denies s/s. Pt just returned to bed from sitting up in chair for lunch. Monitor strip printed and shown to MD. New orders received.     1335- EKG shown to MD. Pt still denies any s/s. Pt is resting in bed

## 2020-11-07 NOTE — PROGRESS NOTES
Pt weaned off O2. Sat 91%. Pt returned from CT.  Back in bed. Pt has coughing fit. Sats down to 80% RA. Placed pt back on O2 4L NC. Admin prn meds as ordered. MD notified. New orders received.    1645 Pt attempting to ambulate without assistance. Pt is climbing out of bed backwards. Bilat feet touching the ground. PPE on, RN to BS. RN lifted pt back in bed. Pt appears confused, but is A/Ox 4. Pt's actions appear as sundowning. MD notified and aware of confusion.     1700- Updated spouse on new orders RN reviewed medications. Spouse is upset d/t her inability to be BS while pt is inpatient. Spouse asked if decadron was given. RN repeated pt received decadron.

## 2020-11-07 NOTE — PROGRESS NOTES
[facility] Medicine Daily Progress Note    Date of Service  11/7/2020    Chief Complaint  85 y.o. male who presented 11/4/2020 with shortness of breath that started 2 days ago, back pain that started yesterday, headache that started today.  Patient is diagnosed with COVID-19 pneumonia.  Patient is also hypoxic on room air.  Patient will be admitted for oxygen therapy.  Patient be also given zinc vitamin C vitamin D for his COVID-19 infection.    Hospital Course  No notes on file    Interval Problem Update  On 2-4L, acting somewhat confused, possible ascites, but mentation improved  CIWA protocol initiated, some question whether patient has had a drink in past month.  Covid-19 positive receiving supportive care and standard therapy  -Possible Dementia, mental status appears worse in early morning  -CT Head negative.  -PT/OT as patient seems to have poor balance.  -EKG 10/13: Compared to the prior study done on 01/23/20 - LVEF is now normal,   previously 35%. Otherwise no significant changes. Normal left ventricular chamber size.  Left ventricular ejection fraction is visually estimated to be 55%. Mild mitral regurgitation.  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    I spoke with patients' wife and daughter at some length regarding patients' status.    Consultants/Specialty  None    Code Status  Full Code    Disposition  Pending PT/OT    Review of Systems  ROS   Review of Systems   Constitutional: Positive for malaise/fatigue. Negative for chills, diaphoresis and fever.   HENT: Negative.    Eyes: Negative.  Negative for double vision.   Respiratory: Positive for shortness of breath. Negative for cough, hemoptysis and wheezing.    Cardiovascular: Negative.  Negative for chest pain, palpitations and leg swelling.   Gastrointestinal: Negative.  Negative for abdominal pain, blood in stool, constipation, diarrhea, heartburn, nausea and vomiting.   Genitourinary: Negative.  Negative for frequency, hematuria and  urgency.   Musculoskeletal: Positive for back pain. Negative for joint pain.   Skin: Negative.  Negative for itching and rash.   Neurological: Positive for headaches. Negative for dizziness, focal weakness, seizures and loss of consciousness.   Endo/Heme/Allergies: Negative.  Does not bruise/bleed easily.   Psychiatric/Behavioral: Negative.  Negative for suicidal ideas. The patient is not nervous/anxious.    All other systems reviewed and are negative.    Physical Exam  Temp:  [36.2 °C (97.1 °F)-37.9 °C (100.3 °F)] 36.2 °C (97.1 °F)  Pulse:  [54-71] 54  Resp:  [18-20] 18  BP: (105-146)/(64-98) 146/64  SpO2:  [90 %-99 %] 98 %    Physical Exam  Physical Exam  Vitals signs and nursing note reviewed.   Constitutional:       Appearance: He is well-developed. He is obese. He is ill-appearing.   HENT:      Head: Normocephalic and atraumatic.      Right Ear: External ear normal.      Left Ear: External ear normal.      Nose: Nose normal.      Mouth/Throat:      Mouth: Mucous membranes are moist.      Pharynx: Oropharynx is clear.   Eyes:      Extraocular Movements: Extraocular movements intact.      Conjunctiva/sclera: Conjunctivae normal.      Pupils: Pupils are equal, round, and reactive to light.   Neck:      Musculoskeletal: Normal range of motion and neck supple.      Thyroid: No thyromegaly.      Vascular: No JVD.   Cardiovascular:      Rate and Rhythm: Normal rate and regular rhythm.      Heart sounds: Murmur present.   Pulmonary:      Effort: Accessory muscle usage present.      Breath sounds: Decreased air movement present. Examination of the right-upper field reveals decreased breath sounds. Examination of the left-upper field reveals decreased breath sounds. Examination of the right-middle field reveals decreased breath sounds and rales. Examination of the left-middle field reveals decreased breath sounds and rales. Examination of the right-lower field reveals decreased breath sounds and rales. Examination of the  left-lower field reveals decreased breath sounds and rales. Decreased breath sounds and rales present.   Chest:      Chest wall: No tenderness.   Abdominal:      General: Abdomen is flat. Bowel sounds are normal. There is no distension.      Palpations: Abdomen is soft. There is no mass.      Tenderness: There is no abdominal tenderness. There is no guarding or rebound.   Musculoskeletal:      Lumbar back: He exhibits decreased range of motion and tenderness.   Lymphadenopathy:      Cervical: No cervical adenopathy.   Skin:     General: Skin is warm and dry.      Capillary Refill: Capillary refill takes more than 3 seconds.      Findings: No rash.   Neurological:      General: No focal deficit present.      Mental Status: He is alert and oriented to person, place, and time. Mental status is at baseline.      Cranial Nerves: No cranial nerve deficit.      Deep Tendon Reflexes: Reflexes are normal and symmetric.   Psychiatric:         Mood and Affect: Mood normal.         Behavior: Behavior normal.         Thought Content: Thought content normal.         Judgment: Judgment normal.        Fluids    Intake/Output Summary (Last 24 hours) at 11/7/2020 0602  Last data filed at 11/6/2020 2333  Gross per 24 hour   Intake --   Output 50 ml   Net -50 ml       Laboratory  Recent Labs     11/04/20  1514 11/06/20  0324   WBC 5.2 5.7   RBC 4.53* 4.18*   HEMOGLOBIN 14.0 13.0*   HEMATOCRIT 41.9* 39.9*   MCV 92.5 95.5   MCH 30.9 31.1   MCHC 33.4* 32.6*   RDW 47.9 50.0   PLATELETCT 121* 138*   MPV 9.7 9.5     Recent Labs     11/04/20  1514 11/06/20  0324 11/07/20  0338   SODIUM 136 137 137   POTASSIUM 4.1 3.9 4.5   CHLORIDE 101 100 99   CO2 24 27 27   GLUCOSE 108* 109* 178*   BUN 22 23* 24*   CREATININE 1.53* 1.30 1.07   CALCIUM 8.4 8.4 8.9                   Imaging  CT-HEAD W/O   Final Result      1.  Cerebral atrophy.      2.  Otherwise, Head CT without contrast within normal limits. No evidence of acute cerebral infarction,  hemorrhage or mass lesion.      3. There are changes of chronic sinusitis.      DX-CHEST-PORTABLE (1 VIEW)   Final Result      1.  Patchy bilateral ill-defined peripheral pulmonary infiltrates. Imaging features can be seen with COVID-19 pneumonia, though are nonspecific and can occur with a variety of infectious and noninfectious processes.      2.  Cardiomegaly.           Assessment/Plan  * Pneumonia due to COVID-19 virus  Assessment & Plan  Patient says for the past couple of days he has not been feeling well.  He is developed back pain headache and shortness of breath.  He first developed the shortness of breath 2 days ago.  The back pain and came on yesterday and had a came on today.  The patient is came to the hospital for evaluation is found to have COVID-19 infection with pneumonia bilaterally.  Patient will be placed on Rocephin azithromycin as a secondary backup antibiotic management to prevent secondary infections.  Patient will be placed on oxygen as needed.  We will initiate him on vitamin D, zinc, vitamin C.  He will be provided acetaminophen as needed for fever or pain.    Coronary artery disease due to lipid rich plaque- (present on admission)  Assessment & Plan  Patient has an extensive history of coronary artery disease he does have a stent in place.  Continue at this point with metoprolol XL, benazepril and amiodarone.  Continue with Plavix    Persistent atrial fibrillation (HCC)- (present on admission)  Assessment & Plan  Persistent atrial fibrillation continue at this point with rate control with a combination amiodarone and metoprolol and anticoagulation using Xarelto.    Essential hypertension- (present on admission)  Assessment & Plan  Blood pressure management optimization continue with benazepril and Toprol-XL    Obesity (BMI 30.0-34.9)  Assessment & Plan  Patient will need outpatient weight loss management program  Continue at this point with monitoring of his weight while in  house.    Stage 3 chronic kidney disease- (present on admission)  Assessment & Plan  Patient has stage III chronic kidney disease continue to monitor renal functions most recently creatinine is 1.53.  We will not be giving judicious fluid resuscitation with the patient's current condition.       VTE prophylaxis: Clopidogrel 75mg, Xarelto 15mg

## 2020-11-07 NOTE — PROGRESS NOTES
Telemetry Shift Summary    Rhythm SB/SR  HR Range 46-66, lowest unsustained 43  Ectopy rPVC, fPAC  Measurements 0.20/0.10/0.52 Per [place], MT    Normal Values  Rhythm SR  HR Range    Measurements 0.12-0.20 / 0.06-0.10  / 0.30-0.52

## 2020-11-07 NOTE — PROGRESS NOTES
Report received by Guadalupe WALLER. Plan of care discussed. Safety measures in place, call light in reach.    Assessment complete. No complaints at this time.     Patient found to be wet, cold and clammy, with difficulty getting an oral or auxiliary temperature. Temporal showed 96.3°F, and a room temperature of 55°F. Bedding/gown changed, new blankets provided and room temperature increased. Patient states no symptoms other than feeling cold.     After intervention, patient oral temp 97.1°F.

## 2020-11-07 NOTE — PROGRESS NOTES
[facility] Medicine Daily Progress Note    Date of Service  11/6/2020    Chief Complaint  85 y.o. male who presented 11/4/2020 with shortness of breath that started 2 days ago, back pain that started yesterday, headache that started today.  Patient is diagnosed with COVID-19 pneumonia.  Patient is also hypoxic on room air.  Patient will be admitted for oxygen therapy.  Patient be also given zinc vitamin C vitamin D for his COVID-19 infection.    Hospital Course  No notes on file    Interval Problem Update  On 2-4L, acting somewhat confused, possible ascites, but mentation improved  CIWA protocol initiated, some question whether patient has had a drink in past month.  Covid-19 positive receiving supportive care and standard therapy  -Possible Dementia, mental status appears worse in early morning  -CT Head negative  -PT/OT as patient seems to have poor balance.    I spoke with patients' wife and daughter at some length regarding patients' status.    Consultants/Specialty  None    Code Status  Full Code    Disposition  TBD    Review of Systems  ROS   Review of Systems   Constitutional: Positive for malaise/fatigue. Negative for chills, diaphoresis and fever.   HENT: Negative.    Eyes: Negative.  Negative for double vision.   Respiratory: Positive for shortness of breath. Negative for cough, hemoptysis and wheezing.    Cardiovascular: Negative.  Negative for chest pain, palpitations and leg swelling.   Gastrointestinal: Negative.  Negative for abdominal pain, blood in stool, constipation, diarrhea, heartburn, nausea and vomiting.   Genitourinary: Negative.  Negative for frequency, hematuria and urgency.   Musculoskeletal: Positive for back pain. Negative for joint pain.   Skin: Negative.  Negative for itching and rash.   Neurological: Positive for headaches. Negative for dizziness, focal weakness, seizures and loss of consciousness.   Endo/Heme/Allergies: Negative.  Does not bruise/bleed easily.   Psychiatric/Behavioral:  Negative.  Negative for suicidal ideas. The patient is not nervous/anxious.    All other systems reviewed and are negative.    Physical Exam  Temp:  [36.8 °C (98.3 °F)-37.9 °C (100.3 °F)] 37.9 °C (100.3 °F)  Pulse:  [63-71] 64  Resp:  [18-24] 18  BP: (105-141)/(64-98) 105/77  SpO2:  [90 %-96 %] 93 %    Physical Exam  Physical Exam  Vitals signs and nursing note reviewed.   Constitutional:       Appearance: He is well-developed. He is obese. He is ill-appearing.   HENT:      Head: Normocephalic and atraumatic.      Right Ear: External ear normal.      Left Ear: External ear normal.      Nose: Nose normal.      Mouth/Throat:      Mouth: Mucous membranes are moist.      Pharynx: Oropharynx is clear.   Eyes:      Extraocular Movements: Extraocular movements intact.      Conjunctiva/sclera: Conjunctivae normal.      Pupils: Pupils are equal, round, and reactive to light.   Neck:      Musculoskeletal: Normal range of motion and neck supple.      Thyroid: No thyromegaly.      Vascular: No JVD.   Cardiovascular:      Rate and Rhythm: Normal rate and regular rhythm.      Heart sounds: Murmur present.   Pulmonary:      Effort: Accessory muscle usage present.      Breath sounds: Decreased air movement present. Examination of the right-upper field reveals decreased breath sounds. Examination of the left-upper field reveals decreased breath sounds. Examination of the right-middle field reveals decreased breath sounds and rales. Examination of the left-middle field reveals decreased breath sounds and rales. Examination of the right-lower field reveals decreased breath sounds and rales. Examination of the left-lower field reveals decreased breath sounds and rales. Decreased breath sounds and rales present.   Chest:      Chest wall: No tenderness.   Abdominal:      General: Abdomen is flat. Bowel sounds are normal. There is no distension.      Palpations: Abdomen is soft. There is no mass.      Tenderness: There is no abdominal  tenderness. There is no guarding or rebound.   Musculoskeletal:      Lumbar back: He exhibits decreased range of motion and tenderness.   Lymphadenopathy:      Cervical: No cervical adenopathy.   Skin:     General: Skin is warm and dry.      Capillary Refill: Capillary refill takes more than 3 seconds.      Findings: No rash.   Neurological:      General: No focal deficit present.      Mental Status: He is alert and oriented to person, place, and time. Mental status is at baseline.      Cranial Nerves: No cranial nerve deficit.      Deep Tendon Reflexes: Reflexes are normal and symmetric.   Psychiatric:         Mood and Affect: Mood normal.         Behavior: Behavior normal.         Thought Content: Thought content normal.         Judgment: Judgment normal.        Fluids    Intake/Output Summary (Last 24 hours) at 11/6/2020 2224  Last data filed at 11/6/2020 0600  Gross per 24 hour   Intake --   Output 625 ml   Net -625 ml       Laboratory  Recent Labs     11/04/20  1514 11/06/20  0324   WBC 5.2 5.7   RBC 4.53* 4.18*   HEMOGLOBIN 14.0 13.0*   HEMATOCRIT 41.9* 39.9*   MCV 92.5 95.5   MCH 30.9 31.1   MCHC 33.4* 32.6*   RDW 47.9 50.0   PLATELETCT 121* 138*   MPV 9.7 9.5     Recent Labs     11/04/20  1514 11/06/20  0324   SODIUM 136 137   POTASSIUM 4.1 3.9   CHLORIDE 101 100   CO2 24 27   GLUCOSE 108* 109*   BUN 22 23*   CREATININE 1.53* 1.30   CALCIUM 8.4 8.4                   Imaging  CT-HEAD W/O   Final Result      1.  Cerebral atrophy.      2.  Otherwise, Head CT without contrast within normal limits. No evidence of acute cerebral infarction, hemorrhage or mass lesion.      3. There are changes of chronic sinusitis.      DX-CHEST-PORTABLE (1 VIEW)   Final Result      1.  Patchy bilateral ill-defined peripheral pulmonary infiltrates. Imaging features can be seen with COVID-19 pneumonia, though are nonspecific and can occur with a variety of infectious and noninfectious processes.      2.  Cardiomegaly.            Assessment/Plan  * Pneumonia due to COVID-19 virus  Assessment & Plan  Patient says for the past couple of days he has not been feeling well.  He is developed back pain headache and shortness of breath.  He first developed the shortness of breath 2 days ago.  The back pain and came on yesterday and had a came on today.  The patient is came to the hospital for evaluation is found to have COVID-19 infection with pneumonia bilaterally.  Patient will be placed on Rocephin azithromycin as a secondary backup antibiotic management to prevent secondary infections.  Patient will be placed on oxygen as needed.  We will initiate him on vitamin D, zinc, vitamin C.  He will be provided acetaminophen as needed for fever or pain.    Coronary artery disease due to lipid rich plaque- (present on admission)  Assessment & Plan  Patient has an extensive history of coronary artery disease he does have a stent in place.  Continue at this point with metoprolol XL, benazepril and amiodarone.  Continue with Plavix    Persistent atrial fibrillation (HCC)- (present on admission)  Assessment & Plan  Persistent atrial fibrillation continue at this point with rate control with a combination amiodarone and metoprolol and anticoagulation using Xarelto.    Essential hypertension- (present on admission)  Assessment & Plan  Blood pressure management optimization continue with benazepril and Toprol-XL    Obesity (BMI 30.0-34.9)  Assessment & Plan  Patient will need outpatient weight loss management program  Continue at this point with monitoring of his weight while in house.    Stage 3 chronic kidney disease- (present on admission)  Assessment & Plan  Patient has stage III chronic kidney disease continue to monitor renal functions most recently creatinine is 1.53.  We will not be giving judicious fluid resuscitation with the patient's current condition.       VTE prophylaxis: Clopidogrel 75mg, Xarelto 15mg

## 2020-11-08 ENCOUNTER — APPOINTMENT (OUTPATIENT)
Dept: CARDIOLOGY | Facility: MEDICAL CENTER | Age: 85
DRG: 177 | End: 2020-11-08
Attending: STUDENT IN AN ORGANIZED HEALTH CARE EDUCATION/TRAINING PROGRAM
Payer: MEDICARE

## 2020-11-08 VITALS
HEART RATE: 51 BPM | HEIGHT: 70 IN | WEIGHT: 215.61 LBS | TEMPERATURE: 97.8 F | OXYGEN SATURATION: 95 % | BODY MASS INDEX: 30.87 KG/M2 | DIASTOLIC BLOOD PRESSURE: 83 MMHG | SYSTOLIC BLOOD PRESSURE: 144 MMHG | RESPIRATION RATE: 20 BRPM

## 2020-11-08 LAB
LV EJECT FRACT MOD 2C 99903: 54.73
LV EJECT FRACT MOD 4C 99902: 68.56
LV EJECT FRACT MOD BP 99901: 62.14

## 2020-11-08 PROCEDURE — 93308 TTE F-UP OR LMTD: CPT | Mod: 26 | Performed by: STUDENT IN AN ORGANIZED HEALTH CARE EDUCATION/TRAINING PROGRAM

## 2020-11-08 PROCEDURE — A9270 NON-COVERED ITEM OR SERVICE: HCPCS | Performed by: STUDENT IN AN ORGANIZED HEALTH CARE EDUCATION/TRAINING PROGRAM

## 2020-11-08 PROCEDURE — 99239 HOSP IP/OBS DSCHRG MGMT >30: CPT | Performed by: STUDENT IN AN ORGANIZED HEALTH CARE EDUCATION/TRAINING PROGRAM

## 2020-11-08 PROCEDURE — 700102 HCHG RX REV CODE 250 W/ 637 OVERRIDE(OP): Performed by: STUDENT IN AN ORGANIZED HEALTH CARE EDUCATION/TRAINING PROGRAM

## 2020-11-08 PROCEDURE — A9270 NON-COVERED ITEM OR SERVICE: HCPCS | Performed by: HOSPITALIST

## 2020-11-08 PROCEDURE — 93321 DOPPLER ECHO F-UP/LMTD STD: CPT | Mod: 26 | Performed by: STUDENT IN AN ORGANIZED HEALTH CARE EDUCATION/TRAINING PROGRAM

## 2020-11-08 PROCEDURE — 93308 TTE F-UP OR LMTD: CPT

## 2020-11-08 PROCEDURE — 700102 HCHG RX REV CODE 250 W/ 637 OVERRIDE(OP): Performed by: HOSPITALIST

## 2020-11-08 PROCEDURE — 97161 PT EVAL LOW COMPLEX 20 MIN: CPT

## 2020-11-08 RX ORDER — ONDANSETRON 4 MG/1
4 TABLET, ORALLY DISINTEGRATING ORAL EVERY 6 HOURS PRN
Qty: 10 TAB | Refills: 0 | Status: SHIPPED | OUTPATIENT
Start: 2020-11-08

## 2020-11-08 RX ORDER — BENZONATATE 100 MG/1
100 CAPSULE ORAL 3 TIMES DAILY PRN
Qty: 60 CAP | Refills: 0 | Status: SHIPPED | OUTPATIENT
Start: 2020-11-08

## 2020-11-08 RX ORDER — GUAIFENESIN/DEXTROMETHORPHAN 100-10MG/5
5 SYRUP ORAL EVERY 4 HOURS PRN
Qty: 300 ML | Refills: 0 | Status: SHIPPED | OUTPATIENT
Start: 2020-11-08 | End: 2020-11-18

## 2020-11-08 RX ORDER — DEXAMETHASONE 6 MG/1
6 TABLET ORAL DAILY
Qty: 10 TAB | Refills: 0 | Status: SHIPPED | OUTPATIENT
Start: 2020-11-09 | End: 2020-12-16 | Stop reason: SDUPTHER

## 2020-11-08 RX ORDER — ZINC SULFATE 50(220)MG
220 CAPSULE ORAL DAILY
Qty: 30 CAP | Refills: 3 | Status: SHIPPED | OUTPATIENT
Start: 2020-11-09

## 2020-11-08 RX ADMIN — ZINC SULFATE 220 MG (50 MG) CAPSULE 220 MG: CAPSULE at 04:54

## 2020-11-08 RX ADMIN — DEXAMETHASONE 6 MG: 4 TABLET ORAL at 04:54

## 2020-11-08 RX ADMIN — OXYCODONE HYDROCHLORIDE AND ACETAMINOPHEN 1000 MG: 500 TABLET ORAL at 04:54

## 2020-11-08 ASSESSMENT — GAIT ASSESSMENTS
DEVIATION: BRADYKINETIC
GAIT LEVEL OF ASSIST: SUPERVISED
ASSISTIVE DEVICE: OTHER (COMMENTS)
DISTANCE (FEET): 150

## 2020-11-08 ASSESSMENT — COGNITIVE AND FUNCTIONAL STATUS - GENERAL
CLIMB 3 TO 5 STEPS WITH RAILING: A LITTLE
MOBILITY SCORE: 23
SUGGESTED CMS G CODE MODIFIER MOBILITY: CI

## 2020-11-08 NOTE — FACE TO FACE
"Face to Face Note  -  Durable Medical Equipment    William Stewart M.D. - NPI: [id number]  I certify that this patient is under my care and that they had a durable medical equipment(DME)face to face encounter by myself that meets the physician DME face-to-face encounter requirements with this patient on:    Date of encounter:   Patient:                    MRN:                       YOB: 2019  Alonso Holder  [id number]  1935     The encounter with the patient was in whole, or in part, for the following medical condition, which is the primary reason for durable medical equipment:  Other - Covid-19    I certify that, based on my findings, the following durable medical equipment is medically necessary:  Oxygen and Walkers.    HOME O2 Saturation Measurements:(Values must be present for Home Oxygen orders)  Room air sat at rest: 89  Room air sat with amb: 86  With liters of O2: 1.5, O2 sat at rest with O2: 93  With Liters of O2: 3, O2 sat with amb with O2 : 91  Is the patient mobile?: Yes    My Clinical findings support the need for the above equipment due to:  Abnormal Gait, Hypoxia, Other - Weakness, Covid-19, slight Imbalance, had a fall at home    Supporting Symptoms: The patient requires supplemental oxygen, as the following interventions have been tried with limited or no improvement: \"Bronchodilators and/or steroid inhalers, \"Positive expiratory pressure therapies, \"Oral and/or IV steroids, \"Ambulation with oximetry and \"Incentive spirometry    "

## 2020-11-08 NOTE — DISCHARGE INSTRUCTIONS
Discharge Instructions    Discharged to home by car with relative. Discharged via wheelchair, hospital escort: Yes.  Special equipment needed: Not Applicable    Be sure to schedule a follow-up appointment with your primary care doctor or any specialists as instructed.     Discharge Plan:   Diet Plan: Discussed  Activity Level: Discussed  Confirmed Follow up Appointment: Patient to Call and Schedule Appointment  Confirmed Symptoms Management: Discussed  Medication Reconciliation Updated: Yes  Influenza Vaccine Indication: Not indicated: Previously immunized this influenza season and > 8 years of age    I understand that a diet low in cholesterol, fat, and sodium is recommended for good health. Unless I have been given specific instructions below for another diet, I accept this instruction as my diet prescription.   Other diet: as tolerated    Special Instructions: None    · Is patient discharged on Warfarin / Coumadin?   No     Depression / Suicide Risk    As you are discharged from this [facility] facility, it is important to learn how to keep safe from harming yourself.    Recognize the warning signs:  · Abrupt changes in personality, positive or negative- including increase in energy   · Giving away possessions  · Change in eating patterns- significant weight changes-  positive or negative  · Change in sleeping patterns- unable to sleep or sleeping all the time   · Unwillingness or inability to communicate  · Depression  · Unusual sadness, discouragement and loneliness  · Talk of wanting to die  · Neglect of personal appearance   · Rebelliousness- reckless behavior  · Withdrawal from people/activities they love  · Confusion- inability to concentrate     If you or a loved one observes any of these behaviors or has concerns about self-harm, here's what you can do:  · Talk about it- your feelings and reasons for harming yourself  · Remove any means that you might use to hurt yourself (examples: pills, rope,  extension cords, firearm)  · Get professional help from the community (Mental Health, Substance Abuse, psychological counseling)  · Do not be alone:Call your Safe Contact- someone whom you trust who will be there for you.  · Call your local CRISIS HOTLINE 517-5775 or 446-047-1486  · Call your local Children's Mobile Crisis Response Team [place] (062) 582-0901 or www.Preen.Me  · Call the toll free National Suicide Prevention Hotlines   · National Suicide Prevention Lifeline 419-884-TKFT (2153)  · [employer] Hope Line Network 800-SUICIDE (030-1016)      COVID-19  COVID-19 is a respiratory infection that is caused by a virus called severe acute respiratory syndrome coronavirus 2 (SARS-CoV-2). The disease is also known as coronavirus disease or novel coronavirus. In some people, the virus may not cause any symptoms. In others, it may cause a serious infection. The infection can get worse quickly and can lead to complications, such as:  · Pneumonia, or infection of the lungs.  · Acute respiratory distress syndrome or ARDS. This is fluid build-up in the lungs.  · Acute respiratory failure. This is a condition in which there is not enough oxygen passing from the lungs to the body.  · Sepsis or septic shock. This is a serious bodily reaction to an infection.  · Blood clotting problems.  · Secondary infections due to bacteria or fungus.  The virus that causes COVID-19 is contagious. This means that it can spread from person to person through droplets from coughs and sneezes (respiratory secretions).  What are the causes?  This illness is caused by a virus. You may catch the virus by:  · Breathing in droplets from an infected person's cough or sneeze.  · Touching something, like a table or a doorknob, that was exposed to the virus (contaminated) and then touching your mouth, nose, or eyes.  What increases the risk?  Risk for infection  You are more likely to be infected with this virus if you:  · Live in or travel  to an area with a COVID-19 outbreak.  · Come in contact with a sick person who recently traveled to an area with a COVID-19 outbreak.  · Provide care for or live with a person who is infected with COVID-19.  Risk for serious illness  You are more likely to become seriously ill from the virus if you:  · Are 65 years of age or older.  · Have a long-term disease that lowers your body's ability to fight infection (immunocompromised).  · Live in a nursing home or long-term care facility.  · Have a long-term (chronic) disease such as:  ? Chronic lung disease, including chronic obstructive pulmonary disease or asthma  ? Heart disease.  ? Diabetes.  ? Chronic kidney disease.  ? Liver disease.  · Are obese.  What are the signs or symptoms?  Symptoms of this condition can range from mild to severe. Symptoms may appear any time from 2 to 14 days after being exposed to the virus. They include:  · A fever.  · A cough.  · Difficulty breathing.  · Chills.  · Muscle pains.  · A sore throat.  · Loss of taste or smell.  Some people may also have stomach problems, such as nausea, vomiting, or diarrhea.  Other people may not have any symptoms of COVID-19.  How is this diagnosed?  This condition may be diagnosed based on:  · Your signs and symptoms, especially if:  ? You live in an area with a COVID-19 outbreak.  ? You recently traveled to or from an area where the virus is common.  ? You provide care for or live with a person who was diagnosed with COVID-19.  · A physical exam.  · Lab tests, which may include:  ? A nasal swab to take a sample of fluid from your nose.  ? A throat swab to take a sample of fluid from your throat.  ? A sample of mucus from your lungs (sputum).  ? Blood tests.  · Imaging tests, which may include, X-rays, CT scan, or ultrasound.  How is this treated?  At present, there is no medicine to treat COVID-19. Medicines that treat other diseases are being used on a trial basis to see if they are effective against  COVID-19.  Your health care provider will talk with you about ways to treat your symptoms. For most people, the infection is mild and can be managed at home with rest, fluids, and over-the-counter medicines.  Treatment for a serious infection usually takes places in a hospital intensive care unit (ICU). It may include one or more of the following treatments. These treatments are given until your symptoms improve.  · Receiving fluids and medicines through an IV.  · Supplemental oxygen. Extra oxygen is given through a tube in the nose, a face mask, or a fuentes.  · Positioning you to lie on your stomach (prone position). This makes it easier for oxygen to get into the lungs.  · Continuous positive airway pressure (CPAP) or bi-level positive airway pressure (BPAP) machine. This treatment uses mild air pressure to keep the airways open. A tube that is connected to a motor delivers oxygen to the body.  · Ventilator. This treatment moves air into and out of the lungs by using a tube that is placed in your windpipe.  · Tracheostomy. This is a procedure to create a hole in the neck so that a breathing tube can be inserted.  · Extracorporeal membrane oxygenation (ECMO). This procedure gives the lungs a chance to recover by taking over the functions of the heart and lungs. It supplies oxygen to the body and removes carbon dioxide.  Follow these instructions at home:  Lifestyle  · If you are sick, stay home except to get medical care. Your health care provider will tell you how long to stay home. Call your health care provider before you go for medical care.  · Rest at home as told by your health care provider.  · Do not use any products that contain nicotine or tobacco, such as cigarettes, e-cigarettes, and chewing tobacco. If you need help quitting, ask your health care provider.  · Return to your normal activities as told by your health care provider. Ask your health care provider what activities are safe for you.  General  instructions  · Take over-the-counter and prescription medicines only as told by your health care provider.  · Drink enough fluid to keep your urine pale yellow.  · Keep all follow-up visits as told by your health care provider. This is important.  How is this prevented?    There is no vaccine to help prevent COVID-19 infection. However, there are steps you can take to protect yourself and others from this virus.  To protect yourself:   · Do not travel to areas where COVID-19 is a risk. The areas where COVID-19 is reported change often. To identify high-risk areas and travel restrictions, check the CDC travel website: wwwnc.cdc.gov/travel/notices  · If you live in, or must travel to, an area where COVID-19 is a risk, take precautions to avoid infection.  ? Stay away from people who are sick.  ? Wash your hands often with soap and water for 20 seconds. If soap and water are not available, use an alcohol-based hand .  ? Avoid touching your mouth, face, eyes, or nose.  ? Avoid going out in public, follow guidance from your state and local health authorities.  ? If you must go out in public, wear a cloth face covering or face mask.  ? Disinfect objects and surfaces that are frequently touched every day. This may include:  § Counters and tables.  § Doorknobs and light switches.  § Sinks and faucets.  § Electronics, such as phones, remote controls, keyboards, computers, and tablets.  To protect others:  If you have symptoms of COVID-19, take steps to prevent the virus from spreading to others.  · If you think you have a COVID-19 infection, contact your health care provider right away. Tell your health care team that you think you may have a COVID-19 infection.  · Stay home. Leave your house only to seek medical care. Do not use public transport.  · Do not travel while you are sick.  · Wash your hands often with soap and water for 20 seconds. If soap and water are not available, use alcohol-based hand  .  · Stay away from other members of your household. Let healthy household members care for children and pets, if possible. If you have to care for children or pets, wash your hands often and wear a mask. If possible, stay in your own room, separate from others. Use a different bathroom.  · Make sure that all people in your household wash their hands well and often.  · Cough or sneeze into a tissue or your sleeve or elbow. Do not cough or sneeze into your hand or into the air.  · Wear a cloth face covering or face mask.  Where to find more information  · Centers for Disease Control and Prevention: www.cdc.gov/coronavirus/2019-ncov/index.html  · World Health Organization: www.who.int/health-topics/coronavirus  Contact a health care provider if:  · You live in or have traveled to an area where COVID-19 is a risk and you have symptoms of the infection.  · You have had contact with someone who has COVID-19 and you have symptoms of the infection.  Get help right away if:  · You have trouble breathing.  · You have pain or pressure in your chest.  · You have confusion.  · You have bluish lips and fingernails.  · You have difficulty waking from sleep.  · You have symptoms that get worse.  These symptoms may represent a serious problem that is an emergency. Do not wait to see if the symptoms will go away. Get medical help right away. Call your local emergency services (911 in the [place] Do not drive yourself to the hospital. Let the emergency medical personnel know if you think you have COVID-19.  Summary  · COVID-19 is a respiratory infection that is caused by a virus. It is also known as coronavirus disease or novel coronavirus. It can cause serious infections, such as pneumonia, acute respiratory distress syndrome, acute respiratory failure, or sepsis.  · The virus that causes COVID-19 is contagious. This means that it can spread from person to person through droplets from coughs and sneezes.  · You are more  likely to develop a serious illness if you are 65 years of age or older, have a weak immunity, live in a nursing home, or have chronic disease.  · There is no medicine to treat COVID-19. Your health care provider will talk with you about ways to treat your symptoms.  · Take steps to protect yourself and others from infection. Wash your hands often and disinfect objects and surfaces that are frequently touched every day. Stay away from people who are sick and wear a mask if you are sick.  This information is not intended to replace advice given to you by your health care provider. Make sure you discuss any questions you have with your health care provider.  Document Released: 01/23/2020 Document Revised: 05/14/2020 Document Reviewed: 01/23/2020  Elsevier Patient Education © 2020 [employer]

## 2020-11-08 NOTE — DISCHARGE PLANNING
Received Choice form at 1116  Agency/Facility Name: [facility]  Referral sent per Choice form @ Unable to fax referral.  Currently no DME order.

## 2020-11-08 NOTE — THERAPY
Physical Therapy   Initial Evaluation     Patient Name: Alonso Holder  Age:  85 y.o., Sex:  male  Medical Record #: [id number]  Today's Date: 11/8/2020          Assessment  Patient is 85 y.o. male who was recently admitted for medical management of COVID-19. Pt was able to demonstrate safe functional mobility during ambulation with SPC use. Pt demonstrated with dec in SpO2 during ambulation on RA down to 87%. Pt required supplemental O2 of 3L O2 during ambulation and was able to maintain SpO2 of 90% and above during functional ambulation. Pt demonstrated with slight veering during ambulation, however, this appeared to improved as pt continued to ambulate. Pt encouraged to use SPC upon d/c to home to avoid falls. Pt states he has a SPC at home and is agreeable to using AD during ambulation. Pt educated on avoiding stairs at this time due maintain functional activity tolerance and states he can be on the first floor upon d/c to home. Pt is in no acute skilled PT needs at this time, anticipate pt to d/c home once medically cleared.     Plan    Recommend Physical Therapy for Evaluation only     DC Equipment Recommendations: (P) Single Point Cane  Discharge Recommendations: (P) Anticipate that the patient will have no further physical therapy needs after discharge from the hospital     Objective       11/08/20 1401   Prior Living Situation   Prior Services None   Housing / Facility 2 Story Apartment / Condo  (can stay on first floor)   Steps Into Home 0   Steps In Home 12   Equipment Owned Single Point Cane   Lives with - Patient's Self Care Capacity Spouse;Adult Children   Comments pt states dtr and children will be able to assist upon d/c to home   Prior Level of Functional Mobility   Bed Mobility Independent   Transfer Status Independent   Ambulation Independent   Distance Ambulation (Feet)   (community )   Assistive Devices Used None   Stairs Independent   Comments pt reports of an IPLOF   Gait Analysis   Gait  Level Of Assist Supervised   Assistive Device Other (Comments)  (pushing med cart )   Distance (Feet) 150   # of Times Distance was Traveled 1   Deviation Bradykinetic   Weight Bearing Status fwb   Comments limited due to fatigue; demonstrated with SpO2 of 94% during ambulation with 3L O2    Bed Mobility    Supine to Sit Supervised   Sit to Supine Supervised   Scooting Supervised   Rolling Supervised   Functional Mobility   Sit to Stand Supervised   Bed, Chair, Wheelchair Transfer Supervised

## 2020-11-08 NOTE — DISCHARGE PLANNING
Received Choice form at 4152  Agency/Facility Name: [facility]  Referral sent per Choice form @ 2346

## 2020-11-09 LAB
BACTERIA BLD CULT: NORMAL
BACTERIA BLD CULT: NORMAL
SIGNIFICANT IND 70042: NORMAL
SIGNIFICANT IND 70042: NORMAL
SITE SITE: NORMAL
SITE SITE: NORMAL
SOURCE SOURCE: NORMAL
SOURCE SOURCE: NORMAL

## 2020-11-09 SDOH — ECONOMIC STABILITY: INCOME INSECURITY: HOW HARD IS IT FOR YOU TO PAY FOR THE VERY BASICS LIKE FOOD, HOUSING, MEDICAL CARE, AND HEATING?: NOT HARD AT ALL

## 2020-11-09 NOTE — PROGRESS NOTES
This RN took over care of patient. Pt resting in bed and no questions. Pt will be discharged once O2 delivered.

## 2020-11-09 NOTE — DISCHARGE PLANNING
Anticipated Discharge Disposition:   · Home with DME-Oxygen    Action:   · [facility] delivered oxygen tank and home concentrator to hospital for discharge.     Barriers to Discharge:   · None    Plan:   · Pt to discharge home when medically clear.

## 2020-11-09 NOTE — DISCHARGE SUMMARY
"Discharge Summary    CHIEF COMPLAINT ON ADMISSION  Chief Complaint   Patient presents with   • Shortness of Breath     Denies CP.   • Cough     Reports being tested for COVID 1 month ago and \"never heard results\".    • Sore Throat     Reports fever and scratchy throat today.        Reason for Admission  Fever, Shortness of Breath, Weakness    Admission Date  11/4/2020    CODE STATUS  Prior    HPI & HOSPITAL COURSE  85 y.o. male who presented 11/4/2020 with shortness of breath that started 2 days ago, back pain that started yesterday, headache that started today.  Patient is diagnosed with COVID-19 pneumonia.  Patient is also hypoxic on room air.  Patient was admitted for oxygen therapy.  Patient was given Decadron zinc vitamin C vitamin D for his COVID-19 infection.    On hospital floors, patient was on 2-4L, acting somewhat confused, but mentation improved. There was some question whether patient had had alcohol, and CIWA protocol initiated but patients' ciwa score became 0 by the next day in the afternoon. Covid-19 positive receiving supportive care and standard therapy. 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 Patient did improve gradually during his stay as well as his oxygen requirements. We did perform a CT Head which was negative. Patients' chart was reviewed and his EKG on 10/13 showed \"compared to the prior study done on 01/23/20 - LVEF is now normal, previously 35%. Otherwise no significant changes. Normal left ventricular chamber size. Left ventricular ejection fraction is visually estimated to be 55%. Mild mitral regurgitation. 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     I spoke with patients' wife and daughter at some length regarding patients' status. Family had recently relocated to a town house with a flight of stairs. Patient was encouraged to " ambulate as he was able and provided materials on covid for discharge.    Therefore, he is discharged in fair and stable condition to home with close outpatient follow-up.    The patient met 2-midnight criteria for an inpatient stay at the time of discharge.    Discharge Date  11/8/2020    FOLLOW UP ITEMS POST DISCHARGE  PMD in 1-2 weeks    DISCHARGE DIAGNOSES  Principal Problem:    Pneumonia due to COVID-19 virus POA: Unknown  Active Problems:    Essential hypertension POA: Yes    Persistent atrial fibrillation (HCC) POA: Yes    Coronary artery disease due to lipid rich plaque POA: Yes      Overview: Multivessel disease     Obesity (BMI 30.0-34.9) POA: Unknown      Overview: Patient will need outpatient weight loss management program    Stage 3 chronic kidney disease POA: Yes  Resolved Problems:    * No resolved hospital problems. *      FOLLOW UP  No future appointments.  Sumanth Fairbanks M.D.  [address]  [address]  [facility] [place] [address]  [phone]    In 1 week  For follow up of positive Covid-19 diagnosis      MEDICATIONS ON DISCHARGE     Medication List      START taking these medications      Instructions   ascorbic acid 1000 MG tablet  Commonly known as: VITAMIN C   Take 1 Tab by mouth 2 Times a Day.  Dose: 1,000 mg     benzonatate 100 MG Caps  Commonly known as: TESSALON   Take 1 Cap by mouth 3 times a day as needed for Cough.  Dose: 100 mg     dexamethasone 6 MG Tabs  Start taking on: November 9, 2020  Commonly known as: DECADRON   Take 1 Tab by mouth every day.  Dose: 6 mg     guaiFENesin dextromethorphan 100-10 MG/5ML Syrp syrup  Commonly known as: ROBITUSSIN DM   Take 5 mL by mouth every four hours as needed for Cough for up to 10 days.  Dose: 5 mL     ondansetron 4 MG Tbdp  Commonly known as: ZOFRAN ODT   Take 1 Tab by mouth every 6 hours as needed for Nausea (give PO if no IV route available).  Dose: 4 mg     zinc sulfate 220 (50 Zn) MG Caps  Start taking on: November 9, 2020  Commonly known as:  ZINCATE   Take 1 Cap by mouth every day.  Dose: 220 mg        CHANGE how you take these medications      Instructions   amiodarone 200 MG Tabs  What changed: when to take this  Commonly known as: Cordarone   Take 1 Tab by mouth every day.  Dose: 200 mg     benazepril 10 MG Tabs  What changed: when to take this  Commonly known as: LOTENSIN   Take 1 Tab by mouth every day.  Dose: 10 mg     clopidogrel 75 MG Tabs  What changed: when to take this  Commonly known as: PLAVIX   Take 1 Tab by mouth every day.  Dose: 75 mg     metoprolol SR 50 MG Tb24  What changed: when to take this  Commonly known as: TOPROL XL   Take 1 Tab by mouth every day.  Dose: 50 mg     Xarelto 20 MG Tabs tablet  What changed: when to take this  Generic drug: rivaroxaban   Take 1 Tab by mouth every day.  Dose: 20 mg        CONTINUE taking these medications      Instructions   D3 VITAMIN PO   Take 1 Cap by mouth every evening.  Dose: 1 Cap     TYLENOL PO   Take 2 Tabs by mouth as needed (For pain). Pt is not sure the strength  Dose: 2 Tab            Allergies  Allergies   Allergen Reactions   • Atorvastatin Myalgia     Severe myalgias, generally feeling unwell.   • Codeine Nausea     Nausea       DIET  No orders of the defined types were placed in this encounter.      ACTIVITY  As tolerated.  Weight bearing as tolerated    CONSULTATIONS  None    PROCEDURES  None    LABORATORY  Lab Results   Component Value Date    SODIUM 137 11/07/2020    POTASSIUM 4.5 11/07/2020    CHLORIDE 99 11/07/2020    CO2 27 11/07/2020    GLUCOSE 178 (H) 11/07/2020    BUN 24 (H) 11/07/2020    CREATININE 1.07 11/07/2020        Lab Results   Component Value Date    WBC 4.5 (L) 11/07/2020    HEMOGLOBIN 13.7 (L) 11/07/2020    HEMATOCRIT 43.5 11/07/2020    PLATELETCT 151 (L) 11/07/2020        Total time of the discharge process exceeds 30 minutes.

## 2020-11-09 NOTE — PROGRESS NOTES
Discharge instructions reviewed and questions answered. PIV removed and cathter intact. Pt instructed on how to work concentrator and portable tank. Pt given preferred card to call if any issues. Pt to call proffered when current portable tank low and refill needed.  Pt's wife instructed as well on O2. Pt will f/u with PCP in a couple weeks.

## 2020-12-04 ENCOUNTER — NON-PROVIDER VISIT (OUTPATIENT)
Dept: WOUND CARE | Facility: MEDICAL CENTER | Age: 85
End: 2020-12-04
Attending: INTERNAL MEDICINE
Payer: MEDICARE

## 2020-12-04 PROCEDURE — 97597 DBRDMT OPN WND 1ST 20 CM/<: CPT

## 2020-12-04 RX ORDER — INHALER, ASSIST DEVICES
SPACER (EA) MISCELLANEOUS
Status: ON HOLD | COMMUNITY
Start: 2020-12-01 | End: 2020-12-11

## 2020-12-04 RX ORDER — ALBUTEROL SULFATE 90 UG/1
1-2 AEROSOL, METERED RESPIRATORY (INHALATION) EVERY 6 HOURS PRN
COMMUNITY
Start: 2020-12-01

## 2020-12-04 RX ORDER — AZITHROMYCIN 500 MG/1
500 TABLET, FILM COATED ORAL DAILY
Status: ON HOLD | COMMUNITY
Start: 2020-12-01 | End: 2020-12-16

## 2020-12-04 SDOH — HEALTH STABILITY: MENTAL HEALTH: HOW OFTEN DO YOU HAVE 6 OR MORE DRINKS ON ONE OCCASION?: NEVER

## 2020-12-04 SDOH — HEALTH STABILITY: MENTAL HEALTH: HOW MANY STANDARD DRINKS CONTAINING ALCOHOL DO YOU HAVE ON A TYPICAL DAY?: 1 OR 2

## 2020-12-04 SDOH — HEALTH STABILITY: MENTAL HEALTH: HOW OFTEN DO YOU HAVE A DRINK CONTAINING ALCOHOL?: MONTHLY OR LESS

## 2020-12-04 NOTE — CERTIFICATION
Non Provider Encounter- Pressure Injury          HISTORY OF PRESENT ILLNESS  Wound History:    START OF CARE IN CLINIC: 12/4/20    REFERRING PROVIDER: Sumanth Fairbanks MD     WOUND- Pressure Injury.    LOCATION AND STAGE: Left Buttock   HISTORY: 86 y/o male pt who is very hard of hearing. Most history is from his wife who is an RN. Pt was recently in hospital (11/4-11/8) for treatment of Covid-19 pneumonia. About a week after being home pt told his wife about discomfort at this buttock, and the ulcer was noted by his wife. Wife reports pt did have few incontinent episodes in the hospital They have been cleaning the wound with wound cleanser and has been leaving it MIKE.     Pertinent Medical History: Hypertension, Afib, COVID-19 pneumonia   Contributing factors: Immobility No.  Activity level: Limited-recovering.  Support surfaces: No.  Incontinence: No.  Nutritional state: Fair. Caregiver assistance: Yes. Obesity: No. Moisture: No       TOBACCO USE: Never        Pertinent Labs and Diagnostics:    Labs:     A1c:   Lab Results   Component Value Date/Time    HBA1C 5.8 (H) 12/09/2019 08:17 AM          IMAGING: N/A    VASCULAR STUDIES: N/A    LAST  WOUND CULTURE:  DATE : N/A                Fall Risk Assessment (nicik all that apply with an X):  Completed    x65 years or older     Fall within the last 2 years   Uses ambulatory devices  Loss of protective sensation in feet   Use of prostethic/orthotic    Presence of lower extremity/foot/toe amputation   xTaking medication that increases risk (per facility policy)    Interventions Recommended (if any of the above are selected):   Use of Assistive Device:   xSupervision with ambulation: Caregiver   Assistance with ambulation: Caregiver   Judah safety education: Educational material provided       PAST MEDICAL HISTORY:   Past Medical History:   Diagnosis Date   • Arthritis     finger   • Essential hypertension 1/29/2018   • Hemorrhagic disorder (HCC)     xarelto   • High  "cholesterol    • Persistent atrial fibrillation (HCC) 1/28/2020    stents   • Snoring     no sleep study       PAST SURGICAL HISTORY:   Past Surgical History:   Procedure Laterality Date   • OTHER  1961    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   • TONSILLECTOMY          MEDICATIONS:   Current Outpatient Medications   Medication   • albuterol 108 (90 Base) MCG/ACT Aero Soln inhalation aerosol   • azithromycin (ZITHROMAX) 500 MG tablet   • Spacer/Aero-Holding Chambers (FLEXICHAMBER) Device   • ascorbic acid (VITAMIN C) 1000 MG tablet   • benzonatate (TESSALON) 100 MG Cap   • dexamethasone (DECADRON) 6 MG Tab   • zinc sulfate (ZINCATE) 220 (50 Zn) MG Cap   • ondansetron (ZOFRAN ODT) 4 MG TABLET DISPERSIBLE   • Cholecalciferol (D3 VITAMIN PO)   • Acetaminophen (TYLENOL PO)   • metoprolol SR (TOPROL XL) 50 MG TABLET SR 24 HR   • clopidogrel (PLAVIX) 75 MG Tab   • XARELTO 20 MG Tab tablet   • benazepril (LOTENSIN) 10 MG Tab   • amiodarone (CORDARONE) 200 MG Tab     No current facility-administered medications for this visit.        ALLERGIES:    Allergies   Allergen Reactions   • Atorvastatin Myalgia     Severe myalgias, generally feeling unwell.   • Codeine Nausea     Nausea         SOCIAL HISTORY:   Social History     Socioeconomic History   • Marital status:      Spouse name: Not on file   • Number of children: Not on file   • Years of education: Not on file   • Highest education level: Not on file   Occupational History   • Not on file   Social Needs   • Financial resource strain: Not hard at all   • Food insecurity     Worry: Never true     Inability: Never true   • Transportation needs     Medical: No     Non-medical: No   Tobacco Use   • Smoking status: Never Smoker   • Smokeless tobacco: Never Used   Substance and Sexual Activity   • Alcohol use: Yes     Frequency: Monthly or less     Drinks per session: 1 or 2     Binge frequency: Never     Comment: \"Social\" drinks only   • Drug use: No   • Sexual activity: Not on file "   Lifestyle   • Physical activity     Days per week: Not on file     Minutes per session: Not on file   • Stress: Not on file   Relationships   • Social connections     Talks on phone: Not on file     Gets together: Not on file     Attends [employer] service: Not on file     Active member of club or organization: Not on file     Attends meetings of clubs or organizations: Not on file     Relationship status: Not on file   • Intimate partner violence     Fear of current or ex partner: Not on file     Emotionally abused: Not on file     Physically abused: Not on file     Forced sexual activity: Not on file   Other Topics Concern   • Not on file   Social History Narrative   • Not on file       FAMILY HISTORY:   Family History   Problem Relation Age of Onset   • Heart Disease Neg Hx                WOUND ASSESSMENT      Wound 12/04/20 Pressure Injury Buttocks Left Buttock (Active)   Site Assessment Pink;Yellow 12/04/20 1300   Periwound Assessment Intact 12/04/20 1300   Margins Attached edges 12/04/20 1300   Drainage Amount FRANCESCO 12/04/20 1300   Treatments Cleansed;Topical Lidocaine;CSWD - Conservative Sharp Wound Debridement 12/04/20 1300   Wound Cleansing Puracyn [place] 12/04/20 1300   Periwound Protectant Skin Protectant Wipes to Periwound 12/04/20 1300   Dressing Options Honey Colloid;Hypafix Tape 12/04/20 1300   Pressure Injury Stage 3 12/04/20 1300   Non-staged Wound Description Full thickness 12/04/20 1300   Wound Length (cm) 2 cm 12/04/20 1300   Wound Width (cm) 1.1 cm 12/04/20 1300   Wound Depth (cm) 0.1 cm 12/04/20 1300   Wound Surface Area (cm^2) 2.2 cm^2 12/04/20 1300   Wound Volume (cm^3) 0.22 cm^3 12/04/20 1300   Post-Procedure Length (cm) 2.2 cm 12/04/20 1300   Post-Procedure Width (cm) 1.2 cm 12/04/20 1300   Post-Procedure Depth (cm) 0.1 cm 12/04/20 1300   Post-Procedure Surface Area (cm^2) 2.64 cm^2 12/04/20 1300   Post-Procedure Volume (cm^3) 0.26 cm^3 12/04/20 1300   Wound Bed Slough (%) 90 % 12/04/20 1300    Wound Bed Slough % - (Post-Procedure) 10 % 12/04/20 1300   Tunneling (cm) 0 cm 12/04/20 1300   Undermining (cm) 0 cm 12/04/20 1300   Wound Odor None 12/04/20 1300   Exposed Structures None 12/04/20 1300          Pre-Debridement photo        Procedures:     -2% viscous lidocaine applied topically to wound bed for approximately 5 minutes prior to debridement  -Curette used to debride wound bed. Entire surface of wound, 2.64cm2 debrided.   -Refer to flowsheet for wound care details.       Post-debridement photo              PATIENT EDUCATION  -Etiology of pressure injury  -Importance of offloading and frequent repositioning  -Strategies for offloading in bed and when seated discussed and demonstrated  - Importance of adequate nutrition for wound healing  - Advised to go to ER for any increased redness, swelling, drainage or odor, or if patient develops fever, chills, nausea or vomiting.

## 2020-12-10 ENCOUNTER — APPOINTMENT (OUTPATIENT)
Dept: RADIOLOGY | Facility: MEDICAL CENTER | Age: 85
DRG: 871 | End: 2020-12-10
Attending: EMERGENCY MEDICINE
Payer: MEDICARE

## 2020-12-10 ENCOUNTER — HOSPITAL ENCOUNTER (INPATIENT)
Facility: MEDICAL CENTER | Age: 85
LOS: 5 days | DRG: 871 | End: 2020-12-16
Attending: EMERGENCY MEDICINE | Admitting: INTERNAL MEDICINE
Payer: MEDICARE

## 2020-12-10 DIAGNOSIS — L89.159 PRESSURE INJURY OF SKIN OF SACRAL REGION, UNSPECIFIED INJURY STAGE: ICD-10-CM

## 2020-12-10 DIAGNOSIS — U07.1 COVID-19 VIRUS INFECTION: ICD-10-CM

## 2020-12-10 DIAGNOSIS — N17.9 ACUTE KIDNEY INJURY (HCC): ICD-10-CM

## 2020-12-10 DIAGNOSIS — R09.02 HYPOXIA: ICD-10-CM

## 2020-12-10 DIAGNOSIS — U07.1 PNEUMONIA DUE TO COVID-19 VIRUS: ICD-10-CM

## 2020-12-10 DIAGNOSIS — A41.9 SEPSIS, DUE TO UNSPECIFIED ORGANISM, UNSPECIFIED WHETHER ACUTE ORGAN DYSFUNCTION PRESENT (HCC): ICD-10-CM

## 2020-12-10 DIAGNOSIS — J12.82 PNEUMONIA DUE TO COVID-19 VIRUS: ICD-10-CM

## 2020-12-10 DIAGNOSIS — R50.9 FEVER, UNSPECIFIED FEVER CAUSE: ICD-10-CM

## 2020-12-10 LAB
ALBUMIN SERPL BCP-MCNC: 2.9 G/DL (ref 3.2–4.9)
ALBUMIN/GLOB SERPL: 0.8 G/DL
ALP SERPL-CCNC: 59 U/L (ref 30–99)
ALT SERPL-CCNC: 28 U/L (ref 2–50)
ANION GAP SERPL CALC-SCNC: 13 MMOL/L (ref 7–16)
AST SERPL-CCNC: 37 U/L (ref 12–45)
BASOPHILS # BLD AUTO: 0.2 % (ref 0–1.8)
BASOPHILS # BLD: 0.03 K/UL (ref 0–0.12)
BILIRUB SERPL-MCNC: 0.7 MG/DL (ref 0.1–1.5)
BUN SERPL-MCNC: 29 MG/DL (ref 8–22)
CALCIUM SERPL-MCNC: 9.3 MG/DL (ref 8.4–10.2)
CHLORIDE SERPL-SCNC: 96 MMOL/L (ref 96–112)
CO2 SERPL-SCNC: 21 MMOL/L (ref 20–33)
CREAT SERPL-MCNC: 1.68 MG/DL (ref 0.5–1.4)
D DIMER PPP IA.FEU-MCNC: 1.07 UG/ML (FEU) (ref 0–0.5)
EOSINOPHIL # BLD AUTO: 0.08 K/UL (ref 0–0.51)
EOSINOPHIL NFR BLD: 0.6 % (ref 0–6.9)
ERYTHROCYTE [DISTWIDTH] IN BLOOD BY AUTOMATED COUNT: 47.4 FL (ref 35.9–50)
GLOBULIN SER CALC-MCNC: 3.7 G/DL (ref 1.9–3.5)
GLUCOSE SERPL-MCNC: 158 MG/DL (ref 65–99)
HCT VFR BLD AUTO: 38.7 % (ref 42–52)
HGB BLD-MCNC: 13.1 G/DL (ref 14–18)
IMM GRANULOCYTES # BLD AUTO: 0.17 K/UL (ref 0–0.11)
IMM GRANULOCYTES NFR BLD AUTO: 1.3 % (ref 0–0.9)
LACTATE BLD-SCNC: 2.9 MMOL/L (ref 0.5–2)
LYMPHOCYTES # BLD AUTO: 1.07 K/UL (ref 1–4.8)
LYMPHOCYTES NFR BLD: 8.4 % (ref 22–41)
MCH RBC QN AUTO: 31.5 PG (ref 27–33)
MCHC RBC AUTO-ENTMCNC: 33.9 G/DL (ref 33.7–35.3)
MCV RBC AUTO: 93 FL (ref 81.4–97.8)
MONOCYTES # BLD AUTO: 0.8 K/UL (ref 0–0.85)
MONOCYTES NFR BLD AUTO: 6.3 % (ref 0–13.4)
NEUTROPHILS # BLD AUTO: 10.52 K/UL (ref 1.82–7.42)
NEUTROPHILS NFR BLD: 83.2 % (ref 44–72)
NRBC # BLD AUTO: 0 K/UL
NRBC BLD-RTO: 0 /100 WBC
NT-PROBNP SERPL IA-MCNC: 1048 PG/ML (ref 0–125)
PLATELET # BLD AUTO: 276 K/UL (ref 164–446)
PMV BLD AUTO: 9.1 FL (ref 9–12.9)
POTASSIUM SERPL-SCNC: 4.3 MMOL/L (ref 3.6–5.5)
PROCALCITONIN SERPL-MCNC: 2.22 NG/ML
PROT SERPL-MCNC: 6.6 G/DL (ref 6–8.2)
RBC # BLD AUTO: 4.16 M/UL (ref 4.7–6.1)
SODIUM SERPL-SCNC: 130 MMOL/L (ref 135–145)
TROPONIN T SERPL-MCNC: 35 NG/L (ref 6–19)
WBC # BLD AUTO: 12.7 K/UL (ref 4.8–10.8)

## 2020-12-10 PROCEDURE — 87040 BLOOD CULTURE FOR BACTERIA: CPT

## 2020-12-10 PROCEDURE — 99285 EMERGENCY DEPT VISIT HI MDM: CPT

## 2020-12-10 PROCEDURE — 700105 HCHG RX REV CODE 258: Performed by: EMERGENCY MEDICINE

## 2020-12-10 PROCEDURE — 96368 THER/DIAG CONCURRENT INF: CPT

## 2020-12-10 PROCEDURE — 84484 ASSAY OF TROPONIN QUANT: CPT

## 2020-12-10 PROCEDURE — 93005 ELECTROCARDIOGRAM TRACING: CPT

## 2020-12-10 PROCEDURE — 36415 COLL VENOUS BLD VENIPUNCTURE: CPT

## 2020-12-10 PROCEDURE — 96366 THER/PROPH/DIAG IV INF ADDON: CPT

## 2020-12-10 PROCEDURE — 700111 HCHG RX REV CODE 636 W/ 250 OVERRIDE (IP): Performed by: EMERGENCY MEDICINE

## 2020-12-10 PROCEDURE — 83880 ASSAY OF NATRIURETIC PEPTIDE: CPT

## 2020-12-10 PROCEDURE — 83605 ASSAY OF LACTIC ACID: CPT

## 2020-12-10 PROCEDURE — 94760 N-INVAS EAR/PLS OXIMETRY 1: CPT

## 2020-12-10 PROCEDURE — 93005 ELECTROCARDIOGRAM TRACING: CPT | Performed by: EMERGENCY MEDICINE

## 2020-12-10 PROCEDURE — 85379 FIBRIN DEGRADATION QUANT: CPT

## 2020-12-10 PROCEDURE — 80053 COMPREHEN METABOLIC PANEL: CPT

## 2020-12-10 PROCEDURE — 96365 THER/PROPH/DIAG IV INF INIT: CPT

## 2020-12-10 PROCEDURE — 85025 COMPLETE CBC W/AUTO DIFF WBC: CPT

## 2020-12-10 PROCEDURE — 71045 X-RAY EXAM CHEST 1 VIEW: CPT

## 2020-12-10 PROCEDURE — 84145 PROCALCITONIN (PCT): CPT

## 2020-12-10 RX ORDER — AZITHROMYCIN 500 MG/1
500 INJECTION, POWDER, LYOPHILIZED, FOR SOLUTION INTRAVENOUS ONCE
Status: COMPLETED | OUTPATIENT
Start: 2020-12-10 | End: 2020-12-10

## 2020-12-10 RX ORDER — SODIUM CHLORIDE, SODIUM LACTATE, POTASSIUM CHLORIDE, CALCIUM CHLORIDE 600; 310; 30; 20 MG/100ML; MG/100ML; MG/100ML; MG/100ML
2000 INJECTION, SOLUTION INTRAVENOUS ONCE
Status: COMPLETED | OUTPATIENT
Start: 2020-12-10 | End: 2020-12-10

## 2020-12-10 RX ADMIN — SODIUM CHLORIDE, POTASSIUM CHLORIDE, SODIUM LACTATE AND CALCIUM CHLORIDE 2000 ML: 600; 310; 30; 20 INJECTION, SOLUTION INTRAVENOUS at 21:30

## 2020-12-10 RX ADMIN — SODIUM CHLORIDE 3 G: 900 INJECTION INTRAVENOUS at 22:37

## 2020-12-10 RX ADMIN — AZITHROMYCIN DIHYDRATE 500 MG: 500 INJECTION, POWDER, LYOPHILIZED, FOR SOLUTION INTRAVENOUS at 22:35

## 2020-12-10 ASSESSMENT — FIBROSIS 4 INDEX: FIB4 SCORE: 2.98

## 2020-12-11 ENCOUNTER — APPOINTMENT (OUTPATIENT)
Dept: WOUND CARE | Facility: MEDICAL CENTER | Age: 85
End: 2020-12-11
Attending: INTERNAL MEDICINE
Payer: MEDICARE

## 2020-12-11 PROBLEM — A41.9 SEPSIS (HCC): Status: ACTIVE | Noted: 2020-12-11

## 2020-12-11 PROBLEM — R73.9 HYPERGLYCEMIA: Status: ACTIVE | Noted: 2020-12-11

## 2020-12-11 PROBLEM — R79.89 ELEVATED TROPONIN: Status: ACTIVE | Noted: 2020-12-11

## 2020-12-11 LAB
APPEARANCE UR: CLEAR
BILIRUB UR QL STRIP.AUTO: NEGATIVE
COLOR UR: YELLOW
EKG IMPRESSION: NORMAL
GLUCOSE BLD-MCNC: 100 MG/DL (ref 65–99)
GLUCOSE BLD-MCNC: 173 MG/DL (ref 65–99)
GLUCOSE BLD-MCNC: 187 MG/DL (ref 65–99)
GLUCOSE BLD-MCNC: 99 MG/DL (ref 65–99)
GLUCOSE UR STRIP.AUTO-MCNC: NEGATIVE MG/DL
INR PPP: 1.28 (ref 0.87–1.13)
KETONES UR STRIP.AUTO-MCNC: NEGATIVE MG/DL
LACTATE BLD-SCNC: 1.2 MMOL/L (ref 0.5–2)
LACTATE BLD-SCNC: 3.1 MMOL/L (ref 0.5–2)
LACTATE BLD-SCNC: 4.4 MMOL/L (ref 0.5–2)
LEUKOCYTE ESTERASE UR QL STRIP.AUTO: NEGATIVE
MICRO URNS: NORMAL
NITRITE UR QL STRIP.AUTO: NEGATIVE
PH UR STRIP.AUTO: 6 [PH] (ref 5–8)
PROCALCITONIN SERPL-MCNC: 3.43 NG/ML
PROT UR QL STRIP: NEGATIVE MG/DL
PROTHROMBIN TIME: 15.7 SEC (ref 12–14.6)
RBC UR QL AUTO: NEGATIVE
SP GR UR STRIP.AUTO: 1.02
TROPONIN T SERPL-MCNC: 24 NG/L (ref 6–19)
TROPONIN T SERPL-MCNC: 28 NG/L (ref 6–19)
TROPONIN T SERPL-MCNC: 33 NG/L (ref 6–19)

## 2020-12-11 PROCEDURE — 81003 URINALYSIS AUTO W/O SCOPE: CPT

## 2020-12-11 PROCEDURE — 74176 CT ABD & PELVIS W/O CONTRAST: CPT

## 2020-12-11 PROCEDURE — 700111 HCHG RX REV CODE 636 W/ 250 OVERRIDE (IP): Performed by: FAMILY MEDICINE

## 2020-12-11 PROCEDURE — 82962 GLUCOSE BLOOD TEST: CPT | Mod: 91

## 2020-12-11 PROCEDURE — 700105 HCHG RX REV CODE 258: Performed by: INTERNAL MEDICINE

## 2020-12-11 PROCEDURE — 99221 1ST HOSP IP/OBS SF/LOW 40: CPT | Mod: AI | Performed by: INTERNAL MEDICINE

## 2020-12-11 PROCEDURE — 83605 ASSAY OF LACTIC ACID: CPT

## 2020-12-11 PROCEDURE — 700111 HCHG RX REV CODE 636 W/ 250 OVERRIDE (IP): Performed by: INTERNAL MEDICINE

## 2020-12-11 PROCEDURE — A9270 NON-COVERED ITEM OR SERVICE: HCPCS | Performed by: INTERNAL MEDICINE

## 2020-12-11 PROCEDURE — 87086 URINE CULTURE/COLONY COUNT: CPT

## 2020-12-11 PROCEDURE — 84145 PROCALCITONIN (PCT): CPT

## 2020-12-11 PROCEDURE — 700105 HCHG RX REV CODE 258: Performed by: FAMILY MEDICINE

## 2020-12-11 PROCEDURE — 700102 HCHG RX REV CODE 250 W/ 637 OVERRIDE(OP): Performed by: INTERNAL MEDICINE

## 2020-12-11 PROCEDURE — C9803 HOPD COVID-19 SPEC COLLECT: HCPCS | Performed by: EMERGENCY MEDICINE

## 2020-12-11 PROCEDURE — 85610 PROTHROMBIN TIME: CPT

## 2020-12-11 PROCEDURE — 84484 ASSAY OF TROPONIN QUANT: CPT

## 2020-12-11 PROCEDURE — 770020 HCHG ROOM/CARE - TELE (206)

## 2020-12-11 RX ORDER — SODIUM CHLORIDE 9 MG/ML
INJECTION, SOLUTION INTRAVENOUS CONTINUOUS
Status: DISCONTINUED | OUTPATIENT
Start: 2020-12-11 | End: 2020-12-13

## 2020-12-11 RX ORDER — AMIODARONE HYDROCHLORIDE 200 MG/1
200 TABLET ORAL EVERY EVENING
Status: DISCONTINUED | OUTPATIENT
Start: 2020-12-11 | End: 2020-12-16 | Stop reason: HOSPADM

## 2020-12-11 RX ORDER — SODIUM CHLORIDE 9 MG/ML
INJECTION, SOLUTION INTRAVENOUS
Status: ACTIVE
Start: 2020-12-11 | End: 2020-12-12

## 2020-12-11 RX ORDER — PIPERACILLIN SODIUM, TAZOBACTAM SODIUM 3; .375 G/15ML; G/15ML
INJECTION, POWDER, LYOPHILIZED, FOR SOLUTION INTRAVENOUS
Status: ACTIVE
Start: 2020-12-11 | End: 2020-12-12

## 2020-12-11 RX ORDER — BISACODYL 10 MG
10 SUPPOSITORY, RECTAL RECTAL
Status: DISCONTINUED | OUTPATIENT
Start: 2020-12-11 | End: 2020-12-16 | Stop reason: HOSPADM

## 2020-12-11 RX ORDER — BENZONATATE 100 MG/1
100 CAPSULE ORAL 3 TIMES DAILY PRN
Status: DISCONTINUED | OUTPATIENT
Start: 2020-12-11 | End: 2020-12-16 | Stop reason: HOSPADM

## 2020-12-11 RX ORDER — DEXAMETHASONE 4 MG/1
6 TABLET ORAL DAILY
Status: DISCONTINUED | OUTPATIENT
Start: 2020-12-11 | End: 2020-12-11

## 2020-12-11 RX ORDER — SODIUM CHLORIDE, SODIUM LACTATE, POTASSIUM CHLORIDE, AND CALCIUM CHLORIDE .6; .31; .03; .02 G/100ML; G/100ML; G/100ML; G/100ML
30 INJECTION, SOLUTION INTRAVENOUS
Status: DISCONTINUED | OUTPATIENT
Start: 2020-12-11 | End: 2020-12-16 | Stop reason: HOSPADM

## 2020-12-11 RX ORDER — CLOPIDOGREL BISULFATE 75 MG/1
75 TABLET ORAL EVERY EVENING
Status: DISCONTINUED | OUTPATIENT
Start: 2020-12-11 | End: 2020-12-16 | Stop reason: HOSPADM

## 2020-12-11 RX ORDER — DEXAMETHASONE SODIUM PHOSPHATE 4 MG/ML
6 INJECTION, SOLUTION INTRA-ARTICULAR; INTRALESIONAL; INTRAMUSCULAR; INTRAVENOUS; SOFT TISSUE DAILY
Status: DISCONTINUED | OUTPATIENT
Start: 2020-12-11 | End: 2020-12-11

## 2020-12-11 RX ORDER — ERGOCALCIFEROL (VITAMIN D2) 10 MCG
400 TABLET ORAL EVERY EVENING
COMMUNITY

## 2020-12-11 RX ORDER — AMOXICILLIN 250 MG
2 CAPSULE ORAL 2 TIMES DAILY
Status: DISCONTINUED | OUTPATIENT
Start: 2020-12-11 | End: 2020-12-16 | Stop reason: HOSPADM

## 2020-12-11 RX ORDER — ONDANSETRON 4 MG/1
4 TABLET, ORALLY DISINTEGRATING ORAL EVERY 4 HOURS PRN
Status: DISCONTINUED | OUTPATIENT
Start: 2020-12-11 | End: 2020-12-16 | Stop reason: HOSPADM

## 2020-12-11 RX ORDER — ONDANSETRON 2 MG/ML
4 INJECTION INTRAMUSCULAR; INTRAVENOUS EVERY 4 HOURS PRN
Status: DISCONTINUED | OUTPATIENT
Start: 2020-12-11 | End: 2020-12-16 | Stop reason: HOSPADM

## 2020-12-11 RX ORDER — HEPARIN SODIUM 5000 [USP'U]/ML
5000 INJECTION, SOLUTION INTRAVENOUS; SUBCUTANEOUS EVERY 8 HOURS
Status: DISCONTINUED | OUTPATIENT
Start: 2020-12-11 | End: 2020-12-11

## 2020-12-11 RX ORDER — ACETAMINOPHEN 325 MG/1
650 TABLET ORAL EVERY 6 HOURS PRN
Status: DISCONTINUED | OUTPATIENT
Start: 2020-12-11 | End: 2020-12-16 | Stop reason: HOSPADM

## 2020-12-11 RX ORDER — DEXTROSE MONOHYDRATE 25 G/50ML
50 INJECTION, SOLUTION INTRAVENOUS
Status: DISCONTINUED | OUTPATIENT
Start: 2020-12-11 | End: 2020-12-16 | Stop reason: HOSPADM

## 2020-12-11 RX ORDER — METOPROLOL SUCCINATE 25 MG/1
50 TABLET, EXTENDED RELEASE ORAL EVERY EVENING
Status: DISCONTINUED | OUTPATIENT
Start: 2020-12-11 | End: 2020-12-16 | Stop reason: HOSPADM

## 2020-12-11 RX ORDER — SODIUM CHLORIDE, SODIUM LACTATE, POTASSIUM CHLORIDE, AND CALCIUM CHLORIDE .6; .31; .03; .02 G/100ML; G/100ML; G/100ML; G/100ML
500 INJECTION, SOLUTION INTRAVENOUS
Status: DISCONTINUED | OUTPATIENT
Start: 2020-12-11 | End: 2020-12-16 | Stop reason: HOSPADM

## 2020-12-11 RX ORDER — DEXAMETHASONE 4 MG/1
6 TABLET ORAL DAILY
Status: DISCONTINUED | OUTPATIENT
Start: 2020-12-12 | End: 2020-12-16 | Stop reason: HOSPADM

## 2020-12-11 RX ORDER — BENAZEPRIL HYDROCHLORIDE 10 MG/1
10 TABLET ORAL EVERY EVENING
Status: DISCONTINUED | OUTPATIENT
Start: 2020-12-11 | End: 2020-12-11

## 2020-12-11 RX ORDER — LABETALOL HYDROCHLORIDE 5 MG/ML
10 INJECTION, SOLUTION INTRAVENOUS EVERY 4 HOURS PRN
Status: DISCONTINUED | OUTPATIENT
Start: 2020-12-11 | End: 2020-12-16 | Stop reason: HOSPADM

## 2020-12-11 RX ORDER — POLYETHYLENE GLYCOL 3350 17 G/17G
1 POWDER, FOR SOLUTION ORAL
Status: DISCONTINUED | OUTPATIENT
Start: 2020-12-11 | End: 2020-12-16 | Stop reason: HOSPADM

## 2020-12-11 RX ORDER — AZITHROMYCIN 250 MG/1
500 TABLET, FILM COATED ORAL DAILY
Status: DISCONTINUED | OUTPATIENT
Start: 2020-12-11 | End: 2020-12-11

## 2020-12-11 RX ADMIN — PIPERACILLIN AND TAZOBACTAM 3.38 G: 3; .375 INJECTION, POWDER, LYOPHILIZED, FOR SOLUTION INTRAVENOUS; PARENTERAL at 11:38

## 2020-12-11 RX ADMIN — INSULIN HUMAN 1 UNITS: 100 INJECTION, SOLUTION PARENTERAL at 23:53

## 2020-12-11 RX ADMIN — AMIODARONE HYDROCHLORIDE 200 MG: 200 TABLET ORAL at 19:46

## 2020-12-11 RX ADMIN — PIPERACILLIN AND TAZOBACTAM 3.38 G: 3; .375 INJECTION, POWDER, LYOPHILIZED, FOR SOLUTION INTRAVENOUS; PARENTERAL at 15:30

## 2020-12-11 RX ADMIN — DEXAMETHASONE SODIUM PHOSPHATE 6 MG: 4 INJECTION, SOLUTION INTRA-ARTICULAR; INTRALESIONAL; INTRAMUSCULAR; INTRAVENOUS; SOFT TISSUE at 11:37

## 2020-12-11 RX ADMIN — VANCOMYCIN HYDROCHLORIDE 2750 MG: 500 INJECTION, POWDER, LYOPHILIZED, FOR SOLUTION INTRAVENOUS at 14:46

## 2020-12-11 RX ADMIN — METOPROLOL SUCCINATE 50 MG: 25 TABLET, EXTENDED RELEASE ORAL at 19:45

## 2020-12-11 RX ADMIN — ACETAMINOPHEN 650 MG: 325 TABLET, FILM COATED ORAL at 08:11

## 2020-12-11 RX ADMIN — SODIUM CHLORIDE: 9 INJECTION, SOLUTION INTRAVENOUS at 14:37

## 2020-12-11 RX ADMIN — RIVAROXABAN 15 MG: 15 TABLET, FILM COATED ORAL at 19:46

## 2020-12-11 RX ADMIN — CLOPIDOGREL BISULFATE 75 MG: 75 TABLET ORAL at 19:46

## 2020-12-11 RX ADMIN — AMPICILLIN AND SULBACTAM 3 G: 2; 1 INJECTION, POWDER, FOR SOLUTION INTRAVENOUS at 04:49

## 2020-12-11 RX ADMIN — PIPERACILLIN AND TAZOBACTAM 3.38 G: 3; .375 INJECTION, POWDER, LYOPHILIZED, FOR SOLUTION INTRAVENOUS; PARENTERAL at 23:53

## 2020-12-11 ASSESSMENT — ENCOUNTER SYMPTOMS
WEAKNESS: 1
SHORTNESS OF BREATH: 1
FEVER: 1
CHILLS: 1
COUGH: 1

## 2020-12-11 ASSESSMENT — CHA2DS2 SCORE
CHA2DS2 VASC SCORE: 4
PRIOR STROKE OR TIA OR THROMBOEMBOLISM: NO
VASCULAR DISEASE: YES
HYPERTENSION: YES
CHF OR LEFT VENTRICULAR DYSFUNCTION: NO
AGE 75 OR GREATER: YES
DIABETES: NO
AGE 65 TO 74: NO
SEX: MALE

## 2020-12-11 NOTE — ASSESSMENT & PLAN NOTE
-This is Sepsis Present on admission  SIRS criteria identified on my evaluation include: Fever, with temperature greater than 101 deg F, Tachycardia, with heart rate greater than 90 BPM, Tachypnea, with respirations greater than 20 per minute and Leukocytosis, with WBC greater than 12,000  Source is covid-19 virus infection, possible bacterial pneumonia  Sepsis protocol initiated  Fluid resuscitation ordered per protocol  IV antibiotics as appropriate for source of sepsis  While organ dysfunction may be noted elsewhere in this problem list or in the chart, degree of organ dysfunction does not meet CMS criteria for severe sepsis  -he was on omnicef as an outpatient  On unasyn  procalcitonin has trended down  Blood cultures are negative times 2 so far  Lactic acid is normal now  Has improved

## 2020-12-11 NOTE — ASSESSMENT & PLAN NOTE
-ongoing symptoms, initially diagnosed on 11/4  -possibly still causing his symptoms now   -keep patient in isolation  -continue decadron  Not  A candidate for remisidivir since he is more then 10 days of his initial covid positive test  -elevated d-dimer, unable to get CTA due to kidney function however patient is on xarelto   procalcitonin is elevated and potentially has bacterial pneumonia  Vancomycin and zosyn are dced  On unasyn  His o2 needs HAVE DECREASED   lasix 20mg iv bid  Pt and ot  On vitamins  Spoke to his wife on 12/15/20

## 2020-12-11 NOTE — PROGRESS NOTES
I spoke with Madelin [phone] wife, in length and detail regarding Pt's POC and current status.  Relayed labs and CXR results.  All questions answered.

## 2020-12-11 NOTE — ED PROVIDER NOTES
"ED Provider Note  CHIEF COMPLAINT  Chief Complaint   Patient presents with   • Cough     Sent by MD, off antibx x4 days per pt. spouse. Pt. saturating 76% on RA. Placed on 4L NC. Pt. spouse stating he is here fot CXR.        HPI  Alonso Holder is a 85 y.o. male who presents to the ER at the request of his primary care physician for persistent fevers over the last 2 weeks.  Temperatures have been as high as 102.  The patient has been on antibiotics on and off over the last few weeks.  He was admitted here at [facility] on November 4 with COVID-19 pneumonia.  He was discharged on November 8.  Since being home he has been on persistent oxygen via nasal cannula.  He says he feels very short of breath.  He continues to cough.  He just feels extremely weak and unwell.  Despite antibiotics he continues to have fevers.  His primary care physician is concerned that perhaps something else is going on.  No pain or swelling in his legs.  No hemoptysis.  Occasionally coughs up a little bit of white phlegm.  No headache.  No chest pain.  No abdominal pain.  No vomiting or diarrhea.  He has a sacral decubitus ulcer to his backside from \"lying in his urine while he was in the hospital.\"  He says he has baseline urinary incontinence but nothing new or different.  No cloudy or foul-smelling urine.  Patient presents with an O2 sat of 76% on room air.  He is oxygenating in the mid 90s on 4 L..    REVIEW OF SYSTEMS  See HPI for further details. All other systems are negative.    PAST MEDICAL HISTORY  Past Medical History:   Diagnosis Date   • Arthritis     finger   • Essential hypertension 1/29/2018   • Hemorrhagic disorder (HCC)     xarelto   • High cholesterol    • Persistent atrial fibrillation (HCC) 1/28/2020    stents   • Snoring     no sleep study       FAMILY HISTORY  Family History   Problem Relation Age of Onset   • Heart Disease Neg Hx        SOCIAL HISTORY  Social History     Socioeconomic History   • " "Marital status:      Spouse name: Not on file   • Number of children: Not on file   • Years of education: Not on file   • Highest education level: Not on file   Occupational History   • Not on file   Social Needs   • Financial resource strain: Not hard at all   • Food insecurity     Worry: Never true     Inability: Never true   • Transportation needs     Medical: No     Non-medical: No   Tobacco Use   • Smoking status: Never Smoker   • Smokeless tobacco: Never Used   Substance and Sexual Activity   • Alcohol use: Yes     Frequency: Monthly or less     Drinks per session: 1 or 2     Binge frequency: Never     Comment: \"Social\" drinks only   • Drug use: No   • Sexual activity: Not on file   Lifestyle   • Physical activity     Days per week: Not on file     Minutes per session: Not on file   • Stress: Not on file   Relationships   • Social connections     Talks on phone: Not on file     Gets together: Not on file     Attends Scientologist service: Not on file     Active member of club or organization: Not on file     Attends meetings of clubs or organizations: Not on file     Relationship status: Not on file   • Intimate partner violence     Fear of current or ex partner: Not on file     Emotionally abused: Not on file     Physically abused: Not on file     Forced sexual activity: Not on file   Other Topics Concern   • Not on file   Social History Narrative   • Not on file       SURGICAL HISTORY  Past Surgical History:   Procedure Laterality Date   • OTHER  1961    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   • Codeine Nausea     Nausea       PHYSICAL EXAM  VITAL SIGNS: /67   Pulse 68   Temp (!) 38.9 °C (102 °F) (Oral)   Resp (!) 29   Ht 1.753 m (5' 9\")   Wt 105.2 kg (232 " lb)   SpO2 97%   BMI 34.26 kg/m²      Constitutional: Well developed, well nourished; increased respiratory rate.  Appears ill.  HENT: Normocephalic, atraumatic; Bilateral external ears normal; dry mucous membranes  Eyes: PERRL, EOMI, Conjunctiva normal. No discharge.   Neck:  Supple, nontender midline; No stridor; No nuchal rigidity.   Lymphatic: No cervical lymphadenopathy noted.   Cardiovascular: Regular rate and rhythm without murmurs, rubs, or gallop.   Thorax & Lungs: Mild increased work of breathing, crackles bilateral bases.  Decreased breath sounds throughout.  Abdomen: Soft, nontender, bowel sounds normal. No obvious masses; No pulsatile masses; no rebound, guarding, or peritoneal signs.  There is a small decubitus ulcer to the patient's backside without any obvious signs of infection.  Skin: Good color; warm and dry without rash or petechia.  Back: Nontender, No CVA tenderness.   Extremities: Distal radial, dorsalis pedis, posterior tibial pulses are equal bilaterally; No edema; Nontender calves or saphenous, No cyanosis, No clubbing.   Musculoskeletal: Good range of motion in all major joints. No tenderness to palpation or major deformities noted.   Neurologic: Alert & oriented x 4, clear speech    EKG  Please see my EKG interpretation below    RADIOLOGY/PROCEDURES  CT-ABDOMEN-PELVIS W/O   Final Result         Diffuse interstitial opacities in lung bases suggesting Covid 19 pneumonia.      Diverticulosis without evidence of diverticulitis.      Atherosclerotic disease with coronary artery calcifications.      Atrophic left kidney.      Enlarged prostate with mildly distended urinary bladder.      DX-CHEST-PORTABLE (1 VIEW)   Final Result      Enlarged cardiac silhouette.      Persistent peripheral interstitial opacities suggesting Covid 19 pneumonia.          COURSE & MEDICAL DECISION MAKING  Pertinent Labs & Imaging studies reviewed. (See chart for details)  Results for orders placed or performed  during the hospital encounter of 12/10/20   CBC WITH DIFFERENTIAL   Result Value Ref Range    WBC 12.7 (H) 4.8 - 10.8 K/uL    RBC 4.16 (L) 4.70 - 6.10 M/uL    Hemoglobin 13.1 (L) 14.0 - 18.0 g/dL    Hematocrit 38.7 (L) 42.0 - 52.0 %    MCV 93.0 81.4 - 97.8 fL    MCH 31.5 27.0 - 33.0 pg    MCHC 33.9 33.7 - 35.3 g/dL    RDW 47.4 35.9 - 50.0 fL    Platelet Count 276 164 - 446 K/uL    MPV 9.1 9.0 - 12.9 fL    Neutrophils-Polys 83.20 (H) 44.00 - 72.00 %    Lymphocytes 8.40 (L) 22.00 - 41.00 %    Monocytes 6.30 0.00 - 13.40 %    Eosinophils 0.60 0.00 - 6.90 %    Basophils 0.20 0.00 - 1.80 %    Immature Granulocytes 1.30 (H) 0.00 - 0.90 %    Nucleated RBC 0.00 /100 WBC    Neutrophils (Absolute) 10.52 (H) 1.82 - 7.42 K/uL    Lymphs (Absolute) 1.07 1.00 - 4.80 K/uL    Monos (Absolute) 0.80 0.00 - 0.85 K/uL    Eos (Absolute) 0.08 0.00 - 0.51 K/uL    Baso (Absolute) 0.03 0.00 - 0.12 K/uL    Immature Granulocytes (abs) 0.17 (H) 0.00 - 0.11 K/uL    NRBC (Absolute) 0.00 K/uL   LACTIC ACID   Result Value Ref Range    Lactic Acid 2.9 (H) 0.5 - 2.0 mmol/L   PROCALCITONIN   Result Value Ref Range    Procalcitonin 2.22 (H) <0.25 ng/mL   proBrain Natriuretic Peptide, NT   Result Value Ref Range    NT-proBNP 1048 (H) 0 - 125 pg/mL   TROPONIN   Result Value Ref Range    Troponin T 35 (H) 6 - 19 ng/L   Comp Metabolic Panel   Result Value Ref Range    Sodium 130 (L) 135 - 145 mmol/L    Potassium 4.3 3.6 - 5.5 mmol/L    Chloride 96 96 - 112 mmol/L    Co2 21 20 - 33 mmol/L    Anion Gap 13.0 7.0 - 16.0    Glucose 158 (H) 65 - 99 mg/dL    Bun 29 (H) 8 - 22 mg/dL    Creatinine 1.68 (H) 0.50 - 1.40 mg/dL    Calcium 9.3 8.4 - 10.2 mg/dL    AST(SGOT) 37 12 - 45 U/L    ALT(SGPT) 28 2 - 50 U/L    Alkaline Phosphatase 59 30 - 99 U/L    Total Bilirubin 0.7 0.1 - 1.5 mg/dL    Albumin 2.9 (L) 3.2 - 4.9 g/dL    Total Protein 6.6 6.0 - 8.2 g/dL    Globulin 3.7 (H) 1.9 - 3.5 g/dL    A-G Ratio 0.8 g/dL   ESTIMATED GFR   Result Value Ref Range    GFR If   47 (A) >60 mL/min/1.73 m 2    GFR If Non  39 (A) >60 mL/min/1.73 m 2   D-DIMER   Result Value Ref Range    D-Dimer Screen 1.07 (H) 0.00 - 0.50 ug/mL (FEU)   EKG   Result Value Ref Range    Report       [facility] Emergency Dept.    Test Date:  2020-12-10  Pt Name:    ERUM ALEX               Department: [facility]  MRN:        [id number]                      [facility]  Gender:     Male                         Technician: SRIRAMR  :        [id number]                   Requested By:ER TRIAGE PROTOCOL  Order #:    [id number]                    Reading MD: Esmer Salguero    Measurements  Intervals                                Axis  Rate:       105                          P:          -6  MS:         176                          QRS:        -28  QRSD:       94                           T:          65  QT:         348  QTc:        461    Interpretive Statements  SINUS TACHYCARDIA rate of 105  Normal axis  Normal intervals  T waves flat in the limb leads  No ST elevation or depression  LEFT VENTRICULAR HYPERTROPHY  BASELINE WANDER IN LEAD(S) II,III,aVF  Compared to ECG 2020 12:19:52  Left ventricular hypertrophy now present  Sinus bradycardia no longer pres ent  Ventricular premature complex(es) no longer present  First degree AV block no longer present  Electronically Signed On 2020 3:00:37 PST by Esmer Salguero         Patient presents to the ER at the request of his primary care physician who sent him down for persistent fevers.  The patient has been having temperatures for the past week or 2 after being discharged for Covid pneumonia with hypoxia.  He is also been having decreased room air saturations despite home oxygen per his PMD.  Patient's highest temp was 102.5 at home.  He has been generally getting weaker and weaker.  He says he is having a hard time getting around.  He has incontinence at baseline and presents with urine saturated pants.  He  "has a sacral decubitus ulcer which he got here in the hospital due to \"lying in his urine.\"  Overall patient denies pain.  No headache.  No chest pain.  He says he feels short of breath.  When he does try to walk he is dyspneic on exertion.  No abdominal pain.  I do not see any infection to the sacral decubitus ulcer on his backside.  No swelling of his legs.  He denies hemoptysis.  Occasionally he will bring up a little bit of white phlegm.  He is hypoxic at 76% on room air.  He is oxygen requiring and is maintaining O2 sats in the mid 90s on 4 L.  He presents hypotensive with blood pressure 80 and he has some tachycardia upon arrival as well.  At this time he is presumed septic.  He was given 2 L of LR and patient's blood pressure responded nicely to fluid administration.  Recent echocardiogram reveals an EF 60% so I think he can tolerate fluid bolus despite his elevated BNP.  Chest x-ray reveals persistent Covid infiltrates.  Given the persistent fevers despite being on outpatient p.o. antibiotics over the last week or so, my blood cultures were obtained and are pending.  Urine and urine culture has been ordered.  Think it might be reasonable to place a condom catheter so that the patient does not continue to have incontinence and make his bedsore even worse.  CT scan of the abdomen pelvis was performed for the persistent fevers.  I wanted to be sure we were not dealing with something intra-abdominal causing his persistent fevers.  At this time a CT scan of the abdomen and pelvis is normal and I presume his persistent fevers are possibly just related to lingering Covid pneumonia.  This time patient does not seem to be doing well at home.  He will need to be admitted for fevers pending blood cultures.  He may need placement in skilled nursing or rehab.  I discussed case with hospitalist, and he will kindly evaluate the patient hospitalization.    CRITICAL CARE  The very real possibilty of a deterioration of this " patient's condition required the highest level of my preparedness for sudden, emergent intervention.  I provided critical care services, which included medication orders, frequent reevaluations of the patient's condition and response to treatment, ordering and reviewing test results, and discussing the case with various consultants.  The critical care time associated with the care of the patient was 35 minutes. Review chart for interventions. This time is exclusive of any other billable procedures.       I verified that the patient was wearing a mask and I was wearing appropriate PPE every time I entered the room. The patient's mask was on the patient at all times during my encounter except for a brief view of the oropharynx.    FINAL IMPRESSION  1. Pneumonia due to COVID-19 virus Acute    2. Fever, unspecified fever cause Acute    3. Acute kidney injury (HCC) Acute    4. Hypoxia Acute    5. Pressure injury of skin of sacral region, unspecified injury stage Acute    6. Sepsis, due to unspecified organism, unspecified whether acute organ dysfunction present (HCC) Acute    The critical care time associated with the care of the patient was 35 minutes. Review chart for interventions. This time is exclusive of any other billable procedures.     This dictation has been created using voice recognition software. The accuracy of the dictation is limited by the abilities of the software. I expect there may be some errors of grammar and possibly content. I made every attempt to manually correct the errors within my dictation. However, errors related to voice recognition software may still exist and should be interpreted within the appropriate context.    Electronically signed by: Esmer Salguero M.D., 12/10/2020 9:17 PM

## 2020-12-11 NOTE — ED NOTES
Attempted to call report to receiving RN - receiving RN not available to get report at this time. Extension was given to other end to call when RN returns to the unit.

## 2020-12-11 NOTE — ED NOTES
Assumed care upon being roomed-saline lock x 2 with ivf infusing. Labs drawn per sepsis protocol. ekg in process, denies pain, placed on 4L n/c. covid isolation for hx of same   No

## 2020-12-11 NOTE — PROGRESS NOTES
ID evaluation for remdesivir  COVID + 11/4/2020    Not a candidate for remdesivir due remote diagnosis and no reasonable expectation of benefit    Please call if we can be of further assistance

## 2020-12-11 NOTE — H&P
Hospital Medicine History & Physical Note    Date of Service  12/11/2020    Primary Care Physician  Sumanth Fairbanks M.D.    Consultants  None     Code Status  Full Code    Chief Complaint  Chief Complaint   Patient presents with   • Cough     Sent by MD, off antibx x4 days per pt. spouse. Pt. saturating 76% on RA. Placed on 4L NC. Pt. spouse stating he is here fot CXR.        History of Presenting Illness  85 y.o. male who presented 12/10/2020 with ongoing fevers. Patient was diagnosed with covid on 11/4 and stayed in the hospital until 11/8. Patient has not been doing well at home, has had ongoing fevers even though PCP has started antibiotics. Today, PCP sent patient to the ER. I did discuss the case including labs and imaging with the ER physician.     Review of Systems  Review of Systems   Constitutional: Positive for chills, fever and malaise/fatigue.   Respiratory: Positive for cough and shortness of breath.    Neurological: Positive for weakness.       Past Medical History   has a past medical history of Arthritis, Essential hypertension (1/29/2018), Hemorrhagic disorder (HCC), High cholesterol, Persistent atrial fibrillation (HCC) (1/28/2020), and Snoring.    Surgical History   has a past surgical history that includes other (1961) and tonsillectomy.     Family History  Reviewed, non pertinent      Social History   reports that he has never smoked. He has never used smokeless tobacco. He reports current alcohol use. He reports that he does not use drugs.    Allergies  Allergies   Allergen Reactions   • Atorvastatin Myalgia     Severe myalgias, generally feeling unwell.   • Codeine Nausea     Nausea       Medications  Prior to Admission Medications   Prescriptions Last Dose Informant Patient Reported? Taking?   Acetaminophen (TYLENOL PO)  Patient Yes No   Sig: Take 2 Tabs by mouth as needed (For pain). Pt is not sure the strength   Cholecalciferol (D3 VITAMIN PO)  Patient Yes No   Sig: Take 1 Cap by mouth  every evening.   Spacer/Aero-Holding Chambers (FLEXICHAMBER) Device   Yes No   Sig: USE AS DIRECTED   XARELTO 20 MG Tab tablet  Patient No No   Sig: Take 1 Tab by mouth every day.   Patient taking differently: Take 20 mg by mouth every evening.   albuterol 108 (90 Base) MCG/ACT Aero Soln inhalation aerosol   Yes No   Sig: INL 1 TO 2 PFS PO TID PRN   amiodarone (CORDARONE) 200 MG Tab  Patient No No   Sig: Take 1 Tab by mouth every day.   Patient taking differently: Take 200 mg by mouth every evening.   ascorbic acid (VITAMIN C) 1000 MG tablet   No No   Sig: Take 1 Tab by mouth 2 Times a Day.   azithromycin (ZITHROMAX) 500 MG tablet   Yes No   Sig: Take  by mouth every day. Take 1 tablet by mouth every day   benazepril (LOTENSIN) 10 MG Tab  Patient No No   Sig: Take 1 Tab by mouth every day.   Patient taking differently: Take 10 mg by mouth every evening.   benzonatate (TESSALON) 100 MG Cap   No No   Sig: Take 1 Cap by mouth 3 times a day as needed for Cough.   clopidogrel (PLAVIX) 75 MG Tab  Patient No No   Sig: Take 1 Tab by mouth every day.   Patient taking differently: Take 75 mg by mouth every evening.   dexamethasone (DECADRON) 6 MG Tab   No No   Sig: Take 1 Tab by mouth every day.   metoprolol SR (TOPROL XL) 50 MG TABLET SR 24 HR  Patient No No   Sig: Take 1 Tab by mouth every day.   Patient taking differently: Take 50 mg by mouth every evening.   ondansetron (ZOFRAN ODT) 4 MG TABLET DISPERSIBLE   No No   Sig: Take 1 Tab by mouth every 6 hours as needed for Nausea (give PO if no IV route available).   zinc sulfate (ZINCATE) 220 (50 Zn) MG Cap   No No   Sig: Take 1 Cap by mouth every day.      Facility-Administered Medications: None       Physical Exam  Temp:  [38.9 °C (102 °F)] 38.9 °C (102 °F)  Pulse:  [] 68  Resp:  [17-33] 29  BP: ()/(47-75) 111/67  SpO2:  [91 %-98 %] 97 %    Physical Exam  Vitals signs and nursing note reviewed.   Constitutional:       General: He is not in acute distress.      Appearance: He is well-developed. He is not toxic-appearing or diaphoretic.   HENT:      Head: Normocephalic and atraumatic.      Right Ear: External ear normal.      Left Ear: External ear normal.      Nose: Nose normal. No congestion or rhinorrhea.      Mouth/Throat:      Mouth: Mucous membranes are dry.      Pharynx: No oropharyngeal exudate.   Eyes:      General:         Right eye: No discharge.         Left eye: No discharge.      Extraocular Movements: Extraocular movements intact.   Neck:      Musculoskeletal: Normal range of motion and neck supple. No edema or erythema.      Trachea: No tracheal deviation.   Cardiovascular:      Rate and Rhythm: Normal rate and regular rhythm.      Heart sounds: No murmur. No friction rub. No gallop.    Pulmonary:      Effort: Pulmonary effort is normal. No respiratory distress.      Breath sounds: No stridor. Rales present. No wheezing.   Chest:      Chest wall: No tenderness.   Abdominal:      General: Bowel sounds are normal. There is no distension.      Palpations: Abdomen is soft.      Tenderness: There is no abdominal tenderness.   Musculoskeletal:         General: No tenderness.      Right lower leg: No edema.      Left lower leg: No edema.   Lymphadenopathy:      Cervical: No cervical adenopathy.   Skin:     General: Skin is warm and dry.      Findings: No erythema or rash.   Neurological:      General: No focal deficit present.      Mental Status: He is alert and oriented to person, place, and time.      Cranial Nerves: No cranial nerve deficit.   Psychiatric:         Mood and Affect: Mood normal.         Behavior: Behavior normal.         Thought Content: Thought content normal.         Judgment: Judgment normal.         Laboratory:  Recent Labs     12/10/20  2055   WBC 12.7*   RBC 4.16*   HEMOGLOBIN 13.1*   HEMATOCRIT 38.7*   MCV 93.0   MCH 31.5   MCHC 33.9   RDW 47.4   PLATELETCT 276   MPV 9.1     Recent Labs     12/10/20  2055   SODIUM 130*   POTASSIUM 4.3    CHLORIDE 96   CO2 21   GLUCOSE 158*   BUN 29*   CREATININE 1.68*   CALCIUM 9.3     Recent Labs     12/10/20  2055   ALTSGPT 28   ASTSGOT 37   ALKPHOSPHAT 59   TBILIRUBIN 0.7   GLUCOSE 158*         Recent Labs     12/10/20  2055   NTPROBNP 1048*         Recent Labs     12/10/20  2055   TROPONINT 35*       Imaging:  CT-ABDOMEN-PELVIS W/O   Final Result         Diffuse interstitial opacities in lung bases suggesting Covid 19 pneumonia.      Diverticulosis without evidence of diverticulitis.      Atherosclerotic disease with coronary artery calcifications.      Atrophic left kidney.      Enlarged prostate with mildly distended urinary bladder.      DX-CHEST-PORTABLE (1 VIEW)   Final Result      Enlarged cardiac silhouette.      Persistent peripheral interstitial opacities suggesting Covid 19 pneumonia.            Assessment/Plan:  I anticipate this patient will require at least two midnights for appropriate medical management, necessitating inpatient admission.    Sepsis (HCC)- (present on admission)  Assessment & Plan  -This is Sepsis Present on admission  SIRS criteria identified on my evaluation include: Fever, with temperature greater than 101 deg F, Tachycardia, with heart rate greater than 90 BPM, Tachypnea, with respirations greater than 20 per minute and Leukocytosis, with WBC greater than 12,000  Source is covid-19 virus infection, possible bacterial pneumonia  Sepsis protocol initiated  Fluid resuscitation ordered per protocol  IV antibiotics as appropriate for source of sepsis  While organ dysfunction may be noted elsewhere in this problem list or in the chart, degree of organ dysfunction does not meet CMS criteria for severe sepsis  -start unasyn and azithromycin  -he was on omnicef as an outpatient  -trend lactic acid  -await culture results  -he is at risk of worsening, closely monitor his hemodynamics and respiratory status    COVID-19 virus infection- (present on admission)  Assessment & Plan  -ongoing  symptoms, initially diagnosed on 11/4  -possibly still causing his symptoms now but unable to rule out bacterial pneumonia  -keep patient in isolation  -continue decadron  -elevated d-dimer, unable to get CTA due to kidney function however patient is on xarelto   -weakness is continuing to worsen, obtain PT/OT    Persistent atrial fibrillation (HCC)- (present on admission)  Assessment & Plan  -now sinus, continue home metoprolol and amiodarone  -monitor on tele  -continue xarelto    Essential hypertension- (present on admission)  Assessment & Plan  -now with borderline low bp, improved with IVF  -hold home benazapril  -continue home metoprolol with hold parameters  -start prn labetalol  -adjust as needed    Elevated troponin- (present on admission)  Assessment & Plan  -mild elevation, likely demand ischemia  -continue to trend trop  -monitor on tele  -he does have a history of CHF with ER of 35% but most recent echo on 11/8 with preserved EF  -patient was dry upon arrival, fluids given in ER but no need for ongoing IVF    Hyperglycemia- (present on admission)  Assessment & Plan  -start ISS  -adjust as needed    Stage 3 chronic kidney disease- (present on admission)  Assessment & Plan  -repeat bmp in the am

## 2020-12-11 NOTE — PROGRESS NOTES
Pharmacy Medication Reconciliation      Medication reconciliation updated and complete per pt spouse over the phone  Allergies have been verified and updated   Patient home [facility]    Per pt spouse pt finished a 7 day course of Zithromax & Decadron that was started on 12/01/2020

## 2020-12-11 NOTE — ED NOTES
Receiving RN contacted back this RN to receive report. Report was given to Karine WALLER - no questions or concerns on other end and room is ready for pt transport. Pt in stable condition and will be transported with RN. Belongings sent with pt.

## 2020-12-11 NOTE — ED NOTES
Pt and family aware of pending admit, warm blanket provided per request call light and urinal n reach

## 2020-12-11 NOTE — PROGRESS NOTES
Juliet from Lab called with critical result of Lactic Acid of 4.4 that was drawn at 0537 at 1105. Critical lab result read back to Juliet.   Dr. Viveros notified of critical lab result at 1109.  Critical lab result read back by Dr. Viveros.

## 2020-12-11 NOTE — PROGRESS NOTES
Pt is seen at the bed side and chart is reviewed.he was admitted early this morning by the admitting physician.  I have asked I.D to see if he qualifies for remisidivir.

## 2020-12-11 NOTE — PROGRESS NOTES
"Pharmacy Kinetics 85 y.o. male on vancomycin day # 1 2020    Provider specified end date: to be determined    Indication for Treatment: Pneumonia    Pertinent history per medical record: Admitted on 12/10/2020 for sepsis with recent hospitalization for COVID-19, -.  PMH: Afib, HTN, hyperglycemia, CKD III    Other antibiotics: Zosyn 3.375 gm IV Q8H extended infusion    Allergies: Atorvastatin and Codeine     List concerns for renal function: elevated BUN/SCr, low albumin, hypermetabolic state (SIRS), nephrotoxic drugs (Zosyn + Vanc)    Pertinent cultures to date:   12/10 BCpx2 NGTD    MRSA nares swab if pneumonia is a concern (ordered/positive/negative/n-a): ordered    Recent Labs     12/10/20  2055   WBC 12.7*   NEUTSPOLYS 83.20*     Recent Labs     12/10/20  2055   BUN 29*   CREATININE 1.68*   ALBUMIN 2.9*     No results for input(s): VANCOTROUGH, VANCOPEAK, VANCORANDOM in the last 72 hours.    Intake/Output Summary (Last 24 hours) at 2020 1118  Last data filed at 2020 0548  Gross per 24 hour   Intake --   Output 200 ml   Net -200 ml      /53   Pulse 95   Temp 37.5 °C (99.5 °F) (Oral)   Resp 20   Ht 1.753 m (5' 9\")   Wt 105.2 kg (232 lb)   SpO2 90%  Temp (24hrs), Av.5 °C (99.5 °F), Min:36.4 °C (97.5 °F), Max:39.2 °C (102.6 °F)      A/P   1. Vancomycin 2750 mg IV loading dose today.  2. Next vancomycin level: random  1200  3. Goal trough: 15 - 20 mcg/ml  4. Comments: Plan to give loading dose today and check level tomorrow.  Will monitor daily and adjust as needed per protocol.    EDWARD Mina PharmD    "

## 2020-12-11 NOTE — ASSESSMENT & PLAN NOTE
-mild elevation, likely demand ischemia  -has trended down  No chest pain  -monitor on tele  -he does have a history of CHF with ER of 35% but most recent echo on 11/8 with preserved EF

## 2020-12-11 NOTE — PROGRESS NOTES
0400 Pt arrived to unit from ED. 4L NC sating 94% RR 25. 0500 pt shaking c/o chills temp 97.9F pulse ox showing O2 sat in 70-80s. Respiratory to bedside. Oxymask applied pt now on 15L sating 89-90.

## 2020-12-11 NOTE — WOUND TEAM
[facility]  Inpatient Services  Initial Wound and Skin Care Evaluation    Admission Date: 12/10/2020     Last order of IP CONSULT TO WOUND CARE was found on 12/11/2020 from Hospital Encounter on 12/10/2020     HPI, PMH, SH: Reviewed    Unit where seen by Wound Team: 1112/02     WOUND CONSULT/FOLLOW UP RELATED TO:  Left buttock     Self Report / Pain Level:  Some pain with palpation, otherwise tolerable. No premed.       OBJECTIVE:  mepilex dressing in place. Patient turned onto side completely with pillows. Regular redistribution mattress.     WOUND TYPE, LOCATION, CHARACTERISTICS (Pressure Injuries: location, stage, POA or date identified)  Wound 12/04/20 Pressure Injury Buttocks Left Buttock (Active)   Wound Image   12/11/20 1030   Site Assessment Pale;Pink;Yellow 12/11/20 1030   Periwound Assessment Scar tissue;Clean;Dry 12/11/20 1030   Margins Epibole (rolled edges);Attached edges;Undefined edges 12/11/20 1030   Closure Secondary intention 12/11/20 1030   Drainage Amount Scant 12/11/20 1030   Drainage Description Serosanguineous 12/11/20 1030   Treatments Cleansed;Site care;Tape change 12/11/20 1030   Wound Cleansing Normal Saline Irrigation 12/11/20 1030   Periwound Protectant Skin Protectant Wipes to Periwound 12/11/20 1030   Dressing Cleansing/Solutions Not Applicable 12/11/20 1030   Dressing Options Honey Colloid;Mepilex 12/11/20 1030   Dressing Changed New 12/11/20 1030   Dressing Status Clean;Dry;Intact 12/11/20 1030   Dressing Change/Treatment Frequency Every 72 hrs, and As Needed 12/11/20 1030   NEXT Dressing Change/Treatment Date 12/14/20 12/11/20 1030   NEXT Weekly Photo (Inpatient Only) 12/18/20 12/11/20 1030   Pressure Injury Stage 3 12/11/20 1030   Wound Length (cm) 2.3 cm 12/11/20 1030   Wound Width (cm) 1.2 cm 12/11/20 1030   Wound Depth (cm) 0.2 cm 12/11/20 1030   Wound Surface Area (cm^2) 2.76 cm^2 12/11/20 1030   Wound Volume (cm^3) 0.55 cm^3 12/11/20 1030   Wound Healing % -150  12/11/20 1030   Number of days: 7        Vascular:    JAYSHREE:   No results found.    Lab Values:    Lab Results   Component Value Date/Time    WBC 12.7 (H) 12/10/2020 08:55 PM    RBC 4.16 (L) 12/10/2020 08:55 PM    HEMOGLOBIN 13.1 (L) 12/10/2020 08:55 PM    HEMATOCRIT 38.7 (L) 12/10/2020 08:55 PM    CREACTPROT 2.50 (H) 01/28/2013 09:09 AM    HBA1C 5.8 (H) 12/09/2019 08:17 AM        Culture Results show:  No results found for this or any previous visit (from the past 720 hour(s)).    INTERVENTIONS BY WOUND TEAM:  Chart and images reviewed. Discussed with bedside RN. This RN in to assess patient. Patient already turned to left side. Removed current mepilex dressing from buttock, cleansed wound and periwound with NS and gauze. Pat dry. Pictures and measurements taken. periwound prepped with no sting skin prep. Cut honeycolloid to fit wound bed, applied, and secured in place with mepilex. Updated bedside RN Nick and placed orders.     Interdisciplinary consultation: Patient, Bedside RN (Nick)    EVALUATION / RATIONALE FOR TREATMENT: Patient with a POA stage 3 pressure injury to his left buttock. Is being seen by outpatient wound clinic as well. Honey colloid applied to cleanse and autolytically debride due to its high osmolarity. As well as help lower overall wound pH, while promoting a moisture-balanced environment conducive to wound healing. Mepilex to offload pressure. IF patient becomes incontinent, nursing has viscopaste orders. Wound team to follow up once weekly, nursing to manage.        Goals: Steady decrease in wound area and depth weekly.    NURSING PLAN OF CARE ORDERS (X):    Dressing changes: See Dressing Care orders: X  Skin care: See Skin Care orders:   RN Prevention Protocol:   Rectal tube care: See Rectal Tube Care orders:   Other orders:    RSKIN:   CURRENTLY IN PLACE (X), APPLIED THIS VISIT (A), ORDERED (O):   Q shift Sabino:  X  Q shift pressure point assessments:  X  Pressure redistribution mattress     X        Low Airloss          Bariatric KACEY         Bariatric foam           Heel float boots     Heel Silicone dressing        Float Heels off Bed with Pillows               Barrier wipes         Barrier Cream         Barrier paste          Sacral silicone dressing  X       Silicone O2 tubing         Anchorfast         Cannula fixation Device (Tender )          Gray Foam Ear protectors     High flow offloading Clip    Elastic head band offloading device                                                      Trach with Optifoam split foam       Z Deshawn Pillow                             Waffle cushion        Waffle Overlay         Rectal tube or BMS    Purwick/Condom Cath          Antifungal tx      Interdry          Reposition q 2 hours   X   TAPs Turning system                 Up to chair        Ambulate      PT/OT        Dietician        Diabetes Education      PO   X  TF     TPN     NPO   # days   Other        WOUND TEAM PLAN OF CARE:   Dressing changes by wound team:                   Follow up 3 times weekly:                NPWT change 3 times weekly:     Follow up 1-2 times weekly: X - wound team follow up left buttock once weekly, nursing to manage per orders     Follow up Bi-Monthly:                   Follow up as needed:       Other (explain):     Anticipated discharge plans:   LTACH:        SNF/Rehab:                  Home Health Care:           Outpatient Wound Center:    X - continue outpatient appointments once discharged.        Self Care:

## 2020-12-11 NOTE — THERAPY
Missed Therapy     Patient Name: Alonso Holder  Age:  85 y.o., Sex:  male  Medical Record #: [id number]  Today's Date: 12/11/2020    PT consult received. RN requesting hold at this time d/t medical status. Requests PT return tomorrow. Will re-attempt as able.

## 2020-12-12 LAB
ANION GAP SERPL CALC-SCNC: 8 MMOL/L (ref 7–16)
BASOPHILS # BLD AUTO: 0.2 % (ref 0–1.8)
BASOPHILS # BLD: 0.02 K/UL (ref 0–0.12)
BUN SERPL-MCNC: 25 MG/DL (ref 8–22)
CALCIUM SERPL-MCNC: 8.9 MG/DL (ref 8.4–10.2)
CHLORIDE SERPL-SCNC: 104 MMOL/L (ref 96–112)
CO2 SERPL-SCNC: 27 MMOL/L (ref 20–33)
CREAT SERPL-MCNC: 1.24 MG/DL (ref 0.5–1.4)
EOSINOPHIL # BLD AUTO: 0 K/UL (ref 0–0.51)
EOSINOPHIL NFR BLD: 0 % (ref 0–6.9)
ERYTHROCYTE [DISTWIDTH] IN BLOOD BY AUTOMATED COUNT: 49.5 FL (ref 35.9–50)
GLUCOSE BLD-MCNC: 135 MG/DL (ref 65–99)
GLUCOSE BLD-MCNC: 140 MG/DL (ref 65–99)
GLUCOSE BLD-MCNC: 184 MG/DL (ref 65–99)
GLUCOSE BLD-MCNC: 284 MG/DL (ref 65–99)
GLUCOSE SERPL-MCNC: 145 MG/DL (ref 65–99)
HCT VFR BLD AUTO: 34.2 % (ref 42–52)
HGB BLD-MCNC: 10.8 G/DL (ref 14–18)
IMM GRANULOCYTES # BLD AUTO: 0.06 K/UL (ref 0–0.11)
IMM GRANULOCYTES NFR BLD AUTO: 0.5 % (ref 0–0.9)
LYMPHOCYTES # BLD AUTO: 0.95 K/UL (ref 1–4.8)
LYMPHOCYTES NFR BLD: 8.5 % (ref 22–41)
MCH RBC QN AUTO: 30.7 PG (ref 27–33)
MCHC RBC AUTO-ENTMCNC: 31.6 G/DL (ref 33.7–35.3)
MCV RBC AUTO: 97.2 FL (ref 81.4–97.8)
MONOCYTES # BLD AUTO: 0.39 K/UL (ref 0–0.85)
MONOCYTES NFR BLD AUTO: 3.5 % (ref 0–13.4)
MRSA DNA SPEC QL NAA+PROBE: NORMAL
NEUTROPHILS # BLD AUTO: 9.71 K/UL (ref 1.82–7.42)
NEUTROPHILS NFR BLD: 87.3 % (ref 44–72)
NRBC # BLD AUTO: 0 K/UL
NRBC BLD-RTO: 0 /100 WBC
PLATELET # BLD AUTO: 222 K/UL (ref 164–446)
PMV BLD AUTO: 9.5 FL (ref 9–12.9)
POTASSIUM SERPL-SCNC: 4.6 MMOL/L (ref 3.6–5.5)
RBC # BLD AUTO: 3.52 M/UL (ref 4.7–6.1)
SIGNIFICANT IND 70042: NORMAL
SITE SITE: NORMAL
SODIUM SERPL-SCNC: 139 MMOL/L (ref 135–145)
SOURCE SOURCE: NORMAL
VANCOMYCIN TIMED 2584: 8.4 UG/ML
WBC # BLD AUTO: 11.1 K/UL (ref 4.8–10.8)

## 2020-12-12 PROCEDURE — 700102 HCHG RX REV CODE 250 W/ 637 OVERRIDE(OP): Performed by: INTERNAL MEDICINE

## 2020-12-12 PROCEDURE — 700105 HCHG RX REV CODE 258: Performed by: FAMILY MEDICINE

## 2020-12-12 PROCEDURE — A9270 NON-COVERED ITEM OR SERVICE: HCPCS | Performed by: INTERNAL MEDICINE

## 2020-12-12 PROCEDURE — 99232 SBSQ HOSP IP/OBS MODERATE 35: CPT | Performed by: FAMILY MEDICINE

## 2020-12-12 PROCEDURE — 82962 GLUCOSE BLOOD TEST: CPT | Mod: 91

## 2020-12-12 PROCEDURE — 85025 COMPLETE CBC W/AUTO DIFF WBC: CPT

## 2020-12-12 PROCEDURE — 80202 ASSAY OF VANCOMYCIN: CPT

## 2020-12-12 PROCEDURE — 80048 BASIC METABOLIC PNL TOTAL CA: CPT

## 2020-12-12 PROCEDURE — A9270 NON-COVERED ITEM OR SERVICE: HCPCS | Performed by: FAMILY MEDICINE

## 2020-12-12 PROCEDURE — 87641 MR-STAPH DNA AMP PROBE: CPT

## 2020-12-12 PROCEDURE — 770020 HCHG ROOM/CARE - TELE (206)

## 2020-12-12 PROCEDURE — 700111 HCHG RX REV CODE 636 W/ 250 OVERRIDE (IP): Performed by: FAMILY MEDICINE

## 2020-12-12 PROCEDURE — 700102 HCHG RX REV CODE 250 W/ 637 OVERRIDE(OP): Performed by: FAMILY MEDICINE

## 2020-12-12 RX ORDER — ASCORBIC ACID 500 MG
500 TABLET ORAL DAILY
Status: DISCONTINUED | OUTPATIENT
Start: 2020-12-12 | End: 2020-12-16 | Stop reason: HOSPADM

## 2020-12-12 RX ORDER — ZINC SULFATE 50(220)MG
220 CAPSULE ORAL DAILY
Status: DISCONTINUED | OUTPATIENT
Start: 2020-12-12 | End: 2020-12-16 | Stop reason: HOSPADM

## 2020-12-12 RX ORDER — THIAMINE MONONITRATE (VIT B1) 100 MG
100 TABLET ORAL 2 TIMES DAILY
Status: DISCONTINUED | OUTPATIENT
Start: 2020-12-12 | End: 2020-12-16 | Stop reason: HOSPADM

## 2020-12-12 RX ORDER — VITAMIN B COMPLEX
1000 TABLET ORAL DAILY
Status: DISCONTINUED | OUTPATIENT
Start: 2020-12-12 | End: 2020-12-16 | Stop reason: HOSPADM

## 2020-12-12 RX ADMIN — CLOPIDOGREL BISULFATE 75 MG: 75 TABLET ORAL at 17:59

## 2020-12-12 RX ADMIN — RIVAROXABAN 20 MG: 20 TABLET, FILM COATED ORAL at 19:49

## 2020-12-12 RX ADMIN — ZINC SULFATE 220 MG (50 MG) CAPSULE 220 MG: CAPSULE at 10:01

## 2020-12-12 RX ADMIN — Medication 100 MG: at 09:30

## 2020-12-12 RX ADMIN — VANCOMYCIN HYDROCHLORIDE 1750 MG: 500 INJECTION, POWDER, LYOPHILIZED, FOR SOLUTION INTRAVENOUS at 17:47

## 2020-12-12 RX ADMIN — Medication 1000 UNITS: at 10:01

## 2020-12-12 RX ADMIN — INSULIN HUMAN 1 UNITS: 100 INJECTION, SOLUTION PARENTERAL at 20:58

## 2020-12-12 RX ADMIN — Medication 100 MG: at 18:00

## 2020-12-12 RX ADMIN — PIPERACILLIN AND TAZOBACTAM 3.38 G: 3; .375 INJECTION, POWDER, LYOPHILIZED, FOR SOLUTION INTRAVENOUS; PARENTERAL at 13:08

## 2020-12-12 RX ADMIN — DEXAMETHASONE 6 MG: 4 TABLET ORAL at 06:12

## 2020-12-12 RX ADMIN — AMIODARONE HYDROCHLORIDE 200 MG: 200 TABLET ORAL at 17:59

## 2020-12-12 RX ADMIN — METOPROLOL SUCCINATE 50 MG: 25 TABLET, EXTENDED RELEASE ORAL at 17:56

## 2020-12-12 RX ADMIN — INSULIN HUMAN 3 UNITS: 100 INJECTION, SOLUTION PARENTERAL at 13:01

## 2020-12-12 RX ADMIN — SENNOSIDES-DOCUSATE SODIUM TAB 8.6-50 MG 2 TABLET: 8.6-5 TAB at 17:56

## 2020-12-12 RX ADMIN — OXYCODONE HYDROCHLORIDE AND ACETAMINOPHEN 500 MG: 500 TABLET ORAL at 10:01

## 2020-12-12 RX ADMIN — PIPERACILLIN AND TAZOBACTAM 3.38 G: 3; .375 INJECTION, POWDER, LYOPHILIZED, FOR SOLUTION INTRAVENOUS; PARENTERAL at 20:58

## 2020-12-12 RX ADMIN — PIPERACILLIN AND TAZOBACTAM 3.38 G: 3; .375 INJECTION, POWDER, LYOPHILIZED, FOR SOLUTION INTRAVENOUS; PARENTERAL at 06:33

## 2020-12-12 ASSESSMENT — ENCOUNTER SYMPTOMS
VOMITING: 0
CHILLS: 0
DOUBLE VISION: 0
HEARTBURN: 0
BACK PAIN: 0
BLURRED VISION: 0
DIZZINESS: 0
WEIGHT LOSS: 0
TINGLING: 0
MYALGIAS: 0
NAUSEA: 0
PHOTOPHOBIA: 0
HEADACHES: 0
DIAPHORESIS: 0
PALPITATIONS: 0
CLAUDICATION: 0
NECK PAIN: 0

## 2020-12-12 NOTE — PROGRESS NOTES
Received bedside patient report from SRIDHAR Romero. Patient resting comfortably in bed, no complaints at this time. Safety precautions in place. Will continue to monitor.

## 2020-12-12 NOTE — PROGRESS NOTES
"Pharmacy Kinetics 85 y.o. male on vancomycin day # 2   2020    Received Vancomycin 2750 mg iv loading dose 20 at 1446      Indication for Treatment: Pneumonia    Pertinent history per medical record: Admitted on 12/10/2020 for sepsis with recent hospitalization for COVID-19, -.  PMH: Afib, HTN, hyperglycemia, CKD III      Other antibiotics: Zosyn 3.375 gm IV Q8H extended infusion    Allergies: Atorvastatin and Codeine     List concerns for renal function: elevated BUN/SCr, low albumin, hypermetabolic state (SIRS), nephrotoxic drugs (Zosyn and Vancomycin)    Pertinent cultures to date:   12/10 BCpx2 negative  12/10 PCT 2.22   PCT 3.43    MRSA nares swab if pneumonia is a concern (ordered/positive/negative/n-a): ordered    Recent Labs     12/10/20  2055 12/12/20  0229   WBC 12.7* 11.1*   NEUTSPOLYS 83.20* 87.30*     Recent Labs     12/10/20  2055 12/12/20  0229   BUN 29* 25*   CREATININE 1.68* 1.24   ALBUMIN 2.9*  --      Recent Labs     20  1129   VANCORANDOM 8.4       Intake/Output Summary (Last 24 hours) at 2020 1323  Last data filed at 2020 0253  Gross per 24 hour   Intake --   Output 850 ml   Net -850 ml      /58   Pulse (!) 54   Temp 36 °C (96.8 °F) (Temporal)   Resp 20   Ht 1.753 m (5' 9\")   Wt 105.2 kg (232 lb)   SpO2 91%  Temp (24hrs), Av.2 °C (97.1 °F), Min:36 °C (96.8 °F), Max:36.5 °C (97.7 °F)      A/P   1. Begin Vancomycin 1750 mg daily for 1st dose random Vancomycin level=8.4 late this morning.  2. Next vancomycin level: 20  3. Goal trough: 15-20 mcg/ml for pneumonia  4. Comments: Monitor and follow per protocol and improving renal function.    SUMA ALDRIDGE    "

## 2020-12-12 NOTE — PROGRESS NOTES
Hospital Medicine Daily Progress Note    Date of Service  12/12/2020    Chief Complaint  85 y.o. male admitted 12/10/2020 with shortness of breath    Hospital Course  85 y.o. male who presented 12/10/2020 with ongoing fevers. Patient was diagnosed with covid on 11/4 and stayed in the hospital until 11/8. Patient has not been doing well at home, has had ongoing fevers even though PCP has started antibiotics.     Interval Problem Update  His O2 needs have decreased    Consultants/Specialty  I.D    Code Status  Full Code    Disposition  pending    Review of Systems  Review of Systems   Constitutional: Negative for chills, diaphoresis and weight loss.   HENT: Negative for ear discharge, ear pain and tinnitus.    Eyes: Negative for blurred vision, double vision and photophobia.   Cardiovascular: Negative for chest pain, palpitations and claudication.   Gastrointestinal: Negative for heartburn, nausea and vomiting.   Genitourinary: Negative for dysuria, frequency and urgency.   Musculoskeletal: Negative for back pain, myalgias and neck pain.   Skin: Negative for itching and rash.   Neurological: Negative for dizziness, tingling and headaches.        Physical Exam  Temp:  [36 °C (96.8 °F)-37.5 °C (99.5 °F)] 36 °C (96.8 °F)  Pulse:  [54-73] 54  Resp:  [20-30] 20  BP: (100-114)/(58-72) 100/58  SpO2:  [91 %-98 %] 91 %    Physical Exam  Constitutional:       Appearance: He is obese.   HENT:      Head: Normocephalic and atraumatic.      Nose: Nose normal.      Mouth/Throat:      Mouth: Mucous membranes are dry.   Eyes:      Extraocular Movements: Extraocular movements intact.      Pupils: Pupils are equal, round, and reactive to light.   Neck:      Musculoskeletal: Normal range of motion and neck supple.   Cardiovascular:      Rate and Rhythm: Normal rate and regular rhythm.      Pulses: Normal pulses.      Heart sounds: Normal heart sounds.   Pulmonary:      Effort: No respiratory distress.      Breath sounds: No stridor.    Abdominal:      General: Bowel sounds are normal.      Palpations: Abdomen is soft.   Musculoskeletal: Normal range of motion.   Skin:     General: Skin is dry.   Neurological:      Mental Status: He is alert and oriented to person, place, and time. Mental status is at baseline.         Fluids    Intake/Output Summary (Last 24 hours) at 12/12/2020 0854  Last data filed at 12/12/2020 0253  Gross per 24 hour   Intake --   Output 850 ml   Net -850 ml       Laboratory  Recent Labs     12/10/20  2055 12/12/20  0229   WBC 12.7* 11.1*   RBC 4.16* 3.52*   HEMOGLOBIN 13.1* 10.8*   HEMATOCRIT 38.7* 34.2*   MCV 93.0 97.2   MCH 31.5 30.7   MCHC 33.9 31.6*   RDW 47.4 49.5   PLATELETCT 276 222   MPV 9.1 9.5     Recent Labs     12/10/20  2055 12/12/20  0229   SODIUM 130* 139   POTASSIUM 4.3 4.6   CHLORIDE 96 104   CO2 21 27   GLUCOSE 158* 145*   BUN 29* 25*   CREATININE 1.68* 1.24   CALCIUM 9.3 8.9     Recent Labs     12/11/20  0537   INR 1.28*               Imaging       Assessment/Plan  Sepsis (HCC)- (present on admission)  Assessment & Plan  -This is Sepsis Present on admission  SIRS criteria identified on my evaluation include: Fever, with temperature greater than 101 deg F, Tachycardia, with heart rate greater than 90 BPM, Tachypnea, with respirations greater than 20 per minute and Leukocytosis, with WBC greater than 12,000  Source is covid-19 virus infection, possible bacterial pneumonia  Sepsis protocol initiated  Fluid resuscitation ordered per protocol  IV antibiotics as appropriate for source of sepsis  While organ dysfunction may be noted elsewhere in this problem list or in the chart, degree of organ dysfunction does not meet CMS criteria for severe sepsis  -he was on omnicef as an outpatient  -trend lactic acid  Blood cultures are negative times 2 so far  Lactic acid is normal now  On vancomycin and zosyn for now  Has improved    COVID-19 virus infection- (present on admission)  Assessment & Plan  -ongoing symptoms,  initially diagnosed on 11/4  -possibly still causing his symptoms now   -keep patient in isolation  -continue decadron  Not  A candidate for remisidivir since he is more then 10 days of his initial covid positive test  -elevated d-dimer, unable to get CTA due to kidney function however patient is on xarelto   -weakness is continuing to worsen, obtain PT/OT  procalcitonin is elevated and potentially has bacterial pneumonia  On vancomycin and zosyn  He feels better today  His o2 need   Has decreased    Persistent atrial fibrillation (HCC)- (present on admission)  Assessment & Plan  -now sinus, continue home metoprolol and amiodarone  -monitor on tele  -continue xarelto    Essential hypertension- (present on admission)  Assessment & Plan  -now with borderline low bp, improved with IVF  -hold home benazapril  -continue home metoprolol with hold parameters  -start prn labetalol  -adjust as needed    Elevated troponin- (present on admission)  Assessment & Plan  -mild elevation, likely demand ischemia  -has trended down  No chest pain  -monitor on tele  -he does have a history of CHF with ER of 35% but most recent echo on 11/8 with preserved EF      Hyperglycemia- (present on admission)  Assessment & Plan   ISS  -adjust as needed    Stage 3 chronic kidney disease- (present on admission)  Assessment & Plan  -repeat bmp in the am       VTE prophylaxis: xarelto

## 2020-12-12 NOTE — DISCHARGE PLANNING
"Hospital Care Management Discharge Planning       Anticipated Discharge Disposition:   · Home      Action:   · DC needs unclear at this time.      Barriers to Discharge:   · Wean O2     Plan:    · Hospital Care Management Team to continue to provide support services and assistance with discharge planning as needed.       Current Expected Day of Discharge: 12/15         For further assistance please contact the assigned RN Case Manager or  at the extension listed under \"Treatment Teams\".    "

## 2020-12-13 LAB
ALBUMIN SERPL BCP-MCNC: 2.2 G/DL (ref 3.2–4.9)
ALBUMIN/GLOB SERPL: 0.6 G/DL
ALP SERPL-CCNC: 59 U/L (ref 30–99)
ALT SERPL-CCNC: 30 U/L (ref 2–50)
ANION GAP SERPL CALC-SCNC: 13 MMOL/L (ref 7–16)
AST SERPL-CCNC: 38 U/L (ref 12–45)
BACTERIA UR CULT: NORMAL
BASOPHILS # BLD AUTO: 0.1 % (ref 0–1.8)
BASOPHILS # BLD: 0.02 K/UL (ref 0–0.12)
BILIRUB SERPL-MCNC: 0.3 MG/DL (ref 0.1–1.5)
BUN SERPL-MCNC: 32 MG/DL (ref 8–22)
CALCIUM SERPL-MCNC: 8.9 MG/DL (ref 8.4–10.2)
CHLORIDE SERPL-SCNC: 101 MMOL/L (ref 96–112)
CO2 SERPL-SCNC: 23 MMOL/L (ref 20–33)
CREAT SERPL-MCNC: 1.07 MG/DL (ref 0.5–1.4)
EOSINOPHIL # BLD AUTO: 0 K/UL (ref 0–0.51)
EOSINOPHIL NFR BLD: 0 % (ref 0–6.9)
ERYTHROCYTE [DISTWIDTH] IN BLOOD BY AUTOMATED COUNT: 47.8 FL (ref 35.9–50)
GLOBULIN SER CALC-MCNC: 3.5 G/DL (ref 1.9–3.5)
GLUCOSE BLD-MCNC: 121 MG/DL (ref 65–99)
GLUCOSE BLD-MCNC: 130 MG/DL (ref 65–99)
GLUCOSE BLD-MCNC: 207 MG/DL (ref 65–99)
GLUCOSE BLD-MCNC: 210 MG/DL (ref 65–99)
GLUCOSE SERPL-MCNC: 179 MG/DL (ref 65–99)
HCT VFR BLD AUTO: 32.5 % (ref 42–52)
HGB BLD-MCNC: 10.5 G/DL (ref 14–18)
IMM GRANULOCYTES # BLD AUTO: 0.08 K/UL (ref 0–0.11)
IMM GRANULOCYTES NFR BLD AUTO: 0.6 % (ref 0–0.9)
LYMPHOCYTES # BLD AUTO: 0.89 K/UL (ref 1–4.8)
LYMPHOCYTES NFR BLD: 6.6 % (ref 22–41)
MCH RBC QN AUTO: 30.7 PG (ref 27–33)
MCHC RBC AUTO-ENTMCNC: 32.3 G/DL (ref 33.7–35.3)
MCV RBC AUTO: 95 FL (ref 81.4–97.8)
MONOCYTES # BLD AUTO: 0.52 K/UL (ref 0–0.85)
MONOCYTES NFR BLD AUTO: 3.8 % (ref 0–13.4)
NEUTROPHILS # BLD AUTO: 12.06 K/UL (ref 1.82–7.42)
NEUTROPHILS NFR BLD: 88.9 % (ref 44–72)
NRBC # BLD AUTO: 0 K/UL
NRBC BLD-RTO: 0 /100 WBC
PLATELET # BLD AUTO: 250 K/UL (ref 164–446)
PMV BLD AUTO: 9.8 FL (ref 9–12.9)
POTASSIUM SERPL-SCNC: 4.3 MMOL/L (ref 3.6–5.5)
PROCALCITONIN SERPL-MCNC: 2.91 NG/ML
PROT SERPL-MCNC: 5.7 G/DL (ref 6–8.2)
RBC # BLD AUTO: 3.42 M/UL (ref 4.7–6.1)
SIGNIFICANT IND 70042: NORMAL
SITE SITE: NORMAL
SODIUM SERPL-SCNC: 137 MMOL/L (ref 135–145)
SOURCE SOURCE: NORMAL
WBC # BLD AUTO: 13.6 K/UL (ref 4.8–10.8)

## 2020-12-13 PROCEDURE — 99233 SBSQ HOSP IP/OBS HIGH 50: CPT | Performed by: FAMILY MEDICINE

## 2020-12-13 PROCEDURE — A9270 NON-COVERED ITEM OR SERVICE: HCPCS | Performed by: FAMILY MEDICINE

## 2020-12-13 PROCEDURE — 84145 PROCALCITONIN (PCT): CPT

## 2020-12-13 PROCEDURE — 82962 GLUCOSE BLOOD TEST: CPT | Mod: 91

## 2020-12-13 PROCEDURE — 700111 HCHG RX REV CODE 636 W/ 250 OVERRIDE (IP): Performed by: FAMILY MEDICINE

## 2020-12-13 PROCEDURE — A9270 NON-COVERED ITEM OR SERVICE: HCPCS | Performed by: INTERNAL MEDICINE

## 2020-12-13 PROCEDURE — 85025 COMPLETE CBC W/AUTO DIFF WBC: CPT

## 2020-12-13 PROCEDURE — 80053 COMPREHEN METABOLIC PANEL: CPT

## 2020-12-13 PROCEDURE — 770020 HCHG ROOM/CARE - TELE (206)

## 2020-12-13 PROCEDURE — 700102 HCHG RX REV CODE 250 W/ 637 OVERRIDE(OP): Performed by: INTERNAL MEDICINE

## 2020-12-13 PROCEDURE — 700105 HCHG RX REV CODE 258: Performed by: FAMILY MEDICINE

## 2020-12-13 PROCEDURE — 700102 HCHG RX REV CODE 250 W/ 637 OVERRIDE(OP): Performed by: FAMILY MEDICINE

## 2020-12-13 RX ORDER — FUROSEMIDE 10 MG/ML
20 INJECTION INTRAMUSCULAR; INTRAVENOUS 2 TIMES DAILY
Status: DISCONTINUED | OUTPATIENT
Start: 2020-12-13 | End: 2020-12-16 | Stop reason: HOSPADM

## 2020-12-13 RX ORDER — AMPICILLIN AND SULBACTAM 2; 1 G/1; G/1
INJECTION, POWDER, FOR SOLUTION INTRAMUSCULAR; INTRAVENOUS
Status: ACTIVE
Start: 2020-12-13 | End: 2020-12-14

## 2020-12-13 RX ADMIN — INSULIN HUMAN 2 UNITS: 100 INJECTION, SOLUTION PARENTERAL at 11:14

## 2020-12-13 RX ADMIN — PIPERACILLIN AND TAZOBACTAM 3.38 G: 3; .375 INJECTION, POWDER, LYOPHILIZED, FOR SOLUTION INTRAVENOUS; PARENTERAL at 12:08

## 2020-12-13 RX ADMIN — AMPICILLIN AND SULBACTAM 3 G: 2; 1 INJECTION, POWDER, FOR SOLUTION INTRAVENOUS at 23:59

## 2020-12-13 RX ADMIN — FUROSEMIDE 20 MG: 10 INJECTION, SOLUTION INTRAMUSCULAR; INTRAVENOUS at 11:22

## 2020-12-13 RX ADMIN — FUROSEMIDE 20 MG: 10 INJECTION, SOLUTION INTRAMUSCULAR; INTRAVENOUS at 17:47

## 2020-12-13 RX ADMIN — DEXAMETHASONE 6 MG: 4 TABLET ORAL at 04:56

## 2020-12-13 RX ADMIN — SENNOSIDES-DOCUSATE SODIUM TAB 8.6-50 MG 2 TABLET: 8.6-5 TAB at 04:56

## 2020-12-13 RX ADMIN — AMPICILLIN AND SULBACTAM 3 G: 2; 1 INJECTION, POWDER, FOR SOLUTION INTRAVENOUS at 17:48

## 2020-12-13 RX ADMIN — PIPERACILLIN AND TAZOBACTAM 3.38 G: 3; .375 INJECTION, POWDER, LYOPHILIZED, FOR SOLUTION INTRAVENOUS; PARENTERAL at 04:57

## 2020-12-13 RX ADMIN — AMIODARONE HYDROCHLORIDE 200 MG: 200 TABLET ORAL at 17:47

## 2020-12-13 RX ADMIN — OXYCODONE HYDROCHLORIDE AND ACETAMINOPHEN 500 MG: 500 TABLET ORAL at 04:56

## 2020-12-13 RX ADMIN — CLOPIDOGREL BISULFATE 75 MG: 75 TABLET ORAL at 17:47

## 2020-12-13 RX ADMIN — INSULIN HUMAN 2 UNITS: 100 INJECTION, SOLUTION PARENTERAL at 21:27

## 2020-12-13 RX ADMIN — ZINC SULFATE 220 MG (50 MG) CAPSULE 220 MG: CAPSULE at 04:56

## 2020-12-13 RX ADMIN — Medication 1000 UNITS: at 04:56

## 2020-12-13 RX ADMIN — RIVAROXABAN 20 MG: 20 TABLET, FILM COATED ORAL at 17:47

## 2020-12-13 ASSESSMENT — ENCOUNTER SYMPTOMS
COUGH: 1
BACK PAIN: 0
CLAUDICATION: 0
HEMOPTYSIS: 0
DIAPHORESIS: 0
PALPITATIONS: 0
NAUSEA: 0
NECK PAIN: 0
ABDOMINAL PAIN: 0
SHORTNESS OF BREATH: 1
WEIGHT LOSS: 0
DOUBLE VISION: 0
CHILLS: 0
VOMITING: 0
HEADACHES: 0
TINGLING: 0
TREMORS: 0
MYALGIAS: 0
EYE PAIN: 0
PHOTOPHOBIA: 0

## 2020-12-13 NOTE — PROGRESS NOTES
Pt having bigeminy and trigeminy PACs throughout the night. Physician on the unit rounding. Verbally made aware of ectopy. No new orders received at this time. Will continue to monitor appropriately.

## 2020-12-13 NOTE — PROGRESS NOTES
[facility] Medicine Daily Progress Note    Date of Service  12/13/2020    Chief Complaint  85 y.o. male admitted 12/10/2020 with shortness of breath    Hospital Course  85 y.o. male who presented 12/10/2020 with ongoing fevers. Patient was diagnosed with covid on 11/4 and stayed in the hospital until 11/8. Patient has not been doing well at home, has had ongoing fevers even though PCP has started antibiotics.     Interval Problem Update  His O2 needs have decreased    Consultants/Specialty  I.D    Code Status  Full Code    Disposition  pending    Review of Systems  Review of Systems   Constitutional: Negative for chills, diaphoresis and weight loss.   HENT: Negative for ear discharge, ear pain and nosebleeds.    Eyes: Negative for double vision, photophobia and pain.   Respiratory: Positive for cough and shortness of breath. Negative for hemoptysis.    Cardiovascular: Negative for chest pain, palpitations and claudication.   Gastrointestinal: Negative for abdominal pain, nausea and vomiting.   Genitourinary: Negative for frequency, hematuria and urgency.   Musculoskeletal: Negative for back pain, myalgias and neck pain.   Skin: Negative for itching and rash.   Neurological: Negative for tingling, tremors and headaches.        Physical Exam  Temp:  [36.1 °C (96.9 °F)-36.8 °C (98.2 °F)] 36.2 °C (97.1 °F)  Pulse:  [53-69] 55  Resp:  [20] 20  BP: (117-121)/(64-66) 121/64  SpO2:  [89 %-100 %] 96 %    Physical Exam  Constitutional:       General: He is not in acute distress.     Appearance: He is obese. He is not toxic-appearing.   HENT:      Head: Normocephalic and atraumatic.      Nose: No congestion or rhinorrhea.      Mouth/Throat:      Mouth: Mucous membranes are dry.   Eyes:      Conjunctiva/sclera: Conjunctivae normal.      Pupils: Pupils are equal, round, and reactive to light.   Neck:      Musculoskeletal: No neck rigidity or muscular tenderness.   Cardiovascular:      Rate and Rhythm: Normal rate and regular  rhythm.      Heart sounds: No murmur. No friction rub.   Pulmonary:      Breath sounds: No stridor. Rhonchi present.   Abdominal:      Palpations: Abdomen is soft.      Tenderness: There is no abdominal tenderness. There is no guarding.   Musculoskeletal:         General: No swelling or tenderness.   Skin:     General: Skin is warm and dry.   Neurological:      Mental Status: He is alert and oriented to person, place, and time. Mental status is at baseline.         Fluids    Intake/Output Summary (Last 24 hours) at 12/13/2020 1014  Last data filed at 12/13/2020 0900  Gross per 24 hour   Intake 1057.08 ml   Output 580 ml   Net 477.08 ml       Laboratory  Recent Labs     12/10/20  2055 12/12/20  0229 12/13/20  0156   WBC 12.7* 11.1* 13.6*   RBC 4.16* 3.52* 3.42*   HEMOGLOBIN 13.1* 10.8* 10.5*   HEMATOCRIT 38.7* 34.2* 32.5*   MCV 93.0 97.2 95.0   MCH 31.5 30.7 30.7   MCHC 33.9 31.6* 32.3*   RDW 47.4 49.5 47.8   PLATELETCT 276 222 250   MPV 9.1 9.5 9.8     Recent Labs     12/10/20  2055 12/12/20  0229 12/13/20  0156   SODIUM 130* 139 137   POTASSIUM 4.3 4.6 4.3   CHLORIDE 96 104 101   CO2 21 27 23   GLUCOSE 158* 145* 179*   BUN 29* 25* 32*   CREATININE 1.68* 1.24 1.07   CALCIUM 9.3 8.9 8.9     Recent Labs     12/11/20  0537   INR 1.28*               Imaging       Assessment/Plan  Sepsis (HCC)- (present on admission)  Assessment & Plan  -This is Sepsis Present on admission  SIRS criteria identified on my evaluation include: Fever, with temperature greater than 101 deg F, Tachycardia, with heart rate greater than 90 BPM, Tachypnea, with respirations greater than 20 per minute and Leukocytosis, with WBC greater than 12,000  Source is covid-19 virus infection, possible bacterial pneumonia  Sepsis protocol initiated  Fluid resuscitation ordered per protocol  IV antibiotics as appropriate for source of sepsis  While organ dysfunction may be noted elsewhere in this problem list or in the chart, degree of organ dysfunction does  not meet CMS criteria for severe sepsis  -he was on omnicef as an outpatient  -trend lactic acid  Blood cultures are negative times 2 so far  Lactic acid is normal now  On vancomycin and zosyn for now  Has improved    COVID-19 virus infection- (present on admission)  Assessment & Plan  -ongoing symptoms, initially diagnosed on 11/4  -possibly still causing his symptoms now   -keep patient in isolation  -continue decadron  Not  A candidate for remisidivir since he is more then 10 days of his initial covid positive test  -elevated d-dimer, unable to get CTA due to kidney function however patient is on xarelto   -weakness is continuing to worsen, obtain PT/OT  procalcitonin is elevated and potentially has bacterial pneumonia  On vancomycin and zosyn  He feels better today  His o2 needs have gone up  startedd lasix 20mg iv bid    Persistent atrial fibrillation (HCC)- (present on admission)  Assessment & Plan  -now sinus, continue home metoprolol and amiodarone  -monitor on tele  -continue xarelto    Essential hypertension- (present on admission)  Assessment & Plan  -now with borderline low bp, improved with IVF  -hold home benazapril  -continue home metoprolol with hold parameters  -start prn labetalol  -adjust as needed    Elevated troponin- (present on admission)  Assessment & Plan  -mild elevation, likely demand ischemia  -has trended down  No chest pain  -monitor on tele  -he does have a history of CHF with ER of 35% but most recent echo on 11/8 with preserved EF      Hyperglycemia- (present on admission)  Assessment & Plan   ISS  -adjust as needed    Stage 3 chronic kidney disease- (present on admission)  Assessment & Plan  -repeat bmp in the am       VTE prophylaxis: xarelto

## 2020-12-14 LAB
ALBUMIN SERPL BCP-MCNC: 2.7 G/DL (ref 3.2–4.9)
ALBUMIN/GLOB SERPL: 0.8 G/DL
ALP SERPL-CCNC: 72 U/L (ref 30–99)
ALT SERPL-CCNC: 30 U/L (ref 2–50)
ANION GAP SERPL CALC-SCNC: 10 MMOL/L (ref 7–16)
AST SERPL-CCNC: 30 U/L (ref 12–45)
BASOPHILS # BLD AUTO: 0.2 % (ref 0–1.8)
BASOPHILS # BLD: 0.02 K/UL (ref 0–0.12)
BILIRUB SERPL-MCNC: 0.4 MG/DL (ref 0.1–1.5)
BUN SERPL-MCNC: 38 MG/DL (ref 8–22)
CALCIUM SERPL-MCNC: 9 MG/DL (ref 8.4–10.2)
CHLORIDE SERPL-SCNC: 100 MMOL/L (ref 96–112)
CO2 SERPL-SCNC: 31 MMOL/L (ref 20–33)
CREAT SERPL-MCNC: 1.35 MG/DL (ref 0.5–1.4)
EOSINOPHIL # BLD AUTO: 0 K/UL (ref 0–0.51)
EOSINOPHIL NFR BLD: 0 % (ref 0–6.9)
ERYTHROCYTE [DISTWIDTH] IN BLOOD BY AUTOMATED COUNT: 46.6 FL (ref 35.9–50)
GLOBULIN SER CALC-MCNC: 3.2 G/DL (ref 1.9–3.5)
GLUCOSE BLD-MCNC: 111 MG/DL (ref 65–99)
GLUCOSE BLD-MCNC: 150 MG/DL (ref 65–99)
GLUCOSE BLD-MCNC: 182 MG/DL (ref 65–99)
GLUCOSE BLD-MCNC: 219 MG/DL (ref 65–99)
GLUCOSE SERPL-MCNC: 118 MG/DL (ref 65–99)
HCT VFR BLD AUTO: 34.3 % (ref 42–52)
HGB BLD-MCNC: 11.3 G/DL (ref 14–18)
IMM GRANULOCYTES # BLD AUTO: 0.12 K/UL (ref 0–0.11)
IMM GRANULOCYTES NFR BLD AUTO: 1 % (ref 0–0.9)
LYMPHOCYTES # BLD AUTO: 0.95 K/UL (ref 1–4.8)
LYMPHOCYTES NFR BLD: 7.6 % (ref 22–41)
MCH RBC QN AUTO: 31.4 PG (ref 27–33)
MCHC RBC AUTO-ENTMCNC: 32.9 G/DL (ref 33.7–35.3)
MCV RBC AUTO: 95.3 FL (ref 81.4–97.8)
MONOCYTES # BLD AUTO: 0.45 K/UL (ref 0–0.85)
MONOCYTES NFR BLD AUTO: 3.6 % (ref 0–13.4)
NEUTROPHILS # BLD AUTO: 11.02 K/UL (ref 1.82–7.42)
NEUTROPHILS NFR BLD: 87.6 % (ref 44–72)
NRBC # BLD AUTO: 0 K/UL
NRBC BLD-RTO: 0 /100 WBC
PLATELET # BLD AUTO: 246 K/UL (ref 164–446)
PMV BLD AUTO: 9.5 FL (ref 9–12.9)
POTASSIUM SERPL-SCNC: 4.2 MMOL/L (ref 3.6–5.5)
PROCALCITONIN SERPL-MCNC: 0.92 NG/ML
PROT SERPL-MCNC: 5.9 G/DL (ref 6–8.2)
RBC # BLD AUTO: 3.6 M/UL (ref 4.7–6.1)
SODIUM SERPL-SCNC: 141 MMOL/L (ref 135–145)
WBC # BLD AUTO: 12.6 K/UL (ref 4.8–10.8)

## 2020-12-14 PROCEDURE — 80053 COMPREHEN METABOLIC PANEL: CPT

## 2020-12-14 PROCEDURE — 700105 HCHG RX REV CODE 258: Performed by: FAMILY MEDICINE

## 2020-12-14 PROCEDURE — A9270 NON-COVERED ITEM OR SERVICE: HCPCS | Performed by: FAMILY MEDICINE

## 2020-12-14 PROCEDURE — 84145 PROCALCITONIN (PCT): CPT

## 2020-12-14 PROCEDURE — 770020 HCHG ROOM/CARE - TELE (206)

## 2020-12-14 PROCEDURE — 700102 HCHG RX REV CODE 250 W/ 637 OVERRIDE(OP): Performed by: FAMILY MEDICINE

## 2020-12-14 PROCEDURE — 97165 OT EVAL LOW COMPLEX 30 MIN: CPT

## 2020-12-14 PROCEDURE — 700102 HCHG RX REV CODE 250 W/ 637 OVERRIDE(OP): Performed by: INTERNAL MEDICINE

## 2020-12-14 PROCEDURE — A9270 NON-COVERED ITEM OR SERVICE: HCPCS | Performed by: INTERNAL MEDICINE

## 2020-12-14 PROCEDURE — 97161 PT EVAL LOW COMPLEX 20 MIN: CPT

## 2020-12-14 PROCEDURE — 700111 HCHG RX REV CODE 636 W/ 250 OVERRIDE (IP): Performed by: FAMILY MEDICINE

## 2020-12-14 PROCEDURE — 85025 COMPLETE CBC W/AUTO DIFF WBC: CPT

## 2020-12-14 PROCEDURE — 82962 GLUCOSE BLOOD TEST: CPT | Mod: 91

## 2020-12-14 PROCEDURE — 99232 SBSQ HOSP IP/OBS MODERATE 35: CPT | Performed by: FAMILY MEDICINE

## 2020-12-14 RX ADMIN — Medication 100 MG: at 06:00

## 2020-12-14 RX ADMIN — SENNOSIDES-DOCUSATE SODIUM TAB 8.6-50 MG 2 TABLET: 8.6-5 TAB at 17:38

## 2020-12-14 RX ADMIN — SENNOSIDES-DOCUSATE SODIUM TAB 8.6-50 MG 2 TABLET: 8.6-5 TAB at 05:13

## 2020-12-14 RX ADMIN — AMPICILLIN AND SULBACTAM 3 G: 2; 1 INJECTION, POWDER, FOR SOLUTION INTRAVENOUS at 17:42

## 2020-12-14 RX ADMIN — AMPICILLIN AND SULBACTAM 3 G: 2; 1 INJECTION, POWDER, FOR SOLUTION INTRAVENOUS at 12:50

## 2020-12-14 RX ADMIN — Medication 100 MG: at 18:00

## 2020-12-14 RX ADMIN — RIVAROXABAN 15 MG: 15 TABLET, FILM COATED ORAL at 17:39

## 2020-12-14 RX ADMIN — CLOPIDOGREL BISULFATE 75 MG: 75 TABLET ORAL at 17:38

## 2020-12-14 RX ADMIN — Medication 1000 UNITS: at 05:13

## 2020-12-14 RX ADMIN — INSULIN HUMAN 2 UNITS: 100 INJECTION, SOLUTION PARENTERAL at 11:19

## 2020-12-14 RX ADMIN — FUROSEMIDE 20 MG: 10 INJECTION, SOLUTION INTRAMUSCULAR; INTRAVENOUS at 05:14

## 2020-12-14 RX ADMIN — OXYCODONE HYDROCHLORIDE AND ACETAMINOPHEN 500 MG: 500 TABLET ORAL at 05:13

## 2020-12-14 RX ADMIN — FUROSEMIDE 20 MG: 10 INJECTION, SOLUTION INTRAMUSCULAR; INTRAVENOUS at 17:39

## 2020-12-14 RX ADMIN — DEXAMETHASONE 6 MG: 4 TABLET ORAL at 05:13

## 2020-12-14 RX ADMIN — ZINC SULFATE 220 MG (50 MG) CAPSULE 220 MG: CAPSULE at 05:13

## 2020-12-14 RX ADMIN — AMPICILLIN AND SULBACTAM 3 G: 2; 1 INJECTION, POWDER, FOR SOLUTION INTRAVENOUS at 05:14

## 2020-12-14 RX ADMIN — INSULIN HUMAN 1 UNITS: 100 INJECTION, SOLUTION PARENTERAL at 21:22

## 2020-12-14 RX ADMIN — AMIODARONE HYDROCHLORIDE 200 MG: 200 TABLET ORAL at 17:37

## 2020-12-14 ASSESSMENT — GAIT ASSESSMENTS
DISTANCE (FEET): 100
DEVIATION: BRADYKINETIC;DECREASED BASE OF SUPPORT
GAIT LEVEL OF ASSIST: MINIMAL ASSIST
ASSISTIVE DEVICE: FRONT WHEEL WALKER

## 2020-12-14 ASSESSMENT — COGNITIVE AND FUNCTIONAL STATUS - GENERAL
DAILY ACTIVITIY SCORE: 23
STANDING UP FROM CHAIR USING ARMS: A LITTLE
WALKING IN HOSPITAL ROOM: A LITTLE
CLIMB 3 TO 5 STEPS WITH RAILING: A LITTLE
SUGGESTED CMS G CODE MODIFIER MOBILITY: CJ
MOBILITY SCORE: 21
HELP NEEDED FOR BATHING: A LITTLE
SUGGESTED CMS G CODE MODIFIER DAILY ACTIVITY: CI

## 2020-12-14 ASSESSMENT — ENCOUNTER SYMPTOMS
PHOTOPHOBIA: 0
CLAUDICATION: 0
SHORTNESS OF BREATH: 1
TINGLING: 0
HEMOPTYSIS: 0
PALPITATIONS: 0
BLURRED VISION: 0
DIZZINESS: 0
VOMITING: 0
NECK PAIN: 0
COUGH: 1
BACK PAIN: 0
WEIGHT LOSS: 0
DOUBLE VISION: 0
HEADACHES: 0
NAUSEA: 0
CHILLS: 0
HEARTBURN: 0
FEVER: 0

## 2020-12-14 ASSESSMENT — ACTIVITIES OF DAILY LIVING (ADL): TOILETING: INDEPENDENT

## 2020-12-14 NOTE — THERAPY
Physical Therapy   Initial Evaluation     Patient Name: Alonso Holder  Age:  85 y.o., Sex:  male  Medical Record #: [id number]  Today's Date: 12/14/2020          Assessment  Patient is 85 y.o. male who was recently admitted for SOB, fever, and sepsis secondary to COVID. Pt presented to PT with impaired balance, impaired gait, weakness, and dec activity tolerance. Pt was primarily limited by weakness and fatigue. Pt was able to ambulate with FWW for about a total of 200 ft with multiple rest breaks. Pt demonstrated with slight veering and occasional LOB, however, this continued to improve with continued ambulation. Pt encouraged to sit up in a chair for meals and ambulate with [facility] staff at least 3x/day to prevent further deconditioning. Pt demonstrated with SpO2 of 88 % during ambulation and 95% on 15 L O2 during rest. Pt will continue to benefit from skilled PT while in house, with recommendation for home with family support and recs for FWW and HH therapy services.     Plan    Recommend Physical Therapy 3 times per week until therapy goals are met for the following treatments:  Bed Mobility, Community Re-integration, Equipment, Gait Training, Manual Therapy, Neuro Re-Education / Balance, Self Care/Home Evaluation, Stair Training, Therapeutic Activities and Therapeutic Exercises    DC Equipment Recommendations: (P) Front-Wheel Walker  Discharge Recommendations: (P) Recommend home health for continued physical therapy services     Objective       12/14/20 1212   Prior Living Situation   Prior Services None   Housing / Facility 2 Story House   Steps Into Home 1   Steps In Home 13   Rail Left Rail (Steps in Home)   Equipment Owned None   Lives with - Patient's Self Care Capacity Spouse;Adult Children   Comments pt states dtr and spouse will be able to assist upon d/c to home    Prior Level of Functional Mobility   Bed Mobility Independent   Transfer Status Independent   Ambulation Independent   Distance Ambulation  (Feet)   (community)   Assistive Devices Used None   Stairs Independent   Comments pt reports of an IPLOF    Gait Analysis   Gait Level Of Assist Minimal Assist   Assistive Device Front Wheel Walker   Distance (Feet) 100   # of Times Distance was Traveled 2   Deviation Bradykinetic;Decreased Base Of Support   Weight Bearing Status fwb   Comments limited due to fatigue and required frequent standing breaks    Bed Mobility    Supine to Sit Supervised   Sit to Supine Supervised   Scooting Supervised   Rolling Supervised   Comments HOB flat and rails up   Functional Mobility   Sit to Stand Supervised   Bed, Chair, Wheelchair Transfer Supervised   Toilet Transfers Minimal Assist  (due to low toilet )   Patient / Family Goals    Patient / Family Goal #1 to go home    Short Term Goals    Short Term Goal # 1 pt will go up/down 13 steps with L railing w/spv in 6tx for safe d/c home

## 2020-12-14 NOTE — PROGRESS NOTES
[facility] Medicine Daily Progress Note    Date of Service  12/14/2020    Chief Complaint  85 y.o. male admitted 12/10/2020 with shortness of breath    Hospital Course  85 y.o. male who presented 12/10/2020 with ongoing fevers. Patient was diagnosed with covid on 11/4 and stayed in the hospital until 11/8. Patient has not been doing well at home, has had ongoing fevers even though PCP has started antibiotics.     Interval Problem Update  His O2 needs have decreased    Consultants/Specialty  I.D    Code Status  Full Code    Disposition  pending    Review of Systems  Review of Systems   Constitutional: Negative for chills, fever and weight loss.   HENT: Negative for ear discharge, ear pain and tinnitus.    Eyes: Negative for blurred vision, double vision and photophobia.   Respiratory: Positive for cough and shortness of breath. Negative for hemoptysis.    Cardiovascular: Negative for chest pain, palpitations and claudication.   Gastrointestinal: Negative for heartburn, nausea and vomiting.   Genitourinary: Negative for dysuria, frequency and urgency.   Musculoskeletal: Negative for back pain, joint pain and neck pain.   Skin: Negative for itching and rash.   Neurological: Negative for dizziness, tingling and headaches.        Physical Exam  Temp:  [35.9 °C (96.7 °F)-37.3 °C (99.2 °F)] 35.9 °C (96.7 °F)  Pulse:  [55-66] 57  Resp:  [17-22] 17  BP: (103-138)/(54-74) 119/74  SpO2:  [90 %-95 %] 90 %    Physical Exam  Constitutional:       Appearance: He is obese. He is not toxic-appearing or diaphoretic.   HENT:      Head: Normocephalic and atraumatic.      Nose: Nose normal.      Mouth/Throat:      Mouth: Mucous membranes are dry.   Eyes:      Extraocular Movements: Extraocular movements intact.      Pupils: Pupils are equal, round, and reactive to light.   Neck:      Musculoskeletal: Normal range of motion and neck supple.   Cardiovascular:      Rate and Rhythm: Normal rate and regular rhythm.      Pulses: Normal pulses.       Heart sounds: Normal heart sounds.   Pulmonary:      Breath sounds: No stridor. Rhonchi present.   Abdominal:      General: There is no distension.      Palpations: Abdomen is soft.      Tenderness: There is no abdominal tenderness.   Musculoskeletal: Normal range of motion.   Skin:     Coloration: Skin is not jaundiced or pale.   Neurological:      Mental Status: He is alert and oriented to person, place, and time. Mental status is at baseline.         Fluids    Intake/Output Summary (Last 24 hours) at 12/14/2020 1039  Last data filed at 12/14/2020 0800  Gross per 24 hour   Intake 400 ml   Output 2430 ml   Net -2030 ml       Laboratory  Recent Labs     12/12/20 0229 12/13/20 0156 12/14/20  0453   WBC 11.1* 13.6* 12.6*   RBC 3.52* 3.42* 3.60*   HEMOGLOBIN 10.8* 10.5* 11.3*   HEMATOCRIT 34.2* 32.5* 34.3*   MCV 97.2 95.0 95.3   MCH 30.7 30.7 31.4   MCHC 31.6* 32.3* 32.9*   RDW 49.5 47.8 46.6   PLATELETCT 222 250 246   MPV 9.5 9.8 9.5     Recent Labs     12/12/20 0229 12/13/20 0156 12/14/20 0453   SODIUM 139 137 141   POTASSIUM 4.6 4.3 4.2   CHLORIDE 104 101 100   CO2 27 23 31   GLUCOSE 145* 179* 118*   BUN 25* 32* 38*   CREATININE 1.24 1.07 1.35   CALCIUM 8.9 8.9 9.0                   Imaging       Assessment/Plan  Sepsis (HCC)- (present on admission)  Assessment & Plan  -This is Sepsis Present on admission  SIRS criteria identified on my evaluation include: Fever, with temperature greater than 101 deg F, Tachycardia, with heart rate greater than 90 BPM, Tachypnea, with respirations greater than 20 per minute and Leukocytosis, with WBC greater than 12,000  Source is covid-19 virus infection, possible bacterial pneumonia  Sepsis protocol initiated  Fluid resuscitation ordered per protocol  IV antibiotics as appropriate for source of sepsis  While organ dysfunction may be noted elsewhere in this problem list or in the chart, degree of organ dysfunction does not meet CMS criteria for severe sepsis  -he was on  omnicef as an outpatient  -trend lactic acid  Blood cultures are negative times 2 so far  Lactic acid is normal now  On vancomycin and zosyn for now  Has improved    COVID-19 virus infection- (present on admission)  Assessment & Plan  -ongoing symptoms, initially diagnosed on 11/4  -possibly still causing his symptoms now   -keep patient in isolation  -continue decadron  Not  A candidate for remisidivir since he is more then 10 days of his initial covid positive test  -elevated d-dimer, unable to get CTA due to kidney function however patient is on xarelto   procalcitonin is elevated and potentially has bacterial pneumonia  Vancomycin and zosyn are dced  On unasyn  His o2 needs are still high   lasix 20mg iv bid  Pt and ot  On vitamins  Not a candidate for remisidivir    Persistent atrial fibrillation (HCC)- (present on admission)  Assessment & Plan  - metoprolol   amiodarone  -monitor on tele  -continue xarelto    Essential hypertension- (present on admission)  Assessment & Plan  controlled  -hold home benazapril  -continue home metoprolol with hold parameters      Elevated troponin- (present on admission)  Assessment & Plan  -mild elevation, likely demand ischemia  -has trended down  No chest pain  -monitor on tele  -he does have a history of CHF with ER of 35% but most recent echo on 11/8 with preserved EF      Hyperglycemia- (present on admission)  Assessment & Plan   ISS  -adjust as needed    Stage 3 chronic kidney disease- (present on admission)  Assessment & Plan  -repeat bmp in the am       VTE prophylaxis: xarelto

## 2020-12-14 NOTE — PROGRESS NOTES
Pt desatting to the low 60's on room air after accidentally disconnecting O2 tubing from wall. Other vitals stable. Pt reconnected and saturation improved. Pt encouraged to call for assistance. BEENA.

## 2020-12-15 ENCOUNTER — APPOINTMENT (OUTPATIENT)
Dept: RADIOLOGY | Facility: MEDICAL CENTER | Age: 85
DRG: 871 | End: 2020-12-15
Attending: FAMILY MEDICINE
Payer: MEDICARE

## 2020-12-15 LAB
ALBUMIN SERPL BCP-MCNC: 2.9 G/DL (ref 3.2–4.9)
ALBUMIN/GLOB SERPL: 0.8 G/DL
ALP SERPL-CCNC: 64 U/L (ref 30–99)
ALT SERPL-CCNC: 30 U/L (ref 2–50)
ANION GAP SERPL CALC-SCNC: 13 MMOL/L (ref 7–16)
AST SERPL-CCNC: 24 U/L (ref 12–45)
BACTERIA BLD CULT: NORMAL
BACTERIA BLD CULT: NORMAL
BASOPHILS # BLD AUTO: 0.1 % (ref 0–1.8)
BASOPHILS # BLD: 0.01 K/UL (ref 0–0.12)
BILIRUB SERPL-MCNC: 0.6 MG/DL (ref 0.1–1.5)
BUN SERPL-MCNC: 41 MG/DL (ref 8–22)
CALCIUM SERPL-MCNC: 9.2 MG/DL (ref 8.4–10.2)
CHLORIDE SERPL-SCNC: 98 MMOL/L (ref 96–112)
CO2 SERPL-SCNC: 30 MMOL/L (ref 20–33)
CREAT SERPL-MCNC: 1.12 MG/DL (ref 0.5–1.4)
EOSINOPHIL # BLD AUTO: 0 K/UL (ref 0–0.51)
EOSINOPHIL NFR BLD: 0 % (ref 0–6.9)
ERYTHROCYTE [DISTWIDTH] IN BLOOD BY AUTOMATED COUNT: 45.4 FL (ref 35.9–50)
GLOBULIN SER CALC-MCNC: 3.5 G/DL (ref 1.9–3.5)
GLUCOSE BLD-MCNC: 127 MG/DL (ref 65–99)
GLUCOSE BLD-MCNC: 213 MG/DL (ref 65–99)
GLUCOSE BLD-MCNC: 214 MG/DL (ref 65–99)
GLUCOSE BLD-MCNC: 98 MG/DL (ref 65–99)
GLUCOSE SERPL-MCNC: 100 MG/DL (ref 65–99)
HCT VFR BLD AUTO: 38.6 % (ref 42–52)
HGB BLD-MCNC: 12.6 G/DL (ref 14–18)
IMM GRANULOCYTES # BLD AUTO: 0.15 K/UL (ref 0–0.11)
IMM GRANULOCYTES NFR BLD AUTO: 1.4 % (ref 0–0.9)
LYMPHOCYTES # BLD AUTO: 0.95 K/UL (ref 1–4.8)
LYMPHOCYTES NFR BLD: 8.8 % (ref 22–41)
MCH RBC QN AUTO: 30.7 PG (ref 27–33)
MCHC RBC AUTO-ENTMCNC: 32.6 G/DL (ref 33.7–35.3)
MCV RBC AUTO: 94.1 FL (ref 81.4–97.8)
MONOCYTES # BLD AUTO: 0.44 K/UL (ref 0–0.85)
MONOCYTES NFR BLD AUTO: 4.1 % (ref 0–13.4)
NEUTROPHILS # BLD AUTO: 9.23 K/UL (ref 1.82–7.42)
NEUTROPHILS NFR BLD: 85.6 % (ref 44–72)
NRBC # BLD AUTO: 0.02 K/UL
NRBC BLD-RTO: 0.2 /100 WBC
PLATELET # BLD AUTO: 255 K/UL (ref 164–446)
PMV BLD AUTO: 9.6 FL (ref 9–12.9)
POTASSIUM SERPL-SCNC: 4.2 MMOL/L (ref 3.6–5.5)
PROCALCITONIN SERPL-MCNC: 0.57 NG/ML
PROT SERPL-MCNC: 6.4 G/DL (ref 6–8.2)
RBC # BLD AUTO: 4.1 M/UL (ref 4.7–6.1)
SIGNIFICANT IND 70042: NORMAL
SIGNIFICANT IND 70042: NORMAL
SITE SITE: NORMAL
SITE SITE: NORMAL
SODIUM SERPL-SCNC: 141 MMOL/L (ref 135–145)
SOURCE SOURCE: NORMAL
SOURCE SOURCE: NORMAL
WBC # BLD AUTO: 10.8 K/UL (ref 4.8–10.8)

## 2020-12-15 PROCEDURE — 700105 HCHG RX REV CODE 258: Performed by: FAMILY MEDICINE

## 2020-12-15 PROCEDURE — 80053 COMPREHEN METABOLIC PANEL: CPT

## 2020-12-15 PROCEDURE — 84145 PROCALCITONIN (PCT): CPT

## 2020-12-15 PROCEDURE — 700111 HCHG RX REV CODE 636 W/ 250 OVERRIDE (IP): Performed by: FAMILY MEDICINE

## 2020-12-15 PROCEDURE — 700102 HCHG RX REV CODE 250 W/ 637 OVERRIDE(OP): Performed by: INTERNAL MEDICINE

## 2020-12-15 PROCEDURE — 770020 HCHG ROOM/CARE - TELE (206)

## 2020-12-15 PROCEDURE — 85025 COMPLETE CBC W/AUTO DIFF WBC: CPT

## 2020-12-15 PROCEDURE — 700102 HCHG RX REV CODE 250 W/ 637 OVERRIDE(OP): Performed by: FAMILY MEDICINE

## 2020-12-15 PROCEDURE — 82962 GLUCOSE BLOOD TEST: CPT

## 2020-12-15 PROCEDURE — 99232 SBSQ HOSP IP/OBS MODERATE 35: CPT | Performed by: FAMILY MEDICINE

## 2020-12-15 PROCEDURE — A9270 NON-COVERED ITEM OR SERVICE: HCPCS | Performed by: INTERNAL MEDICINE

## 2020-12-15 PROCEDURE — 71045 X-RAY EXAM CHEST 1 VIEW: CPT

## 2020-12-15 PROCEDURE — A9270 NON-COVERED ITEM OR SERVICE: HCPCS | Performed by: FAMILY MEDICINE

## 2020-12-15 RX ADMIN — AMIODARONE HYDROCHLORIDE 200 MG: 200 TABLET ORAL at 18:00

## 2020-12-15 RX ADMIN — INSULIN HUMAN 2 UNITS: 100 INJECTION, SOLUTION PARENTERAL at 21:03

## 2020-12-15 RX ADMIN — DEXAMETHASONE 6 MG: 4 TABLET ORAL at 05:38

## 2020-12-15 RX ADMIN — AMPICILLIN AND SULBACTAM 3 G: 2; 1 INJECTION, POWDER, FOR SOLUTION INTRAVENOUS at 12:23

## 2020-12-15 RX ADMIN — Medication 100 MG: at 06:00

## 2020-12-15 RX ADMIN — OXYCODONE HYDROCHLORIDE AND ACETAMINOPHEN 500 MG: 500 TABLET ORAL at 05:38

## 2020-12-15 RX ADMIN — ZINC SULFATE 220 MG (50 MG) CAPSULE 220 MG: CAPSULE at 05:38

## 2020-12-15 RX ADMIN — AMPICILLIN AND SULBACTAM 3 G: 2; 1 INJECTION, POWDER, FOR SOLUTION INTRAVENOUS at 00:28

## 2020-12-15 RX ADMIN — METOPROLOL SUCCINATE 50 MG: 25 TABLET, EXTENDED RELEASE ORAL at 18:07

## 2020-12-15 RX ADMIN — Medication 1000 UNITS: at 05:39

## 2020-12-15 RX ADMIN — FUROSEMIDE 20 MG: 10 INJECTION, SOLUTION INTRAMUSCULAR; INTRAVENOUS at 05:34

## 2020-12-15 RX ADMIN — RIVAROXABAN 15 MG: 15 TABLET, FILM COATED ORAL at 18:07

## 2020-12-15 RX ADMIN — INSULIN HUMAN 2 UNITS: 100 INJECTION, SOLUTION PARENTERAL at 11:36

## 2020-12-15 RX ADMIN — FUROSEMIDE 20 MG: 10 INJECTION, SOLUTION INTRAMUSCULAR; INTRAVENOUS at 18:08

## 2020-12-15 RX ADMIN — AMPICILLIN AND SULBACTAM 3 G: 2; 1 INJECTION, POWDER, FOR SOLUTION INTRAVENOUS at 05:32

## 2020-12-15 RX ADMIN — Medication 100 MG: at 18:00

## 2020-12-15 RX ADMIN — CLOPIDOGREL BISULFATE 75 MG: 75 TABLET ORAL at 18:08

## 2020-12-15 ASSESSMENT — FIBROSIS 4 INDEX: FIB4 SCORE: 1.44

## 2020-12-15 ASSESSMENT — ENCOUNTER SYMPTOMS
COUGH: 1
ABDOMINAL PAIN: 0
MYALGIAS: 0
TINGLING: 0
EYE PAIN: 0
CLAUDICATION: 0
WEIGHT LOSS: 0
HEADACHES: 0
TREMORS: 0
DIAPHORESIS: 0
PHOTOPHOBIA: 0
VOMITING: 0
NAUSEA: 0
CHILLS: 0
NECK PAIN: 0
DOUBLE VISION: 0
SPUTUM PRODUCTION: 0
HEMOPTYSIS: 0
BACK PAIN: 0
PALPITATIONS: 0

## 2020-12-15 NOTE — THERAPY
"Occupational Therapy   Initial Evaluation     Patient Name: Alonso Holder  Age:  85 y.o., Sex:  male  Medical Record #: [id number]  Today's Date: 12/15/2020     Precautions  Comments: 15 L O2 during ambulation     Assessment  Patient is 85 y.o. male with a diagnosis of fever, SOB,covid. A&Ox4, motivated for OOB activity. O2@15 oxymask. ADL transfers with Sup. Tolerates >10\" standing at sink for shaving and other grooming tasks. LB dressing with Sup. Good safety awareness. Primary limitation is current O2 level. Lives with family. All appropriate DME at home.       Plan  Recommend Occupational Therapy for Evaluation only.  DC Equipment Recommendations: Unable to determine at this time  Discharge Recommendations: Anticipate that the patient will have no further occupational therapy needs after discharge from the hospital      12/14/20 1250   Prior Living Situation   Prior Services None   Housing / Facility 2 Story House   Bathroom Set up Bathtub / Shower Combination;Walk In Shower;Grab Bars;Shower Chair   Equipment Owned Tub / Shower Seat;Grab Bar(s) In Tub / Shower   Lives with - Patient's Self Care Capacity Spouse;Adult Children   Prior Level of ADL Function   Self Feeding Independent   Grooming / Hygiene Independent   Bathing Independent   Dressing Independent   Toileting Independent   Prior Level of IADL Function   Medication Management Independent   Laundry Independent   Kitchen Mobility Independent   Finances Independent   Home Management Independent   Shopping Independent   Prior Level Of Mobility Independent Without Device in Home   Driving / Transportation Driving Independent   Occupation (Pre-Hospital Vocational) Retired Due To Age   Leisure Interests Hobbies;Exercise   Balance Assessment   Sitting Balance (Static) Good   Sitting Balance (Dynamic) Fair +   Standing Balance (Static) Good   Standing Balance (Dynamic) Fair +   Weight Shift Sitting Good   Weight Shift Standing Fair   Bed Mobility    Supine " to Sit Supervised   Sit to Supine Supervised   Scooting Supervised   ADL Assessment   Grooming Supervision;Standing   Upper Body Dressing Supervision   Lower Body Dressing Supervision   Functional Mobility   Sit to Stand Supervised   Bed, Chair, Wheelchair Transfer Supervised   Toilet Transfers Supervised   Transfer Method Stand Step   Interdisciplinary Plan of Care Collaboration   Collaboration Comments Results. Pt shows Sup with self-cares, functional mobility with fww. Motivated for OOB activities. Primary limitation at this time is O2 need.

## 2020-12-15 NOTE — DISCHARGE PLANNING
Anticipated Discharge Disposition: Home with HH    Action: Discussed in IDT rounds; pt not medically cleared. PT recommending Home health.    Barriers to Discharge: O2 needs @ 14-15L  OT evaluation pending  Medical clearance    Plan: Care coordination will continue to follow and assist with discharge plan.       Care Transition Team Assessment    Information Source  Orientation : Oriented x 4  Who is responsible for making decisions for patient? : Patient      Interdisciplinary Discharge Planning  Primary Care Physician: Sumanth Fairbanks MD  Lives with - Patient's Self Care Capacity: Spouse, Adult Children  Support Systems: Spouse / Significant Other, Children  Housing / Facility: [address] [place] [address]  Prior Services: Other (Comments)(Outpatient wound care)  Durable Medical Equipment: Home Oxygen  DME Provider / Phone: [facility]    Discharge Preparedness  What is your plan after discharge?: Home with help  What are your discharge supports?: Spouse  Prior Functional Level: Ambulatory, Needs Assist with Activities of Daily Living, Needs Assist with Medication Management    Functional Assesment  Prior Functional Level: Ambulatory, Needs Assist with Activities of Daily Living, Needs Assist with Medication Management    Finances  Financial Barriers to Discharge: No  Prescription Coverage: Yes    Vision / Hearing Impairment  Right Eye Vision: Wears Glasses  Left Eye Vision: Wears Glasses  Hearing Impairment: Both Ears      Anticipated Discharge Information  Discharge Disposition: D/T to home under HHA care in anticipation of covered skilled care (06)

## 2020-12-15 NOTE — PROGRESS NOTES
Patient anxious about feeling short of breath when not on any O2. Patient reassured that we would continue O2 as needed. Patient titrated from 12L oxymask to 5 as he maintained 90's for O2 saturation and appeared comfortable.     Patient states he is comfortable and has no issues at this. Safety checks being completed. Will continue to monitor. Sinus teresa on the monitor with frequent PACs.

## 2020-12-15 NOTE — PROGRESS NOTES
Patient dropping to heart rate of 40 while sleeping, lingering in the 40's most of the night. Dr Talavera made aware. Per MD hold heart rate meds in AM. Patient only receives in evening thus ongoing hand off to be provided.

## 2020-12-15 NOTE — PROGRESS NOTES
[facility] Medicine Daily Progress Note    Date of Service  12/15/2020    Chief Complaint  85 y.o. male admitted 12/10/2020 with shortness of breath    Hospital Course  85 y.o. male who presented 12/10/2020 with ongoing fevers. Patient was diagnosed with covid on 11/4 and stayed in the hospital until 11/8. Patient has not been doing well at home, has had ongoing fevers even though PCP has started antibiotics.     Interval Problem Update  His O2 needs have decreased    Consultants/Specialty  I.D    Code Status  Full Code    Disposition  pending    Review of Systems  Review of Systems   Constitutional: Negative for chills, diaphoresis and weight loss.   HENT: Negative for ear discharge, ear pain and nosebleeds.    Eyes: Negative for double vision, photophobia and pain.   Respiratory: Positive for cough. Negative for hemoptysis and sputum production.    Cardiovascular: Negative for chest pain, palpitations and claudication.   Gastrointestinal: Negative for abdominal pain, nausea and vomiting.   Genitourinary: Negative for frequency, hematuria and urgency.   Musculoskeletal: Negative for back pain, myalgias and neck pain.   Skin: Negative for itching and rash.   Neurological: Negative for tingling, tremors and headaches.        Physical Exam  Temp:  [36.3 °C (97.3 °F)-37 °C (98.6 °F)] 36.6 °C (97.9 °F)  Pulse:  [40-88] 57  Resp:  [18-20] 18  BP: (109-128)/(57-71) 124/61  SpO2:  [91 %-100 %] 91 %    Physical Exam  Constitutional:       General: He is not in acute distress.     Appearance: He is obese. He is not toxic-appearing.   HENT:      Head: Normocephalic and atraumatic.      Nose: No congestion or rhinorrhea.      Mouth/Throat:      Mouth: Mucous membranes are dry.   Eyes:      Conjunctiva/sclera: Conjunctivae normal.      Pupils: Pupils are equal, round, and reactive to light.   Neck:      Musculoskeletal: No neck rigidity or muscular tenderness.   Cardiovascular:      Rate and Rhythm: Normal rate and regular  rhythm.      Heart sounds: No murmur. No friction rub.   Pulmonary:      Breath sounds: No stridor. Rhonchi present.   Abdominal:      Palpations: Abdomen is soft.      Tenderness: There is no abdominal tenderness. There is no guarding.   Musculoskeletal:         General: No swelling or tenderness.   Skin:     General: Skin is warm and dry.   Neurological:      Mental Status: He is alert and oriented to person, place, and time. Mental status is at baseline.         Fluids    Intake/Output Summary (Last 24 hours) at 12/15/2020 1031  Last data filed at 12/15/2020 0600  Gross per 24 hour   Intake 590 ml   Output 1900 ml   Net -1310 ml       Laboratory  Recent Labs     12/13/20  0156 12/14/20  0453 12/15/20  0515   WBC 13.6* 12.6* 10.8   RBC 3.42* 3.60* 4.10*   HEMOGLOBIN 10.5* 11.3* 12.6*   HEMATOCRIT 32.5* 34.3* 38.6*   MCV 95.0 95.3 94.1   MCH 30.7 31.4 30.7   MCHC 32.3* 32.9* 32.6*   RDW 47.8 46.6 45.4   PLATELETCT 250 246 255   MPV 9.8 9.5 9.6     Recent Labs     12/13/20  0156 12/14/20 0453 12/15/20  0515   SODIUM 137 141 141   POTASSIUM 4.3 4.2 4.2   CHLORIDE 101 100 98   CO2 23 31 30   GLUCOSE 179* 118* 100*   BUN 32* 38* 41*   CREATININE 1.07 1.35 1.12   CALCIUM 8.9 9.0 9.2                   Imaging       Assessment/Plan  Sepsis (HCC)- (present on admission)  Assessment & Plan  -This is Sepsis Present on admission  SIRS criteria identified on my evaluation include: Fever, with temperature greater than 101 deg F, Tachycardia, with heart rate greater than 90 BPM, Tachypnea, with respirations greater than 20 per minute and Leukocytosis, with WBC greater than 12,000  Source is covid-19 virus infection, possible bacterial pneumonia  Sepsis protocol initiated  Fluid resuscitation ordered per protocol  IV antibiotics as appropriate for source of sepsis  While organ dysfunction may be noted elsewhere in this problem list or in the chart, degree of organ dysfunction does not meet CMS criteria for severe sepsis  -he was  on omnicef as an outpatient  On unasyn  procalcitonin has trended down  Blood cultures are negative times 2 so far  Lactic acid is normal now  Has improved    COVID-19 virus infection- (present on admission)  Assessment & Plan  -ongoing symptoms, initially diagnosed on 11/4  -possibly still causing his symptoms now   -keep patient in isolation  -continue decadron  Not  A candidate for remisidivir since he is more then 10 days of his initial covid positive test  -elevated d-dimer, unable to get CTA due to kidney function however patient is on xarelto   procalcitonin is elevated and potentially has bacterial pneumonia  Vancomycin and zosyn are dced  On unasyn  His o2 needs HAVE DECREASED   lasix 20mg iv bid  Pt and ot  On vitamins  Spoke to his wife on 12/15/20      Persistent atrial fibrillation (HCC)- (present on admission)  Assessment & Plan  - metoprolol   amiodarone  -monitor on tele  -continue xarelto    Essential hypertension- (present on admission)  Assessment & Plan  controlled  -hold home benazapril  -continue home metoprolol with hold parameters      Elevated troponin- (present on admission)  Assessment & Plan  -mild elevation, likely demand ischemia  -has trended down  No chest pain  -monitor on tele  -he does have a history of CHF with ER of 35% but most recent echo on 11/8 with preserved EF      Hyperglycemia- (present on admission)  Assessment & Plan   ISS  -adjust as needed  Check hema1c    Stage 3 chronic kidney disease- (present on admission)  Assessment & Plan  -repeat bmp in the am       VTE prophylaxis: xarelto

## 2020-12-15 NOTE — PROGRESS NOTES
Patient oriented and cooperative this shift. VSS. Patient O2 sat >92% on 3L NC. Fine crackles in bilateral lung bases. No c/o pain or dyspnea. Pt ambulated with 3L NC and FWW with no complaints. Pt continues hourly IS use. Will continue to monitor oxygenation status with goal of weaning oxygen and ambulation on room air.

## 2020-12-15 NOTE — FACE TO FACE
Face to Face Supporting Documentation - Home Health    The encounter with this patient was in whole or in part the primary reason for home health admission.    Date of encounter:   Patient:                    MRN:                       YOB: 2019  Alonso Holder  [id number]  1935     Home health to see patient for:  Skilled Nursing care for assessment, interventions & education    Skilled need for:  New Onset Medical Diagnosis COVID 19    Skilled nursing interventions to include:  Comment: COVID 19    Homebound status evidenced by:  Needs the assistance of another person in order to leave the home. Leaving home requires a considerable and taxing effort. There is a normal inability to leave the home.    Community Physician to provide follow up care: Sumanth Fairbanks M.D.     Optional Interventions? No      I certify the face to face encounter for this home health care referral meets the CMS requirements and the encounter/clinical assessment with the patient was, in whole, or in part, for the medical condition(s) listed above, which is the primary reason for home health care. Based on my clinical findings: the service(s) are medically necessary, support the need for home health care, and the homebound criteria are met.  I certify that this patient has had a face to face encounter by myself.  ORTEGA Viveros M.D. - NPI: [id number]

## 2020-12-16 ENCOUNTER — HOME HEALTH ADMISSION (OUTPATIENT)
Dept: HOME HEALTH SERVICES | Facility: HOME HEALTHCARE | Age: 85
End: 2020-12-16
Payer: MEDICARE

## 2020-12-16 ENCOUNTER — PATIENT OUTREACH (OUTPATIENT)
Dept: HEALTH INFORMATION MANAGEMENT | Facility: OTHER | Age: 85
End: 2020-12-16

## 2020-12-16 VITALS
DIASTOLIC BLOOD PRESSURE: 66 MMHG | HEIGHT: 69 IN | OXYGEN SATURATION: 99 % | BODY MASS INDEX: 29.49 KG/M2 | WEIGHT: 199.08 LBS | HEART RATE: 47 BPM | RESPIRATION RATE: 18 BRPM | SYSTOLIC BLOOD PRESSURE: 121 MMHG | TEMPERATURE: 96.6 F

## 2020-12-16 LAB
ALBUMIN SERPL BCP-MCNC: 2.7 G/DL (ref 3.2–4.9)
ALBUMIN/GLOB SERPL: 0.7 G/DL
ALP SERPL-CCNC: 59 U/L (ref 30–99)
ALT SERPL-CCNC: 32 U/L (ref 2–50)
ANION GAP SERPL CALC-SCNC: 14 MMOL/L (ref 7–16)
AST SERPL-CCNC: 25 U/L (ref 12–45)
BASOPHILS # BLD AUTO: 0.3 % (ref 0–1.8)
BASOPHILS # BLD: 0.04 K/UL (ref 0–0.12)
BILIRUB SERPL-MCNC: 0.6 MG/DL (ref 0.1–1.5)
BUN SERPL-MCNC: 48 MG/DL (ref 8–22)
CALCIUM SERPL-MCNC: 9.2 MG/DL (ref 8.4–10.2)
CHLORIDE SERPL-SCNC: 96 MMOL/L (ref 96–112)
CO2 SERPL-SCNC: 28 MMOL/L (ref 20–33)
CREAT SERPL-MCNC: 1.4 MG/DL (ref 0.5–1.4)
EOSINOPHIL # BLD AUTO: 0.03 K/UL (ref 0–0.51)
EOSINOPHIL NFR BLD: 0.2 % (ref 0–6.9)
ERYTHROCYTE [DISTWIDTH] IN BLOOD BY AUTOMATED COUNT: 44.4 FL (ref 35.9–50)
EST. AVERAGE GLUCOSE BLD GHB EST-MCNC: 151 MG/DL
GLOBULIN SER CALC-MCNC: 3.7 G/DL (ref 1.9–3.5)
GLUCOSE BLD-MCNC: 101 MG/DL (ref 65–99)
GLUCOSE BLD-MCNC: 267 MG/DL (ref 65–99)
GLUCOSE SERPL-MCNC: 93 MG/DL (ref 65–99)
HBA1C MFR BLD: 6.9 % (ref 0–5.6)
HCT VFR BLD AUTO: 39.4 % (ref 42–52)
HGB BLD-MCNC: 12.9 G/DL (ref 14–18)
IMM GRANULOCYTES # BLD AUTO: 0.21 K/UL (ref 0–0.11)
IMM GRANULOCYTES NFR BLD AUTO: 1.6 % (ref 0–0.9)
LYMPHOCYTES # BLD AUTO: 1.43 K/UL (ref 1–4.8)
LYMPHOCYTES NFR BLD: 10.7 % (ref 22–41)
MCH RBC QN AUTO: 30.1 PG (ref 27–33)
MCHC RBC AUTO-ENTMCNC: 32.7 G/DL (ref 33.7–35.3)
MCV RBC AUTO: 92.1 FL (ref 81.4–97.8)
MONOCYTES # BLD AUTO: 0.52 K/UL (ref 0–0.85)
MONOCYTES NFR BLD AUTO: 3.9 % (ref 0–13.4)
NEUTROPHILS # BLD AUTO: 11.18 K/UL (ref 1.82–7.42)
NEUTROPHILS NFR BLD: 83.3 % (ref 44–72)
NRBC # BLD AUTO: 0 K/UL
NRBC BLD-RTO: 0 /100 WBC
PLATELET # BLD AUTO: 291 K/UL (ref 164–446)
PMV BLD AUTO: 9.5 FL (ref 9–12.9)
POTASSIUM SERPL-SCNC: 4.3 MMOL/L (ref 3.6–5.5)
PROT SERPL-MCNC: 6.4 G/DL (ref 6–8.2)
RBC # BLD AUTO: 4.28 M/UL (ref 4.7–6.1)
SODIUM SERPL-SCNC: 138 MMOL/L (ref 135–145)
WBC # BLD AUTO: 13.4 K/UL (ref 4.8–10.8)

## 2020-12-16 PROCEDURE — 700102 HCHG RX REV CODE 250 W/ 637 OVERRIDE(OP): Performed by: FAMILY MEDICINE

## 2020-12-16 PROCEDURE — 99239 HOSP IP/OBS DSCHRG MGMT >30: CPT | Performed by: FAMILY MEDICINE

## 2020-12-16 PROCEDURE — 80053 COMPREHEN METABOLIC PANEL: CPT

## 2020-12-16 PROCEDURE — 82962 GLUCOSE BLOOD TEST: CPT

## 2020-12-16 PROCEDURE — A9270 NON-COVERED ITEM OR SERVICE: HCPCS | Performed by: FAMILY MEDICINE

## 2020-12-16 PROCEDURE — 85025 COMPLETE CBC W/AUTO DIFF WBC: CPT

## 2020-12-16 PROCEDURE — 83036 HEMOGLOBIN GLYCOSYLATED A1C: CPT

## 2020-12-16 PROCEDURE — 700111 HCHG RX REV CODE 636 W/ 250 OVERRIDE (IP): Performed by: FAMILY MEDICINE

## 2020-12-16 RX ORDER — DEXAMETHASONE 6 MG/1
6 TABLET ORAL DAILY
Qty: 5 TAB | Refills: 0 | Status: SHIPPED | OUTPATIENT
Start: 2020-12-16 | End: 2020-12-21

## 2020-12-16 RX ORDER — METOPROLOL SUCCINATE 50 MG/1
12.5 TABLET, EXTENDED RELEASE ORAL EVERY EVENING
Qty: 60 TAB | Refills: 0 | Status: SHIPPED | OUTPATIENT
Start: 2020-12-16 | End: 2021-05-26

## 2020-12-16 RX ORDER — CEFDINIR 300 MG/1
300 CAPSULE ORAL 2 TIMES DAILY
Qty: 4 CAP | Refills: 0 | Status: SHIPPED | OUTPATIENT
Start: 2020-12-16 | End: 2021-07-14

## 2020-12-16 RX ADMIN — Medication 100 MG: at 06:00

## 2020-12-16 RX ADMIN — INSULIN HUMAN 3 UNITS: 100 INJECTION, SOLUTION PARENTERAL at 12:30

## 2020-12-16 RX ADMIN — DEXAMETHASONE 6 MG: 4 TABLET ORAL at 05:12

## 2020-12-16 RX ADMIN — FUROSEMIDE 20 MG: 10 INJECTION, SOLUTION INTRAMUSCULAR; INTRAVENOUS at 05:11

## 2020-12-16 RX ADMIN — ZINC SULFATE 220 MG (50 MG) CAPSULE 220 MG: CAPSULE at 05:12

## 2020-12-16 RX ADMIN — Medication 1000 UNITS: at 05:12

## 2020-12-16 RX ADMIN — OXYCODONE HYDROCHLORIDE AND ACETAMINOPHEN 500 MG: 500 TABLET ORAL at 05:12

## 2020-12-16 NOTE — DISCHARGE PLANNING
Received Choice form at 4127  Agency/Facility Name: [facility]  Referral sent per Choice form @ 9029

## 2020-12-16 NOTE — PROGRESS NOTES
Patient maintaining O2 sats in the 90's throughout the night on 1L or less most of the night. Patient remains teresa in the 40's while sleeping. No symptoms noted with this.     Will continue hourly rounding and intervening as needed.

## 2020-12-16 NOTE — DISCHARGE PLANNING
Anticipated Disposition:  Home with Home Health     Action:   -> Chart review completed. HH is recommended by PT. Dr. Viveros placed HH order.       1- This CM called Madelin spouse, we discussed the discharge plan which is home with home health. Madelin agrees with it. Would like to use [facility]. Choice form signed and faxed to [facility].           Barriers to Discharge:  [facility] acceptance  Medical clearance      Plan:  Please follow up with [facility] for HH acceptance  Please follow up with attending physician for medical clearance.       Addendum  11:58 am    Per bedside RN, Pt needs home O2, and has [facility] as previous Home O2 supplier. This CM filed DME choice form and faxed to [facility].     Pending [facility] tank delivery.

## 2020-12-16 NOTE — DISCHARGE PLANNING
Received Choice form at 1243  Agency/Facility Name: [facility]  Referral sent per Choice form @ 3035     @4801  According to [facility] this Pt is on service with them. They will bring a tank to  with in 2 hours.

## 2020-12-16 NOTE — DISCHARGE SUMMARY
Discharge Summary    CHIEF COMPLAINT ON ADMISSION  Chief Complaint   Patient presents with   • Cough     Sent by MD, off antibx x4 days per pt. spouse. Pt. saturating 76% on RA. Placed on 4L NC. Pt. spouse stating he is here fot CXR.        Reason for Admission  Sent by MD     Admission Date  12/10/2020    CODE STATUS  Full Code    HPI & HOSPITAL COURSE  This is a 85 y.o. male here with  ongoing fevers. Patient was diagnosed with covid on 11/4 and stayed in the hospital until 11/8. Patient has not been doing well at home, has had ongoing fevers even though PCP has started antibiotics. Today, PCP sent patient to the ER.  He was noted to have sepsis  And admitted  And sepsis probably 2nd to bacterial pneumonia and COVID pneumonia with positive procalcitonin.no iv fluid was given 2nd to COVID..  HE INITIALLY WAS STARTED ON IV VANCOMYCIN AND ZOSYN AND  Later changed to unasy.his o2 needs have dropped a lot and he has been on dexamethasone as well.  He feesl much better and will be discharged home.his blood cultures are negative times 2.  Pt can go home with o2 monitoring        Physical Exam  Constitutional:       General: He is not in acute distress.     Appearance: He is obese. He is not toxic-appearing.   HENT:      Head: Normocephalic and atraumatic.      Nose: No congestion or rhinorrhea.      Mouth/Throat:      Mouth: Mucous membranes are dry.   Eyes:      Conjunctiva/sclera: Conjunctivae normal.      Pupils: Pupils are equal, round, and reactive to light.   Neck:      Musculoskeletal: No neck rigidity or muscular tenderness.   Cardiovascular:      Rate and Rhythm: Normal rate and regular rhythm.      Heart sounds: No murmur. No friction rub.   Pulmonary:      Breath sounds: No stridor. Rhonchi present.   Abdominal:      Palpations: Abdomen is soft.      Tenderness: There is no abdominal tenderness. There is no guarding.   Musculoskeletal:         General: No swelling or tenderness.   Skin:     General: Skin is  warm and dry.   Neurological:      Mental Status: He is alert and oriented to person, place, and time. Mental status is at baseline        Sepsis (HCC)- (present on admission)  Assessment & Plan  -has resolved     COVID-19 virus infection- (present on admission)  Assessment & Plan  -ongoing symptoms, initially diagnosed on 11/4  -possibly still causing his symptoms now   -keep patient in isolation  -continue decadron  Not  A candidate for remisidivir since he is more then 10 days of his initial covid positive test  -elevated d-dimer, unable to get CTA due to kidney function however patient is on xarelto   procalcitonin is elevated and potentially has bacterial pneumonia  Vancomycin and zosyn are dced   unasyn is dced  Not  A candidate for remisidivir since he is more then 10 days of his initial covid positive test  Cefdinir 300mg bid for 2 days  Pt and ot recommended HH  Will continue his home dexamethasone as per prior to admission          Persistent atrial fibrillation (HCC)- (present on admission)  Assessment & Plan  - metoprolol  Is decreased to 12.5mg bid since his heart rate   Is on the low side.  amiodarone    -continue xarelto     Essential hypertension- (present on admission)  Assessment & Plan  controlled  -hold home benazapril 2nd to low bp  -continue home metoprolol lower dose        Elevated troponin- (present on admission)  Assessment & Plan  -mild elevation, likely demand ischemia  -has trended down  No chest pain  -monitor on tele  -he does have a history of CHF with ER of 35% but most recent echo on 11/8 with preserved EF             Stage 3 chronic kidney disease- (present on admission)  Assessment & Plan  -repeat bmp in the am      Therefore, he is discharged in good and stable condition to home with close outpatient follow-up.    The patient met 2-midnight criteria for an inpatient stay at the time of discharge.    Discharge Date  12/16/20    FOLLOW UP ITEMS POST DISCHARGE  pcp next  week    DISCHARGE DIAGNOSES  Active Problems:    COVID-19 virus infection POA: Yes    Sepsis (HCC) POA: Yes    Essential hypertension POA: Yes    Persistent atrial fibrillation (HCC) POA: Yes    Hyperglycemia POA: Yes    Elevated troponin POA: Yes    Stage 3 chronic kidney disease POA: Yes  Resolved Problems:    * No resolved hospital problems. *      FOLLOW UP  Future Appointments   Date Time Provider Department Center   12/18/2020 10:30 AM Nigel Gonzalez M.D. [facility] [address]   12/24/2020 10:00 AM ETHAN Freitas [address]   12/31/2020 11:00 AM ETHAN [facility]endez[facility] [address]     No follow-up provider specified.    MEDICATIONS ON DISCHARGE     Medication List      START taking these medications      Instructions   cefdinir 300 MG Caps  Commonly known as: OMNICEF   Take 1 Cap by mouth 2 times a day.  Dose: 300 mg        CHANGE how you take these medications      Instructions   amiodarone 200 MG Tabs  What changed: when to take this  Commonly known as: Cordarone   Take 1 Tab by mouth every day.  Dose: 200 mg     clopidogrel 75 MG Tabs  What changed: when to take this  Commonly known as: PLAVIX   Take 1 Tab by mouth every day.  Dose: 75 mg     dexamethasone 6 MG Tabs  What changed: additional instructions  Commonly known as: DECADRON   Take 1 Tab by mouth every day.  Dose: 6 mg     metoprolol SR 50 MG Tb24  What changed:   · how much to take  · when to take this  Commonly known as: TOPROL XL   Take 0.5 Tabs by mouth every evening.  Dose: 25 mg     Xarelto 20 MG Tabs tablet  What changed: when to take this  Generic drug: rivaroxaban   Take 1 Tab by mouth every day.  Dose: 20 mg        CONTINUE taking these medications      Instructions   albuterol 108 (90 Base) MCG/ACT Aers inhalation aerosol   Inhale 1-2 Puffs every 6 hours as needed for Shortness of Breath.  Dose: 1-2 Puff     ascorbic acid 1000 MG tablet  Commonly known as: VITAMIN C   Take 1 Tab by mouth 2 Times a Day.  Dose: 1,000 mg     benzonatate 100 MG  Caps  Commonly known as: TESSALON   Take 1 Cap by mouth 3 times a day as needed for Cough.  Dose: 100 mg     EMERGEN-C VITAMIN C PO   Take 1,000 mg by mouth every morning.  Dose: 1,000 mg     MUCINEX PO   Take 1 Tab by mouth every morning.  Dose: 1 Tab     ondansetron 4 MG Tbdp  Commonly known as: ZOFRAN ODT   Take 1 Tab by mouth every 6 hours as needed for Nausea (give PO if no IV route available).  Dose: 4 mg     Vitamin D2 10 MCG (400 UNIT) Tabs   Take 400 Units by mouth every evening.  Dose: 400 Units     zinc sulfate 220 (50 Zn) MG Caps  Commonly known as: ZINCATE   Take 1 Cap by mouth every day.  Dose: 220 mg        STOP taking these medications    azithromycin 500 MG tablet  Commonly known as: ZITHROMAX     benazepril 10 MG Tabs  Commonly known as: LOTENSIN            Allergies  Allergies   Allergen Reactions   • Atorvastatin Nausea and Myalgia     Severe myalgias, generally feeling unwell.   • Codeine Nausea     Nausea       DIET  Orders Placed This Encounter   Procedures   • Diet Order Diet: Regular     Standing Status:   Standing     Number of Occurrences:   1     Order Specific Question:   Diet:     Answer:   Regular [1]       ACTIVITY  As tolerated.  Weight bearing as tolerated    CONSULTATIONS  none    PROCEDURES  none    LABORATORY  Lab Results   Component Value Date    SODIUM 138 12/16/2020    POTASSIUM 4.3 12/16/2020    CHLORIDE 96 12/16/2020    CO2 28 12/16/2020    GLUCOSE 93 12/16/2020    BUN 48 (H) 12/16/2020    CREATININE 1.40 12/16/2020        Lab Results   Component Value Date    WBC 13.4 (H) 12/16/2020    HEMOGLOBIN 12.9 (L) 12/16/2020    HEMATOCRIT 39.4 (L) 12/16/2020    PLATELETCT 291 12/16/2020        Total time of the discharge process exceeds 35 minutes.

## 2020-12-16 NOTE — DISCHARGE INSTRUCTIONS
Discharge Instructions    Discharged to home by car with relative. Discharged via wheelchair, hospital escort: Yes.  Special equipment needed: Oxygen    Be sure to schedule a follow-up appointment with your primary care doctor or any specialists as instructed.     Discharge Plan:        I understand that a diet low in cholesterol, fat, and sodium is recommended for good health. Unless I have been given specific instructions below for another diet, I accept this instruction as my diet prescription.       Special Instructions: None    · Is patient discharged on Warfarin / Coumadin?   No     Depression / Suicide Risk    As you are discharged from this [facility] facility, it is important to learn how to keep safe from harming yourself.    Recognize the warning signs:  · Abrupt changes in personality, positive or negative- including increase in energy   · Giving away possessions  · Change in eating patterns- significant weight changes-  positive or negative  · Change in sleeping patterns- unable to sleep or sleeping all the time   · Unwillingness or inability to communicate  · Depression  · Unusual sadness, discouragement and loneliness  · Talk of wanting to die  · Neglect of personal appearance   · Rebelliousness- reckless behavior  · Withdrawal from people/activities they love  · Confusion- inability to concentrate     If you or a loved one observes any of these behaviors or has concerns about self-harm, here's what you can do:  · Talk about it- your feelings and reasons for harming yourself  · Remove any means that you might use to hurt yourself (examples: pills, rope, extension cords, firearm)  · Get professional help from the community (Mental Health, Substance Abuse, psychological counseling)  · Do not be alone:Call your Safe Contact- someone whom you trust who will be there for you.  · Call your local CRISIS HOTLINE 154-4783 or 411-933-0034  · Call your local Children's Mobile Crisis Response Team [place] (761) 407-0685 or www.[employer]story  · Call the toll free National Suicide Prevention Hotlines   · National Suicide Prevention Lifeline 023-780-UHDB (9381)  · [employer] [facility] [employer] Network 800-SUICIDE (628-5659)      COVID-19  COVID-19 is a respiratory infection that is caused by a virus called severe acute respiratory syndrome coronavirus 2 (SARS-CoV-2). The disease is also known as coronavirus disease or novel coronavirus. In some people, the virus may not cause any symptoms. In others, it may cause a serious infection. The infection can get worse quickly and can lead to complications, such as:  · Pneumonia, or infection of the lungs.  · Acute respiratory distress syndrome or ARDS. This is fluid build-up in the lungs.  · Acute respiratory failure. This is a condition in which there is not enough oxygen passing from the lungs to the body.  · Sepsis or septic shock. This is a serious bodily reaction to an infection.  · Blood clotting problems.  · Secondary infections due to bacteria or fungus.  The virus that causes COVID-19 is contagious. This means that it can spread from person to person through droplets from coughs and sneezes (respiratory secretions).  What are the causes?  This illness is caused by a virus. You may catch the virus by:  · Breathing in droplets from an infected person's cough or sneeze.  · Touching something, like a table or a doorknob, that was exposed to the virus (contaminated) and then touching your mouth, nose, or eyes.  What increases the risk?  Risk for infection  You are more likely to be infected with this virus if you:  · Live in or travel to an area with a COVID-19 outbreak.  · Come in contact with a sick person who recently traveled to an area with a COVID-19 outbreak.  · Provide care for or live with a person who is infected with COVID-19.  Risk for serious illness  You are more likely to become seriously ill from the virus if you:  · Are 65 years of age or older.  · Have a  long-term disease that lowers your body's ability to fight infection (immunocompromised).  · Live in a nursing home or long-term care facility.  · Have a long-term (chronic) disease such as:  ? Chronic lung disease, including chronic obstructive pulmonary disease or asthma  ? Heart disease.  ? Diabetes.  ? Chronic kidney disease.  ? Liver disease.  · Are obese.  What are the signs or symptoms?  Symptoms of this condition can range from mild to severe. Symptoms may appear any time from 2 to 14 days after being exposed to the virus. They include:  · A fever.  · A cough.  · Difficulty breathing.  · Chills.  · Muscle pains.  · A sore throat.  · Loss of taste or smell.  Some people may also have stomach problems, such as nausea, vomiting, or diarrhea.  Other people may not have any symptoms of COVID-19.  How is this diagnosed?  This condition may be diagnosed based on:  · Your signs and symptoms, especially if:  ? You live in an area with a COVID-19 outbreak.  ? You recently traveled to or from an area where the virus is common.  ? You provide care for or live with a person who was diagnosed with COVID-19.  · A physical exam.  · Lab tests, which may include:  ? A nasal swab to take a sample of fluid from your nose.  ? A throat swab to take a sample of fluid from your throat.  ? A sample of mucus from your lungs (sputum).  ? Blood tests.  · Imaging tests, which may include, X-rays, CT scan, or ultrasound.  How is this treated?  At present, there is no medicine to treat COVID-19. Medicines that treat other diseases are being used on a trial basis to see if they are effective against COVID-19.  Your health care provider will talk with you about ways to treat your symptoms. For most people, the infection is mild and can be managed at home with rest, fluids, and over-the-counter medicines.  Treatment for a serious infection usually takes places in a hospital intensive care unit (ICU). It may include one or more of the  following treatments. These treatments are given until your symptoms improve.  · Receiving fluids and medicines through an IV.  · Supplemental oxygen. Extra oxygen is given through a tube in the nose, a face mask, or a fuentes.  · Positioning you to lie on your stomach (prone position). This makes it easier for oxygen to get into the lungs.  · Continuous positive airway pressure (CPAP) or bi-level positive airway pressure (BPAP) machine. This treatment uses mild air pressure to keep the airways open. A tube that is connected to a motor delivers oxygen to the body.  · Ventilator. This treatment moves air into and out of the lungs by using a tube that is placed in your windpipe.  · Tracheostomy. This is a procedure to create a hole in the neck so that a breathing tube can be inserted.  · Extracorporeal membrane oxygenation (ECMO). This procedure gives the lungs a chance to recover by taking over the functions of the heart and lungs. It supplies oxygen to the body and removes carbon dioxide.  Follow these instructions at home:  Lifestyle  · If you are sick, stay home except to get medical care. Your health care provider will tell you how long to stay home. Call your health care provider before you go for medical care.  · Rest at home as told by your health care provider.  · Do not use any products that contain nicotine or tobacco, such as cigarettes, e-cigarettes, and chewing tobacco. If you need help quitting, ask your health care provider.  · Return to your normal activities as told by your health care provider. Ask your health care provider what activities are safe for you.  General instructions  · Take over-the-counter and prescription medicines only as told by your health care provider.  · Drink enough fluid to keep your urine pale yellow.  · Keep all follow-up visits as told by your health care provider. This is important.  How is this prevented?    There is no vaccine to help prevent COVID-19 infection. However,  there are steps you can take to protect yourself and others from this virus.  To protect yourself:   · Do not travel to areas where COVID-19 is a risk. The areas where COVID-19 is reported change often. To identify high-risk areas and travel restrictions, check the [facility] travel website: wwwnc.cdc.gov/travel/notices  · If you live in, or must travel to, an area where COVID-19 is a risk, take precautions to avoid infection.  ? Stay away from people who are sick.  ? Wash your hands often with soap and water for 20 seconds. If soap and water are not available, use an alcohol-based hand .  ? Avoid touching your mouth, face, eyes, or nose.  ? Avoid going out in public, follow guidance from your state and local health authorities.  ? If you must go out in public, wear a cloth face covering or face mask.  ? Disinfect objects and surfaces that are frequently touched every day. This may include:  § Counters and tables.  § Doorknobs and light switches.  § Sinks and faucets.  § Electronics, such as phones, remote controls, keyboards, computers, and tablets.  To protect others:  If you have symptoms of COVID-19, take steps to prevent the virus from spreading to others.  · If you think you have a COVID-19 infection, contact your health care provider right away. Tell your health care team that you think you may have a COVID-19 infection.  · Stay home. Leave your house only to seek medical care. Do not use public transport.  · Do not travel while you are sick.  · Wash your hands often with soap and water for 20 seconds. If soap and water are not available, use alcohol-based hand .  · Stay away from other members of your household. Let healthy household members care for children and pets, if possible. If you have to care for children or pets, wash your hands often and wear a mask. If possible, stay in your own room, separate from others. Use a different bathroom.  · Make sure that all people in your household wash  their hands well and often.  · Cough or sneeze into a tissue or your sleeve or elbow. Do not cough or sneeze into your hand or into the air.  · Wear a cloth face covering or face mask.  Where to find more information  · Centers for Disease Control and Prevention: www.cdc.gov/coronavirus/2019-ncov/index.html  · World Health Organization: www.who.int/health-topics/coronavirus  Contact a health care provider if:  · You live in or have traveled to an area where COVID-19 is a risk and you have symptoms of the infection.  · You have had contact with someone who has COVID-19 and you have symptoms of the infection.  Get help right away if:  · You have trouble breathing.  · You have pain or pressure in your chest.  · You have confusion.  · You have bluish lips and fingernails.  · You have difficulty waking from sleep.  · You have symptoms that get worse.  These symptoms may represent a serious problem that is an emergency. Do not wait to see if the symptoms will go away. Get medical help right away. Call your local emergency services (911 in the [place] Do not drive yourself to the hospital. Let the emergency medical personnel know if you think you have COVID-19.  Summary  · COVID-19 is a respiratory infection that is caused by a virus. It is also known as coronavirus disease or novel coronavirus. It can cause serious infections, such as pneumonia, acute respiratory distress syndrome, acute respiratory failure, or sepsis.  · The virus that causes COVID-19 is contagious. This means that it can spread from person to person through droplets from coughs and sneezes.  · You are more likely to develop a serious illness if you are 65 years of age or older, have a weak immunity, live in a nursing home, or have chronic disease.  · There is no medicine to treat COVID-19. Your health care provider will talk with you about ways to treat your symptoms.  · Take steps to protect yourself and others from infection. Wash your hands often and  disinfect objects and surfaces that are frequently touched every day. Stay away from people who are sick and wear a mask if you are sick.  This information is not intended to replace advice given to you by your health care provider. Make sure you discuss any questions you have with your health care provider.  Document Released: 01/23/2020 Document Revised: 05/14/2020 Document Reviewed: 01/23/2020  Elsevier Patient Education © 2020 [employer]    COVID-19: How to Protect Yourself and Others  Know how it spreads  · There is currently no vaccine to prevent coronavirus disease 2019 (COVID-19).  · The best way to prevent illness is to avoid being exposed to this virus.  · The virus is thought to spread mainly from person-to-person.  ? Between people who are in close contact with one another (within about 6 feet).  ? Through respiratory droplets produced when an infected person coughs, sneezes or talks.  ? These droplets can land in the mouths or noses of people who are nearby or possibly be inhaled into the lungs.  ? Some recent studies have suggested that COVID-19 may be spread by people who are not showing symptoms.  Everyone should  Clean your hands often  · Wash your hands often with soap and water for at least 20 seconds especially after you have been in a public place, or after blowing your nose, coughing, or sneezing.  · If soap and water are not readily available, use a hand  that contains at least 60% alcohol. Cover all surfaces of your hands and rub them together until they feel dry.  · Avoid touching your eyes, nose, and mouth with unwashed hands.  Avoid close contact  · Stay home if you are sick.  · Avoid close contact with people who are sick.  · Put distance between yourself and other people.  ? Remember that some people without symptoms may be able to spread virus.  ? This is especially important for people who are at higher risk of getting very  sick.www.cdc.gov/coronavirus/2019-ncov/need-extra-precautions/people-at-higher-risk.html  Cover your mouth and nose with a cloth face cover when around others  · You could spread COVID-19 to others even if you do not feel sick.  · Everyone should wear a cloth face cover when they have to go out in public, for example to the grocery store or to  other necessities.  ? Cloth face coverings should not be placed on young children under age 2, anyone who has trouble breathing, or is unconscious, incapacitated or otherwise unable to remove the mask without assistance.  · The cloth face cover is meant to protect other people in case you are infected.  · Do NOT use a facemask meant for a healthcare worker.  · Continue to keep about 6 feet between yourself and others. The cloth face cover is not a substitute for social distancing.  Cover coughs and sneezes  · If you are in a private setting and do not have on your cloth face covering, remember to always cover your mouth and nose with a tissue when you cough or sneeze or use the inside of your elbow.  · Throw used tissues in the trash.  · Immediately wash your hands with soap and water for at least 20 seconds. If soap and water are not readily available, clean your hands with a hand  that contains at least 60% alcohol.  Clean and disinfect  · Clean AND disinfect frequently touched surfaces daily. This includes tables, doorknobs, light switches, countertops, handles, desks, phones, keyboards, toilets, faucets, and sinks. www.cdc.gov/coronavirus/2019-ncov/prevent-getting-sick/disinfecting-your-home.html  · If surfaces are dirty, clean them: Use detergent or soap and water prior to disinfection.  · Then, use a household disinfectant. You can see a list of EPA-registered household disinfectants here.  cdc.gov/coronavirus  05/05/2020  This information is not intended to replace advice given to you by your health care provider. Make sure you discuss any questions you  have with your health care provider.  Document Released: 04/14/2020 Document Revised: 05/13/2020 Document Reviewed: 04/14/2020  Elsevier Patient Education © 2020 [employer]

## 2020-12-16 NOTE — DISCHARGE PLANNING
ATTN: Case Management  RE: Referral for Home Health    As of 12/16/2020, we have accepted the Home Health referral for the patient listed above.    A [facility] clinician will be out to see the patient within 48 hours. If you have any questions or concerns regarding the patient’s transition to Home Health, please do not hesitate to contact us at [phone].      We look forward to collaborating with you,  [facility] Health Team

## 2020-12-16 NOTE — FACE TO FACE
Face to Face Note  -  Durable Medical Equipment    H Dorys Viveros M.D. - NPI: [id number]  I certify that this patient is under my care and that they had a durable medical equipment(DME)face to face encounter by myself that meets the physician DME face-to-face encounter requirements with this patient on:    Date of encounter:   Patient:                    MRN:                       YOB: 2019  Alonso Holder  [id number]  1935     The encounter with the patient was in whole, or in part, for the following medical condition, which is the primary reason for durable medical equipment:  Other - COVID 19    I certify that, based on my findings, the following durable medical equipment is medically necessary:  Oxygen.    HOME O2 Saturation Measurements:(Values must be present for Home Oxygen orders)  Room air sat at rest: 88  Room air sat with amb: 76  With liters of O2: 2, O2 sat at rest with O2: 95  With Liters of O2: 4, O2 sat with amb with O2 : 91  Is the patient mobile?: Yes    My Clinical findings support the need for the above equipment due to:  Hypoxia    Supporting Symptoms: COVID 19    If patient feels more short of breath, they can go up to 6 liters per minute and contact healthcare provider.

## 2020-12-17 NOTE — PROGRESS NOTES
Pt discharged to home with oxygen per physician order. Discharge instructions reviewed with pt.. verbalized understanding. IV removed intact, 2x2 with dressing applied. Wife here for . Pt transferred via wheel chair by staff to private vehicle.

## 2020-12-18 ENCOUNTER — HOME CARE VISIT (OUTPATIENT)
Dept: HOME HEALTH SERVICES | Facility: HOME HEALTHCARE | Age: 85
End: 2020-12-18
Payer: MEDICARE

## 2020-12-18 ENCOUNTER — APPOINTMENT (OUTPATIENT)
Dept: WOUND CARE | Facility: MEDICAL CENTER | Age: 85
End: 2020-12-18
Attending: INTERNAL MEDICINE
Payer: MEDICARE

## 2020-12-18 VITALS
RESPIRATION RATE: 18 BRPM | HEART RATE: 58 BPM | HEIGHT: 69 IN | WEIGHT: 190 LBS | DIASTOLIC BLOOD PRESSURE: 60 MMHG | TEMPERATURE: 96.5 F | SYSTOLIC BLOOD PRESSURE: 110 MMHG | BODY MASS INDEX: 28.14 KG/M2 | OXYGEN SATURATION: 97 %

## 2020-12-18 PROCEDURE — A6214 FOAM DRG > 48 SQ IN W/BORDER: HCPCS

## 2020-12-18 PROCEDURE — 665001 SOC-HOME HEALTH

## 2020-12-18 PROCEDURE — G0493 RN CARE EA 15 MIN HH/HOSPICE: HCPCS

## 2020-12-18 SDOH — ECONOMIC STABILITY: HOUSING INSECURITY
HOME SAFETY: OXYGEN SAFETY RISK ASSESSMENT PERFORMED. PATIENT DOES HAVE A NO SMOKING SIGN POSTED IN THE HOME. PATIENT DOES HAVE A WORKING FIRE EXTINGUISHER PRESENT IN THE HOME. SMOKE ALARMS ARE PRESENT AND FUNCTIONAL ON EACH LEVEL OF THE HOME. PATIENT DOES HAVE A

## 2020-12-18 SDOH — ECONOMIC STABILITY: HOUSING INSECURITY: EVIDENCE OF SMOKING MATERIAL: 0

## 2020-12-18 SDOH — ECONOMIC STABILITY: HOUSING INSECURITY
HOME SAFETY: FIRE ESCAPE PLAN DEVELOPED. PATIENT DOES NOT HAVE FLAMMABLE MATERIALS PRESENT IN THE HOME PRESENTING A FIRE HAZARD. NO EVIDENCE FOUND OF SMOKING MATERIALS PRESENT IN THE HOME.

## 2020-12-18 ASSESSMENT — PATIENT HEALTH QUESTIONNAIRE - PHQ9
2. FEELING DOWN, DEPRESSED, IRRITABLE, OR HOPELESS: 00
CLINICAL INTERPRETATION OF PHQ2 SCORE: 0
1. LITTLE INTEREST OR PLEASURE IN DOING THINGS: 00

## 2020-12-18 ASSESSMENT — ENCOUNTER SYMPTOMS
VOMITING: DENES
NAUSEA: DENIES

## 2020-12-18 ASSESSMENT — FIBROSIS 4 INDEX: FIB4 SCORE: 1.28

## 2020-12-18 ASSESSMENT — ACTIVITIES OF DAILY LIVING (ADL): OASIS_M1830: 03

## 2020-12-19 NOTE — PROGRESS NOTES
After Visit Summary   6/26/2018    Olamide Juárez    MRN: [id number]           Patient Information     Date Of Birth          1977        Visit Information        Provider Department      6/26/2018 8:00 AM Giselle Block MD [facility]        Today's Diagnoses     Encounter for routine adult health examination without abnormal findings    -  1    Screening for cholesterol level        Screening for diabetes mellitus        Need for prophylactic vaccination and inoculation against viral hepatitis          Care Instructions      Preventive Health Recommendations  Female Ages 40 to 49    Yearly exam:     See your health care provider every year in order to  1. Review health changes.   2. Discuss preventive care.    3. Review your medicines if your doctor prescribed any.      Get a Pap test every three years (unless you have an abnormal result and your provider advises testing more often).      If you get Pap tests with HPV test, you only need to test every 5 years, unless you have an abnormal result. You do not need a Pap test if your uterus was removed (hysterectomy) and you have not had cancer.      You should be tested each year for STDs (sexually transmitted diseases), if you're at risk.     Ask your doctor if you should have a mammogram.      Have a colonoscopy (test for colon cancer) if someone in your family has had colon cancer or polyps before age 50.       Have a cholesterol test every 5 years.       Have a diabetes test (fasting glucose) after age 45. If you are at risk for diabetes, you should have this test every 3 years.    Shots: Get a flu shot each year. Get a tetanus shot every 10 years.     Nutrition:     Eat at least 5 servings of fruits and vegetables each day.    Eat whole-grain bread, whole-wheat pasta and brown rice instead of white grains and rice.    Get adequate Calcium and Vitamin D.      Lifestyle    Exercise at least 150 minutes a week (an average of 30  Primary dx/Skilled need: Pneumonia post COVID-19 diagnosis on 11/4.  Hospitalized 12/9 to 12/16 for pneumonia.  Coccyx pressure ulcer remains evident, mepilex treatment of choice.  Post hospital weakness and fatigue with loss of muscle mass is CC. Skilled need for  Pressure Ulcer assessment and care, O2, C/P assessment/intervention. And strengthening and reconditioning  SN frequency. 1wk1, 2wk2  Zip code. [address]  Disciplines ordered. PT only refused OT  Insurance and authorization. [facility]  Certification period. 12/18/2020 to 2/15/2020  Special considerations. none-past the COVID window minutes a day, 5 days a week). This will help you control your weight and prevent disease.    Limit alcohol to one drink per day.    No smoking.     Wear sunscreen to prevent skin cancer.    See your dentist every six months for an exam and cleaning.    [facility]    If you have any questions regarding to your visit please contact your care team:       Team Purple:   Clinic Hours Telephone Number   Dr. Giselle Corcoran   7am-7pm  Monday - Thursday   7am-5pm  Fridays  [phone]  (Appointment scheduling available 24/7)    Questions about your recent visit?   Team Line:  [phone]   Urgent [facility] - [place] [facility] - 11am-9pm Monday-Friday Saturday-Sunday- 9am-5pm   Tea - 5pm-9pm Monday-Friday Saturday-Sunday- 9am-5pm  [phone] - [employer]  [phone] [facility]       What options do I have for a visit other than an office visit? We offer electronic visits (e-visits) and telephone visits, when medically appropriate.  Please check with your medical insurance to see if these types of visits are covered, as you will be responsible for any charges that are not paid by your insurance.      You can use [employer] (secure electronic communication) to access to your chart, send your primary care provider a message, or make an appointment. Ask a team member how to get started.     For a price quote for your services, please call our Consumer Price Line at [phone] or our Imaging Cost estimation line at [phone] (for imaging tests).              Follow-ups after your visit        Who to contact     If you have questions or need follow up information about today's clinic visit or your schedule please contact [facility] directly at [phone].  Normal or non-critical lab and imaging results will be communicated to you by H2[employer], letter or phone within 4 business days after the clinic has received the results.  "If you do not hear from us within 7 days, please contact the clinic through [employer] or phone. If you have a critical or abnormal lab result, we will notify you by phone as soon as possible.  Submit refill requests through [employer] or call your pharmacy and they will forward the refill request to us. Please allow 3 business days for your refill to be completed.          Additional Information About Your Visit        [employer] Information     [employer] lets you send messages to your doctor, view your test results, renew your prescriptions, schedule appointments and more. To sign up, go to [email or web address] . Click on \"Log in\" on the left side of the screen, which will take you to the Welcome page. Then click on \"Sign up Now\" on the right side of the page.     You will be asked to enter the access code listed below, as well as some personal information. Please follow the directions to create your username and password.     Your access code is: [email or web address]  Expires: 2018  8:42 AM     Your access code will  in 90 days. If you need help or a new code, please call your [place] clinic or [phone].        Care EveryWhere ID     This is your Care EveryWhere ID. This could be used by other organizations to access your [place] medical records  [id number]        Your Vitals Were     Pulse Temperature Respirations Height Last Period Pulse Oximetry    89 97  F (36.1  C) (Oral) 14 5' 6.1\" (1.679 m) 2018 (Exact Date) 98%    BMI (Body Mass Index)                   33.11 kg/m2            Blood Pressure from Last 3 Encounters:   18 130/70    Weight from Last 3 Encounters:   18 205 lb 12.8 oz (93.4 kg)              We Performed the Following     Glucose     HEPATITIS A VACCINE (ADULT)     Lipid panel reflex to direct LDL Fasting        Primary Care Provider Office Phone # Fax #    Connie Caceres [phone]       [address] [address]      "   Equal Access to Services     [facility] : Hadii aad ku hadaiyanabernard Lulytriston, waamandada luqadaha, qadixonta jenaebarryterrence soares. So [facility] [phone].    ATENCIÓN: Si habla español, tiene a nguyen disposición servicios gratuitos de asistencia lingüística. Llame al [phone].    We comply with applicable federal civil rights laws and [place] laws. We do not discriminate on the basis of race, color, national origin, age, disability, sex, sexual orientation, or gender identity.            Thank you!     Thank you for choosing [facility]  for your care. Our goal is always to provide you with excellent care. Hearing back from our patients is one way we can continue to improve our services. Please take a few minutes to complete the written survey that you may receive in the mail after your visit with us. Thank you!             Your Updated Medication List - Protect others around you: Learn how to safely use, store and throw away your medicines at www.disposemymeds.org.          This list is accurate as of 6/26/18  8:43 AM.  Always use your most recent med list.                   Brand Name Dispense Instructions for use Diagnosis    SYSTANE OP      Place 1 drop into the right eye as needed        SYSTANE OVERNIGHT THERAPY OP      Place into the right eye as needed

## 2020-12-21 ENCOUNTER — HOME CARE VISIT (OUTPATIENT)
Dept: HOME HEALTH SERVICES | Facility: HOME HEALTHCARE | Age: 85
End: 2020-12-21
Payer: MEDICARE

## 2020-12-21 PROCEDURE — G0299 HHS/HOSPICE OF RN EA 15 MIN: HCPCS

## 2020-12-22 ENCOUNTER — HOME CARE VISIT (OUTPATIENT)
Dept: HOME HEALTH SERVICES | Facility: HOME HEALTHCARE | Age: 85
End: 2020-12-22
Payer: MEDICARE

## 2020-12-22 PROCEDURE — G0151 HHCP-SERV OF PT,EA 15 MIN: HCPCS

## 2020-12-23 VITALS
TEMPERATURE: 97.8 F | OXYGEN SATURATION: 96 % | SYSTOLIC BLOOD PRESSURE: 118 MMHG | RESPIRATION RATE: 18 BRPM | DIASTOLIC BLOOD PRESSURE: 62 MMHG | HEART RATE: 85 BPM

## 2020-12-23 VITALS
SYSTOLIC BLOOD PRESSURE: 110 MMHG | OXYGEN SATURATION: 99 % | RESPIRATION RATE: 17 BRPM | TEMPERATURE: 97.9 F | DIASTOLIC BLOOD PRESSURE: 72 MMHG | HEART RATE: 84 BPM

## 2020-12-23 ASSESSMENT — ACTIVITIES OF DAILY LIVING (ADL): AMBULATION ASSISTANCE ON FLAT SURFACES: 1

## 2020-12-23 ASSESSMENT — ENCOUNTER SYMPTOMS
MUSCLE WEAKNESS: 1
VOMITING: DENIES
SEVERE DYSPNEA: 1
NAUSEA: DENIES

## 2020-12-24 ENCOUNTER — TELEPHONE (OUTPATIENT)
Dept: CARDIOLOGY | Facility: MEDICAL CENTER | Age: 85
End: 2020-12-24

## 2020-12-24 ENCOUNTER — APPOINTMENT (OUTPATIENT)
Dept: WOUND CARE | Facility: MEDICAL CENTER | Age: 85
End: 2020-12-24
Attending: INTERNAL MEDICINE
Payer: MEDICARE

## 2020-12-24 ENCOUNTER — HOME CARE VISIT (OUTPATIENT)
Dept: HOME HEALTH SERVICES | Facility: HOME HEALTHCARE | Age: 85
End: 2020-12-24
Payer: MEDICARE

## 2020-12-24 VITALS
TEMPERATURE: 97.4 F | SYSTOLIC BLOOD PRESSURE: 113 MMHG | HEART RATE: 52 BPM | OXYGEN SATURATION: 94 % | RESPIRATION RATE: 16 BRPM | DIASTOLIC BLOOD PRESSURE: 67 MMHG

## 2020-12-24 PROCEDURE — A6214 FOAM DRG > 48 SQ IN W/BORDER: HCPCS

## 2020-12-24 PROCEDURE — G0299 HHS/HOSPICE OF RN EA 15 MIN: HCPCS

## 2020-12-24 NOTE — TELEPHONE ENCOUNTER
Discussed with AA. Recommends if HR <50, ok to hold metoprolol.    Called Kaitlin at [phone], discussed recommendations above. She will relay information to pt and spouse. Verbalized understanding and appreciative of call back.

## 2020-12-24 NOTE — TELEPHONE ENCOUNTER
Received call from Kaitlin, ALMA ROSA RN, warm transfer from operators. Pt had HH visit this morning. Kaitlin reports pt's HR 52 and irregular, pt has hx of Afib and currently taking Xarelto and metoprolol.    Pt takes 25mg metoprolol Qevening. No symptoms reported. Pt scheduled for soonest available with TW per pt's spouse request. Advised will seek if any new recommendations regarding meds.

## 2020-12-25 ASSESSMENT — ENCOUNTER SYMPTOMS
VOMITING: DENIES
SHORTNESS OF BREATH: T
LIMITED RANGE OF MOTION: 1
NAUSEA: DENIES
MUSCLE WEAKNESS: 1

## 2020-12-28 ASSESSMENT — ENCOUNTER SYMPTOMS: SEVERE DYSPNEA: 1

## 2020-12-29 ENCOUNTER — TELEPHONE (OUTPATIENT)
Dept: CARDIOLOGY | Facility: MEDICAL CENTER | Age: 85
End: 2020-12-29

## 2020-12-29 ENCOUNTER — HOME CARE VISIT (OUTPATIENT)
Dept: HOME HEALTH SERVICES | Facility: HOME HEALTHCARE | Age: 85
End: 2020-12-29
Payer: MEDICARE

## 2020-12-29 PROCEDURE — A6214 FOAM DRG > 48 SQ IN W/BORDER: HCPCS

## 2020-12-29 PROCEDURE — G0493 RN CARE EA 15 MIN HH/HOSPICE: HCPCS

## 2020-12-30 ENCOUNTER — HOME CARE VISIT (OUTPATIENT)
Dept: HOME HEALTH SERVICES | Facility: HOME HEALTHCARE | Age: 85
End: 2020-12-30
Payer: MEDICARE

## 2020-12-30 VITALS
SYSTOLIC BLOOD PRESSURE: 80 MMHG | DIASTOLIC BLOOD PRESSURE: 48 MMHG | RESPIRATION RATE: 18 BRPM | HEART RATE: 68 BPM | TEMPERATURE: 98.3 F | OXYGEN SATURATION: 94 %

## 2020-12-30 VITALS
OXYGEN SATURATION: 96 % | TEMPERATURE: 97.5 F | HEART RATE: 69 BPM | DIASTOLIC BLOOD PRESSURE: 58 MMHG | SYSTOLIC BLOOD PRESSURE: 114 MMHG | RESPIRATION RATE: 18 BRPM

## 2020-12-30 PROCEDURE — G0151 HHCP-SERV OF PT,EA 15 MIN: HCPCS

## 2020-12-30 SDOH — ECONOMIC STABILITY: HOUSING INSECURITY: HOME SAFETY: O2 SUPPLIER IS PREFERRED HOME CARE

## 2020-12-30 ASSESSMENT — ENCOUNTER SYMPTOMS
SEVERE DYSPNEA: 1
TREMORS: 1

## 2020-12-30 ASSESSMENT — ACTIVITIES OF DAILY LIVING (ADL)
CURRENT_FUNCTION: STAND BY ASSIST
AMBULATION ASSISTANCE: STAND BY ASSIST

## 2020-12-30 NOTE — TELEPHONE ENCOUNTER
TW    NM: Ramiro Pickering (RN)   PH: [phone]   PT NM: Alonso Holder   : 37   RE: Patient's blood pressure is low  and he's experiencing dizziness. Would  like to discuss changing meds.     --------------------------------------  Message History  Account: [id number]  Taken:  Tue 29-Dec-2020  2:33p [facility]  Serial#: [id number]

## 2020-12-30 NOTE — TELEPHONE ENCOUNTER
Called and discussed recommendations with Shu RN supervisor for HH. She will relay information to HH team. Confirmed pt has HH PT appointment this afternoon and HH RN appointment tomorrow.    Called and spoke with pt's spouse/SHALOM Yusuf. She stated last night, BP improved to 110/74 however afterward, SBP low again at 82 before bedtime. Pt at this time does not have any significant symptoms, currently eating breakfast. Advised safety precautions while ambulating, elevating feet to help improve swelling, hydration, and salty diet. Pt should continue holding metoprolol until SBP >120 and HR>60 and ER precautions advised. Confirmed FV with TW on 01/14/21, verbalized understanding and appreciative of call.

## 2020-12-30 NOTE — TELEPHONE ENCOUNTER
Agree with hypotension with symptoms, report to ER for worsening symptoms or call 911.    Recommend hydration and salty diet to increase BP in the interim.     He has a HH apt tomorrow per the apt log. So hopefully will be improved by then.    Hold metoprolol until SBP >120 and HR>60.    SC

## 2020-12-30 NOTE — TELEPHONE ENCOUNTER
"Called Ramiro WALLER for HH. He reports the following VS at this afternoon's HH visit.    BP 80/48  HR 68  RR 18  O2 94% 1.5L via NC    Pt reported feeling dizziness at times and pt's daughter reported pt \"has come close to fainting\". Metoprolol was held and advised Ramiro pt should continue holding at this time due to hypotension. Highly advised ER precautions if no improvement to BP.    Per Ramiro, please notify supervisor Shu WALLER at [phone] for any recommendations.  "

## 2020-12-31 ENCOUNTER — HOME CARE VISIT (OUTPATIENT)
Dept: HOME HEALTH SERVICES | Facility: HOME HEALTHCARE | Age: 85
End: 2020-12-31
Payer: MEDICARE

## 2020-12-31 ENCOUNTER — APPOINTMENT (OUTPATIENT)
Dept: WOUND CARE | Facility: MEDICAL CENTER | Age: 85
End: 2020-12-31
Attending: INTERNAL MEDICINE
Payer: MEDICARE

## 2020-12-31 PROCEDURE — G0299 HHS/HOSPICE OF RN EA 15 MIN: HCPCS

## 2020-12-31 PROCEDURE — A6214 FOAM DRG > 48 SQ IN W/BORDER: HCPCS

## 2021-01-01 ENCOUNTER — HOME CARE VISIT (OUTPATIENT)
Dept: HOME HEALTH SERVICES | Facility: HOME HEALTHCARE | Age: 86
End: 2021-01-01
Payer: MEDICARE

## 2021-01-01 VITALS
DIASTOLIC BLOOD PRESSURE: 60 MMHG | HEART RATE: 88 BPM | SYSTOLIC BLOOD PRESSURE: 96 MMHG | RESPIRATION RATE: 16 BRPM | TEMPERATURE: 97.6 F | OXYGEN SATURATION: 92 %

## 2021-01-01 PROCEDURE — G0151 HHCP-SERV OF PT,EA 15 MIN: HCPCS

## 2021-01-01 ASSESSMENT — ENCOUNTER SYMPTOMS: SEVERE DYSPNEA: 1

## 2021-01-02 VITALS
DIASTOLIC BLOOD PRESSURE: 56 MMHG | OXYGEN SATURATION: 94 % | TEMPERATURE: 97.4 F | RESPIRATION RATE: 16 BRPM | HEART RATE: 81 BPM | SYSTOLIC BLOOD PRESSURE: 122 MMHG

## 2021-01-02 SDOH — ECONOMIC STABILITY: HOUSING INSECURITY: EVIDENCE OF SMOKING MATERIAL: 0

## 2021-01-02 ASSESSMENT — ACTIVITIES OF DAILY LIVING (ADL)
AMBULATION ASSISTANCE: CONTACT GUARD ASSIST
AMBULATION ASSISTANCE: 1
DRINKING_FROM_CUP_CURRENT_FUNCTIONINDEPENDENT: 1

## 2021-01-02 NOTE — PROGRESS NOTES
" DISCIPLINE DC   Spouse Kaela present. All VS WNL. Left buttock ulcer has healed. Reviewed use of metoprolol per order by cardiology office--\"Pt should continue holding metoprolol until SBP >120 and HR>60\". Kaela reported that she already spoke with SRIDHAR Duffy with [facility], and no questions at this time. Reviewed hyper/hypotension, pressure ulcer prevention, fall prevention. Pt and Kaela declined need for further SNV. Pt is discharged from [facility]. "

## 2021-01-06 ENCOUNTER — HOME CARE VISIT (OUTPATIENT)
Dept: HOME HEALTH SERVICES | Facility: HOME HEALTHCARE | Age: 86
End: 2021-01-06
Payer: MEDICARE

## 2021-01-06 VITALS
TEMPERATURE: 97.6 F | RESPIRATION RATE: 16 BRPM | HEART RATE: 88 BPM | DIASTOLIC BLOOD PRESSURE: 70 MMHG | SYSTOLIC BLOOD PRESSURE: 138 MMHG | OXYGEN SATURATION: 91 %

## 2021-01-06 PROCEDURE — G0151 HHCP-SERV OF PT,EA 15 MIN: HCPCS

## 2021-01-06 ASSESSMENT — ENCOUNTER SYMPTOMS: SEVERE DYSPNEA: 1

## 2021-01-08 ENCOUNTER — HOME CARE VISIT (OUTPATIENT)
Dept: HOME HEALTH SERVICES | Facility: HOME HEALTHCARE | Age: 86
End: 2021-01-08
Payer: MEDICARE

## 2021-01-08 VITALS
SYSTOLIC BLOOD PRESSURE: 118 MMHG | HEART RATE: 75 BPM | OXYGEN SATURATION: 96 % | TEMPERATURE: 97.6 F | DIASTOLIC BLOOD PRESSURE: 68 MMHG | RESPIRATION RATE: 16 BRPM

## 2021-01-08 PROCEDURE — G0151 HHCP-SERV OF PT,EA 15 MIN: HCPCS

## 2021-01-11 DIAGNOSIS — Z23 NEED FOR VACCINATION: ICD-10-CM

## 2021-01-13 ENCOUNTER — HOME CARE VISIT (OUTPATIENT)
Dept: HOME HEALTH SERVICES | Facility: HOME HEALTHCARE | Age: 86
End: 2021-01-13
Payer: MEDICARE

## 2021-01-13 ENCOUNTER — OFFICE VISIT (OUTPATIENT)
Dept: CARDIOLOGY | Facility: MEDICAL CENTER | Age: 86
End: 2021-01-13
Payer: MEDICARE

## 2021-01-13 VITALS
BODY MASS INDEX: 30.21 KG/M2 | OXYGEN SATURATION: 94 % | DIASTOLIC BLOOD PRESSURE: 74 MMHG | WEIGHT: 211 LBS | SYSTOLIC BLOOD PRESSURE: 126 MMHG | HEART RATE: 80 BPM | HEIGHT: 70 IN

## 2021-01-13 VITALS
HEART RATE: 87 BPM | TEMPERATURE: 97.6 F | OXYGEN SATURATION: 94 % | RESPIRATION RATE: 16 BRPM | DIASTOLIC BLOOD PRESSURE: 66 MMHG | SYSTOLIC BLOOD PRESSURE: 116 MMHG

## 2021-01-13 DIAGNOSIS — I48.19 PERSISTENT ATRIAL FIBRILLATION (HCC): ICD-10-CM

## 2021-01-13 DIAGNOSIS — Z79.899 HIGH RISK MEDICATION USE: ICD-10-CM

## 2021-01-13 DIAGNOSIS — I25.5 ISCHEMIC CARDIOMYOPATHY: ICD-10-CM

## 2021-01-13 DIAGNOSIS — Z79.01 ON CONTINUOUS ORAL ANTICOAGULATION: ICD-10-CM

## 2021-01-13 DIAGNOSIS — I10 ESSENTIAL HYPERTENSION: ICD-10-CM

## 2021-01-13 DIAGNOSIS — Z95.5 S/P DRUG ELUTING CORONARY STENT PLACEMENT: ICD-10-CM

## 2021-01-13 PROCEDURE — 99213 OFFICE O/P EST LOW 20 MIN: CPT | Performed by: INTERNAL MEDICINE

## 2021-01-13 PROCEDURE — G0151 HHCP-SERV OF PT,EA 15 MIN: HCPCS

## 2021-01-13 ASSESSMENT — ENCOUNTER SYMPTOMS: SEVERE DYSPNEA: 1

## 2021-01-13 ASSESSMENT — FIBROSIS 4 INDEX: FIB4 SCORE: 1.28

## 2021-01-13 NOTE — PROGRESS NOTES
Subjective:   Alonso Holder is a 85 y.o. male who presents today for follow-up of atrial fibrillation and newly noted ischemic cardiomyopathy.  Underwent evaluation and status post DCCV which was initially successful but reversion to AF now on amiodarone, positive stress testing and subsequent coronary angiography revealing multivessel CAD.  Declined CABG and underwent multivessel PCI.  He is following up after this and notes ongoing NYHA class II dyspnea on exertion and mostly fatigue.  Subsequently was loaded with his antiarrhythmic and repeat cardioversion was recently completed and has been successful in maintaining sinus rhythm with PACs and PVCs.      Since last visit unfortunately he was hospitalized for COVID-19 and then subsequent superimposed pneumonia shortly thereafter.  He was on oxygen until 3 days ago when he turned the corner and now he is feeling remarkably better.  Currently not taking his beta-blocker due to hypotension.  Taking his amiodarone Xarelto and clopidogrel without issue.  No falls.  Physical therapy is planning to discharge him from their home care.  Maintain sinus rhythm.    Past Medical History:   Diagnosis Date   • Arthritis     finger   • Essential hypertension 1/29/2018   • Hemorrhagic disorder (HCC)     xarelto   • High cholesterol    • Persistent atrial fibrillation (HCC) 1/28/2020    stents   • Snoring     no sleep study     Past Surgical History:   Procedure Laterality Date   • OTHER  1961    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   • TONSILLECTOMY       Family History   Problem Relation Age of Onset   • Heart Disease Neg Hx      Social History     Socioeconomic History   • Marital status:      Spouse name: Not on file   • Number of children: Not on file   • Years of education: Not on file   • Highest education level: Not on file   Occupational History   • Not on file   Social Needs   • Financial resource strain: Not hard at all   • Food insecurity     Worry: Never true      "Inability: Never true   • Transportation needs     Medical: No     Non-medical: No   Tobacco Use   • Smoking status: Never Smoker   • Smokeless tobacco: Never Used   Substance and Sexual Activity   • Alcohol use: Yes     Frequency: Monthly or less     Drinks per session: 1 or 2     Binge frequency: Never     Comment: \"Social\" drinks only   • Drug use: No   • Sexual activity: Not on file   Lifestyle   • Physical activity     Days per week: Not on file     Minutes per session: Not on file   • Stress: Not on file   Relationships   • Social connections     Talks on phone: Not on file     Gets together: Not on file     Attends Jew service: Not on file     Active member of club or organization: Not on file     Attends meetings of clubs or organizations: Not on file     Relationship status: Not on file   • Intimate partner violence     Fear of current or ex partner: Not on file     Emotionally abused: Not on file     Physically abused: Not on file     Forced sexual activity: Not on file   Other Topics Concern   • Not on file   Social History Narrative   • Not on file     Allergies   Allergen Reactions   • Atorvastatin Nausea and Myalgia     Severe myalgias, generally feeling unwell.   • Codeine Nausea     Nausea     Outpatient Encounter Medications as of 1/13/2021   Medication Sig Dispense Refill   • metoprolol SR (TOPROL XL) 50 MG TABLET SR 24 HR Take 0.5 Tabs by mouth every evening. (Patient taking differently: Take 25 mg by mouth every evening. Pt should continue holding metoprolol until SBP >120 and HR>60 per cardiology office  ) 60 Tab 0   • Ergocalciferol (VITAMIN D2) 10 MCG (400 UNIT) Tab Take 400 Units by mouth every evening.     • guaiFENesin (MUCINEX PO) Take 1 Tab by mouth as needed (expectorant prn).     • albuterol 108 (90 Base) MCG/ACT Aero Soln inhalation aerosol Inhale 1-2 Puffs every 6 hours as needed for Shortness of Breath.     • ascorbic acid (VITAMIN C) 1000 MG tablet Take 1 Tab by mouth 2 Times " "a Day. 30 Tab 3   • zinc sulfate (ZINCATE) 220 (50 Zn) MG Cap Take 1 Cap by mouth every day. 30 Cap 3   • clopidogrel (PLAVIX) 75 MG Tab Take 1 Tab by mouth every day. 90 Tab 3   • XARELTO 20 MG Tab tablet Take 1 Tab by mouth every day. 90 Tab 3   • amiodarone (CORDARONE) 200 MG Tab Take 1 Tab by mouth every day. 90 Tab 3   • Home Care Oxygen Inhale 2 L/min continuous. Oxygen dose range: 2 L/min  Respiratory route via: NC  Oxygen supplier: [facility]      Indications: SOB     • cefdinir (OMNICEF) 300 MG Cap Take 1 Cap by mouth 2 times a day. (Patient not taking: Reported on 12/18/2020) 4 Cap 0   • Multiple Vitamins-Minerals (EMERGEN-C VITAMIN C PO) Take 1,000 mg by mouth every morning.     • benzonatate (TESSALON) 100 MG Cap Take 1 Cap by mouth 3 times a day as needed for Cough. (Patient not taking: Reported on 12/11/2020) 60 Cap 0   • ondansetron (ZOFRAN ODT) 4 MG TABLET DISPERSIBLE Take 1 Tab by mouth every 6 hours as needed for Nausea (give PO if no IV route available). (Patient not taking: Reported on 12/11/2020) 10 Tab 0     No facility-administered encounter medications on file as of 1/13/2021.      Review of Systems   All other systems reviewed and are negative.       Objective:   /74 (BP Location: Left arm, Patient Position: Sitting)   Pulse 80   Ht 1.778 m (5' 10\")   Wt 95.7 kg (211 lb)   SpO2 94%   BMI 30.28 kg/m²     Physical Exam   Constitutional: He is oriented to person, place, and time. He appears well-developed and well-nourished. No distress.   HENT:   Head: Normocephalic and atraumatic.   Right Ear: External ear normal.   Left Ear: External ear normal.   Eyes: Pupils are equal, round, and reactive to light. Conjunctivae and EOM are normal. Right eye exhibits no discharge. Left eye exhibits no discharge. No scleral icterus.   Neck: Normal range of motion. Neck supple. No JVD present. No tracheal deviation present. No thyromegaly present.   Cardiovascular: Regular rhythm and intact " distal pulses. Frequent extrasystoles are present. Bradycardia present. PMI is not displaced. Exam reveals no gallop and no friction rub.   Murmur ( Soft 2 out of 6 systolic murmur) heard.  Pulses:       Carotid pulses are 2+ on the right side and 2+ on the left side.       Radial pulses are 2+ on the left side.        Popliteal pulses are 2+ on the right side and 2+ on the left side.        Dorsalis pedis pulses are 2+ on the right side and 2+ on the left side.        Posterior tibial pulses are 2+ on the right side and 2+ on the left side.   Pulmonary/Chest: Effort normal and breath sounds normal. No respiratory distress. He has no wheezes. He has no rales. He exhibits no tenderness.   Abdominal: Soft. Bowel sounds are normal. He exhibits no distension. There is no abdominal tenderness.   Musculoskeletal: Normal range of motion.         General: Edema (1+ left lower extremity, trace right lower extremity.  Improved from prior visit.) present. No tenderness or deformity.   Neurological: He is alert and oriented to person, place, and time. No cranial nerve deficit (cranial nerves II through XII grossly intact). Coordination normal.   Skin: Skin is warm and dry. No rash noted. He is not diaphoretic. No erythema. No pallor.   Psychiatric: He has a normal mood and affect. His behavior is normal. Thought content normal.   Vitals reviewed.  No change in physical exam since prior 9/18/2020    LABS:  Lab Results   Component Value Date/Time    CHOLSTRLTOT 189 12/09/2019 08:17 AM     (H) 12/09/2019 08:17 AM    HDL 35 (A) 12/09/2019 08:17 AM    TRIGLYCERIDE 255 (H) 12/09/2019 08:17 AM       Lab Results   Component Value Date/Time    WBC 13.4 (H) 12/16/2020 05:24 AM    RBC 4.28 (L) 12/16/2020 05:24 AM    HEMOGLOBIN 12.9 (L) 12/16/2020 05:24 AM    HEMATOCRIT 39.4 (L) 12/16/2020 05:24 AM    MCV 92.1 12/16/2020 05:24 AM    NEUTSPOLYS 83.30 (H) 12/16/2020 05:24 AM    LYMPHOCYTES 10.70 (L) 12/16/2020 05:24 AM    MONOCYTES  3.90 12/16/2020 05:24 AM    EOSINOPHILS 0.20 12/16/2020 05:24 AM    BASOPHILS 0.30 12/16/2020 05:24 AM     Lab Results   Component Value Date/Time    SODIUM 138 12/16/2020 05:24 AM    POTASSIUM 4.3 12/16/2020 05:24 AM    CHLORIDE 96 12/16/2020 05:24 AM    CO2 28 12/16/2020 05:24 AM    GLUCOSE 93 12/16/2020 05:24 AM    BUN 48 (H) 12/16/2020 05:24 AM    CREATININE 1.40 12/16/2020 05:24 AM     Lab Results   Component Value Date    HBA1C 6.9 (H) 12/16/2020      Lab Results   Component Value Date/Time    ALKPHOSPHAT 59 12/16/2020 05:24 AM    ASTSGOT 25 12/16/2020 05:24 AM    ALTSGPT 32 12/16/2020 05:24 AM    TBILIRUBIN 0.6 12/16/2020 05:24 AM      No results found for: BNPBTYPENAT   No results found for: TSH  Lab Results   Component Value Date/Time    PROTHROMBTM 15.7 (H) 12/11/2020 05:37 AM    INR 1.28 (H) 12/11/2020 05:37 AM      ECHO CONCLUSIONS (11/8/2020):  Normal left ventricular systolic function. Left ventricular ejection   fraction is visually estimated to be 60%.  Normal right ventricular size and right ventricular systolic function.  No significant valvular abnormalities.    EKG (9/18/2020):  I have personally reviewed the EKG this visit and discussed with the patient.  Sinus bradycardia PACs, PVCs.    CORONARY ANGIOGRAM(3/10/2020):  1.  Successful PCI 90% OM 1 stenosis (2.5 x 30 mm David GREG), excellent result  2.  Successful PCI 80% long proximal and ostial LAD stenosis (3.5 x 38 mm David GREG, postdilated 3.75 mm, IVUS guided), excellent result  3.  LVEDP 15 mmHg    ECHO CONCLUSIONS (1/23/2020):  Compared to the report of the study done 01/30/2018, EF is now   moderately reduced, patient noted to be in atrial fibrillation.  Moderately reduced left ventricular systolic function.  Left ventricular ejection fraction is visually estimated to be 35%.  Global hypokinesis with severe hypokinesis of the basal to mid inferior   wall and inferior septum.  Diastolic function is difficult to assess with atrial  fibrillation.  Mildly dilated left atrium.  Mild mitral regurgitation.  Aortic sclerosis without stenosis.  Mild to moderate tricuspid regurgitation.  Estimated right ventricular systolic pressure  is 33 mmHg.  Ascending aorta diameter is 3.9 cm, borderline mildly dilated.  Pt has appointment with cardiology.  Personally reviewed this visit 1/28/2020     STRESS (1/23/2020):   No evidence of significant jeopardized viable myocardium or prior myocardial    infarction. LVEF low at 23%.  Personally reviewed this visit 1/28/2020.  There are areas of fixed photopenia in the inferior base to mid and anterior septum.  It is a suboptimal study.  This was official read by radiology not cardiology.      Assessment:     1. Ischemic cardiomyopathy with recovered LVEF     2. S/P drug eluting coronary stent placement     3. On continuous oral anticoagulation     4. Persistent atrial fibrillation (HCC)     5. High risk medication use     6. Essential hypertension         Medical Decision Making:  Today's Assessment / Status / Plan:     Stable renal function.  Recovering well from his pneumonia's.  Appropriate medical therapy.  Maintaining sinus rhythm.  Ejection fraction has recovered.  No ischemic symptoms.  Taking his medications as directed.  Not having any bleeding difficulties on current Xarelto and Plavix.  Recommend continuing his current medical therapy, including amiodarone.  Recommend adding back to 25 mg of Toprol-XL now that he is feeling better.  Continue other medical therapy including his Plavix and Xarelto.    Follow-up with me in 6 months

## 2021-01-14 ENCOUNTER — HOME CARE VISIT (OUTPATIENT)
Dept: HOME HEALTH SERVICES | Facility: HOME HEALTHCARE | Age: 86
End: 2021-01-14
Payer: MEDICARE

## 2021-01-15 ENCOUNTER — IMMUNIZATION (OUTPATIENT)
Dept: FAMILY PLANNING/WOMEN'S HEALTH CLINIC | Facility: IMMUNIZATION CENTER | Age: 86
End: 2021-01-15
Attending: INTERNAL MEDICINE
Payer: MEDICARE

## 2021-01-15 DIAGNOSIS — Z23 NEED FOR VACCINATION: ICD-10-CM

## 2021-01-15 DIAGNOSIS — Z23 ENCOUNTER FOR VACCINATION: Primary | ICD-10-CM

## 2021-01-15 PROCEDURE — 91300 PFIZER SARS-COV-2 VACCINE: CPT

## 2021-01-15 PROCEDURE — 0001A PFIZER SARS-COV-2 VACCINE: CPT

## 2021-01-20 ENCOUNTER — HOME CARE VISIT (OUTPATIENT)
Dept: HOME HEALTH SERVICES | Facility: HOME HEALTHCARE | Age: 86
End: 2021-01-20
Payer: MEDICARE

## 2021-01-20 VITALS
OXYGEN SATURATION: 93 % | DIASTOLIC BLOOD PRESSURE: 60 MMHG | TEMPERATURE: 97.9 F | RESPIRATION RATE: 16 BRPM | HEART RATE: 78 BPM | SYSTOLIC BLOOD PRESSURE: 118 MMHG

## 2021-01-20 PROCEDURE — 665001 SOC-HOME HEALTH

## 2021-01-20 PROCEDURE — G0151 HHCP-SERV OF PT,EA 15 MIN: HCPCS

## 2021-01-20 SDOH — ECONOMIC STABILITY: HOUSING INSECURITY: HOME SAFETY: PT STATES HE NO LONGER USES SUPPLEMENTAL O2. NOTE SENT TO PCP TO MAKE AWARE.

## 2021-01-20 ASSESSMENT — ACTIVITIES OF DAILY LIVING (ADL)
HOME_HEALTH_OASIS: 00
OASIS_M1830: 00

## 2021-01-20 ASSESSMENT — PATIENT HEALTH QUESTIONNAIRE - PHQ9: CLINICAL INTERPRETATION OF PHQ2 SCORE: 0

## 2021-01-25 DIAGNOSIS — I48.19 PERSISTENT ATRIAL FIBRILLATION (HCC): ICD-10-CM

## 2021-01-28 RX ORDER — AMIODARONE HYDROCHLORIDE 200 MG/1
TABLET ORAL
Qty: 90 TAB | Refills: 2 | Status: SHIPPED | OUTPATIENT
Start: 2021-01-28 | End: 2021-07-14 | Stop reason: SDUPTHER

## 2021-02-11 ENCOUNTER — IMMUNIZATION (OUTPATIENT)
Dept: FAMILY PLANNING/WOMEN'S HEALTH CLINIC | Facility: IMMUNIZATION CENTER | Age: 86
End: 2021-02-11
Attending: INTERNAL MEDICINE
Payer: MEDICARE

## 2021-02-11 DIAGNOSIS — Z23 ENCOUNTER FOR VACCINATION: Primary | ICD-10-CM

## 2021-02-11 PROCEDURE — 91300 PFIZER SARS-COV-2 VACCINE: CPT

## 2021-02-11 PROCEDURE — 0002A PFIZER SARS-COV-2 VACCINE: CPT

## 2021-02-23 DIAGNOSIS — I48.19 PERSISTENT ATRIAL FIBRILLATION (HCC): ICD-10-CM

## 2021-02-24 RX ORDER — RIVAROXABAN 20 MG/1
20 TABLET, FILM COATED ORAL DAILY
Qty: 100 TABLET | Refills: 1 | Status: SHIPPED | OUTPATIENT
Start: 2021-02-24 | End: 2021-07-14 | Stop reason: SDUPTHER

## 2021-02-26 ENCOUNTER — HOSPITAL ENCOUNTER (OUTPATIENT)
Dept: LAB | Facility: MEDICAL CENTER | Age: 86
End: 2021-02-26
Attending: NURSE PRACTITIONER
Payer: MEDICARE

## 2021-02-26 LAB
ALBUMIN SERPL BCP-MCNC: 4.2 G/DL (ref 3.2–4.9)
ALBUMIN/GLOB SERPL: 1.4 G/DL
ALP SERPL-CCNC: 49 U/L (ref 30–99)
ALT SERPL-CCNC: 16 U/L (ref 2–50)
ANION GAP SERPL CALC-SCNC: 8 MMOL/L (ref 7–16)
APPEARANCE UR: CLEAR
AST SERPL-CCNC: 30 U/L (ref 12–45)
BASOPHILS # BLD AUTO: 0.6 % (ref 0–1.8)
BASOPHILS # BLD: 0.04 K/UL (ref 0–0.12)
BILIRUB SERPL-MCNC: 0.5 MG/DL (ref 0.1–1.5)
BILIRUB UR QL STRIP.AUTO: NEGATIVE
BUN SERPL-MCNC: 14 MG/DL (ref 8–22)
CALCIUM SERPL-MCNC: 9.5 MG/DL (ref 8.5–10.5)
CHLORIDE SERPL-SCNC: 104 MMOL/L (ref 96–112)
CHOLEST SERPL-MCNC: 198 MG/DL (ref 100–199)
CO2 SERPL-SCNC: 26 MMOL/L (ref 20–33)
COLOR UR: YELLOW
CREAT SERPL-MCNC: 1.18 MG/DL (ref 0.5–1.4)
CREAT UR-MCNC: 85.26 MG/DL
EOSINOPHIL # BLD AUTO: 0.12 K/UL (ref 0–0.51)
EOSINOPHIL NFR BLD: 1.8 % (ref 0–6.9)
ERYTHROCYTE [DISTWIDTH] IN BLOOD BY AUTOMATED COUNT: 49.9 FL (ref 35.9–50)
EST. AVERAGE GLUCOSE BLD GHB EST-MCNC: 103 MG/DL
FASTING STATUS PATIENT QL REPORTED: NORMAL
GLOBULIN SER CALC-MCNC: 2.9 G/DL (ref 1.9–3.5)
GLUCOSE SERPL-MCNC: 102 MG/DL (ref 65–99)
GLUCOSE UR STRIP.AUTO-MCNC: NEGATIVE MG/DL
HBA1C MFR BLD: 5.2 % (ref 4–5.6)
HCT VFR BLD AUTO: 43.7 % (ref 42–52)
HDLC SERPL-MCNC: 34 MG/DL
HGB BLD-MCNC: 14.4 G/DL (ref 14–18)
IMM GRANULOCYTES # BLD AUTO: 0.02 K/UL (ref 0–0.11)
IMM GRANULOCYTES NFR BLD AUTO: 0.3 % (ref 0–0.9)
IRON SATN MFR SERPL: 25 % (ref 15–55)
IRON SERPL-MCNC: 83 UG/DL (ref 50–180)
KETONES UR STRIP.AUTO-MCNC: NEGATIVE MG/DL
LDLC SERPL CALC-MCNC: 111 MG/DL
LEUKOCYTE ESTERASE UR QL STRIP.AUTO: NEGATIVE
LYMPHOCYTES # BLD AUTO: 1.88 K/UL (ref 1–4.8)
LYMPHOCYTES NFR BLD: 28.3 % (ref 22–41)
MCH RBC QN AUTO: 31.6 PG (ref 27–33)
MCHC RBC AUTO-ENTMCNC: 33 G/DL (ref 33.7–35.3)
MCV RBC AUTO: 96 FL (ref 81.4–97.8)
MICRO URNS: NORMAL
MICROALBUMIN UR-MCNC: <1.2 MG/DL
MICROALBUMIN/CREAT UR: NORMAL MG/G (ref 0–30)
MONOCYTES # BLD AUTO: 0.7 K/UL (ref 0–0.85)
MONOCYTES NFR BLD AUTO: 10.5 % (ref 0–13.4)
NEUTROPHILS # BLD AUTO: 3.89 K/UL (ref 1.82–7.42)
NEUTROPHILS NFR BLD: 58.5 % (ref 44–72)
NITRITE UR QL STRIP.AUTO: NEGATIVE
NRBC # BLD AUTO: 0 K/UL
NRBC BLD-RTO: 0 /100 WBC
PH UR STRIP.AUTO: 6.5 [PH] (ref 5–8)
PLATELET # BLD AUTO: 201 K/UL (ref 164–446)
PMV BLD AUTO: 10.9 FL (ref 9–12.9)
POTASSIUM SERPL-SCNC: 4 MMOL/L (ref 3.6–5.5)
PROT SERPL-MCNC: 7.1 G/DL (ref 6–8.2)
PROT UR QL STRIP: NEGATIVE MG/DL
RBC # BLD AUTO: 4.55 M/UL (ref 4.7–6.1)
RBC UR QL AUTO: NEGATIVE
SODIUM SERPL-SCNC: 138 MMOL/L (ref 135–145)
SP GR UR STRIP.AUTO: 1.02
TIBC SERPL-MCNC: 333 UG/DL (ref 250–450)
TRANSFERRIN SERPL-MCNC: 283 MG/DL (ref 200–370)
TRIGL SERPL-MCNC: 267 MG/DL (ref 0–149)
UIBC SERPL-MCNC: 250 UG/DL (ref 110–370)
URATE SERPL-MCNC: 8.1 MG/DL (ref 2.5–8.3)
UROBILINOGEN UR STRIP.AUTO-MCNC: 0.2 MG/DL
WBC # BLD AUTO: 6.7 K/UL (ref 4.8–10.8)

## 2021-02-26 PROCEDURE — 83540 ASSAY OF IRON: CPT

## 2021-02-26 PROCEDURE — 84481 FREE ASSAY (FT-3): CPT

## 2021-02-26 PROCEDURE — 84153 ASSAY OF PSA TOTAL: CPT

## 2021-02-26 PROCEDURE — 84439 ASSAY OF FREE THYROXINE: CPT

## 2021-02-26 PROCEDURE — 36415 COLL VENOUS BLD VENIPUNCTURE: CPT

## 2021-02-26 PROCEDURE — 82652 VIT D 1 25-DIHYDROXY: CPT

## 2021-02-26 PROCEDURE — 81003 URINALYSIS AUTO W/O SCOPE: CPT

## 2021-02-26 PROCEDURE — 85025 COMPLETE CBC W/AUTO DIFF WBC: CPT

## 2021-02-26 PROCEDURE — 84550 ASSAY OF BLOOD/URIC ACID: CPT

## 2021-02-26 PROCEDURE — 82570 ASSAY OF URINE CREATININE: CPT

## 2021-02-26 PROCEDURE — 84425 ASSAY OF VITAMIN B-1: CPT

## 2021-02-26 PROCEDURE — 82043 UR ALBUMIN QUANTITATIVE: CPT

## 2021-02-26 PROCEDURE — 80061 LIPID PANEL: CPT

## 2021-02-26 PROCEDURE — 82728 ASSAY OF FERRITIN: CPT

## 2021-02-26 PROCEDURE — 83550 IRON BINDING TEST: CPT | Mod: XU

## 2021-02-26 PROCEDURE — 80053 COMPREHEN METABOLIC PANEL: CPT

## 2021-02-26 PROCEDURE — 83036 HEMOGLOBIN GLYCOSYLATED A1C: CPT

## 2021-02-26 PROCEDURE — 84466 ASSAY OF TRANSFERRIN: CPT

## 2021-02-26 PROCEDURE — 84443 ASSAY THYROID STIM HORMONE: CPT

## 2021-02-27 LAB
FERRITIN SERPL-MCNC: 71.4 NG/ML (ref 22–322)
PSA SERPL-MCNC: 2.01 NG/ML (ref 0–4)
T3FREE SERPL-MCNC: 2.86 PG/ML (ref 2–4.4)
TSH SERPL DL<=0.005 MIU/L-ACNC: 1.27 UIU/ML (ref 0.38–5.33)

## 2021-02-28 LAB — 1,25(OH)2D3 SERPL-MCNC: 44.3 PG/ML (ref 19.9–79.3)

## 2021-03-02 LAB — VIT B1 BLD-MCNC: 125 NMOL/L (ref 70–180)

## 2021-03-03 LAB — T4 FREE SERPL DIALY-MCNC: 1.9 NG/DL (ref 1.1–2.4)

## 2021-04-27 ENCOUNTER — PATIENT MESSAGE (OUTPATIENT)
Dept: HEALTH INFORMATION MANAGEMENT | Facility: OTHER | Age: 86
End: 2021-04-27

## 2021-05-25 DIAGNOSIS — I10 ESSENTIAL HYPERTENSION: ICD-10-CM

## 2021-05-26 ENCOUNTER — PATIENT OUTREACH (OUTPATIENT)
Dept: HEALTH INFORMATION MANAGEMENT | Facility: OTHER | Age: 86
End: 2021-05-26

## 2021-05-26 RX ORDER — CLOPIDOGREL BISULFATE 75 MG/1
TABLET ORAL
Qty: 90 TABLET | Refills: 2 | Status: SHIPPED | OUTPATIENT
Start: 2021-05-26 | End: 2022-01-13 | Stop reason: SDUPTHER

## 2021-05-26 RX ORDER — METOPROLOL SUCCINATE 25 MG/1
25 TABLET, EXTENDED RELEASE ORAL DAILY
Qty: 90 TABLET | Refills: 2 | Status: SHIPPED | OUTPATIENT
Start: 2021-05-26 | End: 2022-01-13 | Stop reason: SDUPTHER

## 2021-05-26 NOTE — TELEPHONE ENCOUNTER
Most recent progress note 1/13/2021: Recommend adding back to 25 mg of Toprol-XL now that he is feeling better.      Adjusted dose for refill.

## 2021-05-26 NOTE — PROGRESS NOTES
Outcome: Left Message To schedule Comprehensive health  Assessment      Please transfer to Patient Outreach Team at [phone] when patient returns call.      Attempt # 1

## 2021-07-14 ENCOUNTER — OFFICE VISIT (OUTPATIENT)
Dept: CARDIOLOGY | Facility: MEDICAL CENTER | Age: 86
End: 2021-07-14
Payer: MEDICARE

## 2021-07-14 VITALS
OXYGEN SATURATION: 94 % | HEART RATE: 54 BPM | WEIGHT: 229 LBS | HEIGHT: 70 IN | DIASTOLIC BLOOD PRESSURE: 76 MMHG | SYSTOLIC BLOOD PRESSURE: 148 MMHG | BODY MASS INDEX: 32.78 KG/M2

## 2021-07-14 DIAGNOSIS — Z79.01 ON CONTINUOUS ORAL ANTICOAGULATION: ICD-10-CM

## 2021-07-14 DIAGNOSIS — I10 ESSENTIAL HYPERTENSION: ICD-10-CM

## 2021-07-14 DIAGNOSIS — I48.19 PERSISTENT ATRIAL FIBRILLATION (HCC): ICD-10-CM

## 2021-07-14 DIAGNOSIS — Z79.899 HIGH RISK MEDICATION USE: ICD-10-CM

## 2021-07-14 DIAGNOSIS — Z95.5 S/P DRUG ELUTING CORONARY STENT PLACEMENT: ICD-10-CM

## 2021-07-14 DIAGNOSIS — I25.5 ISCHEMIC CARDIOMYOPATHY: ICD-10-CM

## 2021-07-14 DIAGNOSIS — N18.30 STAGE 3 CHRONIC KIDNEY DISEASE, UNSPECIFIED WHETHER STAGE 3A OR 3B CKD: ICD-10-CM

## 2021-07-14 PROCEDURE — 99214 OFFICE O/P EST MOD 30 MIN: CPT | Performed by: INTERNAL MEDICINE

## 2021-07-14 RX ORDER — RIVAROXABAN 20 MG/1
20 TABLET, FILM COATED ORAL DAILY
Qty: 100 TABLET | Refills: 3 | Status: SHIPPED | OUTPATIENT
Start: 2021-07-14 | End: 2021-08-17

## 2021-07-14 RX ORDER — AMIODARONE HYDROCHLORIDE 200 MG/1
200 TABLET ORAL DAILY
Qty: 90 TABLET | Refills: 3 | Status: SHIPPED | OUTPATIENT
Start: 2021-07-14 | End: 2022-01-13 | Stop reason: SDUPTHER

## 2021-07-14 RX ORDER — BENAZEPRIL HYDROCHLORIDE 10 MG/1
10 TABLET ORAL DAILY
COMMUNITY

## 2021-07-14 ASSESSMENT — FIBROSIS 4 INDEX: FIB4 SCORE: 3.17

## 2021-07-14 NOTE — PROGRESS NOTES
"  Subjective:   Alonso Holder is a 86 y.o. male who presents today for follow-up of atrial fibrillation and newly noted ischemic cardiomyopathy.  Underwent evaluation and status post DCCV which was initially successful but reversion to AF now on amiodarone, positive stress testing and subsequent coronary angiography revealing multivessel CAD.  Declined CABG and underwent multivessel PCI.  He is following up after this and notes ongoing NYHA class II dyspnea on exertion and mostly fatigue.  Subsequently was loaded with his antiarrhythmic and repeat cardioversion was recently completed and has been successful in maintaining sinus rhythm with PACs and PVCs.      Since last visit doing well maintaining sinus rhythm tolerating his medications without difficulty.    Past Medical History:   Diagnosis Date   • Arthritis     finger   • Essential hypertension 1/29/2018   • Hemorrhagic disorder (HCC)     xarelto   • High cholesterol    • Persistent atrial fibrillation (HCC) 1/28/2020    stents   • Snoring     no sleep study     Past Surgical History:   Procedure Laterality Date   • OTHER  1961    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   • TONSILLECTOMY       Family History   Problem Relation Age of Onset   • Heart Disease Neg Hx      Social History     Socioeconomic History   • Marital status:      Spouse name: Not on file   • Number of children: Not on file   • Years of education: Not on file   • Highest education level: Not on file   Occupational History   • Not on file   Tobacco Use   • Smoking status: Never Smoker   • Smokeless tobacco: Never Used   Vaping Use   • Vaping Use: Never used   Substance and Sexual Activity   • Alcohol use: Yes     Comment: \"Social\" drinks only   • Drug use: No   • Sexual activity: Not on file   Other Topics Concern   • Not on file   Social History Narrative   • Not on file     Social Determinants of Health     Financial Resource Strain: Low Risk    • Difficulty of Paying Living Expenses: Not " hard at all   Food Insecurity: No Food Insecurity   • Worried About Running Out of Food in the Last Year: Never true   • Ran Out of Food in the Last Year: Never true   Transportation Needs: No Transportation Needs   • Lack of Transportation (Medical): No   • Lack of Transportation (Non-Medical): No   Physical Activity:    • Days of Exercise per Week:    • Minutes of Exercise per Session:    Stress:    • Feeling of Stress :    Social Connections:    • Frequency of Communication with Friends and Family:    • Frequency of Social Gatherings with Friends and Family:    • Attends Yarsanism Services:    • Active Member of Clubs or Organizations:    • Attends Club or Organization Meetings:    • Marital Status:    Intimate Partner Violence:    • Fear of Current or Ex-Partner:    • Emotionally Abused:    • Physically Abused:    • Sexually Abused:      Allergies   Allergen Reactions   • Atorvastatin Nausea and Myalgia     Severe myalgias, generally feeling unwell.   • Codeine Nausea     Nausea     Outpatient Encounter Medications as of 7/14/2021   Medication Sig Dispense Refill   • benazepril (LOTENSIN) 10 MG Tab Take 10 mg by mouth every day.     • XARELTO 20 MG Tab tablet Take 1 tablet by mouth every day. 100 tablet 3   • amiodarone (CORDARONE) 200 MG Tab Take 1 tablet by mouth every day. 90 tablet 3   • clopidogrel (PLAVIX) 75 MG Tab TAKE ONE TABLET BY MOUTH ONE TIME DAILY  90 tablet 2   • metoprolol SR (TOPROL XL) 25 MG TABLET SR 24 HR Take 1 tablet by mouth every day. 90 tablet 2   • Ergocalciferol (VITAMIN D2) 10 MCG (400 UNIT) Tab Take 400 Units by mouth every evening.     • ascorbic acid (VITAMIN C) 1000 MG tablet Take 1 Tab by mouth 2 Times a Day. 30 Tab 3   • [DISCONTINUED] XARELTO 20 MG Tab tablet Take 1 tablet by mouth every day. 100 tablet 1   • [DISCONTINUED] amiodarone (CORDARONE) 200 MG Tab TAKE ONE TABLET BY MOUTH ONE TIME DAILY  90 Tab 2   • [DISCONTINUED] Home Care Oxygen Inhale 2 L/min continuous. Oxygen  "dose range: 2 L/min  Respiratory route via: [place]  Oxygen supplier: [facility]      Indications: SOB     • cefdinir (OMNICEF) 300 MG Cap Take 1 Cap by mouth 2 times a day. (Patient not taking: Reported on 12/18/2020) 4 Cap 0   • Multiple Vitamins-Minerals (EMERGEN-C VITAMIN C PO) Take 1,000 mg by mouth every morning. (Patient not taking: Reported on 7/14/2021)     • guaiFENesin (MUCINEX PO) Take 1 Tab by mouth as needed (expectorant prn). (Patient not taking: Reported on 7/14/2021)     • albuterol 108 (90 Base) MCG/ACT Aero Soln inhalation aerosol Inhale 1-2 Puffs every 6 hours as needed for Shortness of Breath. (Patient not taking: Reported on 7/14/2021)     • benzonatate (TESSALON) 100 MG Cap Take 1 Cap by mouth 3 times a day as needed for Cough. (Patient not taking: Reported on 12/11/2020) 60 Cap 0   • zinc sulfate (ZINCATE) 220 (50 Zn) MG Cap Take 1 Cap by mouth every day. (Patient not taking: Reported on 7/14/2021) 30 Cap 3   • ondansetron (ZOFRAN ODT) 4 MG TABLET DISPERSIBLE Take 1 Tab by mouth every 6 hours as needed for Nausea (give PO if no IV route available). (Patient not taking: Reported on 12/11/2020) 10 Tab 0     No facility-administered encounter medications on file as of 7/14/2021.     Review of Systems   All other systems reviewed and are negative.       Objective:   /76 (BP Location: Left arm, Patient Position: Sitting)   Pulse (!) 54   Ht 1.778 m (5' 10\")   Wt 104 kg (229 lb)   SpO2 94%   BMI 32.86 kg/m²     Physical Exam   Constitutional: He is oriented to person, place, and time. He appears well-developed. No distress.   HENT:   Head: Normocephalic and atraumatic.   Right Ear: External ear normal.   Left Ear: External ear normal.   Eyes: Pupils are equal, round, and reactive to light. Conjunctivae are normal. Right eye exhibits no discharge. Left eye exhibits no discharge. No scleral icterus.   Neck: No JVD present. No tracheal deviation present. No thyromegaly present. "   Cardiovascular: Regular rhythm. Frequent extrasystoles are present. Bradycardia present. PMI is not displaced. Exam reveals no gallop and no friction rub.   Murmur ( Soft 2 out of 6 systolic murmur) heard.  Pulses:       Carotid pulses are 2+ on the right side and 2+ on the left side.       Radial pulses are 2+ on the left side.        Popliteal pulses are 2+ on the right side and 2+ on the left side.        Dorsalis pedis pulses are 2+ on the right side and 2+ on the left side.        Posterior tibial pulses are 2+ on the right side and 2+ on the left side.   Pulmonary/Chest: Effort normal and breath sounds normal. No respiratory distress. He has no wheezes. He has no rales. He exhibits no tenderness.   Abdominal: Soft. Bowel sounds are normal. He exhibits no distension. There is no abdominal tenderness.   Musculoskeletal:         General: No tenderness or deformity. Normal range of motion.      Cervical back: Normal range of motion and neck supple.   Neurological: He is alert and oriented to person, place, and time. No cranial nerve deficit (cranial nerves II through XII grossly intact). Coordination normal.   Skin: Skin is warm and dry. No rash noted. He is not diaphoretic. No erythema. No pallor.   Psychiatric: His behavior is normal. Thought content normal.   Vitals reviewed.  No change in physical exam since prior 9/18/2020    LABS:  Lab Results   Component Value Date/Time    CHOLSTRLTOT 198 02/26/2021 08:37 AM     (H) 02/26/2021 08:37 AM    HDL 34 (A) 02/26/2021 08:37 AM    TRIGLYCERIDE 267 (H) 02/26/2021 08:37 AM       Lab Results   Component Value Date/Time    WBC 6.7 02/26/2021 08:37 AM    RBC 4.55 (L) 02/26/2021 08:37 AM    HEMOGLOBIN 14.4 02/26/2021 08:37 AM    HEMATOCRIT 43.7 02/26/2021 08:37 AM    MCV 96.0 02/26/2021 08:37 AM    NEUTSPOLYS 58.50 02/26/2021 08:37 AM    LYMPHOCYTES 28.30 02/26/2021 08:37 AM    MONOCYTES 10.50 02/26/2021 08:37 AM    EOSINOPHILS 1.80 02/26/2021 08:37 AM     BASOPHILS 0.60 02/26/2021 08:37 AM     Lab Results   Component Value Date/Time    SODIUM 138 02/26/2021 08:37 AM    POTASSIUM 4.0 02/26/2021 08:37 AM    CHLORIDE 104 02/26/2021 08:37 AM    CO2 26 02/26/2021 08:37 AM    GLUCOSE 102 (H) 02/26/2021 08:37 AM    BUN 14 02/26/2021 08:37 AM    CREATININE 1.18 02/26/2021 08:37 AM     Lab Results   Component Value Date    HBA1C 5.2 02/26/2021      Lab Results   Component Value Date/Time    ALKPHOSPHAT 49 02/26/2021 08:37 AM    ASTSGOT 30 02/26/2021 08:37 AM    ALTSGPT 16 02/26/2021 08:37 AM    TBILIRUBIN 0.5 02/26/2021 08:37 AM      No results found for: BNPBTYPENAT   No results found for: TSH  Lab Results   Component Value Date/Time    PROTHROMBTM 15.7 (H) 12/11/2020 05:37 AM    INR 1.28 (H) 12/11/2020 05:37 AM      ECHO CONCLUSIONS (11/8/2020):  Normal left ventricular systolic function. Left ventricular ejection   fraction is visually estimated to be 60%.  Normal right ventricular size and right ventricular systolic function.  No significant valvular abnormalities.    EKG (9/18/2020):  I have personally reviewed the EKG this visit and discussed with the patient.  Sinus bradycardia PACs, PVCs.    CORONARY ANGIOGRAM(3/10/2020):  1.  Successful PCI 90% OM 1 stenosis (2.5 x 30 mm David GREG), excellent result  2.  Successful PCI 80% long proximal and ostial LAD stenosis (3.5 x 38 mm David GREG, postdilated 3.75 mm, IVUS guided), excellent result  3.  LVEDP 15 mmHg    ECHO CONCLUSIONS (1/23/2020):  Compared to the report of the study done 01/30/2018, EF is now   moderately reduced, patient noted to be in atrial fibrillation.  Moderately reduced left ventricular systolic function.  Left ventricular ejection fraction is visually estimated to be 35%.  Global hypokinesis with severe hypokinesis of the basal to mid inferior   wall and inferior septum.  Diastolic function is difficult to assess with atrial fibrillation.  Mildly dilated left atrium.  Mild mitral regurgitation.  Aortic  sclerosis without stenosis.  Mild to moderate tricuspid regurgitation.  Estimated right ventricular systolic pressure  is 33 mmHg.  Ascending aorta diameter is 3.9 cm, borderline mildly dilated.  Pt has appointment with cardiology.  Personally reviewed this visit 1/28/2020     STRESS (1/23/2020):   No evidence of significant jeopardized viable myocardium or prior myocardial    infarction. LVEF low at 23%.  Personally reviewed this visit 1/28/2020.  There are areas of fixed photopenia in the inferior base to mid and anterior septum.  It is a suboptimal study.  This was official read by radiology not cardiology.      Assessment:     1. Persistent atrial fibrillation (HCC)  XARELTO 20 MG Tab tablet    amiodarone (CORDARONE) 200 MG Tab   2. Ischemic cardiomyopathy     3. Essential hypertension     4. S/P drug eluting coronary stent placement     5. On continuous oral anticoagulation     6. High risk medication use     7. Stage 3 chronic kidney disease, unspecified whether stage 3a or 3b CKD (HCC)         Medical Decision Making:  Today's Assessment / Status / Plan:     Stable renal function.  Tolerating medications well.  Not on a statin due to intolerance.  We discussed PCSK9 inhibitor therapy again today.  He is moving from the area and needs to establish with a new cardiologist and [place].  He defers initiation of this therapy today and would prefer to rediscuss it with his new cardiologist once he moves.  This is reasonable.  I do recommend goal LDL less than 70.  He is tolerating his Xarelto well.  We discussed amiodarone long-term side effect profile.  It is reasonable to continue it at his age as it is working well for him but he will need ongoing surveillance for side effects with his new cardiologist.  We discussed alternatives including Tikosyn and sotalol which do require a hospitalization he is not interested time.    He is welcome to follow-up with me if he remains in the area otherwise he should  establish with a new cardiologist in 6 months and his new home in [place].    Thank you for this interesting consultation. It was my pleasure to see Alonso Holder today.    Rober Blas MD, FACC, [facility]  Division of Interventional Cardiology  [facility]

## 2021-08-12 ENCOUNTER — HOSPITAL ENCOUNTER (OUTPATIENT)
Dept: LAB | Facility: MEDICAL CENTER | Age: 86
End: 2021-08-12
Attending: NURSE PRACTITIONER
Payer: MEDICARE

## 2021-08-12 LAB
ALBUMIN SERPL BCP-MCNC: 4.2 G/DL (ref 3.2–4.9)
ALBUMIN/GLOB SERPL: 1.4 G/DL
ALP SERPL-CCNC: 64 U/L (ref 30–99)
ALT SERPL-CCNC: 52 U/L (ref 2–50)
ANION GAP SERPL CALC-SCNC: 12 MMOL/L (ref 7–16)
AST SERPL-CCNC: 43 U/L (ref 12–45)
BILIRUB SERPL-MCNC: 0.6 MG/DL (ref 0.1–1.5)
BUN SERPL-MCNC: 24 MG/DL (ref 8–22)
CALCIUM SERPL-MCNC: 9.2 MG/DL (ref 8.5–10.5)
CHLORIDE SERPL-SCNC: 101 MMOL/L (ref 96–112)
CO2 SERPL-SCNC: 26 MMOL/L (ref 20–33)
CREAT SERPL-MCNC: 1.33 MG/DL (ref 0.5–1.4)
CREAT UR-MCNC: 96.46 MG/DL
EST. AVERAGE GLUCOSE BLD GHB EST-MCNC: 123 MG/DL
GLOBULIN SER CALC-MCNC: 3 G/DL (ref 1.9–3.5)
GLUCOSE SERPL-MCNC: 111 MG/DL (ref 65–99)
HBA1C MFR BLD: 5.9 % (ref 4–5.6)
MICROALBUMIN UR-MCNC: 2 MG/DL
MICROALBUMIN/CREAT UR: 21 MG/G (ref 0–30)
POTASSIUM SERPL-SCNC: 4.4 MMOL/L (ref 3.6–5.5)
PROT SERPL-MCNC: 7.2 G/DL (ref 6–8.2)
SODIUM SERPL-SCNC: 139 MMOL/L (ref 135–145)
URATE SERPL-MCNC: 8.2 MG/DL (ref 2.5–8.3)

## 2021-08-12 PROCEDURE — 80061 LIPID PANEL: CPT

## 2021-08-12 PROCEDURE — 82043 UR ALBUMIN QUANTITATIVE: CPT

## 2021-08-12 PROCEDURE — 84550 ASSAY OF BLOOD/URIC ACID: CPT

## 2021-08-12 PROCEDURE — 83036 HEMOGLOBIN GLYCOSYLATED A1C: CPT

## 2021-08-12 PROCEDURE — 80053 COMPREHEN METABOLIC PANEL: CPT

## 2021-08-12 PROCEDURE — 82570 ASSAY OF URINE CREATININE: CPT

## 2021-08-12 PROCEDURE — 36415 COLL VENOUS BLD VENIPUNCTURE: CPT

## 2021-08-12 PROCEDURE — 83704 LIPOPROTEIN BLD QUAN PART: CPT

## 2021-08-16 LAB
CHOLEST SERPL-MCNC: 220 MG/DL
HDL PARTICAL NO Q4363: 30.6 UMOL/L
HDL SIZE Q4361: 8.6 NM
HDLC SERPL-MCNC: 36 MG/DL (ref 40–59)
HLD.LARGE SERPL-SCNC: <2.8 UMOL/L
L VLDL PART NO Q4357: 5.2 NMOL/L
LDL SERPL QN: 20.3 NM
LDL SERPL-SCNC: 2135 NMOL/L
LDL SMALL SERPL-SCNC: >1085 NMOL/L
LDLC SERPL CALC-MCNC: 131 MG/DL
PATHOLOGY STUDY: ABNORMAL
TRIGL SERPL-MCNC: 266 MG/DL (ref 30–149)
VLDL SIZE Q4362: 41.9 NM

## 2021-08-17 DIAGNOSIS — I48.19 PERSISTENT ATRIAL FIBRILLATION (HCC): ICD-10-CM

## 2021-08-17 RX ORDER — RIVAROXABAN 20 MG/1
TABLET, FILM COATED ORAL
Qty: 100 TABLET | Refills: 3 | Status: SHIPPED | OUTPATIENT
Start: 2021-08-17 | End: 2022-01-13 | Stop reason: SDUPTHER

## 2022-01-13 ENCOUNTER — TELEPHONE (OUTPATIENT)
Dept: CARDIOLOGY | Facility: MEDICAL CENTER | Age: 87
End: 2022-01-13

## 2022-01-13 DIAGNOSIS — I48.19 PERSISTENT ATRIAL FIBRILLATION (HCC): ICD-10-CM

## 2022-01-13 DIAGNOSIS — I10 ESSENTIAL HYPERTENSION: ICD-10-CM

## 2022-01-13 DIAGNOSIS — I25.10 CORONARY ARTERY DISEASE DUE TO LIPID RICH PLAQUE: ICD-10-CM

## 2022-01-13 DIAGNOSIS — I25.83 CORONARY ARTERY DISEASE DUE TO LIPID RICH PLAQUE: ICD-10-CM

## 2022-01-13 RX ORDER — AMIODARONE HYDROCHLORIDE 200 MG/1
200 TABLET ORAL DAILY
Qty: 90 TABLET | Refills: 0 | Status: SHIPPED
Start: 2022-01-13 | End: 2022-01-14 | Stop reason: SDUPTHER

## 2022-01-13 RX ORDER — CLOPIDOGREL BISULFATE 75 MG/1
75 TABLET ORAL DAILY
Qty: 90 TABLET | Refills: 0 | Status: SHIPPED
Start: 2022-01-13 | End: 2022-01-14 | Stop reason: SDUPTHER

## 2022-01-13 RX ORDER — METOPROLOL SUCCINATE 25 MG/1
25 TABLET, EXTENDED RELEASE ORAL DAILY
Qty: 90 TABLET | Refills: 0 | Status: SHIPPED
Start: 2022-01-13 | End: 2022-01-14 | Stop reason: SDUPTHER

## 2022-01-13 RX ORDER — RIVAROXABAN 20 MG/1
20 TABLET, FILM COATED ORAL DAILY
Qty: 90 TABLET | Refills: 0 | Status: SHIPPED
Start: 2022-01-13 | End: 2022-01-14 | Stop reason: SDUPTHER

## 2022-01-13 NOTE — TELEPHONE ENCOUNTER
DANY Duffy,    This patient was calling to get a refill for the following scripts:    XARELTO 20 MG Tab tablet    amiodarone (CORDARONE) 200 MG Tab    clopidogrel (PLAVIX) 75 MG Tab    metoprolol SR (TOPROL XL) 25 MG TABLET SR 24 HR    He is asking for a paper copy as he's going to [place] for the next few months. Can you please call him back at [phone].      Thank you,    YOUNG

## 2022-01-13 NOTE — TELEPHONE ENCOUNTER
Pt returned call. Tried reaching nurse but unavailable. Pt said he will come by the office today to  the paper copy of the prescriptions. If any questions please call Pt back at [phone].    Thank you

## 2022-01-14 DIAGNOSIS — I25.10 CORONARY ARTERY DISEASE DUE TO LIPID RICH PLAQUE: ICD-10-CM

## 2022-01-14 DIAGNOSIS — I48.19 PERSISTENT ATRIAL FIBRILLATION (HCC): ICD-10-CM

## 2022-01-14 DIAGNOSIS — I25.83 CORONARY ARTERY DISEASE DUE TO LIPID RICH PLAQUE: ICD-10-CM

## 2022-01-14 DIAGNOSIS — I10 ESSENTIAL HYPERTENSION: ICD-10-CM

## 2022-01-14 RX ORDER — RIVAROXABAN 20 MG/1
20 TABLET, FILM COATED ORAL DAILY
Qty: 90 TABLET | Refills: 0 | Status: SHIPPED
Start: 2022-01-14 | End: 2022-05-25

## 2022-01-14 RX ORDER — METOPROLOL SUCCINATE 25 MG/1
25 TABLET, EXTENDED RELEASE ORAL DAILY
Qty: 90 TABLET | Refills: 0 | Status: SHIPPED
Start: 2022-01-14 | End: 2022-05-25

## 2022-01-14 RX ORDER — AMIODARONE HYDROCHLORIDE 200 MG/1
200 TABLET ORAL DAILY
Qty: 90 TABLET | Refills: 0 | Status: SHIPPED
Start: 2022-01-14 | End: 2022-05-25

## 2022-01-14 RX ORDER — CLOPIDOGREL BISULFATE 75 MG/1
75 TABLET ORAL DAILY
Qty: 90 TABLET | Refills: 0 | Status: SHIPPED
Start: 2022-01-14 | End: 2022-05-25

## 2022-01-14 NOTE — PROGRESS NOTES
Pt walked in office and needed printed RX's for:    Metoprolol  Clopidogrel  Amiodarone  Xarelto    Reprinted paper rx's, reviewed and signed by CULLEN ADD. Concerns about pt being on Plavix and Xarelto. Will reach out to TW for confirmation.    Pt in office, stated he is going out of town and needed RX's filled at a different pharmacy. He will call if any questions/concerns. Verbalized understanding and was appreciative.

## 2022-05-24 ENCOUNTER — TELEPHONE (OUTPATIENT)
Dept: CARDIOLOGY | Facility: MEDICAL CENTER | Age: 87
End: 2022-05-24

## 2022-05-24 NOTE — TELEPHONE ENCOUNTER
TW    Caller: Alonso    Medication Name and Dosage: XARELTO 20 MG Tab tablet [[id number]]    amiodarone (CORDARONE) 200 MG Tab [[id number]]    clopidogrel (PLAVIX) 75 MG Tab [[id number]]    metoprolol SR (TOPROL XL) 25 MG TABLET SR 24 HR [[id number]]    benazepril (LOTENSIN) 10 MG Tab (Order #[id number]) on 7/14/21     Did patient contact pharmacy (If no, please call pharmacy first): yes  Medication amount left: 0  Preferred Pharmacy:: [facility], [address] [place] [address]  -   [phone]  Other questions (Topic): TW said he would fill scripts, until Alonso finds a new cardidologist in [place].  He is requesting a 90 day supply.   Callback Number (Will only call for issues): [phone]    Thank you   Janey OSUNA

## 2022-05-26 RX ORDER — BENAZEPRIL HYDROCHLORIDE 20 MG/1
20 TABLET ORAL
COMMUNITY
Start: 2022-04-05 | End: 2022-05-26

## 2022-05-26 NOTE — TELEPHONE ENCOUNTER
TW      Hi,    This patient's wife Kaela called and wanted to follow up on the refill for his meds. He's completely out and is just trying to get a temporary refill till he establishes a new provider in [place].    Ph. [phone]      Thank you,    YOUNG

## 2022-05-27 ENCOUNTER — TELEPHONE (OUTPATIENT)
Dept: CARDIOLOGY | Facility: MEDICAL CENTER | Age: 87
End: 2022-05-27

## 2022-05-27 NOTE — TELEPHONE ENCOUNTER
PT is all settled in new state with new cardiology appt coming up- wants to thank staff here for wonderful care and shouldn't need any further RX refills.   SLC

## 2022-05-27 NOTE — TELEPHONE ENCOUNTER
----- Message from Naty Costa, Med Ass't sent at 5/25/2022 11:01 AM PDT -----  Regarding: Need Follow Appointment  Please contact patient to schedule follow up appointment. Thank you

## 2022-06-03 ENCOUNTER — OFFICE VISIT (OUTPATIENT)
Dept: CARDIOLOGY CLINIC | Age: 87
End: 2022-06-03
Payer: MEDICARE

## 2022-06-03 VITALS
BODY MASS INDEX: 36.15 KG/M2 | HEIGHT: 70 IN | DIASTOLIC BLOOD PRESSURE: 90 MMHG | HEART RATE: 52 BPM | SYSTOLIC BLOOD PRESSURE: 150 MMHG | WEIGHT: 252.5 LBS

## 2022-06-03 DIAGNOSIS — Z79.899 AT RISK FOR AMIODARONE TOXICITY WITH LONG TERM USE: ICD-10-CM

## 2022-06-03 DIAGNOSIS — Z91.89 AT RISK FOR AMIODARONE TOXICITY WITH LONG TERM USE: ICD-10-CM

## 2022-06-03 DIAGNOSIS — E78.5 DYSLIPIDEMIA: ICD-10-CM

## 2022-06-03 DIAGNOSIS — Z00.00 ENCOUNTER FOR MEDICAL EXAMINATION TO ESTABLISH CARE: Primary | ICD-10-CM

## 2022-06-03 DIAGNOSIS — I48.0 PAROXYSMAL ATRIAL FIBRILLATION (HCC): ICD-10-CM

## 2022-06-03 DIAGNOSIS — I25.10 CORONARY ARTERY DISEASE INVOLVING NATIVE CORONARY ARTERY OF NATIVE HEART WITHOUT ANGINA PECTORIS: ICD-10-CM

## 2022-06-03 DIAGNOSIS — Z78.9 STATIN INTOLERANCE: ICD-10-CM

## 2022-06-03 LAB
ALBUMIN SERPL-MCNC: 3.8 G/DL (ref 3.2–4.6)
ALBUMIN/GLOB SERPL: 1.2 {RATIO} (ref 1.2–3.5)
ALP SERPL-CCNC: 62 U/L (ref 50–136)
ALT SERPL-CCNC: 44 U/L (ref 12–65)
AST SERPL-CCNC: 31 U/L (ref 15–37)
BILIRUB DIRECT SERPL-MCNC: 0.2 MG/DL
BILIRUB SERPL-MCNC: 0.7 MG/DL (ref 0.2–1.1)
CHOLEST SERPL-MCNC: 211 MG/DL
GLOBULIN SER CALC-MCNC: 3.2 G/DL (ref 2.3–3.5)
HDLC SERPL-MCNC: 35 MG/DL (ref 40–60)
HDLC SERPL: 6 {RATIO}
LDLC SERPL CALC-MCNC: 126.4 MG/DL
PROT SERPL-MCNC: 7 G/DL (ref 6.3–8.2)
TRIGL SERPL-MCNC: 248 MG/DL (ref 35–150)
TSH, 3RD GENERATION: 1.04 UIU/ML (ref 0.36–3.74)
VLDLC SERPL CALC-MCNC: 49.6 MG/DL (ref 6–23)

## 2022-06-03 PROCEDURE — 1123F ACP DISCUSS/DSCN MKR DOCD: CPT | Performed by: INTERNAL MEDICINE

## 2022-06-03 PROCEDURE — 99204 OFFICE O/P NEW MOD 45 MIN: CPT | Performed by: INTERNAL MEDICINE

## 2022-06-03 PROCEDURE — 93000 ELECTROCARDIOGRAM COMPLETE: CPT | Performed by: INTERNAL MEDICINE

## 2022-06-03 RX ORDER — BENAZEPRIL HYDROCHLORIDE 20 MG/1
20 TABLET ORAL DAILY
COMMUNITY
End: 2022-06-03 | Stop reason: SDUPTHER

## 2022-06-03 RX ORDER — AMIODARONE HYDROCHLORIDE 200 MG/1
200 TABLET ORAL DAILY
Qty: 90 TABLET | Refills: 3 | Status: SHIPPED | OUTPATIENT
Start: 2022-06-03 | End: 2022-09-08 | Stop reason: SDUPTHER

## 2022-06-03 RX ORDER — AMIODARONE HYDROCHLORIDE 200 MG/1
200 TABLET ORAL DAILY
COMMUNITY
End: 2022-06-03 | Stop reason: SDUPTHER

## 2022-06-03 RX ORDER — BENAZEPRIL HYDROCHLORIDE 20 MG/1
20 TABLET ORAL DAILY
Qty: 90 TABLET | Refills: 3 | Status: SHIPPED | OUTPATIENT
Start: 2022-06-03 | End: 2022-09-08 | Stop reason: SDUPTHER

## 2022-06-03 RX ORDER — METOPROLOL SUCCINATE 25 MG/1
25 TABLET, EXTENDED RELEASE ORAL DAILY
Qty: 90 TABLET | Refills: 3 | Status: SHIPPED | OUTPATIENT
Start: 2022-06-03 | End: 2022-09-08 | Stop reason: SDUPTHER

## 2022-06-03 RX ORDER — CLOPIDOGREL BISULFATE 75 MG/1
75 TABLET ORAL DAILY
COMMUNITY
End: 2022-09-08 | Stop reason: SDUPTHER

## 2022-06-03 RX ORDER — METOPROLOL SUCCINATE 25 MG/1
25 TABLET, EXTENDED RELEASE ORAL DAILY
COMMUNITY
End: 2022-06-03 | Stop reason: SDUPTHER

## 2022-06-03 ASSESSMENT — ENCOUNTER SYMPTOMS: SHORTNESS OF BREATH: 0

## 2022-06-03 NOTE — PROGRESS NOTES
[facility] CARDIOLOGY  [address] [facility], [address]  PHONE: [phone]      22    NAME:  Lynne Hidalgo  : 1935  MRN: [id number]         SUBJECTIVE:   Lynne Hidalgo is a 81 y.o. male seen for a consultation visit regarding the following:     Chief Complaint   Patient presents with    New Patient     moved to area    Atrial Fibrillation    Coronary Artery Disease    Establish Cardiologist            HPI:  Consultation is requested by the patient for evaluation of New Patient (moved to area), Atrial Fibrillation, Coronary Artery Disease, and Establish Cardiologist   .     Patient requests evaluation to establish local cardiology care, having moved here from [facility]. He was previously seeing a cardiologist there for atrial fib. Diagnosed with atrial fib ~ 2020. Was discovered incidentally on a routine EKG at his physical.  He was sent to cardiologist who did stress test, echo, ultimately ended up with 2 coronary stents and a cardioversion and was placed on amiodarone. Never mentioned another rhythm drug nor heart failure. Patient never had symptoms of cp, dyspnea or indeed any at all. He currently feels well, describes himself as very active, taking care of a large yard in his new house. Was going to the gym three days a week, had COVID late  and with pandemic hadn't gotten back. Plans to look into the [facility]. He was placed on cholesterol therapy but stopped d/t poor tolerance. He monitors BP at home, has generally been ok but his doctor in [facility] recently increased ACEi to 20 mg dose. We were able to get records to upload on Care Everywhere after visit completed. They indicate he is status post DCCV which was initially successful but reversion to AF now on amiodarone, positive stress testing and subsequent coronary angiography revealing multivessel CAD. Declined CABG and underwent multivessel PCI.  Confirms severe myalgia with limited physical activity on atorvastatin. PAST CARDIAC HISTORY:  Afib - DCCV, reverted to af-repeat DCCF and amiodarone ([place], [place])  Transient tachycardia induced CM  Jan 2020- in afib - EF 35%, bi atrial enlargement, EF 23% on stress perfusion study  March 2020-  PCI 90% OM 1(2.5 x 30 mm [place] ISABEL)  PCI 80% long proximal and ostial LAD stenosis (3.5 x 38 mm [place] ISABEL, postdilated 3.75 mm, IVUS guided)  11/8/20 - EF 60%, no valve disease              Past Medical History, Past Surgical History, Family history, Social History, and Medications were all reviewed with the patient today and updated as necessary. Prior to Admission medications    Medication Sig Start Date End Date Taking? Authorizing Provider   clopidogrel (PLAVIX) 75 MG tablet Take 75 mg by mouth daily   Yes Historical Provider, MD   rivaroxaban (XARELTO) 20 MG TABS tablet Take 1 tablet by mouth daily (with breakfast) 6/3/22  Yes Ever MD Mani   benazepril (LOTENSIN) 20 MG tablet Take 1 tablet by mouth daily 6/3/22  Yes Ever MD Mani   amiodarone (CORDARONE) 200 MG tablet Take 1 tablet by mouth daily 6/3/22  Yes Ever MD Mani   metoprolol succinate (TOPROL XL) 25 MG extended release tablet Take 1 tablet by mouth daily 6/3/22  Yes Ever MD Mani     No Known Allergies  Past Medical History:   Diagnosis Date    Atrial fibrillation ([facility] [address]     CAD (coronary artery disease)     Hyperlipidemia      Past Surgical History:   Procedure Laterality Date    SALIVARY GLAND SURGERY      UMBILICAL HERNIA REPAIR       Family History   Family history unknown: Yes     Social History     Tobacco Use    Smoking status: Never Smoker    Smokeless tobacco: Never Used   Substance Use Topics    Alcohol use: Yes     Comment: 1 drink daily       ROS:    Review of Systems   Cardiovascular: Negative for chest pain, leg swelling and palpitations. Respiratory: Negative for shortness of breath.            PHYSICAL EXAM:   BP (!) 150/90 Pulse 52   Ht 5' 10\" (1.778 m)   Wt 252 lb 8 oz (114.5 kg)   BMI 36.23 kg/m²      Physical Exam  Constitutional:       Appearance: Normal appearance. HENT:      Head: Normocephalic and atraumatic. Eyes:      Conjunctiva/sclera: Conjunctivae normal.   Neck:      Vascular: No carotid bruit. Cardiovascular:      Rate and Rhythm: Normal rate and regular rhythm. Heart sounds: No murmur heard. No friction rub. No gallop. Pulmonary:      Effort: No respiratory distress. Breath sounds: No wheezing or rales. Abdominal:      General: Abdomen is flat. There is no distension. Palpations: Abdomen is soft. Tenderness: There is no abdominal tenderness. Musculoskeletal:         General: No swelling. Cervical back: Neck supple. Skin:     General: Skin is warm and dry. Neurological:      General: No focal deficit present. Mental Status: He is alert. Psychiatric:         Mood and Affect: Mood normal.         Behavior: Behavior normal.         Medical problems and test results were reviewed with the patient today.      DATA REVIEW    Lab Results   Component Value Date/Time   CHOLSTRLTOT 198 02/26/2021 08:37 AM    (H) 02/26/2021 08:37 AM   HDL 34 (A) 02/26/2021 08:37 AM   TRIGLYCERIDE 267 (H) 02/26/2021 08:37 AM     Lab Results   Component Value Date/Time   WBC 6.7 02/26/2021 08:37 AM   RBC 4.55 (L) 02/26/2021 08:37 AM   HEMOGLOBIN 14.4 02/26/2021 08:37 AM   HEMATOCRIT 43.7 02/26/2021 08:37 AM   MCV 96.0 02/26/2021 08:37 AM   NEUTSPOLYS 58.50 02/26/2021 08:37 AM   LYMPHOCYTES 28.30 02/26/2021 08:37 AM   MONOCYTES 10.50 02/26/2021 08:37 AM   EOSINOPHILS 1.80 02/26/2021 08:37 AM   BASOPHILS 0.60 02/26/2021 08:37 AM     Lab Results   Component Value Date/Time   SODIUM 138 02/26/2021 08:37 AM   POTASSIUM 4.0 02/26/2021 08:37 AM   CHLORIDE 104 02/26/2021 08:37 AM   CO2 26 02/26/2021 08:37 AM   GLUCOSE 102 (H) 02/26/2021 08:37 AM   BUN 14 02/26/2021 08:37 AM   CREATININE 1.18 02/26/2021 08:37 AM     Lab Results   Component Value Date   HBA1C 5.2 02/26/2021     Lab Results   Component Value Date/Time   ALKPHOSPHAT 49 02/26/2021 08:37 AM   ASTSGOT 30 02/26/2021 08:37 AM   ALTSGPT 16 02/26/2021 08:37 AM   TBILIRUBIN 0.5 02/26/2021 08:37 AM     Component Value Date/Time   PROTHROMBTM 15.7 (H) 12/11/2020 05:37 AM   INR 1.28 (H) 12/11/2020 05:37 AM        EKG    Sinus  Bradycardia 52  normal axis, intervals, ST And T waves  low voltage    CXR/IMAGING        DEVICE INTERROGATION        OUTSIDE RECORDS REVIEW    Records from outside providers have been reviewed and summarized as noted in the HPI, past history and data review sections of this note, and reviewed with patient. .       ASSESSMENT and PLAN    Kalpana Casiano was seen today for new patient, atrial fibrillation, coronary artery disease and establish cardiologist.    Diagnoses and all orders for this visit:    Encounter for medical examination to establish care  -     EKG 12 Lead    At risk for amiodarone toxicity with long term use  -     Ambulatory referral to PFT  -     Hepatic Function Panel; Future  -     Hepatic Function Panel; Future  -     TSH; Future  -     TSH; Future    Dyslipidemia  -     Lipid Panel; Future    Statin intolerance    Coronary artery disease involving native coronary artery of native heart without angina pectoris    Paroxysmal atrial fibrillation (HCC)    Other orders  -     rivaroxaban (XARELTO) 20 MG TABS tablet; Take 1 tablet by mouth daily (with breakfast)  -     benazepril (LOTENSIN) 20 MG tablet; Take 1 tablet by mouth daily  -     amiodarone (CORDARONE) 200 MG tablet; Take 1 tablet by mouth daily  -     metoprolol succinate (TOPROL XL) 25 MG extended release tablet; Take 1 tablet by mouth daily          IMPRESSION:    Asymptomatic patient with coronary artery disease, transient tachycardia induced cardiomyopathy (resolved). All was reportedly asymptomatic at the time it was diagnosed as well.   True statin intolerance    Reviewed long term risks of amiodarone therapy but he's done well. Understands need for serial labs, regular eye exams. Never had baseline PFTs so will get those, discussed risk of pulmonary fibrosis and continue med for now. His EF has normalized so we do now have other options pending his course    No angina, very active. Declined CABG, had multivessel PCI more than 18 months ago. High risk of bleeding on DOAC and antiplatelet. Recommend stopping plavix, continue with Xarelto indefinitely. BP control has been borderline of late at home. They will upload BP diary to My Chart in ~ 1 month and return in 3 months to make sure this is controlled. True statin intolerance. Check lipids today. May consider PCSK9 therapy pending results. In process of establishing primary care    Return in about 3 months (around 9/3/2022). Thank you for allowing me to participate in this patient's care. Please call or contact me if there are any questions or concerns regarding the above.       Aiden Lancaster MD  06/03/22  11:53 AM

## 2022-06-03 NOTE — PATIENT INSTRUCTIONS
Patient Education        Atrial Fibrillation: Care Instructions  Your Care Instructions     Atrial fibrillation is an irregular and often fast heartbeat. Treating this condition is important for several reasons. It can cause blood clots, which can travel from your heart to your brain and cause a stroke. If you have a fast heartbeat, you may feel lightheaded, dizzy, and weak. An irregular heartbeatcan also increase your risk for heart failure. Atrial fibrillation is often the result of another heart condition, such as high blood pressure or coronary artery disease. Making changes to improve yourheart condition will help you stay healthy and active. Follow-up care is a key part of your treatment and safety. Be sure to make and go to all appointments, and call your doctor if you are having problems. It's also a good idea to know your test results and keep alist of the medicines you take. How can you care for yourself at home? Medicines     Take your medicines exactly as prescribed. Call your doctor if you think you are having a problem with your medicine. You will get more details on the specific medicines your doctor prescribes.      If your doctor has given you a blood thinner to prevent a stroke, be sure you get instructions about how to take your medicine safely. Blood thinners can cause serious bleeding problems.      Do not take any vitamins, over-the-counter drugs, or herbal products without talking to your doctor first.   Lifestyle changes     Do not smoke. Smoking can increase your chance of a stroke and heart attack. If you need help quitting, talk to your doctor about stop-smoking programs and medicines. These can increase your chances of quitting for good.      Eat a heart-healthy diet.      Stay at a healthy weight. Lose weight if you need to.      Limit alcohol to 2 drinks a day for men and 1 drink a day for women. Too much alcohol can cause health problems.      Avoid colds and flu.  Get a pneumococcal vaccine shot. If you have had one before, ask your doctor whether you need another dose. Get a flu shot every year. If you must be around people with colds or flu, wash your hands often. Activity     If your doctor recommends it, get more exercise. Walking is a good choice. Bit by bit, increase the amount you walk every day. Try for at least 30 minutes on most days of the week. You also may want to swim, bike, or do other activities. Your doctor may suggest that you join a cardiac rehabilitation program so that you can have help increasing your physical activity safely.      Start light exercise if your doctor says it is okay. Even a small amount will help you get stronger, have more energy, and manage stress. Walking is an easy way to get exercise. Start out by walking a little more than you did in the hospital. Gradually increase the amount you walk.      When you exercise, watch for signs that your heart is working too hard. You are pushing too hard if you cannot talk while you are exercising. If you become short of breath or dizzy or have chest pain, sit down and rest immediately.      Check your pulse regularly. Place two fingers on the artery at the palm side of your wrist, in line with your thumb. If your heartbeat seems uneven or fast, talk to your doctor. When should you call for help? Call 911 anytime you think you may need emergency care. For example, call if:     You have symptoms of a heart attack. These may include:  ? Chest pain or pressure, or a strange feeling in the chest.  ? Sweating. ? Shortness of breath. ? Nausea or vomiting. ? Pain, pressure, or a strange feeling in the back, neck, jaw, or upper belly or in one or both shoulders or arms. ? Lightheadedness or sudden weakness. ? A fast or irregular heartbeat. After you call 911, the  may tell you to chew 1 adult-strength or 2 to 4 low-dose aspirin. Wait for an ambulance.  Do not try to drive yourself.      You have symptoms of a stroke. These may include:  ? Sudden numbness, tingling, weakness, or loss of movement in your face, arm, or leg, especially on only one side of your body. ? Sudden vision changes. ? Sudden trouble speaking. ? Sudden confusion or trouble understanding simple statements. ? Sudden problems with walking or balance. ? A sudden, severe headache that is different from past headaches.      You passed out (lost consciousness). Call your doctor now or seek immediate medical care if:     You have new or increased shortness of breath.      You feel dizzy or lightheaded, or you feel like you may faint.      Your heart rate becomes irregular.      You can feel your heart flutter in your chest or skip heartbeats. Tell your doctor if these symptoms are new or worse. Watch closely for changes in your health, and be sure to contact your doctor ifyou have any problems. Where can you learn more? Go to https://ihiji.Aardvark. org and sign in to your NetHooks account. Enter U020 in the Sonics box to learn more about \"Atrial Fibrillation: Care Instructions. \"     If you do not have an account, please click on the \"Sign Up Now\" link. Current as of: January 10, 2022               Content Version: 13.2  © 9810-4804 Healthwise, Incorporated. Care instructions adapted under license by [facility] ([facility]). If you have questions about a medical condition or this instruction, always ask your healthcare professional. [address]  any warranty or liability for your use of this information.          Please upload BP diary to me on My Chart in ~ 4 weeks

## 2022-07-13 ENCOUNTER — NURSE ONLY (OUTPATIENT)
Dept: PULMONOLOGY | Age: 87
End: 2022-07-13
Payer: MEDICARE

## 2022-07-13 DIAGNOSIS — Z91.89 AT RISK FOR AMIODARONE TOXICITY WITH LONG TERM USE: ICD-10-CM

## 2022-07-13 DIAGNOSIS — Z79.899 AT RISK FOR AMIODARONE TOXICITY WITH LONG TERM USE: ICD-10-CM

## 2022-07-13 DIAGNOSIS — I48.91 ATRIAL FIBRILLATION, UNSPECIFIED TYPE (HCC): Primary | ICD-10-CM

## 2022-07-13 LAB
FEF25-27, POC: 1.98 L/S
FET, POC: NORMAL
FEV 1 , POC: 2.18 L
FEV1/FVC, POC: NORMAL
FVC, POC: NORMAL
LUNG AGE, POC: NORMAL
PEF, POC: 9.29 L/S

## 2022-07-13 PROCEDURE — 94729 DIFFUSING CAPACITY: CPT | Performed by: INTERNAL MEDICINE

## 2022-07-13 PROCEDURE — 94726 PLETHYSMOGRAPHY LUNG VOLUMES: CPT | Performed by: INTERNAL MEDICINE

## 2022-07-13 PROCEDURE — 94010 BREATHING CAPACITY TEST: CPT | Performed by: INTERNAL MEDICINE

## 2022-09-08 ENCOUNTER — OFFICE VISIT (OUTPATIENT)
Dept: CARDIOLOGY CLINIC | Age: 87
End: 2022-09-08
Payer: MEDICARE

## 2022-09-08 VITALS
HEART RATE: 56 BPM | HEIGHT: 70 IN | WEIGHT: 231 LBS | BODY MASS INDEX: 33.07 KG/M2 | SYSTOLIC BLOOD PRESSURE: 160 MMHG | DIASTOLIC BLOOD PRESSURE: 90 MMHG

## 2022-09-08 DIAGNOSIS — Z91.89 AT RISK FOR AMIODARONE TOXICITY WITH LONG TERM USE: Primary | ICD-10-CM

## 2022-09-08 DIAGNOSIS — I25.10 CORONARY ARTERY DISEASE INVOLVING NATIVE CORONARY ARTERY OF NATIVE HEART WITHOUT ANGINA PECTORIS: ICD-10-CM

## 2022-09-08 DIAGNOSIS — Z79.899 AT RISK FOR AMIODARONE TOXICITY WITH LONG TERM USE: Primary | ICD-10-CM

## 2022-09-08 DIAGNOSIS — Z78.9 STATIN INTOLERANCE: ICD-10-CM

## 2022-09-08 DIAGNOSIS — I48.0 PAROXYSMAL ATRIAL FIBRILLATION (HCC): ICD-10-CM

## 2022-09-08 DIAGNOSIS — I10 WHITE COAT SYNDROME WITH DIAGNOSIS OF HYPERTENSION: ICD-10-CM

## 2022-09-08 PROCEDURE — 99214 OFFICE O/P EST MOD 30 MIN: CPT | Performed by: INTERNAL MEDICINE

## 2022-09-08 PROCEDURE — 1123F ACP DISCUSS/DSCN MKR DOCD: CPT | Performed by: INTERNAL MEDICINE

## 2022-09-08 RX ORDER — CLOPIDOGREL BISULFATE 75 MG/1
75 TABLET ORAL DAILY
Qty: 90 TABLET | Refills: 3 | Status: SHIPPED | OUTPATIENT
Start: 2022-09-08 | End: 2022-09-29 | Stop reason: ALTCHOICE

## 2022-09-08 RX ORDER — BENAZEPRIL HYDROCHLORIDE 20 MG/1
20 TABLET ORAL DAILY
Qty: 90 TABLET | Refills: 3 | Status: SHIPPED | OUTPATIENT
Start: 2022-09-08

## 2022-09-08 RX ORDER — METOPROLOL SUCCINATE 25 MG/1
25 TABLET, EXTENDED RELEASE ORAL DAILY
Qty: 90 TABLET | Refills: 3 | Status: SHIPPED | OUTPATIENT
Start: 2022-09-08

## 2022-09-08 RX ORDER — AMIODARONE HYDROCHLORIDE 200 MG/1
200 TABLET ORAL DAILY
Qty: 90 TABLET | Refills: 3 | Status: SHIPPED | OUTPATIENT
Start: 2022-09-08

## 2022-09-08 NOTE — PROGRESS NOTES
[address], [address], [address] [place] [address]  PHONE: [phone]      22    NAME:  Adebayo Navarro  : 1935  MRN: [id number]         SUBJECTIVE:   Adebayo Navarro is a 81 y.o. male seen for a follow up visit regarding the following:     Chief Complaint   Patient presents with    3 Month Follow-Up    Coronary Artery Disease            HPI:  Follow up  3 Month Follow-Up and Coronary Artery Disease   . 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 Hepatic panel and thyroid ok in . Had PFTs done though I am having difficulty locating the final interpretation no glaring abnormalities and done as baseline. They were told he passed with flying colors. Remains very active, works in the yard, has been digging trenches. BP at home has not been higher than 140/80 and generally < 135/80. Anxious today and upset with some ill family members and had to kill a snake while working in the yard yesterday, very upsetting. PAST CARDIAC HISTORY:  Afib - DCCV, reverted to af-repeat DCCF and amiodarone ([place], [place])  Transient tachycardia induced CM  2020- in afib - EF 35%, bi atrial enlargement, EF 23% on stress perfusion study  2020-  PCI 90% OM 1(2.5 x 30 mm Thaddeus ISABEL)  PCI 80% long proximal and ostial LAD stenosis (3.5 x 38 mm [place] ISABEL, postdilated 3.75 mm, IVUS guided)  20 - EF 60%, no valve disease          Key CAD CHF Meds            rivaroxaban (XARELTO) 20 MG TABS tablet (Taking)    Sig - Route: Take 1 tablet by mouth daily (with breakfast) - Oral    benazepril (LOTENSIN) 20 MG tablet (Taking)    Sig - Route: Take 1 tablet by mouth daily - Oral    metoprolol succinate (TOPROL XL) 25 MG extended release tablet (Taking)    Sig - Route:  Take 1 tablet by mouth daily - Oral              Past Medical History, Past Surgical History, Family history, Social History, and Medications were all reviewed with the patient today and updated as necessary. Prior to Admission medications    Medication Sig Start Date End Date Taking? Authorizing Provider   Cholecalciferol 400 UNIT TABS tablet Take 400 Units by mouth daily   Yes Historical Provider, MD   clopidogrel (PLAVIX) 75 MG tablet Take 1 tablet by mouth daily 9/8/22  Yes Rochelle Sims MD   rivaroxaban (Aleena Pilsner) 20 MG TABS tablet Take 1 tablet by mouth daily (with breakfast) 9/8/22  Yes Rochelle Sims MD   benazepril (LOTENSIN) 20 MG tablet Take 1 tablet by mouth daily 9/8/22  Yes Rochelle Sims MD   amiodarone (CORDARONE) 200 MG tablet Take 1 tablet by mouth daily 9/8/22  Yes Rochelle Sims MD   metoprolol succinate (TOPROL XL) 25 MG extended release tablet Take 1 tablet by mouth daily 9/8/22  Yes Rochelle Sims MD     No Known Allergies  Past Medical History:   Diagnosis Date    Atrial fibrillation ([facility] [address]     CAD (coronary artery disease)     Hyperlipidemia      Past Surgical History:   Procedure Laterality Date    SALIVARY GLAND SURGERY      UMBILICAL HERNIA REPAIR       Family History   Family history unknown: Yes     Social History     Tobacco Use    Smoking status: Never    Smokeless tobacco: Never   Substance Use Topics    Alcohol use: Yes     Comment: 1 drink daily       ROS:    ROS       PHYSICAL EXAM:   BP (!) 160/90   Pulse 56   Ht 5' 10\" (1.778 m)   Wt 231 lb (104.8 kg)   BMI 33.15 kg/m²      Wt Readings from Last 3 Encounters:   09/08/22 231 lb (104.8 kg)   06/03/22 252 lb 8 oz (114.5 kg)     BP Readings from Last 3 Encounters:   09/08/22 (!) 160/90   06/03/22 (!) 150/90     Pulse Readings from Last 3 Encounters:   09/08/22 56   06/03/22 52           Physical Exam    Medical problems and test results were reviewed with the patient today.      DATA REVIEW    LIPID PANEL     Lab Results   Component Value Date    CHOL 211 (H) 06/03/2022     Lab Results   Component Value Date TRIG 248 (H) 06/03/2022     Lab Results   Component Value Date    HDL 35 (L) 06/03/2022     Lab Results   Component Value Date    LDLCALC 126.4 (H) 06/03/2022     Lab Results   Component Value Date    LABVLDL 49.6 (H) 06/03/2022     Lab Results   Component Value Date    CHOLHDLRATIO 6.0 06/03/2022       Lab Results   Component Value Date/Time    ALKPHOS 62 06/03/2022 11:52 AM    ALT 44 06/03/2022 11:52 AM    AST 31 06/03/2022 11:52 AM    PROT 7.0 06/03/2022 11:52 AM    BILITOT 0.7 06/03/2022 11:52 AM    BILIDIR 0.2 06/03/2022 11:52 AM    LABALBU 3.8 06/03/2022 11:52 AM     Lab Results   Component Value Date    HXJ7GHC 1.040 06/03/2022            EKG        CXR/IMAGING        DEVICE INTERROGATION        OUTSIDE RECORDS REVIEW    Records from outside providers have been reviewed and summarized as noted in the HPI, past history and data review sections of this note, and reviewed with patient. .       ASSESSMENT and PLAN    Miley Oakes was seen today for 3 month follow-up and coronary artery disease. Diagnoses and all orders for this visit:    At risk for amiodarone toxicity with long term use  -     Hepatic Function Panel; Future  -     TSH; Future    White coat syndrome with diagnosis of hypertension    Paroxysmal atrial fibrillation (HCC)    Statin intolerance    Coronary artery disease involving native coronary artery of native heart without angina pectoris    Other orders  -     clopidogrel (PLAVIX) 75 MG tablet; Take 1 tablet by mouth daily  -     rivaroxaban (XARELTO) 20 MG TABS tablet; Take 1 tablet by mouth daily (with breakfast)  -     benazepril (LOTENSIN) 20 MG tablet; Take 1 tablet by mouth daily  -     amiodarone (CORDARONE) 200 MG tablet; Take 1 tablet by mouth daily  -     metoprolol succinate (TOPROL XL) 25 MG extended release tablet; Take 1 tablet by mouth daily        IMPRESSION:    No angina, continue meds and lifestyle modification      Truly statin intolerant.  Active octogenarian, hesitant to add meds that could lead to more side effects, continue to monitor with healthy diet and exercise    BP well controlled outside office, continue to monitor on current therapy    Labs every 6 mos on amiodarone therapy, patient aware        Return in about 6 months (around 3/8/2023). Thank you for allowing me to participate in this patient's care. Please call or contact me if there are any questions or concerns regarding the above.       Isaiah Kan MD  09/08/22  12:07 PM

## 2022-09-08 NOTE — PATIENT INSTRUCTIONS
Patient Education        Learning About Coronary Artery Disease (CAD)  What is coronary artery disease? Coronary artery disease is a condition that occurs when plaque builds up in the arteries that bring oxygen-rich blood to your heart. Plaque is a fatty substance made of cholesterol, calcium, and other substances in the blood. Thisprocess is called hardening of the arteries, or atherosclerosis. What happens when you have coronary artery disease? Plaque may narrow the coronary arteries. Narrowed arteries cause poor blood flow. This can lead to angina symptoms such as chest pain or discomfort. If blood flow is completely blocked, you could have a heart attack. You can slow and reduce the risk of future problems by making changes in your lifestyle. These include quitting smoking and eating heart-healthy foods. Treatment, along with changes in your lifestyle, can help you live a longer and healthier life. How can you prevent coronary artery disease? Do not smoke. It may be the best thing you can do to prevent coronary artery disease. If you need help quitting, talk to your doctor about stop-smoking programs and medicines. These can increase your chances of quitting for good. Be active. Try to do moderate activity at least 2½ hours a week. Or try to do vigorous activity at least 1¼ hours a week. You may want to walk or try other activities, such as running, swimming, cycling, or playing tennis or team sports. Eat heart-healthy foods. Eat more fruits and vegetables and less food that contains saturated and trans fats. Limit alcohol, sodium, and sweets. Stay at a healthy weight. Lose weight if you need to. Manage other health problems such as diabetes, high blood pressure, and high cholesterol. How is coronary artery disease treated? Your doctor will suggest that you make lifestyle changes. For example, your doctor may ask you to eat healthy foods, quit smoking, lose extra weight, and be more active.   You will take medicines that help prevent a heart attack. Your doctor may suggest a procedure to open narrowed or blocked arteries. This is called angioplasty. Or your doctor may suggest using healthy blood vessels to create detours around narrowed or blocked arteries. This is called bypass surgery. Follow-up care is a key part of your treatment and safety. Be sure to make and go to all appointments, and call your doctor if you are having problems. It's also a good idea to know your test results and keep alist of the medicines you take. Where can you learn more? Go to [email or web address] org and sign in to your Giftology account. Enter [phone] in the Agrivida box to learn more about \"Learning About Coronary Artery Disease (CAD). \"     If you do not have an account, please click on the \"Sign Up Now\" link. Current as of: January 10, 2022               Content Version: 13.3  © 2006-2022 Healthwise, Compendium. Care instructions adapted under license by [facility] ([place] [facility]). If you have questions about a medical condition or this instruction, always ask your healthcare professional. [address] any warranty or liability for your use of this information. Please visit www.cardiosmart. org for more information regarding cardiovascular disease prevention and treatment.

## 2022-09-29 ENCOUNTER — OFFICE VISIT (OUTPATIENT)
Dept: INTERNAL MEDICINE CLINIC | Facility: CLINIC | Age: 87
End: 2022-09-29
Payer: MEDICARE

## 2022-09-29 VITALS
HEART RATE: 62 BPM | WEIGHT: 227.6 LBS | TEMPERATURE: 96.8 F | HEIGHT: 70 IN | OXYGEN SATURATION: 95 % | BODY MASS INDEX: 32.58 KG/M2 | DIASTOLIC BLOOD PRESSURE: 69 MMHG | SYSTOLIC BLOOD PRESSURE: 133 MMHG

## 2022-09-29 DIAGNOSIS — I48.0 PAROXYSMAL ATRIAL FIBRILLATION (HCC): ICD-10-CM

## 2022-09-29 DIAGNOSIS — I25.10 CORONARY ARTERY DISEASE INVOLVING NATIVE CORONARY ARTERY OF NATIVE HEART WITHOUT ANGINA PECTORIS: ICD-10-CM

## 2022-09-29 DIAGNOSIS — I10 HYPERTENSION, ESSENTIAL: ICD-10-CM

## 2022-09-29 DIAGNOSIS — L98.9 NON-HEALING SKIN LESION OF NOSE: ICD-10-CM

## 2022-09-29 DIAGNOSIS — E78.5 DYSLIPIDEMIA: ICD-10-CM

## 2022-09-29 DIAGNOSIS — Z23 ENCOUNTER FOR IMMUNIZATION: ICD-10-CM

## 2022-09-29 PROCEDURE — 1123F ACP DISCUSS/DSCN MKR DOCD: CPT | Performed by: INTERNAL MEDICINE

## 2022-09-29 PROCEDURE — 99204 OFFICE O/P NEW MOD 45 MIN: CPT | Performed by: INTERNAL MEDICINE

## 2022-09-29 SDOH — HEALTH STABILITY: PHYSICAL HEALTH: ON AVERAGE, HOW MANY MINUTES DO YOU ENGAGE IN EXERCISE AT THIS LEVEL?: 60 MIN

## 2022-09-29 SDOH — HEALTH STABILITY: PHYSICAL HEALTH: ON AVERAGE, HOW MANY DAYS PER WEEK DO YOU ENGAGE IN MODERATE TO STRENUOUS EXERCISE (LIKE A BRISK WALK)?: 2 DAYS

## 2022-09-29 ASSESSMENT — SOCIAL DETERMINANTS OF HEALTH (SDOH)
WITHIN THE LAST YEAR, HAVE YOU BEEN KICKED, HIT, SLAPPED, OR OTHERWISE PHYSICALLY HURT BY YOUR PARTNER OR EX-PARTNER?: NO
WITHIN THE LAST YEAR, HAVE YOU BEEN AFRAID OF YOUR PARTNER OR EX-PARTNER?: NO
WITHIN THE LAST YEAR, HAVE YOU BEEN HUMILIATED OR EMOTIONALLY ABUSED IN OTHER WAYS BY YOUR PARTNER OR EX-PARTNER?: NO
WITHIN THE LAST YEAR, HAVE TO BEEN RAPED OR FORCED TO HAVE ANY KIND OF SEXUAL ACTIVITY BY YOUR PARTNER OR EX-PARTNER?: NO

## 2022-09-29 ASSESSMENT — ENCOUNTER SYMPTOMS
STRIDOR: 0
VOICE CHANGE: 0
RECTAL PAIN: 0
EYE PAIN: 0
CHOKING: 0

## 2022-09-29 NOTE — PROGRESS NOTES
NEW PATIENT VISIT    Subjective:    Mr. Mery Schilling is a 81 y.o., male,   Chief Complaint   Patient presents with    New Patient       HPI:    Mr. Mery Schilling is a 81 y.o., male who presents today for a new patient appointment. Their previous primary provider was Wander Rodriguez M.D. Internist in [place], [place], [address] [facility] [address], [facility], [facility].  [phone]. Old records have been requested. The patient has hyperlipidemia. The patient has been following a low cholesterol diet. The patient has coronary artery disease. The patient has been attempting to follow a heart healthy diet and exercise. The patient denies chest pain, shortness of breath, dyspnea on exertion, or PND. The patient has hypertension. The patient has been on an attempted low sodium diet and has been trying to exercise and maintain a healthy weight. The patient reports good compliance with the blood pressure medications and good blood pressure readings on home / ambulatory monitoring. He has PAF. Maintained in NSR on amiodarone and Xarelto for stroke prevention. The following portions of the patient's history were reviewed and updated as appropriate:      Past Medical History:   Diagnosis Date    Atrial fibrillation ([address]     CAD (coronary artery disease)     S/P stents 2019    Hyperlipidemia     Hypertension, essential 9/29/2022       Past Surgical History:   Procedure Laterality Date    SALIVARY GLAND SURGERY      UMBILICAL HERNIA REPAIR         Family History   Problem Relation Age of Onset    Colon Cancer Mother     Heart Attack Father        Social History     Socioeconomic History    Marital status:      Spouse name: Not on file    Number of children: 4    Years of education: Not on file    Highest education level: Not on file   Occupational History     Comment: retired    Tobacco Use    Smoking status: Never    Smokeless tobacco: Never   Substance and Sexual Activity    Alcohol use:  Yes Comment: 1 drink daily    Drug use: Never    Sexual activity: Not on file   Other Topics Concern    Not on file   Social History Narrative    Not on file     Social Determinants of Health     Financial Resource Strain: Not on file   Food Insecurity: Not on file   Transportation Needs: Not on file   Physical Activity: Insufficiently Active    Days of Exercise per Week: 2 days    Minutes of Exercise per Session: 60 min   Stress: Not on file   Social Connections: Not on file   Intimate Partner Violence: Not At Risk    Fear of Current or Ex-Partner: No    Emotionally Abused: No    Physically Abused: No    Sexually Abused: No   Housing Stability: Not on file       Current Outpatient Medications   Medication Sig Dispense Refill    Cholecalciferol 400 UNIT TABS tablet Take 400 Units by mouth daily      rivaroxaban (XARELTO) 20 MG TABS tablet Take 1 tablet by mouth daily (with breakfast) 90 tablet 3    benazepril (LOTENSIN) 20 MG tablet Take 1 tablet by mouth daily 90 tablet 3    amiodarone (CORDARONE) 200 MG tablet Take 1 tablet by mouth daily 90 tablet 3    metoprolol succinate (TOPROL XL) 25 MG extended release tablet Take 1 tablet by mouth daily 90 tablet 3     No current facility-administered medications for this visit. Allergies as of 09/29/2022 - Fully Reviewed 09/29/2022   Allergen Reaction Noted    Atorvastatin Nausea Only 03/19/2020    Codeine Nausea Only 02/27/2020       Review of Systems   Constitutional:  Negative for activity change and appetite change. HENT:  Negative for drooling and voice change. Eyes:  Negative for pain. Respiratory:  Negative for choking and stridor. Gastrointestinal:  Negative for rectal pain. Endocrine: Negative for polydipsia and polyphagia. Genitourinary:  Negative for enuresis and penile pain. Musculoskeletal:  Negative for gait problem and neck stiffness. Skin:  Negative for pallor. Allergic/Immunologic: Negative for immunocompromised state.    Neurological: Negative for facial asymmetry and speech difficulty. Hematological:  Does not bruise/bleed easily. Psychiatric/Behavioral:  Negative for self-injury. The patient is not hyperactive. Patient Care Team:  Dylan Pickard MD as PCP - General (Internal Medicine)    Objective:    /69 (Site: Left Upper Arm, Position: Sitting)   Pulse 62   Temp 96.8 °F (36 °C) (Temporal)   Ht 5' 10\" (1.778 m)   Wt 227 lb 9.6 oz (103.2 kg)   SpO2 95%   BMI 32.66 kg/m²     Physical Exam  Vitals reviewed. Constitutional:       General: He is not in acute distress. Appearance: Normal appearance. He is not toxic-appearing. HENT:      Head: Normocephalic and atraumatic. Right Ear: Tympanic membrane, ear canal and external ear normal.      Left Ear: Tympanic membrane, ear canal and external ear normal.      Nose: Nose normal.      Mouth/Throat:      Mouth: Mucous membranes are moist.      Pharynx: Oropharynx is clear. Eyes:      General: No scleral icterus. Extraocular Movements: Extraocular movements intact. Conjunctiva/sclera: Conjunctivae normal.      Pupils: Pupils are equal, round, and reactive to light. Cardiovascular:      Rate and Rhythm: Normal rate and regular rhythm. Pulses: Normal pulses. Heart sounds: Normal heart sounds. Pulmonary:      Breath sounds: Normal breath sounds. Abdominal:      General: Abdomen is flat. Bowel sounds are normal.      Palpations: Abdomen is soft. There is no mass. Tenderness: There is no guarding or rebound. Musculoskeletal:         General: Normal range of motion. Cervical back: Normal range of motion and neck supple. Skin:     General: Skin is warm and dry. Coloration: Skin is not jaundiced. Neurological:      Mental Status: He is alert and oriented to person, place, and time. Mental status is at baseline. Psychiatric:         Behavior: Behavior normal.         Thought Content:  Thought content normal.            Nurse

## 2023-03-10 NOTE — PROGRESS NOTES
[address], [address]  PHONE: [phone]      23    NAME:  Allen Gandara  : 1935  MRN: [id number]         SUBJECTIVE:   Allen Gandara is a 81 y.o. male seen for a follow up visit regarding the following:     Chief Complaint   Patient presents with    Follow-up     6 months    Coronary Artery Disease              HPI:  Follow up  Follow-up (6 months) and Coronary Artery Disease   . 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 Just had his labs this morning, pending, they've been caring for her brother in [place] after a fractured leg. The patient feels great, has been very active, no cp, dyspnea or palpitations. PAST CARDIAC HISTORY:  Afib - DCCV, reverted to af-repeat DCCF and amiodarone ([place], [place])  Transient tachycardia induced CM  2020- in afib - EF 35%, bi atrial enlargement, EF 23% on stress perfusion study  2020-  PCI 90% OM 1(2.5 x 30 mm [place] ISABEL)  PCI 80% long proximal and ostial LAD stenosis (3.5 x 38 mm [place] ISABEL, postdilated 3.75 mm, IVUS guided)  20 - EF 60%, no valve disease          Key CAD CHF Meds            rivaroxaban (XARELTO) 20 MG TABS tablet (Taking)    Sig - Route: Take 1 tablet by mouth daily (with breakfast) - Oral    benazepril (LOTENSIN) 20 MG tablet (Taking)    Sig - Route: Take 1 tablet by mouth daily - Oral    metoprolol succinate (TOPROL XL) 25 MG extended release tablet (Taking)    Sig - Route: Take 1 tablet by mouth daily - Oral          Key Antihyperglycemic Medications       Patient is on no antihyperglycemic meds. Past Medical History, Past Surgical History, Family history, Social History, and Medications were all reviewed with the patient today and updated as necessary. Prior to Admission medications    Medication Sig Start Date End Date Taking?  Authorizing Provider ascorbic acid (VITAMIN C) 1000 MG tablet Take by mouth 2 times daily 11/8/20  Yes Historical Provider, MD   loratadine (CLARITIN) 10 MG capsule Take 10 mg by mouth as needed   Yes Historical Provider, MD   rivaroxaban (XARELTO) 20 MG TABS tablet Take 1 tablet by mouth daily (with breakfast) 3/13/23  Yes Junior Gutierres MD   benazepril (LOTENSIN) 20 MG tablet Take 1 tablet by mouth daily 3/13/23  Yes Junior Gutierres MD   metoprolol succinate (TOPROL XL) 25 MG extended release tablet Take 1 tablet by mouth daily 3/13/23  Yes Junior Gutierres MD   amiodarone (CORDARONE) 200 MG tablet Take 1 tablet by mouth daily 3/13/23  Yes Junior Gutierres MD   Cholecalciferol 400 UNIT TABS tablet Take 400 Units by mouth daily   Yes Historical Provider, MD     Allergies   Allergen Reactions    Atorvastatin Nausea Only     Other reaction(s): Myalgia  Severe myalgias, generally feeling unwell. Codeine Nausea Only     Nausea     Past Medical History:   Diagnosis Date    Atrial fibrillation (HCC)     CAD (coronary artery disease)     S/P stents 2019    Hyperlipidemia     Hypertension, essential 9/29/2022     Past Surgical History:   Procedure Laterality Date    SALIVARY GLAND SURGERY      UMBILICAL HERNIA REPAIR       Family History   Problem Relation Age of Onset    Colon Cancer Mother     Heart Attack Father      Social History     Tobacco Use    Smoking status: Never    Smokeless tobacco: Never   Substance Use Topics    Alcohol use: Yes     Comment: 1 drink daily       ROS:    Review of Systems   Cardiovascular:  Negative for chest pain and palpitations. Respiratory:  Negative for shortness of breath.          PHYSICAL EXAM:   /70   Pulse 60   Ht 5' 10\" (1.778 m)   Wt 222 lb (100.7 kg)   BMI 31.85 kg/m²      Wt Readings from Last 3 Encounters:   03/13/23 222 lb (100.7 kg)   09/29/22 227 lb 9.6 oz (103.2 kg)   09/08/22 231 lb (104.8 kg)     BP Readings from Last 3 Encounters:   03/13/23 116/70 09/29/22 133/69   09/08/22 (!) 160/90     Pulse Readings from Last 3 Encounters:   03/13/23 60   09/29/22 62   09/08/22 56           Physical Exam  Constitutional:       Appearance: Normal appearance. HENT:      Head: Normocephalic and atraumatic. Eyes:      Conjunctiva/sclera: Conjunctivae normal.   Neck:      Vascular: No carotid bruit. Cardiovascular:      Rate and Rhythm: Normal rate and regular rhythm. Heart sounds: No murmur heard. No friction rub. No gallop. Pulmonary:      Effort: No respiratory distress. Breath sounds: No wheezing or rales. Musculoskeletal:         General: No swelling. Cervical back: Neck supple. Skin:     General: Skin is warm and dry. Neurological:      General: No focal deficit present. Mental Status: He is alert. Psychiatric:         Mood and Affect: Mood normal.         Behavior: Behavior normal.       Medical problems and test results were reviewed with the patient today. DATA REVIEW    LIPID PANEL     Lab Results   Component Value Date    CHOL 211 (H) 06/03/2022     Lab Results   Component Value Date    TRIG 248 (H) 06/03/2022     Lab Results   Component Value Date    HDL 35 (L) 06/03/2022     Lab Results   Component Value Date    LDLCALC 126.4 (H) 06/03/2022     Lab Results   Component Value Date    LABVLDL 49.6 (H) 06/03/2022     Lab Results   Component Value Date    CHOLHDLRATIO 6.0 06/03/2022     Lab Results   Component Value Date/Time    ALKPHOS 62 06/03/2022 11:52 AM    ALT 44 06/03/2022 11:52 AM    AST 31 06/03/2022 11:52 AM    PROT 7.0 06/03/2022 11:52 AM    BILITOT 0.7 06/03/2022 11:52 AM    BILIDIR 0.2 06/03/2022 11:52 AM    LABALBU 3.8 06/03/2022 11:52 AM              EKG        CXR/IMAGING        DEVICE INTERROGATION        OUTSIDE RECORDS REVIEW    Records from outside providers have been reviewed and summarized as noted in the HPI, past history and data review sections of this note, and reviewed with patient. Eugene Beebe ASSESSMENT and PLAN    Maribel Boyce was seen today for follow-up and coronary artery disease. Diagnoses and all orders for this visit:    Coronary artery disease involving native coronary artery of native heart without angina pectoris  -     rivaroxaban (XARELTO) 20 MG TABS tablet; Take 1 tablet by mouth daily (with breakfast)  -     benazepril (LOTENSIN) 20 MG tablet; Take 1 tablet by mouth daily  -     metoprolol succinate (TOPROL XL) 25 MG extended release tablet; Take 1 tablet by mouth daily    Paroxysmal atrial fibrillation (HCC)  -     rivaroxaban (XARELTO) 20 MG TABS tablet; Take 1 tablet by mouth daily (with breakfast)  -     benazepril (LOTENSIN) 20 MG tablet; Take 1 tablet by mouth daily  -     metoprolol succinate (TOPROL XL) 25 MG extended release tablet; Take 1 tablet by mouth daily    At risk for amiodarone toxicity with long term use  -     rivaroxaban (XARELTO) 20 MG TABS tablet; Take 1 tablet by mouth daily (with breakfast)  -     benazepril (LOTENSIN) 20 MG tablet; Take 1 tablet by mouth daily  -     metoprolol succinate (TOPROL XL) 25 MG extended release tablet; Take 1 tablet by mouth daily  -     Hepatic Function Panel; Future  -     TSH; Future    Statin intolerance  -     rivaroxaban (XARELTO) 20 MG TABS tablet; Take 1 tablet by mouth daily (with breakfast)  -     benazepril (LOTENSIN) 20 MG tablet; Take 1 tablet by mouth daily  -     metoprolol succinate (TOPROL XL) 25 MG extended release tablet; Take 1 tablet by mouth daily    Other orders  -     amiodarone (CORDARONE) 200 MG tablet; Take 1 tablet by mouth daily        IMPRESSION:     Rate controlled, anticoagulated, continue meds    No angina, continue meds and lifestyle modification    Statin intolerant.   Already struggling at year's end with cost of some meds, manage lipids with active lifestyle, healthy diet    Just had labs this morning, will await results, repeat labs in 6 months        Return in about 6 months (around 9/13/2023) for LABS BEFORE VISIT. Thank you for allowing me to participate in this patient's care. Please call or contact me if there are any questions or concerns regarding the above.       Breana Dooley MD  03/13/23  2:57 PM

## 2023-03-13 ENCOUNTER — OFFICE VISIT (OUTPATIENT)
Dept: CARDIOLOGY CLINIC | Age: 88
End: 2023-03-13
Payer: MEDICARE

## 2023-03-13 VITALS
HEIGHT: 70 IN | SYSTOLIC BLOOD PRESSURE: 116 MMHG | HEART RATE: 60 BPM | DIASTOLIC BLOOD PRESSURE: 70 MMHG | WEIGHT: 222 LBS | BODY MASS INDEX: 31.78 KG/M2

## 2023-03-13 DIAGNOSIS — I10 HYPERTENSION, ESSENTIAL: ICD-10-CM

## 2023-03-13 DIAGNOSIS — I48.0 PAROXYSMAL ATRIAL FIBRILLATION (HCC): ICD-10-CM

## 2023-03-13 DIAGNOSIS — Z79.899 AT RISK FOR AMIODARONE TOXICITY WITH LONG TERM USE: ICD-10-CM

## 2023-03-13 DIAGNOSIS — Z91.89 AT RISK FOR AMIODARONE TOXICITY WITH LONG TERM USE: ICD-10-CM

## 2023-03-13 DIAGNOSIS — I25.10 CORONARY ARTERY DISEASE INVOLVING NATIVE CORONARY ARTERY OF NATIVE HEART WITHOUT ANGINA PECTORIS: Primary | ICD-10-CM

## 2023-03-13 DIAGNOSIS — Z78.9 STATIN INTOLERANCE: ICD-10-CM

## 2023-03-13 LAB
BASOPHILS # BLD: 0.1 K/UL (ref 0–0.2)
BASOPHILS NFR BLD: 1 % (ref 0–2)
DIFFERENTIAL METHOD BLD: NORMAL
EOSINOPHIL # BLD: 0.2 K/UL (ref 0–0.8)
EOSINOPHIL NFR BLD: 3 % (ref 0.5–7.8)
ERYTHROCYTE [DISTWIDTH] IN BLOOD BY AUTOMATED COUNT: 13.7 % (ref 11.9–14.6)
HCT VFR BLD AUTO: 46.1 % (ref 41.1–50.3)
HGB BLD-MCNC: 14.8 G/DL (ref 13.6–17.2)
IMM GRANULOCYTES # BLD AUTO: 0 K/UL (ref 0–0.5)
IMM GRANULOCYTES NFR BLD AUTO: 0 % (ref 0–5)
LYMPHOCYTES # BLD: 1.7 K/UL (ref 0.5–4.6)
LYMPHOCYTES NFR BLD: 24 % (ref 13–44)
MCH RBC QN AUTO: 30.5 PG (ref 26.1–32.9)
MCHC RBC AUTO-ENTMCNC: 32.1 G/DL (ref 31.4–35)
MCV RBC AUTO: 94.9 FL (ref 82–102)
MONOCYTES # BLD: 0.7 K/UL (ref 0.1–1.3)
MONOCYTES NFR BLD: 10 % (ref 4–12)
NEUTS SEG # BLD: 4.5 K/UL (ref 1.7–8.2)
NEUTS SEG NFR BLD: 62 % (ref 43–78)
NRBC # BLD: 0 K/UL (ref 0–0.2)
PLATELET # BLD AUTO: 164 K/UL (ref 150–450)
PMV BLD AUTO: 11 FL (ref 9.4–12.3)
RBC # BLD AUTO: 4.86 M/UL (ref 4.23–5.6)
WBC # BLD AUTO: 7.1 K/UL (ref 4.3–11.1)

## 2023-03-13 PROCEDURE — 99214 OFFICE O/P EST MOD 30 MIN: CPT | Performed by: INTERNAL MEDICINE

## 2023-03-13 PROCEDURE — 1123F ACP DISCUSS/DSCN MKR DOCD: CPT | Performed by: INTERNAL MEDICINE

## 2023-03-13 RX ORDER — AMIODARONE HYDROCHLORIDE 200 MG/1
200 TABLET ORAL DAILY
Qty: 90 TABLET | Refills: 3 | Status: SHIPPED | OUTPATIENT
Start: 2023-03-13

## 2023-03-13 RX ORDER — METOPROLOL SUCCINATE 25 MG/1
25 TABLET, EXTENDED RELEASE ORAL DAILY
Qty: 100 TABLET | Refills: 3 | Status: SHIPPED | OUTPATIENT
Start: 2023-03-13

## 2023-03-13 RX ORDER — BENAZEPRIL HYDROCHLORIDE 20 MG/1
20 TABLET ORAL DAILY
Qty: 100 TABLET | Refills: 3 | Status: SHIPPED | OUTPATIENT
Start: 2023-03-13

## 2023-03-13 RX ORDER — LORATADINE 10 MG/1
10 CAPSULE, LIQUID FILLED ORAL AS NEEDED
COMMUNITY

## 2023-03-13 ASSESSMENT — ENCOUNTER SYMPTOMS: SHORTNESS OF BREATH: 0

## 2023-03-13 NOTE — PATIENT INSTRUCTIONS
Patient Education        Atrial Fibrillation: Care Instructions  Your Care Instructions     Atrial fibrillation is an irregular and often fast heartbeat. Treating this condition is important for several reasons. It can cause blood clots, which can travel from your heart to your brain and cause a stroke. If you have a fast heartbeat, you may feel lightheaded, dizzy, and weak. An irregular heartbeat can also increase your risk for heart failure. Atrial fibrillation is often the result of another heart condition, such as high blood pressure or coronary artery disease. Making changes to improve your heart condition will help you stay healthy and active. Follow-up care is a key part of your treatment and safety. Be sure to make and go to all appointments, and call your doctor if you are having problems. It's also a good idea to know your test results and keep a list of the medicines you take. How can you care for yourself at home? Medicines    Take your medicines exactly as prescribed. Call your doctor if you think you are having a problem with your medicine. You will get more details on the specific medicines your doctor prescribes. If your doctor has given you a blood thinner to prevent a stroke, be sure you get instructions about how to take your medicine safely. Blood thinners can cause serious bleeding problems. Do not take any vitamins, over-the-counter drugs, or herbal products without talking to your doctor first.   Lifestyle changes    Do not smoke. Smoking can increase your chance of a stroke and heart attack. If you need help quitting, talk to your doctor about stop-smoking programs and medicines. These can increase your chances of quitting for good. Eat a heart-healthy diet. Stay at a healthy weight. Lose weight if you need to. Limit alcohol to 2 drinks a day for men and 1 drink a day for women. Too much alcohol can cause health problems.      Avoid infections such as COVID-19, colds, and the flu. Get the flu vaccine every year. Get a pneumococcal vaccine. If you have had one before, ask your doctor whether you need another dose. Stay up to date on your COVID-19 vaccines. Activity    If your doctor recommends it, get more exercise. Walking is a good choice. Bit by bit, increase the amount you walk every day. Try for at least 30 minutes on most days of the week. You also may want to swim, bike, or do other activities. Your doctor may suggest that you join a cardiac rehabilitation program so that you can have help increasing your physical activity safely. Start light exercise if your doctor says it is okay. Even a small amount will help you get stronger, have more energy, and manage stress. Walking is an easy way to get exercise. Start out by walking a little more than you did in the hospital. Gradually increase the amount you walk. When you exercise, watch for signs that your heart is working too hard. You are pushing too hard if you cannot talk while you are exercising. If you become short of breath or dizzy or have chest pain, sit down and rest immediately. Check your pulse regularly. Place two fingers on the artery at the palm side of your wrist, in line with your thumb. If your heartbeat seems uneven or fast, talk to your doctor. When should you call for help? Call 911 anytime you think you may need emergency care. For example, call if:    You have symptoms of a heart attack. These may include:  Chest pain or pressure, or a strange feeling in the chest.  Sweating. Shortness of breath. Nausea or vomiting. Pain, pressure, or a strange feeling in the back, neck, jaw, or upper belly or in one or both shoulders or arms. Lightheadedness or sudden weakness. A fast or irregular heartbeat. After you call 911, the  may tell you to chew 1 adult-strength or 2 to 4 low-dose aspirin. Wait for an ambulance. Do not try to drive yourself. You have symptoms of a stroke.  These may include:  Sudden numbness, tingling, weakness, or loss of movement in your face, arm, or leg, especially on only one side of your body. Sudden vision changes. Sudden trouble speaking. Sudden confusion or trouble understanding simple statements. Sudden problems with walking or balance. A sudden, severe headache that is different from past headaches. You passed out (lost consciousness). Call your doctor now or seek immediate medical care if:    You have new or increased shortness of breath. You feel dizzy or lightheaded, or you feel like you may faint. Your heart rate becomes irregular. You can feel your heart flutter in your chest or skip heartbeats. Tell your doctor if these symptoms are new or worse. Watch closely for changes in your health, and be sure to contact your doctor if you have any problems. Where can you learn more? Go to http://www.woods.com/ and enter U020 to learn more about \"Atrial Fibrillation: Care Instructions. \"  Current as of: September 7, 2022               Content Version: 13.5  © 2006-2022 Healthwise[employer] Inc[employer]. Care instructions adapted under license by [facility] ([facility]). If you have questions about a medical condition or this instruction, always ask your healthcare professional. [place] [address] any warranty or liability for your use of this information. Please visit www.cardiosmart. org for more information regarding cardiovascular disease prevention and treatment.

## 2023-03-14 LAB
ALBUMIN SERPL-MCNC: 3.7 G/DL (ref 3.2–4.6)
ALBUMIN SERPL-MCNC: 3.7 G/DL (ref 3.2–4.6)
ALBUMIN/GLOB SERPL: 1.1 (ref 0.4–1.6)
ALBUMIN/GLOB SERPL: 1.2 (ref 0.4–1.6)
ALP SERPL-CCNC: 57 U/L (ref 50–136)
ALP SERPL-CCNC: 59 U/L (ref 50–136)
ALT SERPL-CCNC: 33 U/L (ref 12–65)
ALT SERPL-CCNC: 36 U/L (ref 12–65)
ANION GAP SERPL CALC-SCNC: 6 MMOL/L (ref 2–11)
AST SERPL-CCNC: 33 U/L (ref 15–37)
AST SERPL-CCNC: 34 U/L (ref 15–37)
BILIRUB DIRECT SERPL-MCNC: 0.2 MG/DL
BILIRUB SERPL-MCNC: 0.7 MG/DL (ref 0.2–1.1)
BILIRUB SERPL-MCNC: 0.8 MG/DL (ref 0.2–1.1)
BUN SERPL-MCNC: 24 MG/DL (ref 8–23)
CALCIUM SERPL-MCNC: 8.8 MG/DL (ref 8.3–10.4)
CHLORIDE SERPL-SCNC: 107 MMOL/L (ref 101–110)
CO2 SERPL-SCNC: 28 MMOL/L (ref 21–32)
CREAT SERPL-MCNC: 1.6 MG/DL (ref 0.8–1.5)
GLOBULIN SER CALC-MCNC: 3.2 G/DL (ref 2.8–4.5)
GLOBULIN SER CALC-MCNC: 3.3 G/DL (ref 2.8–4.5)
GLUCOSE SERPL-MCNC: 105 MG/DL (ref 65–100)
POTASSIUM SERPL-SCNC: 4.3 MMOL/L (ref 3.5–5.1)
PROT SERPL-MCNC: 6.9 G/DL (ref 6.3–8.2)
PROT SERPL-MCNC: 7 G/DL (ref 6.3–8.2)
SODIUM SERPL-SCNC: 141 MMOL/L (ref 133–143)
TSH, 3RD GENERATION: 0.84 UIU/ML (ref 0.36–3.74)

## 2023-03-27 SDOH — ECONOMIC STABILITY: HOUSING INSECURITY
IN THE LAST 12 MONTHS, WAS THERE A TIME WHEN YOU DID NOT HAVE A STEADY PLACE TO SLEEP OR SLEPT IN A SHELTER (INCLUDING NOW)?: NO

## 2023-03-27 SDOH — ECONOMIC STABILITY: FOOD INSECURITY: WITHIN THE PAST 12 MONTHS, YOU WORRIED THAT YOUR FOOD WOULD RUN OUT BEFORE YOU GOT MONEY TO BUY MORE.: NEVER TRUE

## 2023-03-27 SDOH — ECONOMIC STABILITY: FOOD INSECURITY: WITHIN THE PAST 12 MONTHS, THE FOOD YOU BOUGHT JUST DIDN'T LAST AND YOU DIDN'T HAVE MONEY TO GET MORE.: NEVER TRUE

## 2023-03-27 SDOH — ECONOMIC STABILITY: INCOME INSECURITY: HOW HARD IS IT FOR YOU TO PAY FOR THE VERY BASICS LIKE FOOD, HOUSING, MEDICAL CARE, AND HEATING?: NOT HARD AT ALL

## 2023-03-27 SDOH — HEALTH STABILITY: PHYSICAL HEALTH: ON AVERAGE, HOW MANY MINUTES DO YOU ENGAGE IN EXERCISE AT THIS LEVEL?: 30 MIN

## 2023-03-27 SDOH — HEALTH STABILITY: PHYSICAL HEALTH: ON AVERAGE, HOW MANY DAYS PER WEEK DO YOU ENGAGE IN MODERATE TO STRENUOUS EXERCISE (LIKE A BRISK WALK)?: 2 DAYS

## 2023-03-27 ASSESSMENT — LIFESTYLE VARIABLES
HOW OFTEN DO YOU HAVE A DRINK CONTAINING ALCOHOL: 5
HOW OFTEN DO YOU HAVE A DRINK CONTAINING ALCOHOL: 4 OR MORE TIMES A WEEK
HOW OFTEN DURING THE LAST YEAR HAVE YOU FOUND THAT YOU WERE NOT ABLE TO STOP DRINKING ONCE YOU HAD STARTED: 0
HOW OFTEN DURING THE LAST YEAR HAVE YOU NEEDED AN ALCOHOLIC DRINK FIRST THING IN THE MORNING TO GET YOURSELF GOING AFTER A NIGHT OF HEAVY DRINKING: NEVER
HAVE YOU OR SOMEONE ELSE BEEN INJURED AS A RESULT OF YOUR DRINKING: 0
HOW OFTEN DURING THE LAST YEAR HAVE YOU HAD A FEELING OF GUILT OR REMORSE AFTER DRINKING: 0
HOW OFTEN DURING THE LAST YEAR HAVE YOU HAD A FEELING OF GUILT OR REMORSE AFTER DRINKING: NEVER
HOW MANY STANDARD DRINKS CONTAINING ALCOHOL DO YOU HAVE ON A TYPICAL DAY: 1 OR 2
HOW OFTEN DO YOU HAVE SIX OR MORE DRINKS ON ONE OCCASION: 1
HAS A RELATIVE, FRIEND, DOCTOR, OR ANOTHER HEALTH PROFESSIONAL EXPRESSED CONCERN ABOUT YOUR DRINKING OR SUGGESTED YOU CUT DOWN: 0
HAVE YOU OR SOMEONE ELSE BEEN INJURED AS A RESULT OF YOUR DRINKING: NO
HAS A RELATIVE, FRIEND, DOCTOR, OR ANOTHER HEALTH PROFESSIONAL EXPRESSED CONCERN ABOUT YOUR DRINKING OR SUGGESTED YOU CUT DOWN: NO
HOW OFTEN DURING THE LAST YEAR HAVE YOU BEEN UNABLE TO REMEMBER WHAT HAPPENED THE NIGHT BEFORE BECAUSE YOU HAD BEEN DRINKING: 0
HOW OFTEN DURING THE LAST YEAR HAVE YOU FAILED TO DO WHAT WAS NORMALLY EXPECTED FROM YOU BECAUSE OF DRINKING: NEVER
HOW OFTEN DURING THE LAST YEAR HAVE YOU FOUND THAT YOU WERE NOT ABLE TO STOP DRINKING ONCE YOU HAD STARTED: NEVER
HOW OFTEN DURING THE LAST YEAR HAVE YOU FAILED TO DO WHAT WAS NORMALLY EXPECTED FROM YOU BECAUSE OF DRINKING: 0
HOW OFTEN DURING THE LAST YEAR HAVE YOU NEEDED AN ALCOHOLIC DRINK FIRST THING IN THE MORNING TO GET YOURSELF GOING AFTER A NIGHT OF HEAVY DRINKING: 0
HOW MANY STANDARD DRINKS CONTAINING ALCOHOL DO YOU HAVE ON A TYPICAL DAY: 1
HOW OFTEN DURING THE LAST YEAR HAVE YOU BEEN UNABLE TO REMEMBER WHAT HAPPENED THE NIGHT BEFORE BECAUSE YOU HAD BEEN DRINKING: NEVER

## 2023-03-27 ASSESSMENT — PATIENT HEALTH QUESTIONNAIRE - PHQ9
2. FEELING DOWN, DEPRESSED OR HOPELESS: 0
SUM OF ALL RESPONSES TO PHQ QUESTIONS 1-9: 0
SUM OF ALL RESPONSES TO PHQ9 QUESTIONS 1 & 2: 0
1. LITTLE INTEREST OR PLEASURE IN DOING THINGS: 0
SUM OF ALL RESPONSES TO PHQ QUESTIONS 1-9: 0

## 2023-03-29 ENCOUNTER — OFFICE VISIT (OUTPATIENT)
Dept: INTERNAL MEDICINE CLINIC | Facility: CLINIC | Age: 88
End: 2023-03-29
Payer: MEDICARE

## 2023-03-29 VITALS
HEART RATE: 57 BPM | WEIGHT: 211.8 LBS | SYSTOLIC BLOOD PRESSURE: 130 MMHG | DIASTOLIC BLOOD PRESSURE: 80 MMHG | BODY MASS INDEX: 30.32 KG/M2 | HEIGHT: 70 IN

## 2023-03-29 DIAGNOSIS — I48.0 PAROXYSMAL ATRIAL FIBRILLATION (HCC): ICD-10-CM

## 2023-03-29 DIAGNOSIS — I25.10 CORONARY ARTERY DISEASE INVOLVING NATIVE CORONARY ARTERY OF NATIVE HEART WITHOUT ANGINA PECTORIS: ICD-10-CM

## 2023-03-29 DIAGNOSIS — Z00.00 MEDICARE ANNUAL WELLNESS VISIT, SUBSEQUENT: Primary | ICD-10-CM

## 2023-03-29 DIAGNOSIS — I10 HYPERTENSION, ESSENTIAL: ICD-10-CM

## 2023-03-29 DIAGNOSIS — Z78.9 STATIN INTOLERANCE: ICD-10-CM

## 2023-03-29 DIAGNOSIS — N28.9 ACUTE RENAL INSUFFICIENCY: ICD-10-CM

## 2023-03-29 DIAGNOSIS — R73.9 HYPERGLYCEMIA: ICD-10-CM

## 2023-03-29 DIAGNOSIS — E78.5 DYSLIPIDEMIA: ICD-10-CM

## 2023-03-29 DIAGNOSIS — Z23 ENCOUNTER FOR IMMUNIZATION: ICD-10-CM

## 2023-03-29 PROCEDURE — 99214 OFFICE O/P EST MOD 30 MIN: CPT | Performed by: INTERNAL MEDICINE

## 2023-03-29 PROCEDURE — 1123F ACP DISCUSS/DSCN MKR DOCD: CPT | Performed by: INTERNAL MEDICINE

## 2023-03-29 PROCEDURE — G0439 PPPS, SUBSEQ VISIT: HCPCS | Performed by: INTERNAL MEDICINE

## 2023-03-29 ASSESSMENT — ENCOUNTER SYMPTOMS
VOICE CHANGE: 0
STRIDOR: 0
EYE PAIN: 0
CHOKING: 0
RECTAL PAIN: 0

## 2023-03-29 NOTE — PROGRESS NOTES
Thought Content: Thought content normal.            Orders Only on 03/13/2023   Component Date Value Ref Range Status    TSH, 3RD GENERATION 03/13/2023 0.840  0.358 - 3.740 uIU/mL Final    Total Protein 03/13/2023 6.9  6.3 - 8.2 g/dL Final    Albumin 03/13/2023 3.7  3.2 - 4.6 g/dL Final    Globulin 03/13/2023 3.2  2.8 - 4.5 g/dL Final    Albumin/Globulin Ratio 03/13/2023 1.2  0.4 - 1.6   Final    Total Bilirubin 03/13/2023 0.8  0.2 - 1.1 MG/DL Final    Bilirubin, Direct 03/13/2023 0.2  <0.4 MG/DL Final    Alk Phosphatase 03/13/2023 57  50 - 136 U/L Final    AST 03/13/2023 34  15 - 37 U/L Final    ALT 03/13/2023 36  12 - 65 U/L Final    Sodium 03/13/2023 141  133 - 143 mmol/L Final    Potassium 03/13/2023 4.3  3.5 - 5.1 mmol/L Final    Chloride 03/13/2023 107  101 - 110 mmol/L Final    CO2 03/13/2023 28  21 - 32 mmol/L Final    Anion Gap 03/13/2023 6  2 - 11 mmol/L Final    Glucose 03/13/2023 105 (A)  65 - 100 mg/dL Final    BUN 03/13/2023 24 (A)  8 - 23 MG/DL Final    Creatinine 03/13/2023 1.60 (A)  0.8 - 1.5 MG/DL Final    Est, Glom Filt Rate 03/13/2023 42 (A)  >60 ml/min/1.73m2 Final    Comment:   Pediatric calculator link: CarWashShow.at. org/professionals/kdoqi/gfr_calculatorped    These results are not intended for use in patients <25years of age. eGFR results are calculated without a race factor using  the 2021 CKD-EPI equation. Careful clinical correlation is recommended, particularly when comparing to results calculated using previous equations. The CKD-EPI equation is less accurate in patients with extremes of muscle mass, extra-renal metabolism of creatinine, excessive creatine ingestion, or following therapy that affects renal tubular secretion.       Calcium 03/13/2023 8.8  8.3 - 10.4 MG/DL Final    Total Bilirubin 03/13/2023 0.7  0.2 - 1.1 MG/DL Final    ALT 03/13/2023 33  12 - 65 U/L Final    AST 03/13/2023 33  15 - 37 U/L Final    Alk Phosphatase 03/13/2023 59  50 - 136 U/L Final    Total

## 2023-06-20 DIAGNOSIS — R73.9 HYPERGLYCEMIA: Primary | ICD-10-CM

## 2023-06-20 DIAGNOSIS — E78.5 DYSLIPIDEMIA: ICD-10-CM

## 2023-06-26 DIAGNOSIS — Z79.899 AT RISK FOR AMIODARONE TOXICITY WITH LONG TERM USE: ICD-10-CM

## 2023-06-26 DIAGNOSIS — R73.9 HYPERGLYCEMIA: ICD-10-CM

## 2023-06-26 DIAGNOSIS — E78.5 DYSLIPIDEMIA: ICD-10-CM

## 2023-06-26 DIAGNOSIS — Z91.89 AT RISK FOR AMIODARONE TOXICITY WITH LONG TERM USE: ICD-10-CM

## 2023-06-26 ASSESSMENT — PATIENT HEALTH QUESTIONNAIRE - PHQ9
2. FEELING DOWN, DEPRESSED OR HOPELESS: NOT AT ALL
2. FEELING DOWN, DEPRESSED OR HOPELESS: 0
SUM OF ALL RESPONSES TO PHQ9 QUESTIONS 1 & 2: 0
SUM OF ALL RESPONSES TO PHQ9 QUESTIONS 1 & 2: 0
SUM OF ALL RESPONSES TO PHQ QUESTIONS 1-9: 0
1. LITTLE INTEREST OR PLEASURE IN DOING THINGS: 0
1. LITTLE INTEREST OR PLEASURE IN DOING THINGS: NOT AT ALL
SUM OF ALL RESPONSES TO PHQ QUESTIONS 1-9: 0

## 2023-06-27 LAB
ALBUMIN SERPL-MCNC: 3.7 G/DL (ref 3.2–4.6)
ALBUMIN/GLOB SERPL: 1 (ref 0.4–1.6)
ALP SERPL-CCNC: 59 U/L (ref 50–136)
ALT SERPL-CCNC: 32 U/L (ref 12–65)
ANION GAP SERPL CALC-SCNC: 2 MMOL/L (ref 2–11)
AST SERPL-CCNC: 32 U/L (ref 15–37)
BILIRUB DIRECT SERPL-MCNC: 0.1 MG/DL
BILIRUB SERPL-MCNC: 0.7 MG/DL (ref 0.2–1.1)
BUN SERPL-MCNC: 20 MG/DL (ref 8–23)
CALCIUM SERPL-MCNC: 9 MG/DL (ref 8.3–10.4)
CHLORIDE SERPL-SCNC: 108 MMOL/L (ref 101–110)
CHOLEST SERPL-MCNC: 211 MG/DL
CO2 SERPL-SCNC: 29 MMOL/L (ref 21–32)
CREAT SERPL-MCNC: 1.6 MG/DL (ref 0.8–1.5)
EST. AVERAGE GLUCOSE BLD GHB EST-MCNC: 117 MG/DL
GLOBULIN SER CALC-MCNC: 3.7 G/DL (ref 2.8–4.5)
GLUCOSE SERPL-MCNC: 107 MG/DL (ref 65–100)
HBA1C MFR BLD: 5.7 % (ref 4.8–5.6)
HDLC SERPL-MCNC: 36 MG/DL (ref 40–60)
HDLC SERPL: 5.9
LDLC SERPL CALC-MCNC: 126 MG/DL
POTASSIUM SERPL-SCNC: 4.2 MMOL/L (ref 3.5–5.1)
PROT SERPL-MCNC: 7.4 G/DL (ref 6.3–8.2)
SODIUM SERPL-SCNC: 139 MMOL/L (ref 133–143)
TRIGL SERPL-MCNC: 245 MG/DL (ref 35–150)
TSH, 3RD GENERATION: 0.63 UIU/ML (ref 0.36–3.74)
VLDLC SERPL CALC-MCNC: 49 MG/DL (ref 6–23)

## 2023-06-29 ENCOUNTER — OFFICE VISIT (OUTPATIENT)
Dept: INTERNAL MEDICINE CLINIC | Facility: CLINIC | Age: 88
End: 2023-06-29
Payer: MEDICARE

## 2023-06-29 VITALS
SYSTOLIC BLOOD PRESSURE: 130 MMHG | HEIGHT: 70 IN | DIASTOLIC BLOOD PRESSURE: 80 MMHG | BODY MASS INDEX: 31.41 KG/M2 | HEART RATE: 66 BPM | WEIGHT: 219.4 LBS

## 2023-06-29 DIAGNOSIS — Z91.89 AT RISK FOR AMIODARONE TOXICITY WITH LONG TERM USE: ICD-10-CM

## 2023-06-29 DIAGNOSIS — I25.10 CORONARY ARTERY DISEASE INVOLVING NATIVE CORONARY ARTERY OF NATIVE HEART WITHOUT ANGINA PECTORIS: ICD-10-CM

## 2023-06-29 DIAGNOSIS — D68.69 SECONDARY HYPERCOAGULABLE STATE (HCC): ICD-10-CM

## 2023-06-29 DIAGNOSIS — N18.32 STAGE 3B CHRONIC KIDNEY DISEASE (CKD) (HCC): ICD-10-CM

## 2023-06-29 DIAGNOSIS — Z79.899 AT RISK FOR AMIODARONE TOXICITY WITH LONG TERM USE: ICD-10-CM

## 2023-06-29 DIAGNOSIS — R73.01 IMPAIRED FASTING GLUCOSE: ICD-10-CM

## 2023-06-29 DIAGNOSIS — E78.2 HYPERLIPIDEMIA, MIXED: ICD-10-CM

## 2023-06-29 DIAGNOSIS — N18.32 STAGE 3B CHRONIC KIDNEY DISEASE (HCC): Primary | ICD-10-CM

## 2023-06-29 DIAGNOSIS — Z78.9 STATIN INTOLERANCE: ICD-10-CM

## 2023-06-29 DIAGNOSIS — I48.0 PAROXYSMAL ATRIAL FIBRILLATION (HCC): ICD-10-CM

## 2023-06-29 PROBLEM — N18.30 CHRONIC RENAL DISEASE, STAGE III (HCC): Status: ACTIVE | Noted: 2023-06-29

## 2023-06-29 PROCEDURE — 1123F ACP DISCUSS/DSCN MKR DOCD: CPT | Performed by: INTERNAL MEDICINE

## 2023-06-29 PROCEDURE — 99214 OFFICE O/P EST MOD 30 MIN: CPT | Performed by: INTERNAL MEDICINE

## 2023-06-29 ASSESSMENT — ENCOUNTER SYMPTOMS
CHOKING: 0
STRIDOR: 0
VOICE CHANGE: 0
EYE PAIN: 0
RECTAL PAIN: 0

## 2023-08-21 DIAGNOSIS — Z79.899 AT RISK FOR AMIODARONE TOXICITY WITH LONG TERM USE: ICD-10-CM

## 2023-08-21 DIAGNOSIS — Z91.89 AT RISK FOR AMIODARONE TOXICITY WITH LONG TERM USE: ICD-10-CM

## 2023-08-21 DIAGNOSIS — I48.0 PAROXYSMAL ATRIAL FIBRILLATION (HCC): ICD-10-CM

## 2023-08-21 DIAGNOSIS — I25.10 CORONARY ARTERY DISEASE INVOLVING NATIVE CORONARY ARTERY OF NATIVE HEART WITHOUT ANGINA PECTORIS: ICD-10-CM

## 2023-08-21 DIAGNOSIS — Z78.9 STATIN INTOLERANCE: ICD-10-CM

## 2023-08-21 NOTE — TELEPHONE ENCOUNTER
Patients wife called stating her  found a special discount from [facility] for [employer]. Patients wife states there have been two attempts to reach Dr Jairo Hawley to have her send the prescription but hasn't received a response. Please call and advise.

## 2023-08-22 NOTE — TELEPHONE ENCOUNTER
Called pt's wife states would like to have xarelto sent to [facility]. Advised could send in a new Rx to [facility].

## 2023-09-29 NOTE — PROGRESS NOTES
[address] [facility]  [address] [facility]  [place], [address]  PHONE: [phone]      10/02/23    NAME:  Jagjit Garcia  : 1935  MRN: [id number]         SUBJECTIVE:   Jagjit Garcia is a 81 y.o. male seen for a follow up visit regarding the following:     Chief Complaint   Patient presents with    Follow-up    Atrial Fibrillation    Coronary Artery Disease            HPI:  Follow up  Follow-up, Atrial Fibrillation, and Coronary Artery Disease   . 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 Denies cp, dyspnea, palpitations. Tries to remain active. PAST CARDIAC HISTORY:  STATIN INTOLERANT  Afib - DCCV, reverted to af-repeat DCCF and amiodarone ([place], [place])  Transient tachycardia induced CM  2020- in afib - EF 35%, bi atrial enlargement, EF 23% on stress perfusion study  2020-  PCI 90% OM 1(2.5 x 30 mm [place] ISABEL)  PCI 80% long proximal and ostial LAD stenosis (3.5 x 38 mm [place] ISABEL, postdilated 3.75 mm, IVUS guided)  20 - EF 60%, no valve disease            Key CAD CHF Meds            rivaroxaban (XARELTO) 15 MG TABS tablet (Taking)    Sig - Route: Take 1 tablet by mouth daily (with breakfast) - Oral    benazepril (LOTENSIN) 20 MG tablet (Taking)    Sig - Route: Take 1 tablet by mouth daily - Oral    metoprolol succinate (TOPROL XL) 25 MG extended release tablet (Taking)    Sig - Route: Take 1 tablet by mouth daily - Oral          Key Antihyperglycemic Medications       Patient is on no antihyperglycemic meds. Past Medical History, Past Surgical History, Family history, Social History, and Medications were all reviewed with the patient today and updated as necessary. Prior to Admission medications    Medication Sig Start Date End Date Taking?  Authorizing Provider   Multiple Vitamins-Minerals (THERAPEUTIC MULTIVITAMIN-MINERALS) tablet Take 1 tablet by mouth daily

## 2023-10-02 ENCOUNTER — OFFICE VISIT (OUTPATIENT)
Age: 88
End: 2023-10-02
Payer: MEDICARE

## 2023-10-02 VITALS
HEIGHT: 70 IN | DIASTOLIC BLOOD PRESSURE: 60 MMHG | HEART RATE: 60 BPM | SYSTOLIC BLOOD PRESSURE: 110 MMHG | BODY MASS INDEX: 31.04 KG/M2 | WEIGHT: 216.8 LBS

## 2023-10-02 DIAGNOSIS — T46.6X5A MYALGIA DUE TO STATIN: ICD-10-CM

## 2023-10-02 DIAGNOSIS — Z79.899 ENCOUNTER FOR MONITORING AMIODARONE THERAPY: ICD-10-CM

## 2023-10-02 DIAGNOSIS — I25.10 CORONARY ARTERY DISEASE INVOLVING NATIVE CORONARY ARTERY OF NATIVE HEART WITHOUT ANGINA PECTORIS: Primary | ICD-10-CM

## 2023-10-02 DIAGNOSIS — I10 HYPERTENSION, ESSENTIAL: ICD-10-CM

## 2023-10-02 DIAGNOSIS — M79.10 MYALGIA DUE TO STATIN: ICD-10-CM

## 2023-10-02 DIAGNOSIS — I48.0 PAROXYSMAL ATRIAL FIBRILLATION (HCC): ICD-10-CM

## 2023-10-02 DIAGNOSIS — Z51.81 ENCOUNTER FOR MONITORING AMIODARONE THERAPY: ICD-10-CM

## 2023-10-02 PROCEDURE — 1123F ACP DISCUSS/DSCN MKR DOCD: CPT | Performed by: INTERNAL MEDICINE

## 2023-10-02 PROCEDURE — 99214 OFFICE O/P EST MOD 30 MIN: CPT | Performed by: INTERNAL MEDICINE

## 2023-10-02 PROCEDURE — 93000 ELECTROCARDIOGRAM COMPLETE: CPT | Performed by: INTERNAL MEDICINE

## 2023-10-02 RX ORDER — M-VIT,TX,IRON,MINS/CALC/FOLIC 27MG-0.4MG
1 TABLET ORAL DAILY
COMMUNITY

## 2023-10-02 ASSESSMENT — ENCOUNTER SYMPTOMS: SHORTNESS OF BREATH: 0

## 2024-01-15 ENCOUNTER — TELEPHONE (OUTPATIENT)
Age: 89
End: 2024-01-15

## 2024-01-15 NOTE — TELEPHONE ENCOUNTER
Pt states he picked up his xarelto 20 mg tablets today from [facility]. Pt states Dr Headley stated pt should decrease to 15 mg. Per medication workflow, xarelto 15 mg tablets sent to [facility]'s in August. Pt confirms and states he used that pharmacy for a single refill. Needs updated dose sent to [facility]. Will call [facility] when he needs refill of 15mg tablets.

## 2024-01-18 DIAGNOSIS — I48.0 PAROXYSMAL ATRIAL FIBRILLATION (HCC): ICD-10-CM

## 2024-01-18 DIAGNOSIS — Z91.89 AT RISK FOR AMIODARONE TOXICITY WITH LONG TERM USE: ICD-10-CM

## 2024-01-18 DIAGNOSIS — R73.01 IMPAIRED FASTING GLUCOSE: ICD-10-CM

## 2024-01-18 DIAGNOSIS — Z79.899 AT RISK FOR AMIODARONE TOXICITY WITH LONG TERM USE: ICD-10-CM

## 2024-01-18 DIAGNOSIS — E78.2 HYPERLIPIDEMIA, MIXED: ICD-10-CM

## 2024-01-18 LAB
ALBUMIN SERPL-MCNC: 3.7 G/DL (ref 3.2–4.6)
ALBUMIN/GLOB SERPL: 1 (ref 0.4–1.6)
ALP SERPL-CCNC: 59 U/L (ref 50–136)
ALT SERPL-CCNC: 31 U/L (ref 12–65)
ANION GAP SERPL CALC-SCNC: 3 MMOL/L (ref 2–11)
AST SERPL-CCNC: 25 U/L (ref 15–37)
BILIRUB SERPL-MCNC: 0.8 MG/DL (ref 0.2–1.1)
BUN SERPL-MCNC: 23 MG/DL (ref 8–23)
CALCIUM SERPL-MCNC: 8.9 MG/DL (ref 8.3–10.4)
CHLORIDE SERPL-SCNC: 107 MMOL/L (ref 103–113)
CO2 SERPL-SCNC: 28 MMOL/L (ref 21–32)
CREAT SERPL-MCNC: 1.5 MG/DL (ref 0.8–1.5)
GLOBULIN SER CALC-MCNC: 3.6 G/DL (ref 2.8–4.5)
GLUCOSE SERPL-MCNC: 97 MG/DL (ref 65–100)
POTASSIUM SERPL-SCNC: 4.3 MMOL/L (ref 3.5–5.1)
PROT SERPL-MCNC: 7.3 G/DL (ref 6.3–8.2)
SODIUM SERPL-SCNC: 138 MMOL/L (ref 136–146)
TSH, 3RD GENERATION: 1.7 UIU/ML (ref 0.36–3.74)

## 2024-01-19 LAB
EST. AVERAGE GLUCOSE BLD GHB EST-MCNC: 114 MG/DL
HBA1C MFR BLD: 5.6 % (ref 4.8–5.6)

## 2024-01-20 ASSESSMENT — PATIENT HEALTH QUESTIONNAIRE - PHQ9
SUM OF ALL RESPONSES TO PHQ QUESTIONS 1-9: 0
SUM OF ALL RESPONSES TO PHQ9 QUESTIONS 1 & 2: 0
2. FEELING DOWN, DEPRESSED OR HOPELESS: 0
SUM OF ALL RESPONSES TO PHQ9 QUESTIONS 1 & 2: 0
SUM OF ALL RESPONSES TO PHQ QUESTIONS 1-9: 0
2. FEELING DOWN, DEPRESSED OR HOPELESS: NOT AT ALL
1. LITTLE INTEREST OR PLEASURE IN DOING THINGS: 0
1. LITTLE INTEREST OR PLEASURE IN DOING THINGS: NOT AT ALL

## 2024-01-23 ENCOUNTER — OFFICE VISIT (OUTPATIENT)
Dept: INTERNAL MEDICINE CLINIC | Facility: CLINIC | Age: 89
End: 2024-01-23
Payer: MEDICARE

## 2024-01-23 VITALS
SYSTOLIC BLOOD PRESSURE: 108 MMHG | HEIGHT: 70 IN | HEART RATE: 57 BPM | BODY MASS INDEX: 31.52 KG/M2 | DIASTOLIC BLOOD PRESSURE: 62 MMHG | WEIGHT: 220.2 LBS

## 2024-01-23 DIAGNOSIS — Z79.899 AT RISK FOR AMIODARONE TOXICITY WITH LONG TERM USE: Primary | ICD-10-CM

## 2024-01-23 DIAGNOSIS — D68.69 SECONDARY HYPERCOAGULABLE STATE (HCC): ICD-10-CM

## 2024-01-23 DIAGNOSIS — R73.01 IMPAIRED FASTING GLUCOSE: ICD-10-CM

## 2024-01-23 DIAGNOSIS — Z78.9 STATIN INTOLERANCE: ICD-10-CM

## 2024-01-23 DIAGNOSIS — Z91.89 AT RISK FOR AMIODARONE TOXICITY WITH LONG TERM USE: Primary | ICD-10-CM

## 2024-01-23 DIAGNOSIS — E78.2 HYPERLIPIDEMIA, MIXED: ICD-10-CM

## 2024-01-23 DIAGNOSIS — I48.0 PAROXYSMAL ATRIAL FIBRILLATION (HCC): ICD-10-CM

## 2024-01-23 DIAGNOSIS — N18.32 STAGE 3B CHRONIC KIDNEY DISEASE (CKD) (HCC): ICD-10-CM

## 2024-01-23 DIAGNOSIS — I10 HYPERTENSION, ESSENTIAL: ICD-10-CM

## 2024-01-23 PROBLEM — N18.30 CHRONIC RENAL DISEASE, STAGE III (HCC): Status: RESOLVED | Noted: 2023-06-29 | Resolved: 2024-01-23

## 2024-01-23 PROCEDURE — 1123F ACP DISCUSS/DSCN MKR DOCD: CPT | Performed by: INTERNAL MEDICINE

## 2024-01-23 PROCEDURE — 99214 OFFICE O/P EST MOD 30 MIN: CPT | Performed by: INTERNAL MEDICINE

## 2024-01-23 ASSESSMENT — ENCOUNTER SYMPTOMS
VOICE CHANGE: 0
CHOKING: 0
RECTAL PAIN: 0
STRIDOR: 0

## 2024-01-23 NOTE — PROGRESS NOTES
oriented to person, place, and time. Mental status is at baseline.   Psychiatric:         Behavior: Behavior normal.         Thought Content: Thought content normal.              Orders Only on 01/18/2024   Component Date Value Ref Range Status    Hemoglobin A1C 01/18/2024 5.6  4.8 - 5.6 % Final    eAG 01/18/2024 114  mg/dL Final    Comment: Reference Range  Normal: 4.8-5.6  Diabetic >=6.5%  Normal       <5.7%      TSH, 3RD GENERATION 01/18/2024 1.700  0.358 - 3.740 uIU/mL Final    Sodium 01/18/2024 138  136 - 146 mmol/L Final    Potassium 01/18/2024 4.3  3.5 - 5.1 mmol/L Final    Chloride 01/18/2024 107  103 - 113 mmol/L Final    CO2 01/18/2024 28  21 - 32 mmol/L Final    Anion Gap 01/18/2024 3  2 - 11 mmol/L Final    Glucose 01/18/2024 97  65 - 100 mg/dL Final    BUN 01/18/2024 23  8 - 23 MG/DL Final    Creatinine 01/18/2024 1.50  0.8 - 1.5 MG/DL Final    Est, Glom Filt Rate 01/18/2024 45 (L)  >60 ml/min/1.73m2 Final    Comment:   Pediatric calculator link: https://www.kidney.org/professionals/kdoqi/gfr_calculatorped    These results are not intended for use in patients <18 years of age.    eGFR results are calculated without a race factor using  the 2021 CKD-EPI equation. Careful clinical correlation is recommended, particularly when comparing to results calculated using previous equations.  The CKD-EPI equation is less accurate in patients with extremes of muscle mass, extra-renal metabolism of creatinine, excessive creatine ingestion, or following therapy that affects renal tubular secretion.      Calcium 01/18/2024 8.9  8.3 - 10.4 MG/DL Final    Total Bilirubin 01/18/2024 0.8  0.2 - 1.1 MG/DL Final    ALT 01/18/2024 31  12 - 65 U/L Final    AST 01/18/2024 25  15 - 37 U/L Final    Alk Phosphatase 01/18/2024 59  50 - 136 U/L Final    Total Protein 01/18/2024 7.3  6.3 - 8.2 g/dL Final    Albumin 01/18/2024 3.7  3.2 - 4.6 g/dL Final    Globulin 01/18/2024 3.6  2.8 - 4.5 g/dL Final    Albumin/Globulin Ratio

## 2024-04-15 RX ORDER — RIVAROXABAN 20 MG/1
20 TABLET, FILM COATED ORAL DAILY
Qty: 90 TABLET | Refills: 3 | OUTPATIENT
Start: 2024-04-15

## 2024-04-15 NOTE — TELEPHONE ENCOUNTER
MEDICATION REFILL REQUEST      Name of Medication:  Xarelto  Dose:    Frequency:    Quantity:    Days' supply:  90      Pharmacy Name/Location:  [facility] [address] in [place]       MEDICATION REFILL REQUEST      Name of Medication:  Amiodarone  Dose:    Frequency:    Quantity:    Days' supply:  90      Ph[facility] Name[facility]Location:  [facility]

## 2024-04-16 RX ORDER — AMIODARONE HYDROCHLORIDE 200 MG/1
200 TABLET ORAL DAILY
Qty: 90 TABLET | Refills: 3 | Status: SHIPPED | OUTPATIENT
Start: 2024-04-16

## 2024-04-16 NOTE — TELEPHONE ENCOUNTER
Requested Prescriptions     Pending Prescriptions Disp Refills    rivaroxaban (XARELTO) 15 MG TABS tablet 90 tablet 3     Sig: Take 1 tablet by mouth daily (with breakfast)    amiodarone (CORDARONE) 200 MG tablet 90 tablet 3     Sig: Take 1 tablet by mouth daily     Refused Prescriptions Disp Refills    XARELTO 20 MG TABS tablet [Pharmacy Med Name: XARELTO 20MG TABLETS] 90 tablet 3     Sig: TAKE 1 TABLET BY MOUTH DAILY WITH BREAKFAST     Refused By: CATHY MATHIAS     Reason for Refusal: Other     Rx verified last ov 10/2/23

## 2024-05-03 DIAGNOSIS — Z78.9 STATIN INTOLERANCE: ICD-10-CM

## 2024-05-03 DIAGNOSIS — Z91.89 AT RISK FOR AMIODARONE TOXICITY WITH LONG TERM USE: ICD-10-CM

## 2024-05-03 DIAGNOSIS — I48.0 PAROXYSMAL ATRIAL FIBRILLATION (HCC): ICD-10-CM

## 2024-05-03 DIAGNOSIS — I25.10 CORONARY ARTERY DISEASE INVOLVING NATIVE CORONARY ARTERY OF NATIVE HEART WITHOUT ANGINA PECTORIS: ICD-10-CM

## 2024-05-03 DIAGNOSIS — Z79.899 AT RISK FOR AMIODARONE TOXICITY WITH LONG TERM USE: ICD-10-CM

## 2024-05-03 NOTE — PROGRESS NOTES
Value Date/Time     01/18/2024 12:21 PM    K 4.3 01/18/2024 12:21 PM     01/18/2024 12:21 PM    CO2 28 01/18/2024 12:21 PM    BUN 23 01/18/2024 12:21 PM    CREATININE 1.50 01/18/2024 12:21 PM    GLUCOSE 97 01/18/2024 12:21 PM    CALCIUM 8.9 01/18/2024 12:21 PM      Lab Results   Component Value Date/Time    ALKPHOS 59 01/18/2024 12:21 PM    ALT 31 01/18/2024 12:21 PM    AST 25 01/18/2024 12:21 PM    BILITOT 0.8 01/18/2024 12:21 PM    BILIDIR 0.1 06/26/2023 11:02 AM     Lab Results   Component Value Date    DRD0KWD 1.700 01/18/2024         EKG        CXR/IMAGING        DEVICE INTERROGATION        OUTSIDE RECORDS REVIEW    Records from outside providers have been reviewed and summarized as noted in the HPI, past history and data review sections of this note, and reviewed with patient. .       ASSESSMENT and PLAN    Bertrand was seen today for follow-up, hyperlipidemia, coronary artery disease, atrial fibrillation and hypertension.    Diagnoses and all orders for this visit:    Coronary artery disease involving native coronary artery of native heart without angina pectoris  -     metoprolol succinate (TOPROL XL) 25 MG extended release tablet; Take 1 tablet by mouth daily    Paroxysmal atrial fibrillation (HCC)  -     metoprolol succinate (TOPROL XL) 25 MG extended release tablet; Take 1 tablet by mouth daily    Hypertension, essential    Stage 3b chronic kidney disease (CKD) (HCC)    Impaired fasting glucose    Hyperlipidemia, mixed    At risk for amiodarone toxicity with long term use  -     metoprolol succinate (TOPROL XL) 25 MG extended release tablet; Take 1 tablet by mouth daily    Statin intolerance  -     metoprolol succinate (TOPROL XL) 25 MG extended release tablet; Take 1 tablet by mouth daily          IMPRESSION:    No angina, continue meds and lifestyle modification    BP at target, continue meds    Having lipid panel soon, continue meds and active lifestyle    Up to date with hepatic and thyroid

## 2024-05-06 ENCOUNTER — OFFICE VISIT (OUTPATIENT)
Age: 89
End: 2024-05-06
Payer: MEDICARE

## 2024-05-06 VITALS
BODY MASS INDEX: 31.73 KG/M2 | SYSTOLIC BLOOD PRESSURE: 124 MMHG | DIASTOLIC BLOOD PRESSURE: 58 MMHG | HEART RATE: 60 BPM | HEIGHT: 70 IN | WEIGHT: 221.6 LBS

## 2024-05-06 DIAGNOSIS — I25.10 CORONARY ARTERY DISEASE INVOLVING NATIVE CORONARY ARTERY OF NATIVE HEART WITHOUT ANGINA PECTORIS: Primary | ICD-10-CM

## 2024-05-06 DIAGNOSIS — Z91.89 AT RISK FOR AMIODARONE TOXICITY WITH LONG TERM USE: ICD-10-CM

## 2024-05-06 DIAGNOSIS — E78.2 HYPERLIPIDEMIA, MIXED: ICD-10-CM

## 2024-05-06 DIAGNOSIS — Z78.9 STATIN INTOLERANCE: ICD-10-CM

## 2024-05-06 DIAGNOSIS — Z79.899 AT RISK FOR AMIODARONE TOXICITY WITH LONG TERM USE: ICD-10-CM

## 2024-05-06 DIAGNOSIS — R73.01 IMPAIRED FASTING GLUCOSE: ICD-10-CM

## 2024-05-06 DIAGNOSIS — N18.32 STAGE 3B CHRONIC KIDNEY DISEASE (CKD) (HCC): ICD-10-CM

## 2024-05-06 DIAGNOSIS — I48.0 PAROXYSMAL ATRIAL FIBRILLATION (HCC): ICD-10-CM

## 2024-05-06 DIAGNOSIS — I10 HYPERTENSION, ESSENTIAL: ICD-10-CM

## 2024-05-06 PROCEDURE — 99214 OFFICE O/P EST MOD 30 MIN: CPT | Performed by: INTERNAL MEDICINE

## 2024-05-06 PROCEDURE — 1123F ACP DISCUSS/DSCN MKR DOCD: CPT | Performed by: INTERNAL MEDICINE

## 2024-05-06 RX ORDER — BENAZEPRIL HYDROCHLORIDE 20 MG/1
20 TABLET ORAL DAILY
Qty: 100 TABLET | Refills: 3 | Status: SHIPPED | OUTPATIENT
Start: 2024-05-06

## 2024-05-06 RX ORDER — METOPROLOL SUCCINATE 25 MG/1
25 TABLET, EXTENDED RELEASE ORAL DAILY
Qty: 100 TABLET | Refills: 3 | Status: SHIPPED | OUTPATIENT
Start: 2024-05-06

## 2024-05-06 ASSESSMENT — ENCOUNTER SYMPTOMS: SHORTNESS OF BREATH: 0

## 2024-06-07 DIAGNOSIS — Z79.899 AT RISK FOR AMIODARONE TOXICITY WITH LONG TERM USE: ICD-10-CM

## 2024-06-07 DIAGNOSIS — I25.10 CORONARY ARTERY DISEASE INVOLVING NATIVE CORONARY ARTERY OF NATIVE HEART WITHOUT ANGINA PECTORIS: ICD-10-CM

## 2024-06-07 DIAGNOSIS — Z91.89 AT RISK FOR AMIODARONE TOXICITY WITH LONG TERM USE: ICD-10-CM

## 2024-06-07 DIAGNOSIS — I48.0 PAROXYSMAL ATRIAL FIBRILLATION (HCC): ICD-10-CM

## 2024-06-07 DIAGNOSIS — Z78.9 STATIN INTOLERANCE: ICD-10-CM

## 2024-06-07 RX ORDER — METOPROLOL SUCCINATE 25 MG/1
25 TABLET, EXTENDED RELEASE ORAL DAILY
Qty: 100 TABLET | Refills: 3 | Status: SHIPPED | OUTPATIENT
Start: 2024-06-07

## 2024-07-23 DIAGNOSIS — E78.2 HYPERLIPIDEMIA, MIXED: ICD-10-CM

## 2024-07-23 DIAGNOSIS — Z79.899 AT RISK FOR AMIODARONE TOXICITY WITH LONG TERM USE: ICD-10-CM

## 2024-07-23 DIAGNOSIS — I48.0 PAROXYSMAL ATRIAL FIBRILLATION (HCC): ICD-10-CM

## 2024-07-23 DIAGNOSIS — R73.01 IMPAIRED FASTING GLUCOSE: ICD-10-CM

## 2024-07-23 DIAGNOSIS — Z91.89 AT RISK FOR AMIODARONE TOXICITY WITH LONG TERM USE: ICD-10-CM

## 2024-07-23 LAB
ALBUMIN SERPL-MCNC: 3.7 G/DL (ref 3.2–4.6)
ALBUMIN/GLOB SERPL: 1.1 (ref 1–1.9)
ALP SERPL-CCNC: 60 U/L (ref 40–129)
ALT SERPL-CCNC: 17 U/L (ref 12–65)
ANION GAP SERPL CALC-SCNC: 10 MMOL/L (ref 9–18)
AST SERPL-CCNC: 23 U/L (ref 15–37)
BASOPHILS # BLD: 0 K/UL (ref 0–0.2)
BASOPHILS NFR BLD: 1 % (ref 0–2)
BILIRUB SERPL-MCNC: 0.4 MG/DL (ref 0–1.2)
BUN SERPL-MCNC: 23 MG/DL (ref 8–23)
CALCIUM SERPL-MCNC: 8.9 MG/DL (ref 8.8–10.2)
CHLORIDE SERPL-SCNC: 103 MMOL/L (ref 98–107)
CHOLEST SERPL-MCNC: 195 MG/DL (ref 0–200)
CO2 SERPL-SCNC: 25 MMOL/L (ref 20–28)
CREAT SERPL-MCNC: 1.49 MG/DL (ref 0.8–1.3)
DIFFERENTIAL METHOD BLD: NORMAL
EOSINOPHIL # BLD: 0.2 K/UL (ref 0–0.8)
EOSINOPHIL NFR BLD: 2 % (ref 0.5–7.8)
ERYTHROCYTE [DISTWIDTH] IN BLOOD BY AUTOMATED COUNT: 13.6 % (ref 11.9–14.6)
EST. AVERAGE GLUCOSE BLD GHB EST-MCNC: 123 MG/DL
GLOBULIN SER CALC-MCNC: 3.3 G/DL (ref 2.3–3.5)
GLUCOSE SERPL-MCNC: 109 MG/DL (ref 70–99)
HBA1C MFR BLD: 5.9 % (ref 0–5.6)
HCT VFR BLD AUTO: 45.7 % (ref 41.1–50.3)
HDLC SERPL-MCNC: 36 MG/DL (ref 40–60)
HDLC SERPL: 5.4 (ref 0–5)
HGB BLD-MCNC: 14.5 G/DL (ref 13.6–17.2)
IMM GRANULOCYTES # BLD AUTO: 0 K/UL (ref 0–0.5)
IMM GRANULOCYTES NFR BLD AUTO: 0 % (ref 0–5)
LDLC SERPL CALC-MCNC: 124 MG/DL (ref 0–100)
LYMPHOCYTES # BLD: 2.2 K/UL (ref 0.5–4.6)
LYMPHOCYTES NFR BLD: 34 % (ref 13–44)
MCH RBC QN AUTO: 31.3 PG (ref 26.1–32.9)
MCHC RBC AUTO-ENTMCNC: 31.7 G/DL (ref 31.4–35)
MCV RBC AUTO: 98.7 FL (ref 82–102)
MONOCYTES # BLD: 0.7 K/UL (ref 0.1–1.3)
MONOCYTES NFR BLD: 11 % (ref 4–12)
NEUTS SEG # BLD: 3.4 K/UL (ref 1.7–8.2)
NEUTS SEG NFR BLD: 52 % (ref 43–78)
NRBC # BLD: 0 K/UL (ref 0–0.2)
PLATELET # BLD AUTO: 177 K/UL (ref 150–450)
PMV BLD AUTO: 10.4 FL (ref 9.4–12.3)
POTASSIUM SERPL-SCNC: 4.9 MMOL/L (ref 3.5–5.1)
PROT SERPL-MCNC: 6.9 G/DL (ref 6.3–8.2)
RBC # BLD AUTO: 4.63 M/UL (ref 4.23–5.6)
SODIUM SERPL-SCNC: 139 MMOL/L (ref 136–145)
TRIGL SERPL-MCNC: 177 MG/DL (ref 0–150)
TSH W FREE THYROID IF ABNORMAL: 1.52 UIU/ML (ref 0.27–4.2)
VLDLC SERPL CALC-MCNC: 35 MG/DL (ref 6–23)
WBC # BLD AUTO: 6.6 K/UL (ref 4.3–11.1)

## 2024-07-23 SDOH — HEALTH STABILITY: PHYSICAL HEALTH: ON AVERAGE, HOW MANY MINUTES DO YOU ENGAGE IN EXERCISE AT THIS LEVEL?: 30 MIN

## 2024-07-23 SDOH — HEALTH STABILITY: PHYSICAL HEALTH: ON AVERAGE, HOW MANY DAYS PER WEEK DO YOU ENGAGE IN MODERATE TO STRENUOUS EXERCISE (LIKE A BRISK WALK)?: 1 DAY

## 2024-07-23 ASSESSMENT — LIFESTYLE VARIABLES
HOW OFTEN DO YOU HAVE A DRINK CONTAINING ALCOHOL: 5
HAS A RELATIVE, FRIEND, DOCTOR, OR ANOTHER HEALTH PROFESSIONAL EXPRESSED CONCERN ABOUT YOUR DRINKING OR SUGGESTED YOU CUT DOWN: NO
HOW OFTEN DO YOU HAVE A DRINK CONTAINING ALCOHOL: 4 OR MORE TIMES A WEEK
HOW MANY STANDARD DRINKS CONTAINING ALCOHOL DO YOU HAVE ON A TYPICAL DAY: 1
HAS A RELATIVE, FRIEND, DOCTOR, OR ANOTHER HEALTH PROFESSIONAL EXPRESSED CONCERN ABOUT YOUR DRINKING OR SUGGESTED YOU CUT DOWN: NO
HOW OFTEN DURING THE LAST YEAR HAVE YOU FAILED TO DO WHAT WAS NORMALLY EXPECTED FROM YOU BECAUSE OF DRINKING: NEVER
HOW OFTEN DURING THE LAST YEAR HAVE YOU NEEDED AN ALCOHOLIC DRINK FIRST THING IN THE MORNING TO GET YOURSELF GOING AFTER A NIGHT OF HEAVY DRINKING: NEVER
HOW OFTEN DURING THE LAST YEAR HAVE YOU BEEN UNABLE TO REMEMBER WHAT HAPPENED THE NIGHT BEFORE BECAUSE YOU HAD BEEN DRINKING: NEVER
HOW OFTEN DURING THE LAST YEAR HAVE YOU FAILED TO DO WHAT WAS NORMALLY EXPECTED FROM YOU BECAUSE OF DRINKING: NEVER
HAVE YOU OR SOMEONE ELSE BEEN INJURED AS A RESULT OF YOUR DRINKING: NO
HOW OFTEN DO YOU HAVE SIX OR MORE DRINKS ON ONE OCCASION: 1
HAVE YOU OR SOMEONE ELSE BEEN INJURED AS A RESULT OF YOUR DRINKING: NO
HOW OFTEN DURING THE LAST YEAR HAVE YOU FOUND THAT YOU WERE NOT ABLE TO STOP DRINKING ONCE YOU HAD STARTED: NEVER
HOW OFTEN DURING THE LAST YEAR HAVE YOU HAD A FEELING OF GUILT OR REMORSE AFTER DRINKING: NEVER
HOW MANY STANDARD DRINKS CONTAINING ALCOHOL DO YOU HAVE ON A TYPICAL DAY: 1 OR 2
HOW OFTEN DURING THE LAST YEAR HAVE YOU HAD A FEELING OF GUILT OR REMORSE AFTER DRINKING: NEVER
HOW OFTEN DURING THE LAST YEAR HAVE YOU FOUND THAT YOU WERE NOT ABLE TO STOP DRINKING ONCE YOU HAD STARTED: NEVER
HOW OFTEN DURING THE LAST YEAR HAVE YOU NEEDED AN ALCOHOLIC DRINK FIRST THING IN THE MORNING TO GET YOURSELF GOING AFTER A NIGHT OF HEAVY DRINKING: NEVER
HOW OFTEN DURING THE LAST YEAR HAVE YOU BEEN UNABLE TO REMEMBER WHAT HAPPENED THE NIGHT BEFORE BECAUSE YOU HAD BEEN DRINKING: NEVER

## 2024-07-23 ASSESSMENT — PATIENT HEALTH QUESTIONNAIRE - PHQ9
2. FEELING DOWN, DEPRESSED OR HOPELESS: NOT AT ALL
SUM OF ALL RESPONSES TO PHQ QUESTIONS 1-9: 0
SUM OF ALL RESPONSES TO PHQ9 QUESTIONS 1 & 2: 0
SUM OF ALL RESPONSES TO PHQ QUESTIONS 1-9: 0
SUM OF ALL RESPONSES TO PHQ QUESTIONS 1-9: 0
1. LITTLE INTEREST OR PLEASURE IN DOING THINGS: NOT AT ALL
SUM OF ALL RESPONSES TO PHQ QUESTIONS 1-9: 0

## 2024-07-31 ENCOUNTER — OFFICE VISIT (OUTPATIENT)
Dept: INTERNAL MEDICINE CLINIC | Facility: CLINIC | Age: 89
End: 2024-07-31
Payer: MEDICARE

## 2024-07-31 VITALS
HEIGHT: 70 IN | SYSTOLIC BLOOD PRESSURE: 128 MMHG | WEIGHT: 213 LBS | DIASTOLIC BLOOD PRESSURE: 80 MMHG | BODY MASS INDEX: 30.49 KG/M2 | HEART RATE: 62 BPM

## 2024-07-31 DIAGNOSIS — N18.32 STAGE 3B CHRONIC KIDNEY DISEASE (CKD) (HCC): ICD-10-CM

## 2024-07-31 DIAGNOSIS — I10 HYPERTENSION, ESSENTIAL: ICD-10-CM

## 2024-07-31 DIAGNOSIS — D68.69 SECONDARY HYPERCOAGULABLE STATE (HCC): ICD-10-CM

## 2024-07-31 DIAGNOSIS — Z00.00 MEDICARE ANNUAL WELLNESS VISIT, SUBSEQUENT: Primary | Chronic | ICD-10-CM

## 2024-07-31 DIAGNOSIS — E78.2 HYPERLIPIDEMIA, MIXED: ICD-10-CM

## 2024-07-31 DIAGNOSIS — I48.0 PAROXYSMAL ATRIAL FIBRILLATION (HCC): ICD-10-CM

## 2024-07-31 DIAGNOSIS — R73.01 IMPAIRED FASTING GLUCOSE: ICD-10-CM

## 2024-07-31 DIAGNOSIS — Z23 ENCOUNTER FOR IMMUNIZATION: ICD-10-CM

## 2024-07-31 DIAGNOSIS — I25.10 CORONARY ARTERY DISEASE INVOLVING NATIVE CORONARY ARTERY OF NATIVE HEART WITHOUT ANGINA PECTORIS: ICD-10-CM

## 2024-07-31 DIAGNOSIS — Z78.9 STATIN INTOLERANCE: ICD-10-CM

## 2024-07-31 PROCEDURE — 1123F ACP DISCUSS/DSCN MKR DOCD: CPT | Performed by: INTERNAL MEDICINE

## 2024-07-31 PROCEDURE — G0439 PPPS, SUBSEQ VISIT: HCPCS | Performed by: INTERNAL MEDICINE

## 2024-07-31 PROCEDURE — 99214 OFFICE O/P EST MOD 30 MIN: CPT | Performed by: INTERNAL MEDICINE

## 2024-07-31 SDOH — ECONOMIC STABILITY: FOOD INSECURITY: WITHIN THE PAST 12 MONTHS, YOU WORRIED THAT YOUR FOOD WOULD RUN OUT BEFORE YOU GOT MONEY TO BUY MORE.: NEVER TRUE

## 2024-07-31 SDOH — ECONOMIC STABILITY: FOOD INSECURITY: WITHIN THE PAST 12 MONTHS, THE FOOD YOU BOUGHT JUST DIDN'T LAST AND YOU DIDN'T HAVE MONEY TO GET MORE.: NEVER TRUE

## 2024-07-31 SDOH — ECONOMIC STABILITY: INCOME INSECURITY: HOW HARD IS IT FOR YOU TO PAY FOR THE VERY BASICS LIKE FOOD, HOUSING, MEDICAL CARE, AND HEATING?: NOT HARD AT ALL

## 2024-07-31 ASSESSMENT — ENCOUNTER SYMPTOMS
CHOKING: 0
STRIDOR: 0
EYE PAIN: 0
VOICE CHANGE: 0
RECTAL PAIN: 0

## 2024-07-31 NOTE — PROGRESS NOTES
FOLLOWUP VISIT and MEDICARE WELLNESS    Subjective:    Mr. Stephenson is a 89 y.o., male,   Chief Complaint   Patient presents with    Medicare AWV    6 Month Follow-Up    URI     Runny nose, post nasal congestion and productive, intermittent x a month covid test negatives        HPI:    Patient presents today for follow up of two or more chronic medical problems and review of labs.       The patient is also here for the annual wellness visit.     The patient has hypertension.  The patient has been on an attempted low sodium diet and has been trying to exercise and maintain a healthy weight.  The patient reports good compliance with the blood pressure medications.       The patient has hyperlipidemia.  The patient has been attempting to follow a low cholesterol diet.      The patient has impaired fasting glucose tolerance.  The patient remains on attempted diet exercise and weight loss therapy.  The patient denies any symptoms of hyperglycemia.     He has atrial fibrillation.  Maintained in NSR on amiodarone.  Denies any bruising or bleeding on Xarelto.      The following portions of the patient's history were reviewed and updated as appropriate:      Past Medical History:   Diagnosis Date    Atrial fibrillation (HCC)     CAD (coronary artery disease)     S/P stents 2019    Hyperlipidemia     Hypertension, essential 9/29/2022       Past Surgical History:   Procedure Laterality Date    SALIVARY GLAND SURGERY      UMBILICAL HERNIA REPAIR         Family History   Problem Relation Age of Onset    Colon Cancer Mother     Heart Attack Father        Social History     Socioeconomic History    Marital status:      Spouse name: Not on file    Number of children: 4    Years of education: Not on file    Highest education level: Not on file   Occupational History     Comment: retired    Tobacco Use    Smoking status: Never    Smokeless tobacco: Never   Substance and Sexual Activity    Alcohol use: Yes

## 2024-10-16 ENCOUNTER — OFFICE VISIT (OUTPATIENT)
Dept: INTERNAL MEDICINE CLINIC | Facility: CLINIC | Age: 89
End: 2024-10-16
Payer: MEDICARE

## 2024-10-16 VITALS
SYSTOLIC BLOOD PRESSURE: 128 MMHG | WEIGHT: 208.6 LBS | DIASTOLIC BLOOD PRESSURE: 60 MMHG | HEART RATE: 56 BPM | HEIGHT: 70 IN | BODY MASS INDEX: 29.86 KG/M2

## 2024-10-16 DIAGNOSIS — J18.9 PNEUMONIA OF RIGHT UPPER LOBE DUE TO INFECTIOUS ORGANISM: Primary | ICD-10-CM

## 2024-10-16 DIAGNOSIS — I10 HYPERTENSION, ESSENTIAL: ICD-10-CM

## 2024-10-16 PROCEDURE — 1123F ACP DISCUSS/DSCN MKR DOCD: CPT | Performed by: INTERNAL MEDICINE

## 2024-10-16 PROCEDURE — G2211 COMPLEX E/M VISIT ADD ON: HCPCS | Performed by: INTERNAL MEDICINE

## 2024-10-16 PROCEDURE — 99213 OFFICE O/P EST LOW 20 MIN: CPT | Performed by: INTERNAL MEDICINE

## 2024-10-16 ASSESSMENT — ENCOUNTER SYMPTOMS
RECTAL PAIN: 0
CHOKING: 0
STRIDOR: 0
EYE PAIN: 0
VOICE CHANGE: 0

## 2024-10-16 NOTE — PROGRESS NOTES
FOLLOWUP VISIT    Subjective:    Mr. Stephenson is a 89 y.o., male,   Chief Complaint   Patient presents with    Follow-Up from Hospital     10/05/2024-10/07/2024 [facility] hospital        HPI:    Patient was hospitalized at [facility] 10/524 - 10/7/24.  Presented with fever / cough / SOB / yellow sputum / mild hypoxemia.  CXR revealed RUL pneumonia vs mass.  Blood cultures x two negative.  Improved with IV Rocephin and Zithromax.      Patient / wife unhappy with care they received at [facility].  They had a dispute with a resident phyisican over patient's DNR status... per wife the patient agreed to DNR status while hypoxemic and confused and without his hearing aides and it took her 12+ hours to get DNR rescinded.  Patient today confirms his desire to be full code.      The following portions of the patient's history were reviewed and updated as appropriate:      Past Medical History:   Diagnosis Date    Atrial fibrillation (HCC)     CAD (coronary artery disease)     S/P stents 2019    Hyperlipidemia     Hypertension, essential 9/29/2022       Past Surgical History:   Procedure Laterality Date    SALIVARY GLAND SURGERY      UMBILICAL HERNIA REPAIR         Family History   Problem Relation Age of Onset    Colon Cancer Mother     Heart Attack Father        Social History     Socioeconomic History    Marital status:      Spouse name: Not on file    Number of children: 4    Years of education: Not on file    Highest education level: Not on file   Occupational History     Comment: retired    Tobacco Use    Smoking status: Never    Smokeless tobacco: Never   Substance and Sexual Activity    Alcohol use: Yes     Comment: 1 drink daily    Drug use: Never    Sexual activity: Not on file   Other Topics Concern    Not on file   Social History Narrative    Not on file     Social Determinants of Health     Financial Resource Strain: Low Risk  (7/31/2024)    Overall Financial Resource Strain (CARDIA)     Difficulty of Paying

## 2024-11-18 ENCOUNTER — HOSPITAL ENCOUNTER (OUTPATIENT)
Dept: GENERAL RADIOLOGY | Age: 89
Discharge: HOME | End: 2024-11-20
Payer: MEDICARE

## 2024-11-18 DIAGNOSIS — J18.9 PNEUMONIA OF RIGHT UPPER LOBE DUE TO INFECTIOUS ORGANISM: ICD-10-CM

## 2024-11-18 PROCEDURE — 71046 X-RAY EXAM CHEST 2 VIEWS: CPT

## 2024-11-20 ENCOUNTER — TELEPHONE (OUTPATIENT)
Dept: INTERNAL MEDICINE CLINIC | Facility: CLINIC | Age: 89
End: 2024-11-20

## 2024-11-20 DIAGNOSIS — R93.89 ABNORMAL CXR (CHEST X-RAY): Primary | ICD-10-CM

## 2024-11-20 NOTE — RESULT ENCOUNTER NOTE
Call patient.  CXR still abnormal.  I have ordered CT of his chest.  Will made decision where to go next after CT scan.

## 2024-11-20 NOTE — TELEPHONE ENCOUNTER
----- Message from Dr. Dean Navarro MD sent at 11/20/2024 11:12 AM EST -----  Call patient.  CXR still abnormal.  I have ordered CT of his chest.  Will made decision where to go next after CT scan.

## 2024-11-25 ENCOUNTER — TELEPHONE (OUTPATIENT)
Age: 89
End: 2024-11-25

## 2024-11-26 NOTE — TELEPHONE ENCOUNTER
MEDICATION REFILL REQUEST      Name of Medication:  Xarelto   Dose:  15 mg  Frequency:  daily   Quantity:    Days' supply:  30 day       Pharmacy Name/Location:  [facility]

## 2024-11-26 NOTE — TELEPHONE ENCOUNTER
Requested Prescriptions     Pending Prescriptions Disp Refills    rivaroxaban (XARELTO) 15 MG TABS tablet 90 tablet 3     Sig: Take 1 tablet by mouth daily (with breakfast)     Rx verified last ov 5/6/24

## 2024-11-27 ENCOUNTER — HOSPITAL ENCOUNTER (OUTPATIENT)
Dept: CT IMAGING | Age: 89
Discharge: HOME | End: 2024-11-29
Attending: INTERNAL MEDICINE
Payer: MEDICARE

## 2024-11-27 DIAGNOSIS — R93.89 ABNORMAL CXR (CHEST X-RAY): ICD-10-CM

## 2024-11-27 LAB — CREAT BLD-MCNC: 1.33 MG/DL (ref 0.8–1.5)

## 2024-11-27 PROCEDURE — 6360000004 HC RX CONTRAST MEDICATION: Performed by: INTERNAL MEDICINE

## 2024-11-27 PROCEDURE — 82565 ASSAY OF CREATININE: CPT

## 2024-11-27 PROCEDURE — 71260 CT THORAX DX C+: CPT

## 2024-11-27 RX ORDER — IOPAMIDOL 755 MG/ML
80 INJECTION, SOLUTION INTRAVASCULAR
Status: COMPLETED | OUTPATIENT
Start: 2024-11-27 | End: 2024-11-27

## 2024-11-27 RX ADMIN — IOPAMIDOL 80 ML: 755 INJECTION, SOLUTION INTRAVENOUS at 13:13

## 2024-12-02 DIAGNOSIS — R91.8 LUNG MASS: Primary | ICD-10-CM

## 2024-12-03 ENCOUNTER — TELEPHONE (OUTPATIENT)
Dept: PULMONOLOGY | Age: 89
End: 2024-12-03

## 2024-12-03 DIAGNOSIS — R91.8 LUNG MASS: Primary | ICD-10-CM

## 2024-12-03 NOTE — TELEPHONE ENCOUNTER
Note:  Ref by Dr. Dean Navarro for Lung mass concerning for malignancy. CT chest 11/27/24 IMPRESSION:  Masslike area of consolidation in the right upper lobe adjacent to  the minor fissure. While pneumonia is not completely excluded, the appearance is  concerning for malignancy.    Patient was hospitalized at [facility] 10/524 - 10/7/24. Presented with fever / cough / SOB / yellow sputum / mild hypoxemia. CXR revealed RUL pneumonia vs mass. Blood cultures x two negative. Improved with IV Rocephin and Zithromax     I have faxed request to [facility] to obtain 10/5/2024 CXR to PACS.    Patient had CT chest yesterday at [facility] for follow up of pulmonary consolidation.             I have faxed request to [email or web address] to obtain a copy of 1/29/2028 CT chest abdomen to PACS. CO Everywhere phone medical records [phone].    Chart indicates never smoker. Dr Lux-please advise if additional imaging prior to appointment.

## 2024-12-05 NOTE — TELEPHONE ENCOUNTER
Magnolia Lux MD Kisler, Debra L, RCP; [facility] T[facility]  Caller: Unspecified (2 days ago, 12:56 PM)  PET/CT if able    I have spoken with Mr and Mrs Stephenson.  They are agreeable to allowing PET scan prior to appointment of which I have explained.  Patient is scheduled for PET scan on 12/13 and around existing schedule, patient is scheduled to see Dr Lux on 12/18 at 1440 arrival.

## 2024-12-13 ENCOUNTER — HOSPITAL ENCOUNTER (OUTPATIENT)
Dept: PET IMAGING | Age: 89
Discharge: HOME | End: 2024-12-16
Payer: MEDICARE

## 2024-12-13 DIAGNOSIS — R91.8 LUNG MASS: ICD-10-CM

## 2024-12-13 LAB
GLUCOSE BLD STRIP.AUTO-MCNC: 101 MG/DL (ref 65–100)
SERVICE CMNT-IMP: ABNORMAL

## 2024-12-13 PROCEDURE — 82962 GLUCOSE BLOOD TEST: CPT

## 2024-12-13 PROCEDURE — 3430000000 HC RX DIAGNOSTIC RADIOPHARMACEUTICAL: Performed by: INTERNAL MEDICINE

## 2024-12-13 PROCEDURE — 6360000004 HC RX CONTRAST MEDICATION: Performed by: INTERNAL MEDICINE

## 2024-12-13 PROCEDURE — A9609 HC RX DIAGNOSTIC RADIOPHARMACEUTICAL: HCPCS | Performed by: INTERNAL MEDICINE

## 2024-12-13 PROCEDURE — 78815 PET IMAGE W/CT SKULL-THIGH: CPT

## 2024-12-13 PROCEDURE — 2580000003 HC RX 258: Performed by: INTERNAL MEDICINE

## 2024-12-13 RX ORDER — DIATRIZOATE MEGLUMINE AND DIATRIZOATE SODIUM 660; 100 MG/ML; MG/ML
10 SOLUTION ORAL; RECTAL
Status: DISCONTINUED | OUTPATIENT
Start: 2024-12-13 | End: 2024-12-17 | Stop reason: HOSPADM

## 2024-12-13 RX ORDER — SODIUM CHLORIDE 0.9 % (FLUSH) 0.9 %
20 SYRINGE (ML) INJECTION AS NEEDED
Status: DISCONTINUED | OUTPATIENT
Start: 2024-12-13 | End: 2024-12-17 | Stop reason: HOSPADM

## 2024-12-13 RX ORDER — FLUDEOXYGLUCOSE F 18 200 MCI/ML
12.88 INJECTION, SOLUTION INTRAVENOUS
Status: COMPLETED | OUTPATIENT
Start: 2024-12-13 | End: 2024-12-13

## 2024-12-13 RX ADMIN — SODIUM CHLORIDE, PRESERVATIVE FREE 20 ML: 5 INJECTION INTRAVENOUS at 09:40

## 2024-12-13 RX ADMIN — DIATRIZOATE MEGLUMINE AND DIATRIZOATE SODIUM 10 ML: 660; 100 LIQUID ORAL; RECTAL at 09:40

## 2024-12-13 RX ADMIN — FLUDEOXYGLUCOSE F 18 12.88 MILLICURIE: 200 INJECTION, SOLUTION INTRAVENOUS at 09:40

## 2024-12-18 ENCOUNTER — OFFICE VISIT (OUTPATIENT)
Dept: PULMONOLOGY | Age: 89
End: 2024-12-18

## 2024-12-18 ENCOUNTER — PREP FOR PROCEDURE (OUTPATIENT)
Dept: PULMONOLOGY | Age: 89
End: 2024-12-18

## 2024-12-18 VITALS
TEMPERATURE: 98 F | HEIGHT: 70 IN | WEIGHT: 216 LBS | OXYGEN SATURATION: 98 % | DIASTOLIC BLOOD PRESSURE: 60 MMHG | RESPIRATION RATE: 20 BRPM | BODY MASS INDEX: 30.92 KG/M2 | HEART RATE: 63 BPM | SYSTOLIC BLOOD PRESSURE: 123 MMHG

## 2024-12-18 DIAGNOSIS — R91.8 LUNG MASS: Primary | ICD-10-CM

## 2024-12-18 DIAGNOSIS — R91.8 LUNG MASS: ICD-10-CM

## 2024-12-18 DIAGNOSIS — J18.9 PNEUMONIA OF RIGHT UPPER LOBE DUE TO INFECTIOUS ORGANISM: ICD-10-CM

## 2024-12-18 LAB
EXPIRATORY TIME: NORMAL
FEF 25-75% %PRED-PRE: NORMAL
FEF 25-75% PRED: NORMAL
FEF 25-75-PRE: NORMAL
FEV1 %PRED-PRE: 88 %
FEV1 PRED: NORMAL
FEV1/FVC %PRED-PRE: NORMAL
FEV1/FVC PRED: NORMAL
FEV1/FVC: 80 %
FEV1: 2.33 L
FVC %PRED-PRE: 80 %
FVC PRED: NORMAL
FVC: 2.92 L
PEF %PRED-PRE: NORMAL
PEF PRED: NORMAL
PEF-PRE: NORMAL

## 2024-12-18 ASSESSMENT — PULMONARY FUNCTION TESTS
FVC_PERCENT_PREDICTED_PRE: 80
FEV1_PERCENT_PREDICTED_PRE: 88
FEV1/FVC: 80
FEV1: 2.33
FVC: 2.92

## 2024-12-18 NOTE — PROGRESS NOTES
and posterior supraglottic lesion. ENT consultation  with direct visualization suggested for further evaluation.    No evidence for infradiaphragmatic spread of disease. No evidence for bony  metastasis. Whole-body bone scan is still recommended for further evaluation.    Coronary artery disease.    Gallbladder sludge.    Borderline cardiomegaly with dense coronary arterial calcifications.    Prostatomegaly. Recommend correlation with serum PSA level.          Electronically signed by Bertrand Tapia    PFTs:       Latest Ref Rng & Units 12/18/2024    12:00 AM   Office Spirometry Results   FVC L 2.92    FEV1 L 2.33    FEV1 %Pred-Pre % 88    FVC %Pred-Pre % 80    FEV1/FVC % 80      No results found for this or any previous visit. No results found for this or any previous visit.    FeNO: No results found for this or any previous visit.  FeNO and Likelihood of Eosinophilic Asthma   Unlikely Intermediate Likely   <25 ppb 25-50 ppb >50ppb     Exercise Oximetry:    Echo: No results found for this or any previous visit from the past 3650 days.    [facility] Reference Info:                                                                                                                Immunization History   Administered Date(s) Administered    COVID-19, MODERNA Booster BLUE border, (age 18y+), IM, 50mcg/0.25mL 12/06/2021    COVID-19, PFIZER Bivalent, DO NOT Dilute, (age 12y+), IM, 30 mcg/0.3 mL 12/07/2022    COVID-19, PFIZER PURPLE top, DILUTE for use, (age 12 y+), 30mcg/0.3mL 01/15/2021, 02/11/2021    Influenza Virus Vaccine 11/13/2011, 11/15/2012, 09/18/2015, 09/29/2017, 09/26/2018    Influenza, FLUAD, (age 65 y+), IM, Quadv, 0.5mL 12/07/2022    Influenza, FLUCELVAX, (age 6 mo+) IM, Trivalent PF, 0.5mL 01/16/2014    Influenza, FLUZONE High Dose (age 65 y+), IM, Quadv, 0.7mL 10/28/2014, 11/23/2016, 12/12/2019, 09/10/2020, 09/11/2020, 12/06/2021    Pneumococcal, PCV-13, PREVNAR 13, (age 6w+), IM, 0.5mL 01/30/2018    Pneumococcal,

## 2024-12-19 DIAGNOSIS — R94.8 ABNORMAL POSITRON EMISSION TOMOGRAPHY (PET) SCAN: Primary | ICD-10-CM

## 2024-12-20 RX ORDER — SODIUM CHLORIDE 0.9 % (FLUSH) 0.9 %
5-40 SYRINGE (ML) INJECTION PRN
Status: CANCELLED | OUTPATIENT
Start: 2024-12-20

## 2024-12-20 RX ORDER — SODIUM CHLORIDE 0.9 % (FLUSH) 0.9 %
5-40 SYRINGE (ML) INJECTION EVERY 12 HOURS SCHEDULED
Status: CANCELLED | OUTPATIENT
Start: 2024-12-20

## 2024-12-20 RX ORDER — SODIUM CHLORIDE 9 MG/ML
INJECTION, SOLUTION INTRAVENOUS PRN
Status: CANCELLED | OUTPATIENT
Start: 2024-12-20

## 2025-01-03 ENCOUNTER — TELEPHONE (OUTPATIENT)
Dept: PULMONOLOGY | Age: 89
End: 2025-01-03

## 2025-01-03 NOTE — TELEPHONE ENCOUNTER
Patient has sore throat,low grade fever,stuffy nose & cough.  Needs to cancel the CT scan on Monday and Bronch on Tuesday

## 2025-01-06 NOTE — TELEPHONE ENCOUNTER
I have spoken with Mrs Stephenson, she reports patient is no longer running fever but did last week. Per EC, sore throat, stuffy nose & cough. She is concerned that patient may have ongoing pneumonia. She reports that patient is not wheezing. She reports that she is ill as well.  Per EC EC patient's SpO2 has not dropped below 90%.   I have asked that she perform home COVID test that she has on hand.  She is asking to reschedule both CT chest and ION BX.  We have discussed that with current symptoms, anesthesia will likely postpone x 4 weeks.  She reports that patient has been concerned about undergoing bronchoscopy due to age and also reports concern that this is ongoing, unresolved pneumonia.  She allows me to scheduled CT chest to 1/30 and patient is scheduled to see Dr Lux in office on 1/31 to review this new image.  She allows me to tentatively put patient on for ION BX on 2/4 pending this review.  She reports that patient did not hold Xarelto knowing that BX would be cancelled.  She is asking if additional antibiotic and possibly steroids would be appropriate at this time.  I have let her know that Dr Lux will be in office tomorrow and I will route this message to him for further input.  She is aware to take patient to Urgent Care/ED for decline prior to input from Dr Lux. Reviewed MD 12/18/2024 note and want to assure that Dr Lux approves this plan for Ct chest on 1/30, followed by appointment with him on 1/31 with time held on 2/4 for ION. She will call office back if COVID test is +.

## 2025-01-07 RX ORDER — PREDNISONE 20 MG/1
20 TABLET ORAL DAILY
Qty: 7 TABLET | Refills: 0 | Status: SHIPPED | OUTPATIENT
Start: 2025-01-07 | End: 2025-01-14

## 2025-01-07 NOTE — TELEPHONE ENCOUNTER
I have spoken with patient's wife, Kyung.  She is aware of order for Augmentin per Dr Lux and while on phone, re-requests possible steroids.  I have spoken with Dr Lux verbally and MD orders 20 mg Prednisone daily x 7 days.  Prescriptions to preferred pharmacy as ordered by Dr Lux.

## 2025-01-07 NOTE — TELEPHONE ENCOUNTER
MEDICATION REFILL REQUEST      Name of Medication:  xarelto   Dose:  15 mg  Frequency:  daily   Quantity:    Days' supply:  90 day       Pharmacy Name/Location:  [facility]

## 2025-01-07 NOTE — TELEPHONE ENCOUNTER
Magnolia Lux MD Kisler, Debra L, NITOP; [facility] Triage  Caller: Unspecified (4 days ago,  2:45 PM)  We could give him a 7 day course of Augmentin 875mg BID for possible pneumonia given the appearance on CT since his bronchoscopy is getting delayed for not feeling well. Would cover strep as well though I suspect his symptoms are likely viral at this point given numerous viruses circulating heavily in the community.

## 2025-01-13 NOTE — TELEPHONE ENCOUNTER
Forwarding to Triage to further review.  Looks like patient is wanting a continuation of the Amoxicillin and Prednisone. // [facility] Rivera [facility]    
nonverbal cues (demo/gestures)/1 person assist/verbal cues

## 2025-01-15 RX ORDER — PREDNISONE 20 MG/1
20 TABLET ORAL DAILY
Qty: 7 TABLET | Refills: 0 | OUTPATIENT
Start: 2025-01-15 | End: 2025-01-22

## 2025-01-21 ENCOUNTER — TELEPHONE (OUTPATIENT)
Dept: ENT CLINIC | Age: 89
End: 2025-01-21

## 2025-01-21 NOTE — TELEPHONE ENCOUNTER
Lm with Dr Davis's message below.         Neel Davis MD Middleton, Kimberly Dawn, MA  Hey-I looked at his scan.  There is just mild uptake seen on his PET/CT which is likely physiologic in nature.  He will need quick in office laryngoscopy to help rule out anything concerning.  I think he is perfectly fine to wait till after his CT scan and bronchoscopy, unless of course they are worried, and I would be happy to see him sooner.    Thanks          Previous Messages       ----- Message -----  From: Claudia Quick MA  Sent: 1/21/2025  11:32 AM EST  To: Neel Davis MD  Subject: Referral                                        Can you take a look at this patients chart? Just spoke to wife and she wanted to make sure it was okay to wait to see an ENT doc until after patient has a follow up Ct scan 1/30 and a Bronchoscopy on 2/4/25.

## 2025-01-27 ENCOUNTER — TELEPHONE (OUTPATIENT)
Dept: PULMONOLOGY | Age: 89
End: 2025-01-27

## 2025-01-27 NOTE — TELEPHONE ENCOUNTER
Patient 's wife is calling to cancel Bronch on 2/4.  They have an medical emergency in [place] with patient's brother who is dying.  She promised to keep us informed about when they will be able to reschedule

## 2025-02-07 NOTE — CONSULTS
REFERRING PHYSICIAN: Rober Moore MD.     CONSULTING PHYSICIAN: Malik Lemus MD, FACS.    CHIEF COMPLAINT: fatigue    HISTORY OF PRESENT ILLNESS: The patient is a 84 y.o. male with a history of atrial fibrillation duration unknown, h/o syncope several years ago, h/o PAC's who presented to cardiology with atrial fibrillation and fatigue.  He took a long trip 3 months ago and found it to be very fatiguing.  He denies chest pain, shortness of breath, any further syncopal episodes.  No other associated signs or symptoms.  He had an recent echo which showed a decline in his EF.  Stress test was done which was positive.  He was cathed today and found to have multivessel coronary artery disease.  He recently had a cardioversion for his atrial fibrillation and was placed on xarelto.     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       FAMILY HISTORY:   Family History   Problem Relation Age of Onset   • Heart Disease Neg Hx         SOCIAL HISTORY:   Social History     Socioeconomic History   • Marital status:      Spouse name: Not on file   • Number of children: Not on file   • Years of education: Not on file   • Highest education level: Not on file   Occupational History   • Not on file   Social Needs   • Financial resource strain: Not on file   • Food insecurity     Worry: Not on file     Inability: Not on file   • Transportation needs     Medical: Not on file     Non-medical: Not on file   Tobacco Use   • Smoking status: Never Smoker   • Smokeless tobacco: Never Used   Substance and Sexual Activity   • Alcohol use: Yes     Alcohol/week: 0.6 oz     Types: 1 Cans of beer per week      Frequency: 4 or more times a week     Drinks per session: 1 or 2   • Drug use: No   • Sexual activity: Not on file   Lifestyle   • Physical activity     Days per week: Not on file     Minutes per session: Not on file   • Stress: Not on file   Relationships   • Social connections     Talks on phone: Not on file     Gets together: Not on file     Attends Denominational service: Not on file     Active member of club or organization: Not on file     Attends meetings of clubs or organizations: Not on file     Relationship status: Not on file   • Intimate partner violence     Fear of current or ex partner: Not on file     Emotionally abused: Not on file     Physically abused: Not on file     Forced sexual activity: Not on file   Other Topics Concern   • Not on file   Social History Narrative   • Not on file       ALLERGIES:   Allergies   Allergen Reactions   • Codeine      Nausea        CURRENT MEDICATIONS:     Current Facility-Administered Medications:   •  NS infusion, , Intravenous, PPU Continuous, Rober Blas M.D., Last Rate: 100 mL/hr at 02/28/20 0713  •  NS infusion, , Intravenous, Continuous, Rober Blas M.D.     LABS REVIEWED:  Lab Results   Component Value Date/Time    SODIUM 140 02/27/2020 08:50 AM    POTASSIUM 4.0 02/27/2020 08:50 AM    CHLORIDE 106 02/27/2020 08:50 AM    CO2 29 02/27/2020 08:50 AM    GLUCOSE 108 (H) 02/27/2020 08:50 AM    BUN 23 (H) 02/27/2020 08:50 AM    CREATININE 1.25 02/27/2020 08:50 AM      Lab Results   Component Value Date/Time    PROTHROMBTM 13.7 02/27/2020 08:50 AM    INR 1.02 02/27/2020 08:50 AM      Lab Results   Component Value Date/Time    WBC 6.4 02/27/2020 08:50 AM    RBC 4.35 (L) 02/27/2020 08:50 AM    HEMOGLOBIN 13.7 (L) 02/27/2020 08:50 AM    HEMATOCRIT 41.5 (L) 02/27/2020 08:50 AM    MCV 95.4 02/27/2020 08:50 AM    MCH 31.5 02/27/2020 08:50 AM    MCHC 33.0 (L) 02/27/2020 08:50 AM    MPV 9.9 02/27/2020 08:50 AM    NEUTSPOLYS 46.10 12/09/2019 08:17 AM    LYMPHOCYTES  38.60 12/09/2019 08:17 AM    MONOCYTES 11.00 12/09/2019 08:17 AM    EOSINOPHILS 3.30 12/09/2019 08:17 AM    BASOPHILS 0.80 12/09/2019 08:17 AM        IMAGING REVIEWED AND INTERPRETED:    ECHOCARDIOGRAM:   Compared to the report of the study done 01/30/2018, EF is now   moderately reduced, patient noted to be in atrial fibrillation.  Moderately reduced left ventricular systolic function.  Left ventricular ejection fraction is visually estimated to be 35%.  Global hypokinesis with severe hypokinesis of the basal to mid inferior   wall and inferior septum.  Diastolic function is difficult to assess with atrial fibrillation.  Mildly dilated left atrium.  Mild mitral regurgitation.  Aortic sclerosis without stenosis.  Mild to moderate tricuspid regurgitation.  Estimated right ventricular systolic pressure  is 33 mmHg.  Ascending aorta diameter is 3.9 cm, borderline mildly dilated.    ANGIOGRAM:   Left Main: Large vessel bifurcating no CAD.  Left Anterior Descending: Large vessel diffusely diseased.  80% moderate length eccentric mid LAD stenosis and a [place] 1, 1, 1 configuration with a  of the D1.  There are ipsilateral collaterals.  Left Circumflex: Large caliber vessel with a large OM1.  The OM1 has a 98% focal concentric stenosis in its proximal portion.    Right Coronary: Moderate caliber vessel.  100% chronic totally occluded in its proximal portion with an unfavorable  crossing profile.  There are ipsilateral right to right collaterals as well as contralateral left to right collaterals.    REVIEW OF SYSTEMS:   Review of Systems   Constitutional: Positive for malaise/fatigue.   HENT: Negative.    Eyes: Negative.    Respiratory: Positive for shortness of breath.    Cardiovascular: Positive for palpitations.   Gastrointestinal: Negative.    Genitourinary: Negative.    Musculoskeletal: Negative.    Skin: Negative.    Neurological: Negative.    Endo/Heme/Allergies: Negative.    Psychiatric/Behavioral: Negative.   "    All other systems were reviewed with the patient and are negative.     CONSTITUTIONAL:  /78   Pulse 79   Temp 36.4 °C (97.6 °F) (Temporal)   Resp 18   Ht 1.778 m (5' 10\")   Wt 105.5 kg (232 lb 9.4 oz)   SpO2 94%     PHYSICAL EXAMINATION:   Physical Exam   Constitutional: He is oriented to person, place, and time and well-developed, well-nourished, and in no distress.   HENT:   Head: Normocephalic and atraumatic.   Eyes: Pupils are equal, round, and reactive to light.   Neck: Normal range of motion. Neck supple. No JVD present.   Cardiovascular: A regularly irregular rhythm present.   Pulmonary/Chest: Effort normal and breath sounds normal.   Abdominal: Soft. Bowel sounds are normal.   Musculoskeletal: Normal range of motion.         General: No edema.   Neurological: He is alert and oriented to person, place, and time.   Skin: Skin is warm and dry.   Psychiatric: Mood, memory, affect and judgment normal.     IMPRESSION:  84 year old gentleman with a history of CHF and multivessel coronary artery disease noted on cath.  I do not think the I can bypass the right coronary artery which leaves the LAD and circumflex which could be bypassed but also stented.  After discussion withy the patient and family they do not want surgery.  CABG.      PLAN:  I recommend PCI of the LAD and circumflex.  Will discuss with Dr. Moore.     The procedure, its risks, benefits, potential complications and alternative treatments were discussed with the patient in detail including the risks should he decide not to undergo my recommended treatment. All of his questions were answered to his satisfaction and he is willing to proceed with the operation. The risks include death, stroke,  infection: to include a rare bacterial infection related to the use of the heart/lung machine, loren-operative myocardial infarction, dysrhythmias, diaphragmatic paralysis, chest wall paresthesia, tracheostomy, kidney or other organ failure, " possible return to the operating room for bleeding, bleeding requiring transfusion with its attendant risks including AIDS or hepatitis, dehiscence of surgical incisions, respiratory complications including the need for prolonged ventilator support, Protamine or other drug reaction, peripheral neuropathy, loss of limb, and miscount of surgical items. The STS mortality risk score is 4% and the morbidity and mortality risk score is 16%. The scores were discussed with patient.    Thank you for this very challenging consultation and participation in the patient’s care.  I will keep you apprised of all future developments.      Sincerely,       Malik Lemus MD, FACS.    I,  Malik Lemus MD, FACS performed a substantial portion of the EM visit face-to-face with the same patient on the same date of service with, LUDMILA Cedillo. I was personally involved in reviewing and interpreting the films and conducted elements of the history and physical exam. I performed all of the medical decision making for the patient.   3 (mild pain)

## 2025-03-12 DIAGNOSIS — Z91.89 AT RISK FOR AMIODARONE TOXICITY WITH LONG TERM USE: ICD-10-CM

## 2025-03-12 DIAGNOSIS — Z78.9 STATIN INTOLERANCE: ICD-10-CM

## 2025-03-12 DIAGNOSIS — I48.0 PAROXYSMAL ATRIAL FIBRILLATION (HCC): ICD-10-CM

## 2025-03-12 DIAGNOSIS — Z79.899 AT RISK FOR AMIODARONE TOXICITY WITH LONG TERM USE: ICD-10-CM

## 2025-03-12 DIAGNOSIS — I25.10 CORONARY ARTERY DISEASE INVOLVING NATIVE CORONARY ARTERY OF NATIVE HEART WITHOUT ANGINA PECTORIS: ICD-10-CM

## 2025-03-12 RX ORDER — METOPROLOL SUCCINATE 25 MG/1
25 TABLET, EXTENDED RELEASE ORAL DAILY
Qty: 90 TABLET | Refills: 0 | Status: SHIPPED | OUTPATIENT
Start: 2025-03-12

## 2025-03-12 NOTE — TELEPHONE ENCOUNTER
Requested Prescriptions     Pending Prescriptions Disp Refills    metoprolol succinate (TOPROL XL) 25 MG extended release tablet [Pharmacy Med Name: METOPROLOL ER SUCCINATE 25MG TABS] 90 tablet 0     Sig: TAKE 1 TABLET BY MOUTH DAILY     Rx verified last ov 5/6/24 message sent to schedule yearly.

## 2025-03-19 ENCOUNTER — TELEPHONE (OUTPATIENT)
Dept: PULMONOLOGY | Age: 89
End: 2025-03-19

## 2025-03-19 NOTE — TELEPHONE ENCOUNTER
Patient wife is calling to reschedule Bronch and CT scan.  They will be available 4/8-4/11.  They are asking to have this done during this time

## 2025-03-21 ENCOUNTER — PREP FOR PROCEDURE (OUTPATIENT)
Dept: PULMONOLOGY | Age: 89
End: 2025-03-21

## 2025-03-21 RX ORDER — SODIUM CHLORIDE 0.9 % (FLUSH) 0.9 %
5-40 SYRINGE (ML) INJECTION EVERY 12 HOURS SCHEDULED
Status: CANCELLED | OUTPATIENT
Start: 2025-03-21

## 2025-03-21 RX ORDER — SODIUM CHLORIDE 0.9 % (FLUSH) 0.9 %
5-40 SYRINGE (ML) INJECTION PRN
Status: CANCELLED | OUTPATIENT
Start: 2025-03-21

## 2025-03-21 RX ORDER — SODIUM CHLORIDE 9 MG/ML
INJECTION, SOLUTION INTRAVENOUS PRN
Status: CANCELLED | OUTPATIENT
Start: 2025-03-21

## 2025-03-21 NOTE — TELEPHONE ENCOUNTER
I have spoken with patient's wife.  She reports that patient will be available for ION on April 7-11 to reschedule missed ION.  Patient is scheduled for Ion protocol Ct chest on 4/8 at 0700 arrival.  Patient will be NPO and have an adult with his on 4/8 for BX as well.  Will hold Xarelto on 4/6, 4/7 and am of 4/8.  She is aware that patient will get a call from anesthesia pre-assessment prior to procedure for additional instructions.  She voices appreciation for this office being able to accommodate window of availability.

## 2025-04-04 NOTE — PERIOP NOTE
Patient verified name and .  Order for consent  was found in EHR and does match case posting; patient verifies procedure.   Type 1B surgery, Phone assessment complete.  Orders not received.  Labs per surgeon: none  Labs per anesthesia protocol: none    Patient answered medical/surgical history questions at their best of ability. All prior to admission medications documented in EPIC.    Patient instructed to continue taking all prescription medications up to the day of surgery but to take only the following medications the day of surgery according to anesthesia guidelines with a small sip of water: Amiodarone (Cordarone), Metoprolol (Lopressor), Benazepril (Lotensin) Also, patient is requested to take 2 Tylenol in the morning and then again before bed on the day before surgery. Regular or extra strength may be used.       Patient informed that all vitamins and supplements should be held 7 days prior to surgery and NSAIDS 5 days prior to surgery. Prescription meds to hold:Xarelto  Patient told to hold 3 days prior to surgery.     Patient instructed on the following:    > Arrive at [facility] Entrance, time of arrival to be called the day before by 1700  > No food after midnight, patient may drink clear liquids up until 2 hours prior to arrival. No gum, candy, mints.   > Responsible adult must drive patient to the hospital, stay during surgery, and patient will need supervision 24 hours after anesthesia  > Use non moisturizing soap in shower the night before surgery and on the morning of surgery  > All piercings must be removed prior to arrival.    > Leave all valuables (money and jewelry) at home but bring insurance card and ID on DOS.   > You may be required to pay a deductible or co-pay on the day of your procedure. You can pre-pay by calling [phone] if your surgery is at the [facility] or [phone] if your surgery is at the [facility].  > Do not wear make-up, nail polish, lotions, cologne, perfumes,  powders, or oil on skin. Artificial nails are not permitted.

## 2025-04-07 NOTE — PRE-PROCEDURE INSTRUCTIONS
Preop department called to notify patient of arrival time for scheduled procedure. Instructions given to   - Arrive at OPC Entrance [address].  - Nothing to eat after midnight unless otherwise indicated. No gum, mints, or ice chips. You may have clear liquids two hours prior to arrival to the hospital.   - Have a responsible adult to drive patient to the hospital, stay during surgery, and patient will need supervision 24 hours after anesthesia.   - Use antibacterial soap in shower the night before surgery and on the morning of surgery.       Was patient contacted: No   Voicemail left: Yes  Numbers contacted: [phone]   Arrival time: 0630  Time to stop clear liquids: 0430

## 2025-04-08 ENCOUNTER — ANESTHESIA EVENT (OUTPATIENT)
Dept: SURGERY | Age: 89
End: 2025-04-08
Payer: MEDICARE

## 2025-04-08 ENCOUNTER — HOSPITAL ENCOUNTER (OUTPATIENT)
Age: 89
Setting detail: OUTPATIENT SURGERY
Discharge: HOME | End: 2025-04-08
Attending: STUDENT IN AN ORGANIZED HEALTH CARE EDUCATION/TRAINING PROGRAM | Admitting: STUDENT IN AN ORGANIZED HEALTH CARE EDUCATION/TRAINING PROGRAM
Payer: MEDICARE

## 2025-04-08 ENCOUNTER — APPOINTMENT (OUTPATIENT)
Dept: CT IMAGING | Age: 89
End: 2025-04-08
Attending: STUDENT IN AN ORGANIZED HEALTH CARE EDUCATION/TRAINING PROGRAM
Payer: MEDICARE

## 2025-04-08 ENCOUNTER — ANESTHESIA (OUTPATIENT)
Dept: SURGERY | Age: 89
End: 2025-04-08
Payer: MEDICARE

## 2025-04-08 ENCOUNTER — APPOINTMENT (OUTPATIENT)
Dept: GENERAL RADIOLOGY | Age: 89
End: 2025-04-08
Attending: STUDENT IN AN ORGANIZED HEALTH CARE EDUCATION/TRAINING PROGRAM
Payer: MEDICARE

## 2025-04-08 VITALS
SYSTOLIC BLOOD PRESSURE: 162 MMHG | RESPIRATION RATE: 18 BRPM | HEART RATE: 66 BPM | HEIGHT: 70 IN | BODY MASS INDEX: 30.06 KG/M2 | WEIGHT: 210 LBS | DIASTOLIC BLOOD PRESSURE: 77 MMHG | OXYGEN SATURATION: 94 % | TEMPERATURE: 97 F

## 2025-04-08 PROCEDURE — 3609020000 HC BRONCHOSCOPY W/EBUS FNA 3/> NODE: Performed by: STUDENT IN AN ORGANIZED HEALTH CARE EDUCATION/TRAINING PROGRAM

## 2025-04-08 PROCEDURE — 71250 CT THORAX DX C-: CPT

## 2025-04-08 PROCEDURE — 31629 BRONCHOSCOPY/NEEDLE BX EACH: CPT | Performed by: STUDENT IN AN ORGANIZED HEALTH CARE EDUCATION/TRAINING PROGRAM

## 2025-04-08 PROCEDURE — 31653 BRONCH EBUS SAMPLNG 3/> NODE: CPT | Performed by: STUDENT IN AN ORGANIZED HEALTH CARE EDUCATION/TRAINING PROGRAM

## 2025-04-08 PROCEDURE — 88305 TISSUE EXAM BY PATHOLOGIST: CPT

## 2025-04-08 PROCEDURE — 88173 CYTOPATH EVAL FNA REPORT: CPT

## 2025-04-08 PROCEDURE — 2720000010 HC SURG SUPPLY STERILE: Performed by: STUDENT IN AN ORGANIZED HEALTH CARE EDUCATION/TRAINING PROGRAM

## 2025-04-08 PROCEDURE — 7100000000 HC PACU RECOVERY - FIRST 15 MIN: Performed by: STUDENT IN AN ORGANIZED HEALTH CARE EDUCATION/TRAINING PROGRAM

## 2025-04-08 PROCEDURE — 3700000000 HC ANESTHESIA ATTENDED CARE: Performed by: STUDENT IN AN ORGANIZED HEALTH CARE EDUCATION/TRAINING PROGRAM

## 2025-04-08 PROCEDURE — 7100000001 HC PACU RECOVERY - ADDTL 15 MIN: Performed by: STUDENT IN AN ORGANIZED HEALTH CARE EDUCATION/TRAINING PROGRAM

## 2025-04-08 PROCEDURE — 88177 CYTP FNA EVAL EA ADDL: CPT

## 2025-04-08 PROCEDURE — 31628 BRONCHOSCOPY/LUNG BX EACH: CPT | Performed by: STUDENT IN AN ORGANIZED HEALTH CARE EDUCATION/TRAINING PROGRAM

## 2025-04-08 PROCEDURE — 7100000011 HC PHASE II RECOVERY - ADDTL 15 MIN: Performed by: STUDENT IN AN ORGANIZED HEALTH CARE EDUCATION/TRAINING PROGRAM

## 2025-04-08 PROCEDURE — 31627 NAVIGATIONAL BRONCHOSCOPY: CPT | Performed by: STUDENT IN AN ORGANIZED HEALTH CARE EDUCATION/TRAINING PROGRAM

## 2025-04-08 PROCEDURE — 7100000010 HC PHASE II RECOVERY - FIRST 15 MIN: Performed by: STUDENT IN AN ORGANIZED HEALTH CARE EDUCATION/TRAINING PROGRAM

## 2025-04-08 PROCEDURE — 2580000003 HC RX 258: Performed by: ANESTHESIOLOGY

## 2025-04-08 PROCEDURE — 88172 CYTP DX EVAL FNA 1ST EA SITE: CPT

## 2025-04-08 PROCEDURE — 2500000003 HC RX 250 WO HCPCS: Performed by: REGISTERED NURSE

## 2025-04-08 PROCEDURE — 3700000001 HC ADD 15 MINUTES (ANESTHESIA): Performed by: STUDENT IN AN ORGANIZED HEALTH CARE EDUCATION/TRAINING PROGRAM

## 2025-04-08 PROCEDURE — 2709999900 HC NON-CHARGEABLE SUPPLY: Performed by: STUDENT IN AN ORGANIZED HEALTH CARE EDUCATION/TRAINING PROGRAM

## 2025-04-08 PROCEDURE — 6360000002 HC RX W HCPCS: Performed by: REGISTERED NURSE

## 2025-04-08 PROCEDURE — 31654 BRONCH EBUS IVNTJ PERPH LES: CPT | Performed by: STUDENT IN AN ORGANIZED HEALTH CARE EDUCATION/TRAINING PROGRAM

## 2025-04-08 PROCEDURE — 88333 PATH CONSLTJ SURG CYTO XM 1: CPT

## 2025-04-08 RX ORDER — SODIUM CHLORIDE 9 MG/ML
INJECTION, SOLUTION INTRAVENOUS PRN
Status: DISCONTINUED | OUTPATIENT
Start: 2025-04-08 | End: 2025-04-08 | Stop reason: HOSPADM

## 2025-04-08 RX ORDER — PROPOFOL 10 MG/ML
INJECTION, EMULSION INTRAVENOUS
Status: DISCONTINUED | OUTPATIENT
Start: 2025-04-08 | End: 2025-04-08 | Stop reason: SDUPTHER

## 2025-04-08 RX ORDER — SODIUM CHLORIDE, SODIUM LACTATE, POTASSIUM CHLORIDE, CALCIUM CHLORIDE 600; 310; 30; 20 MG/100ML; MG/100ML; MG/100ML; MG/100ML
INJECTION, SOLUTION INTRAVENOUS CONTINUOUS
Status: DISCONTINUED | OUTPATIENT
Start: 2025-04-08 | End: 2025-04-08 | Stop reason: HOSPADM

## 2025-04-08 RX ORDER — PROCHLORPERAZINE EDISYLATE 5 MG/ML
5 INJECTION INTRAMUSCULAR; INTRAVENOUS
Status: DISCONTINUED | OUTPATIENT
Start: 2025-04-08 | End: 2025-04-08 | Stop reason: HOSPADM

## 2025-04-08 RX ORDER — ROCURONIUM BROMIDE 10 MG/ML
INJECTION, SOLUTION INTRAVENOUS
Status: DISCONTINUED | OUTPATIENT
Start: 2025-04-08 | End: 2025-04-08 | Stop reason: SDUPTHER

## 2025-04-08 RX ORDER — SODIUM CHLORIDE 0.9 % (FLUSH) 0.9 %
5-40 SYRINGE (ML) INJECTION PRN
Status: DISCONTINUED | OUTPATIENT
Start: 2025-04-08 | End: 2025-04-08 | Stop reason: HOSPADM

## 2025-04-08 RX ORDER — OXYCODONE HYDROCHLORIDE 5 MG/1
5 TABLET ORAL
Status: DISCONTINUED | OUTPATIENT
Start: 2025-04-08 | End: 2025-04-08 | Stop reason: HOSPADM

## 2025-04-08 RX ORDER — MIDAZOLAM HYDROCHLORIDE 2 MG/2ML
2 INJECTION, SOLUTION INTRAMUSCULAR; INTRAVENOUS
Status: DISCONTINUED | OUTPATIENT
Start: 2025-04-08 | End: 2025-04-08 | Stop reason: HOSPADM

## 2025-04-08 RX ORDER — ONDANSETRON 2 MG/ML
INJECTION INTRAMUSCULAR; INTRAVENOUS
Status: DISCONTINUED | OUTPATIENT
Start: 2025-04-08 | End: 2025-04-08 | Stop reason: SDUPTHER

## 2025-04-08 RX ORDER — NALOXONE HYDROCHLORIDE 0.4 MG/ML
INJECTION, SOLUTION INTRAMUSCULAR; INTRAVENOUS; SUBCUTANEOUS PRN
Status: DISCONTINUED | OUTPATIENT
Start: 2025-04-08 | End: 2025-04-08 | Stop reason: HOSPADM

## 2025-04-08 RX ORDER — SODIUM CHLORIDE 0.9 % (FLUSH) 0.9 %
5-40 SYRINGE (ML) INJECTION EVERY 12 HOURS SCHEDULED
Status: DISCONTINUED | OUTPATIENT
Start: 2025-04-08 | End: 2025-04-08 | Stop reason: HOSPADM

## 2025-04-08 RX ORDER — LIDOCAINE HYDROCHLORIDE 20 MG/ML
INJECTION, SOLUTION EPIDURAL; INFILTRATION; INTRACAUDAL; PERINEURAL
Status: DISCONTINUED | OUTPATIENT
Start: 2025-04-08 | End: 2025-04-08 | Stop reason: SDUPTHER

## 2025-04-08 RX ADMIN — PROPOFOL 200 MG: 10 INJECTION, EMULSION INTRAVENOUS at 09:01

## 2025-04-08 RX ADMIN — EPHEDRINE SULFATE 10 MG: 5 INJECTION INTRAVENOUS at 09:47

## 2025-04-08 RX ADMIN — PHENYLEPHRINE HYDROCHLORIDE 100 MCG: 0.1 INJECTION, SOLUTION INTRAVENOUS at 09:45

## 2025-04-08 RX ADMIN — ONDANSETRON 4 MG: 2 INJECTION, SOLUTION INTRAMUSCULAR; INTRAVENOUS at 09:08

## 2025-04-08 RX ADMIN — SODIUM CHLORIDE, SODIUM LACTATE, POTASSIUM CHLORIDE, AND CALCIUM CHLORIDE: .6; .31; .03; .02 INJECTION, SOLUTION INTRAVENOUS at 07:07

## 2025-04-08 RX ADMIN — LIDOCAINE HYDROCHLORIDE 100 MG: 20 INJECTION, SOLUTION EPIDURAL; INFILTRATION; INTRACAUDAL; PERINEURAL at 09:01

## 2025-04-08 RX ADMIN — SUGAMMADEX 200 MG: 100 INJECTION, SOLUTION INTRAVENOUS at 10:11

## 2025-04-08 RX ADMIN — ROCURONIUM BROMIDE 50 MG: 10 INJECTION, SOLUTION INTRAVENOUS at 09:02

## 2025-04-08 RX ADMIN — EPHEDRINE SULFATE 10 MG: 5 INJECTION INTRAVENOUS at 09:19

## 2025-04-08 ASSESSMENT — PAIN SCALES - GENERAL
PAINLEVEL_OUTOF10: 0
PAINLEVEL_OUTOF10: 5

## 2025-04-08 NOTE — ANESTHESIA PROCEDURE NOTES
Airway  Date/Time: 4/8/2025 9:01 AM  Urgency: elective    Airway not difficult    General Information and Staff    Patient location during procedure: OR  Resident/CRNA: Alta French APRN - CRNA  Performed: resident/CRNA   Performed by: Alta French APRN - CRNA  Authorized by: Silva Schultz MD      Indications and Patient Condition  Indications for airway management: anesthesia  Spontaneous Ventilation: absent  Sedation level: deep  Preoxygenated: yes  Patient position: sniffing  MILS not maintained throughout  Mask difficulty assessment: vent by bag mask    Final Airway Details  Final airway type: endotracheal airway      Successful airway: ETT  Cuffed: yes   Successful intubation technique: direct laryngoscopy  Facilitating devices/methods: intubating stylet  Endotracheal tube insertion site: oral  Blade: Derrek  Blade size: #4  ETT size (mm): 8.5  Cormack-Lehane Classification: grade I - full view of glottis  Placement verified by: chest auscultation and capnometry   Measured from: lips  ETT to lips (cm): 21  Number of attempts at approach: 1  Ventilation between attempts: bag mask  Number of other approaches attempted: 0    no

## 2025-04-08 NOTE — DISCHARGE INSTRUCTIONS
RESPIRATORY CARE - BRONCHOSCOPY - DISCHARGE INSTRUCTIONS    RESTART XARELTO ON 4/10/25    You received a lot of numbing medication for your throat and nose, and you also received medication to make you sleepy during your procedure.  Because of this and because the bronchoscopy may have irritated your airways, we ask that you follow these directions:    1.        Be careful when eating and drinking, you may have a sore throat.              You may want to try some liquids first, because your throat may be a little sore.    2. Medication may cause drowsiness for several hours, therefore, do not drive or operate machinery for remainder of the day.  No alcohol today.    3. You may cough up more mucus than usual and you may see some blood, but this is expected and should subside by the following day.    4. If severe throat irritation, coughing, or bleeding continue, call your doctor.    5.         If you run a fever greater than 102, call [facility] at [phone].

## 2025-04-08 NOTE — OP NOTE
04/08/25  PROCEDURE  Robotic Bronchoscopy of RUL Mass w/ full staging EBUS.    EQUIPMENT  Olympus  Bronchoscope  ION Robotic Bronchoscope  Olympus [id number] EBUS scope  UM 17/20 Radial probe  ION 21g needle, forceps    INDICATION   Peripheral lung Mass suspicious for malignancy /staging of presumed malignancy    IMAGING  See H&P    ANESTHESIA  Intubation and general sedation performed by anesthesia. See MAR.     Procedure:  AIRWAY INSPECTION  After obtaining informed consent, an Olympus Bronchoscope was introduced into the trachea through the ETT without complication. This was used to clean the airways, perform visual inspection, and evaluate for any endobronchial lesions.   Airway exam significant for thin secretions throughout airways, no endobronchial lesions    Navigation  CT images acquired with thin slice protocol were used to plan the pathway to target lesion planned using Ion robotic software.  ION Robotic Bronchoscope system was introduced into the airway to identify and biopsy the lesion seen on prior imaging, navigating to the lesion with the aid of fluoroscopy and radial probe US. Visibility with radial US was: concentric      TBNA and forceps biopsies of the lesion were taken. KIMBERLEE was present.   Histology from obtained biopsies is pending.    After identifying targets the following samples were obtained:  Location Slide # Sampling technique Findings on KIMBERLEE   RUL mass 1-5 TBNA Atypical and suspicious cells    1 Forceps touch prep Malignant     A total of 5 TBNA passes were performed, 6 passes w/ forceps transbronchial biopsy    EBUS  After completing the airway inspection an Olympus  F EBUS bronchoscope was introduced into the trachea through the ETT without complication.  The balloon was inflated with saline and a full mediastinal staging inspection was commenced    Stations 11L,4L,4R,11R were biopsied and 4R was for malignancy on KIMBERLEE.    STATION SIZE IN CM Findings on

## 2025-04-08 NOTE — PRE SEDATION
PULMONARY PREPROCEDURE H&P           4/7/2025    Bertrand Block JOCELINEjuvenal                        Date of Admission:  (Not on file)    HPI  Patient is a 89 y.o. male w/ a hx of CAD s/p multiple PCIs >5 yrs ago, pAFib on Xarelto, HTN, HLD, CKD3. Pt had a probable PNA in Oct 2024 and was treated but follow up imaging in dec showed a PET avid mass in the RUL.     Pt presents for robotic bronchoscopy of the RUL pet avid nodule under general anesthesia. His DOAC has been held since 4/5    Diagnostic Review:  CT Chest w/ contast 12/2/24  IMPRESSION:  Masslike area of consolidation in the right upper lobe adjacent to  the minor fissure. While pneumonia is not completely excluded, the appearance is  concerning for malignancy    PET:  CXR Oct 2024      12/13/24  RUL pet avid nodule. No significant mediastinal or hilar lymphadenopathy    Review of Systems: Negative 14 point review of systems other than what is noted in HPI    Height 1.778 m (5' 10\"), weight 95.3 kg (210 lb). No intake or output data in the 24 hours ending 04/07/25 2008    24 Hour I/O   No intake/output data recorded.   Current Outpatient Medications   Medication Instructions    amiodarone (CORDARONE) 200 mg, Oral, DAILY    ascorbic acid (VITAMIN C) 1000 MG tablet 2 TIMES DAILY    benazepril (LOTENSIN) 20 mg, Oral, DAILY    cholecalciferol (VITAMIN D3) 400 Units, DAILY    loratadine (CLARITIN) 10 mg, NIGHTLY PRN    metoprolol succinate (TOPROL XL) 25 mg, Oral, DAILY    Multiple Vitamins-Minerals (THERAPEUTIC MULTIVITAMIN-MINERALS) tablet 1 tablet, DAILY    rivaroxaban (XARELTO) 15 mg, Oral, DAILY WITH BREAKFAST    Saw Palmetto, Serenoa repens, (SAW PALMETTO PO) DAILY      Past Medical History:   Diagnosis Date    Arthritis 5 years ago    Atrial fibrillation (HCC)     CAD (coronary artery disease)     S/P stents x 2 in 2019    CHF (congestive heart failure) (HCC)     Followed by Dr. Winn; Echo- 11/08/2020    H/O heart artery stent 2019    x 2  \"AST\", \"ALT\", \"ALKPHOS\", \"TROPHS\", \"NTPROBNP\", \"CRP\", \"ESR\", \"A1\" in the last 72 hours.    Invalid input(s): \"PCT\", \"CA\", \"ALB\"  ECHO: No results found for this or any previous visit.    MICRO: No results for input(s): \"CULTURE\" in the last 72 hours.  No results for input(s): \"COVID19\" in the last 72 hours.    Wicho Andre MD

## 2025-04-08 NOTE — ANESTHESIA PRE PROCEDURE
Department of Anesthesiology  Preprocedure Note       Name:  Bertrand Stephenson   Age:  89 y.o.  :  1935                                          MRN:  [id number]         Date:  2025      Surgeon: Surgeon(s):  Wicho Andre MD    Procedure: Procedure(s):  BRONCHOSCOPY ENDOBRONCHIAL ULTRASOUND FINE NEEDLE ASPIRATION ROBOTIC in fluoro copy to Sine    Medications prior to admission:   Prior to Admission medications    Medication Sig Start Date End Date Taking? Authorizing Provider   metoprolol succinate (TOPROL XL) 25 MG extended release tablet TAKE 1 TABLET BY MOUTH DAILY 3/12/25  Yes Madi Guerra MD   rivaroxaban (XARELTO) 15 MG TABS tablet Take 1 tablet by mouth daily (with breakfast)  Patient taking differently: Take 1 tablet by mouth daily (with breakfast) Taking for atrial fibrillation 25  Yes Lizy Winn MD   benazepril (LOTENSIN) 20 MG tablet TAKE 1 TABLET BY MOUTH DAILY 24  Yes Lizy Winn MD   Saw Palmmc Serenoa repens, (SAW PALMETTO PO) Take by mouth daily   Yes Lelo Lo MD   amiodarone (CORDARONE) 200 MG tablet Take 1 tablet by mouth daily 24  Yes Liyz Winn MD   Multiple Vitamins-Minerals (THERAPEUTIC MULTIVITAMIN-MINERALS) tablet Take 1 tablet by mouth daily   Yes Lelo Lo MD   ascorbic acid (VITAMIN C) 1000 MG tablet Take by mouth 2 times daily 20  Yes Lelo Lo MD   loratadine (CLARITIN) 10 MG capsule Take 1 capsule by mouth nightly as needed (Allergies)   Yes Lelo Lo MD   Cholecalciferol 400 UNIT TABS tablet Take 1 tablet by mouth daily   Yes Lelo Lo MD       Current medications:    Current Facility-Administered Medications   Medication Dose Route Frequency Provider Last Rate Last Admin    lactated ringers infusion   IntraVENous Continuous Silva Schultz MD        sodium chloride flush 0.9 % injection 5-40 mL  5-40 mL IntraVENous 2 times per

## 2025-04-08 NOTE — ANESTHESIA POSTPROCEDURE EVALUATION
Department of Anesthesiology  Postprocedure Note    Patient: Bertrand Stephenson  MRN: [id number]  YOB: 1935  Date of evaluation: 4/8/2025    Procedure Summary       Date: 04/08/25 Room / Location: [facility] OP OR 01 / [facility]    Anesthesia Start: 0853 Anesthesia Stop: 1023    Procedure: BRONCHOSCOPY ENDOBRONCHIAL ULTRASOUND FINE NEEDLE ASPIRATION ROBOTIC in fluoro copy to Sine (Chest) Diagnosis:       Lung mass      (Lung mass [R91.8])    Surgeons: Wicho Andre MD Responsible Provider: Silva Schultz MD    Anesthesia Type: general ASA Status: 3            Anesthesia Type: No value filed.    Aleisha Phase I: Aleisha Score: 8    Aleisha Phase II: Aleisha Score: 10    Anesthesia Post Evaluation    Patient location during evaluation: PACU  Patient participation: complete - patient participated  Level of consciousness: awake and alert  Airway patency: patent  Nausea & Vomiting: no nausea and no vomiting  Cardiovascular status: hemodynamically stable  Respiratory status: acceptable, nonlabored ventilation and spontaneous ventilation  Hydration status: euvolemic  Comments: BP (!) 162/77   Pulse 66   Temp 97 °F (36.1 °C)   Resp 18   Ht 1.778 m (5' 10\")   Wt 95.3 kg (210 lb)   SpO2 94%   BMI 30.13 kg/m²     Multimodal analgesia pain management approach  Pain management: adequate and satisfactory to patient    No notable events documented.

## 2025-04-10 ENCOUNTER — TELEPHONE (OUTPATIENT)
Dept: PULMONOLOGY | Age: 89
End: 2025-04-10

## 2025-04-10 DIAGNOSIS — R52 PAIN: Primary | ICD-10-CM

## 2025-04-10 DIAGNOSIS — C80.1 ADENOCARCINOMA (HCC): Primary | ICD-10-CM

## 2025-04-10 DIAGNOSIS — R91.8 LUNG MASS: ICD-10-CM

## 2025-04-10 NOTE — TELEPHONE ENCOUNTER
Thai, MD Demetrio Bobby Debra L, P  Good morning,  Looks like at least stage III NSCLC by KIMBERLEE. He needs a brain MRI, cPFT, and repeat PET, as well as referral to Med Onc and Radiation. Per communication with Dr Andre, also needs NGS testing on tissue.     Will confirm with Dr Andre that PET scan needs to be 10 days post BX and then contact patient to schedule all appointments.

## 2025-04-11 DIAGNOSIS — E78.2 HYPERLIPIDEMIA, MIXED: Primary | ICD-10-CM

## 2025-04-11 RX ORDER — AMIODARONE HYDROCHLORIDE 200 MG/1
200 TABLET ORAL DAILY
Qty: 90 TABLET | Refills: 0 | Status: SHIPPED | OUTPATIENT
Start: 2025-04-11

## 2025-04-11 RX ORDER — TRAMADOL HYDROCHLORIDE 50 MG/1
50 TABLET ORAL EVERY 6 HOURS PRN
Qty: 30 TABLET | Refills: 0 | Status: SHIPPED | OUTPATIENT
Start: 2025-04-11 | End: 2025-04-11

## 2025-04-11 RX ORDER — TRAMADOL HYDROCHLORIDE 50 MG/1
50 TABLET ORAL EVERY 6 HOURS PRN
Qty: 30 TABLET | Refills: 0 | Status: SHIPPED | OUTPATIENT
Start: 2025-04-11 | End: 2025-04-19

## 2025-04-11 NOTE — TELEPHONE ENCOUNTER
Scheduled follow up appointment for 4/21/25 located at the [place] office. Advised pt of getting TSH lab checked at that visit. Pt's wife voiced understanding.

## 2025-04-11 NOTE — TELEPHONE ENCOUNTER
Last seen: 12/18/24 new    Bronch bx: 4/8/25    Pain in back over last 3 weeks, initially thought he pulled a muscle, hasn't gotten better, asking for next steps.  Has tried salonpas patch, has been taking ibuprofen despite warnings w/ xarelto, tylenol has not helped. Historically has avoided medications for pain, spouse reporting that patient is obviously uncomfortable, unable to sit for long periods, not sleeping well, suggesting Tramadol or something until seen by oncology. Very concerned about the amount of ibuprofen patient is taking. Oncology referral is in process.    Confirmed local pharmacy.

## 2025-04-14 RX ORDER — AMIODARONE HYDROCHLORIDE 200 MG/1
200 TABLET ORAL DAILY
Qty: 90 TABLET | Refills: 0 | Status: SHIPPED | OUTPATIENT
Start: 2025-04-14

## 2025-04-14 NOTE — TELEPHONE ENCOUNTER
Confirmed with Dr Andre that PET needs to be 10-14 days post BX. I have spoken with Mrs Stephenson, she is agreeable to allowing me to schedule all ordered tests and is agreeable to medical and radiation oncology appointments.   She is aware that this office will follow along until scheduled.  She is aware and approves of [facility] testing of which I have explained. She confirms that patient does not have any metallic objects, is not on dialysis.  Patient has cardiac stents but no other metal.

## 2025-04-15 DIAGNOSIS — G89.3 CANCER RELATED PAIN: Primary | ICD-10-CM

## 2025-04-17 ENCOUNTER — TELEPHONE (OUTPATIENT)
Dept: PULMONOLOGY | Age: 89
End: 2025-04-17

## 2025-04-17 NOTE — TELEPHONE ENCOUNTER
Patient is scheduled for PET scan.  [facility] is asking for a Peer to Peer for this to be appoved.    Call [facility]- [phone]    CASE#  [id number]

## 2025-04-18 NOTE — PROGRESS NOTES
[facility] CARDIOLOGY  [address] [place] [address], [address]  [place] [address]  PHONE: [phone]      25    NAME:  Bertrand Stephenson  : 1935  MRN: [id number]         SUBJECTIVE:   Bretrand Stephenson is a 89 y.o. male seen for a follow up visit regarding the following:     Chief Complaint   Patient presents with    Follow-up    Coronary Artery Disease            HPI:  Follow up  Follow-up and Coronary Artery Disease   .    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    He is overdue for follow up hepatic and thyroid panels.  He's recently been diagnosed with lung cancer.  For MRI today and PET scan tomorrow, meeting with radiation oncology.  He's never smoked.  Right sided.      He's felt well throughout, got diagnosed after having seen the mass following a bout of PNA several months ago.  Admits he's only been taking his Xarelto every other day because he found his pain is only controlled with daily naprosyn.  Denies cp, dyspnea, palpitations, in fact he feels fine.                PAST CARDIAC HISTORY:  STATIN INTOLERANT  Afib - DCCV, reverted to af-repeat DCCF and amiodarone ([place])  Transient tachycardia induced CM  2020- in afib - EF 35%, bi atrial enlargement, EF 23% on stress perfusion study  2020-  PCI 90% OM 1(2.5 x 30 mm Thaddeus ISABEL)  PCI 80% long proximal and ostial LAD stenosis (3.5 x 38 mm Thaddeus ISABEL, postdilated 3.75 mm, IVUS guided)  20 - EF 60%, no valve disease  2025       RUL adenocarcinoma            Cardiac Medications       ACE Inhibitors       benazepril (LOTENSIN) 20 MG tablet Take 1 tablet by mouth daily       Beta Blockers Cardio-Selective       metoprolol succinate (TOPROL XL) 25 MG extended release tablet Take 1 tablet by mouth daily       Antiarrhythmics Type III       amiodarone (CORDARONE) 200 MG tablet Take 1 tablet by mouth daily       Direct Factor Xa

## 2025-04-18 NOTE — TELEPHONE ENCOUNTER
I have spoken with [facility] in attempt to perform Peer to Peer for PET scan. Spoke with Josie WALLACE.  Per RN PET scan is pending denial for reason of prior PET scan was performed in Dec 2024.  I have verbally reviewed clinicals with RN reviewer and ultimately PET scan scheduled on 4/22/2025 has been approved.     DOS 4/18/2025-6/2/2025  Auth#[id number]    I have updated the referral notes for PET scan and have notified Mrs Stephenson.

## 2025-04-19 LAB
TEMPUS PD-L1 (22C3) COMBINED POSITIVE SCORE: 1
TEMPUS PD-L1 (22C3) TUMOR PROPORTION SCORE: <1 %
TEMPUS PORTAL: NORMAL

## 2025-04-21 ENCOUNTER — OFFICE VISIT (OUTPATIENT)
Age: 89
End: 2025-04-21
Payer: MEDICARE

## 2025-04-21 VITALS
BODY MASS INDEX: 30.66 KG/M2 | DIASTOLIC BLOOD PRESSURE: 80 MMHG | SYSTOLIC BLOOD PRESSURE: 124 MMHG | HEIGHT: 69 IN | HEART RATE: 54 BPM | WEIGHT: 207 LBS

## 2025-04-21 DIAGNOSIS — E78.2 HYPERLIPIDEMIA, MIXED: ICD-10-CM

## 2025-04-21 DIAGNOSIS — Z91.89 AT RISK FOR AMIODARONE TOXICITY WITH LONG TERM USE: ICD-10-CM

## 2025-04-21 DIAGNOSIS — Z78.9 STATIN INTOLERANCE: ICD-10-CM

## 2025-04-21 DIAGNOSIS — I25.10 CORONARY ARTERY DISEASE INVOLVING NATIVE CORONARY ARTERY OF NATIVE HEART WITHOUT ANGINA PECTORIS: Primary | ICD-10-CM

## 2025-04-21 DIAGNOSIS — Z79.899 AT RISK FOR AMIODARONE TOXICITY WITH LONG TERM USE: ICD-10-CM

## 2025-04-21 DIAGNOSIS — I10 HYPERTENSION, ESSENTIAL: ICD-10-CM

## 2025-04-21 DIAGNOSIS — I48.0 PAROXYSMAL ATRIAL FIBRILLATION (HCC): ICD-10-CM

## 2025-04-21 LAB
ALBUMIN SERPL-MCNC: 3.5 G/DL (ref 3.2–4.6)
ALBUMIN/GLOB SERPL: 0.9 (ref 1–1.9)
ALP SERPL-CCNC: 68 U/L (ref 40–129)
ALT SERPL-CCNC: 14 U/L (ref 8–55)
AST SERPL-CCNC: 22 U/L (ref 15–37)
BILIRUB DIRECT SERPL-MCNC: 0.2 MG/DL (ref 0–0.3)
BILIRUB SERPL-MCNC: 0.5 MG/DL (ref 0–1.2)
GLOBULIN SER CALC-MCNC: 3.8 G/DL (ref 2.3–3.5)
PROT SERPL-MCNC: 7.3 G/DL (ref 6.3–8.2)
TSH, 3RD GENERATION: 1.73 UIU/ML (ref 0.27–4.2)

## 2025-04-21 PROCEDURE — 1126F AMNT PAIN NOTED NONE PRSNT: CPT | Performed by: INTERNAL MEDICINE

## 2025-04-21 PROCEDURE — 93000 ELECTROCARDIOGRAM COMPLETE: CPT | Performed by: INTERNAL MEDICINE

## 2025-04-21 PROCEDURE — 1123F ACP DISCUSS/DSCN MKR DOCD: CPT | Performed by: INTERNAL MEDICINE

## 2025-04-21 PROCEDURE — 1159F MED LIST DOCD IN RCRD: CPT | Performed by: INTERNAL MEDICINE

## 2025-04-21 PROCEDURE — 99214 OFFICE O/P EST MOD 30 MIN: CPT | Performed by: INTERNAL MEDICINE

## 2025-04-21 RX ORDER — AMIODARONE HYDROCHLORIDE 200 MG/1
200 TABLET ORAL DAILY
Qty: 90 TABLET | Refills: 3 | Status: SHIPPED | OUTPATIENT
Start: 2025-04-21

## 2025-04-21 RX ORDER — BENAZEPRIL HYDROCHLORIDE 20 MG/1
20 TABLET ORAL DAILY
Qty: 100 TABLET | Refills: 3 | Status: SHIPPED | OUTPATIENT
Start: 2025-04-21

## 2025-04-21 RX ORDER — METOPROLOL SUCCINATE 25 MG/1
25 TABLET, EXTENDED RELEASE ORAL DAILY
Qty: 90 TABLET | Refills: 0 | Status: SHIPPED | OUTPATIENT
Start: 2025-04-21

## 2025-04-21 ASSESSMENT — ENCOUNTER SYMPTOMS: SHORTNESS OF BREATH: 0

## 2025-04-22 ENCOUNTER — HOSPITAL ENCOUNTER (OUTPATIENT)
Dept: PET IMAGING | Age: 89
Discharge: HOME | End: 2025-04-25
Payer: MEDICARE

## 2025-04-22 DIAGNOSIS — C80.1 ADENOCARCINOMA (HCC): ICD-10-CM

## 2025-04-22 DIAGNOSIS — R91.8 LUNG MASS: ICD-10-CM

## 2025-04-22 LAB
GLUCOSE BLD STRIP.AUTO-MCNC: 97 MG/DL (ref 65–100)
SERVICE CMNT-IMP: NORMAL

## 2025-04-22 PROCEDURE — A9609 HC RX DIAGNOSTIC RADIOPHARMACEUTICAL: HCPCS | Performed by: STUDENT IN AN ORGANIZED HEALTH CARE EDUCATION/TRAINING PROGRAM

## 2025-04-22 PROCEDURE — 2500000003 HC RX 250 WO HCPCS: Performed by: STUDENT IN AN ORGANIZED HEALTH CARE EDUCATION/TRAINING PROGRAM

## 2025-04-22 PROCEDURE — 6360000004 HC RX CONTRAST MEDICATION: Performed by: STUDENT IN AN ORGANIZED HEALTH CARE EDUCATION/TRAINING PROGRAM

## 2025-04-22 PROCEDURE — 82962 GLUCOSE BLOOD TEST: CPT

## 2025-04-22 PROCEDURE — 3430000000 HC RX DIAGNOSTIC RADIOPHARMACEUTICAL: Performed by: STUDENT IN AN ORGANIZED HEALTH CARE EDUCATION/TRAINING PROGRAM

## 2025-04-22 PROCEDURE — 78815 PET IMAGE W/CT SKULL-THIGH: CPT

## 2025-04-22 RX ORDER — SODIUM CHLORIDE 0.9 % (FLUSH) 0.9 %
10 SYRINGE (ML) INJECTION ONCE AS NEEDED
Status: COMPLETED | OUTPATIENT
Start: 2025-04-22 | End: 2025-04-22

## 2025-04-22 RX ORDER — DIATRIZOATE MEGLUMINE AND DIATRIZOATE SODIUM 660; 100 MG/ML; MG/ML
10 SOLUTION ORAL; RECTAL
Status: DISCONTINUED | OUTPATIENT
Start: 2025-04-22 | End: 2025-04-26 | Stop reason: HOSPADM

## 2025-04-22 RX ORDER — FLUDEOXYGLUCOSE F 18 200 MCI/ML
12.79 INJECTION, SOLUTION INTRAVENOUS
Status: COMPLETED | OUTPATIENT
Start: 2025-04-22 | End: 2025-04-22

## 2025-04-22 RX ADMIN — SODIUM CHLORIDE, PRESERVATIVE FREE 10 ML: 5 INJECTION INTRAVENOUS at 12:48

## 2025-04-22 RX ADMIN — DIATRIZOATE MEGLUMINE AND DIATRIZOATE SODIUM 10 ML: 660; 100 LIQUID ORAL; RECTAL at 12:48

## 2025-04-22 RX ADMIN — FLUDEOXYGLUCOSE F 18 12.79 MILLICURIE: 200 INJECTION, SOLUTION INTRAVENOUS at 12:48

## 2025-04-23 ENCOUNTER — CLINICAL SUPPORT (OUTPATIENT)
Age: 89
End: 2025-04-23

## 2025-04-23 ENCOUNTER — TELEPHONE (OUTPATIENT)
Dept: PULMONOLOGY | Age: 89
End: 2025-04-23

## 2025-04-23 DIAGNOSIS — R91.8 LUNG MASS: Primary | ICD-10-CM

## 2025-04-23 DIAGNOSIS — G89.3 CANCER RELATED PAIN: ICD-10-CM

## 2025-04-23 DIAGNOSIS — C80.1 ADENOCARCINOMA (HCC): Primary | ICD-10-CM

## 2025-04-23 LAB
FEV 1 , POC: 2.6 L
FEV1 % PRED, POC: 100 %
FEV1/FVC, POC: NORMAL
FVC % PRED, POC: 90 %
FVC, POC: NORMAL

## 2025-04-23 ASSESSMENT — PULMONARY FUNCTION TESTS
FEV1_PERCENT_PREDICTED_POC: 100
FVC_PERCENT_PREDICTED_POC: 90

## 2025-04-23 NOTE — TELEPHONE ENCOUNTER
Patient is still having back and rib pain.  Wife is asking to get another prescription for:      traMADol (ULTRAM) 50 MG tablet

## 2025-04-24 ENCOUNTER — TELEPHONE (OUTPATIENT)
Dept: PULMONOLOGY | Age: 89
End: 2025-04-24

## 2025-04-24 DIAGNOSIS — M54.9 BACK PAIN, UNSPECIFIED BACK LOCATION, UNSPECIFIED BACK PAIN LATERALITY, UNSPECIFIED CHRONICITY: Primary | ICD-10-CM

## 2025-04-24 DIAGNOSIS — R94.8 ABNORMAL POSITRON EMISSION TOMOGRAPHY (PET) SCAN: Primary | ICD-10-CM

## 2025-04-24 LAB
DNA RANGE(S) EXAMINED NAR: NORMAL
GENE DIS ANL INTERP-IMP: POSITIVE
GENE DIS ASSESSED: NORMAL
GENE MUT TESTED BLD/T: 4.7 M/MB
HLA-A HIGH RES: NORMAL
HLA-B HIGH RES: NORMAL
HLA-C HIGH RES: NORMAL
MSI CA SPEC-IMP: NORMAL
REASON FOR STUDY: NORMAL
TEMPUS GERMLINE NOTE: NORMAL
TEMPUS LCA: NORMAL
TEMPUS PD-L1 (22C3) COMBINED POSITIVE SCORE: 1
TEMPUS PD-L1 (22C3) TUMOR PROPORTION SCORE: <1 %
TEMPUS PORTAL: NORMAL
TEMPUS TREATMENT IMPLICATIONS NOTE: NORMAL
TEMPUS TRIAL1: NORMAL
TEMPUS TRIALCOUNT: 1

## 2025-04-24 RX ORDER — TRAMADOL HYDROCHLORIDE 50 MG/1
50 TABLET ORAL EVERY 4 HOURS PRN
Qty: 44 TABLET | Refills: 0 | Status: SHIPPED | OUTPATIENT
Start: 2025-04-24 | End: 2025-05-01

## 2025-04-24 RX ORDER — TRAMADOL HYDROCHLORIDE 50 MG/1
50 TABLET ORAL EVERY 4 HOURS PRN
Qty: 42 TABLET | Refills: 0 | Status: SHIPPED | OUTPATIENT
Start: 2025-04-24 | End: 2025-04-24 | Stop reason: ALTCHOICE

## 2025-04-24 NOTE — TELEPHONE ENCOUNTER
Patient discussed at thoracic conference lead by Dr Andre.  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  Patient has had PET scan and has ? Spinal lesion.  Per group recommendation, patient also needs IR BX of spinal lesion.    I have spoken with Mrs Stephenson.  She is listed on JINA. She is aware of discussion at thoracic conference and is agreeable to allowing me to schedule PET + bone BX as recommended by thoracic conference.  I have spoken with Dr Andre who approves 48 hour Xarelto hold for this procedure.

## 2025-04-24 NOTE — TELEPHONE ENCOUNTER
Has ramon barahona/ Dr. Ortiz on 4/29 & Dr. Gerard 4/30; will our office be treating pain until?

## 2025-04-25 DIAGNOSIS — R94.8 ABNORMAL POSITRON EMISSION TOMOGRAPHY (PET) SCAN: Primary | ICD-10-CM

## 2025-04-28 DIAGNOSIS — R94.8 ABNORMAL POSITRON EMISSION TOMOGRAPHY (PET) SCAN: Primary | ICD-10-CM

## 2025-04-29 ENCOUNTER — HOSPITAL ENCOUNTER (OUTPATIENT)
Dept: RADIATION ONCOLOGY | Age: 89
Setting detail: RECURRING SERIES
Discharge: HOME | End: 2025-05-02
Payer: MEDICARE

## 2025-04-29 ENCOUNTER — TELEPHONE (OUTPATIENT)
Dept: INTERVENTIONAL RADIOLOGY/VASCULAR | Age: 89
End: 2025-04-29

## 2025-04-29 VITALS
HEART RATE: 48 BPM | BODY MASS INDEX: 30.02 KG/M2 | WEIGHT: 205 LBS | RESPIRATION RATE: 16 BRPM | DIASTOLIC BLOOD PRESSURE: 75 MMHG | OXYGEN SATURATION: 97 % | TEMPERATURE: 97.5 F | SYSTOLIC BLOOD PRESSURE: 142 MMHG

## 2025-04-29 DIAGNOSIS — R91.8 LUNG MASS: Primary | ICD-10-CM

## 2025-04-29 PROCEDURE — 99211 OFF/OP EST MAY X REQ PHY/QHP: CPT

## 2025-04-29 ASSESSMENT — PAIN DESCRIPTION - LOCATION: LOCATION: RIB CAGE;ARM

## 2025-04-29 ASSESSMENT — PAIN SCALES - GENERAL: PAINLEVEL_OUTOF10: 5

## 2025-04-29 NOTE — TELEPHONE ENCOUNTER
[] Phone call to: Spouse Kyung    [] Number used to reach this person: [phone]    [] Voicemail reached: N/A     [] Appointment date:  5/6/25    [] Arrival time:  0900    [] Location given: yes    [] AM Medicine with a small sip of water: Yes    [] Patient is NPO: Yes    [] Need for : Yes    [] Anesthetic Moderate Sedation    [] Blood thinners held:YES    [] Education on Hold requirements prior to procedure: 48 hrs hold      [] Allergies: OTHER: see list    [] Reviewed    [] Latex Allergy: No    [] Lidocaine Allergy: No    [] CPAP at night: No    [] Any recent infections: No    [] Diabetes: No    [] Additional information:  nA      [] Time taken to answer all questions    [] Call back phone number of [phone] given

## 2025-04-29 NOTE — PROGRESS NOTES
Consult Lung Cancer.  Pathology 4-8-25.  MRI Brain 4-21-25.  Pet scan 4-22-25.  New pt apt with Dr. Gerard on 4-30-25.  Bone biopsy scheduled 5-6-25.  Complaining of level 5 right lateral rib pain to axilla.  He took Tramadol this morning.   Pt was referred to Palliative 4-17-25 by NN.  Will reach out to  to make sure he is scheduled.  To follow up with Dr. Ortiz after bone biopsy around 5-20-25.     Melonie Powell, RN

## 2025-04-29 NOTE — PROGRESS NOTES
[facility] RADIATION ONCOLOGY CONSULTATION    Patient: Bertrand Stephenson MRN: [id number]  SSN: [id number]    YOB: 1935  Age: 89 y.o.  Sex: male      Other Providers:  Wicho Pillai     CHIEF COMPLAINT: Cough, fever    DIAGNOSIS: [id number] NSCLC    PREVIOUS RADIATION TREATMENT:  None    HISTORY OF PRESENT ILLNESS:  Bertrand Stephenson is a 89 y.o. male who I am seeing at the request of Wicho iPllai.    Mr. Stephenson was in his usual state which time he underwent a chest x-ray for cough and fever.  There was a pulmonary consolidation in the right upper lobe which could have been focal pneumonia but malignancy not excluded.  Repeat chest x-ray 11/18/2024 showed the right midlung opacity suspicious for pneumonia.  CT chest 11/2024 showed a masslike area of consolidation in the right upper adjacent to the right fissure concerning for malignancy.  PET/CT/18/0.4 showed a large mass in the right upper lobe and noted extend to the chest measuring 0.9 cm, SUV 5.8.  There is no evidence of metastatic lymphadenopathy or other metastases.  On 4/825 he underwent bronchoscopy and biopsy.  The right upper lobe showed adenocarcinoma.  Level 11L was negative malignancy, level 4L was negative for malignancy, level 4R was positive for adenocarcinoma, level 11R was negative for malignancy. PET/CT 4/22/25 shows a new pleural based mass in the apical RUL and a dominant nodular area more inferiorly in the RUL with right hilar and mediastinal adenopathy. There is a metastasis of the lateral right second rib and large osteolytic metastasis involving the right T8 transverse process. Bone biopsy is pending.      PAST MEDICAL HISTORY:    Past Medical History:   Diagnosis Date    Arthritis 5 years ago    Atrial fibrillation (HCC)     CAD (coronary artery disease)     S/P stents x 2 in 2019    CHF (congestive heart failure) (HCC)     Followed by Dr. Winn; Echo-

## 2025-04-29 NOTE — PROGRESS NOTES
showed a masslike area of consolidation in the right upper adjacent to the right fissure concerning for malignancy.  PET/CT/18/0.4 showed a large mass in the right upper lobe and noted extend to the chest measuring 0.9 cm, SUV 5.8.  There is no evidence of metastatic lymphadenopathy or other metastases.  On 4/8/25 he underwent bronchoscopy and biopsy.  The right upper lobe showed adenocarcinoma.  Level 11L was negative malignancy, level 4L was negative for malignancy, level 4R was positive for adenocarcinoma, level 11R was negative for malignancy. PET/CT 4/22/25 shows a new pleural based mass in the apical RUL and a dominant nodular area more inferiorly in the RUL with right hilar and mediastinal adenopathy. There is a metastasis of the lateral right second rib and large osteolytic metastasis involving the right T8 transverse process. Bone biopsy is pending.      Record located in [facility] and Care Everywhere.      Pertinent Notes from Referring Provider: NA    Other Pertinent Information: IR BX on 5/6

## 2025-04-29 NOTE — TELEPHONE ENCOUNTER
I have spoken with Mrs Stephenson.  She is aware of IR BX on 5/6.  She has instructions for this procedure.  She is aware that Dr Andre has [facility].  She will call if additional questions.

## 2025-04-30 ENCOUNTER — HOSPITAL ENCOUNTER (OUTPATIENT)
Dept: LAB | Age: 89
Discharge: HOME | End: 2025-04-30
Payer: MEDICARE

## 2025-04-30 ENCOUNTER — OFFICE VISIT (OUTPATIENT)
Dept: ONCOLOGY | Age: 89
End: 2025-04-30
Payer: MEDICARE

## 2025-04-30 ENCOUNTER — TELEPHONE (OUTPATIENT)
Dept: ONCOLOGY | Age: 89
End: 2025-04-30

## 2025-04-30 ENCOUNTER — OFFICE VISIT (OUTPATIENT)
Dept: PALLATIVE CARE | Age: 89
End: 2025-04-30
Payer: MEDICARE

## 2025-04-30 VITALS
OXYGEN SATURATION: 97 % | WEIGHT: 205.5 LBS | SYSTOLIC BLOOD PRESSURE: 110 MMHG | BODY MASS INDEX: 30.44 KG/M2 | RESPIRATION RATE: 18 BRPM | HEART RATE: 60 BPM | TEMPERATURE: 97.8 F | DIASTOLIC BLOOD PRESSURE: 66 MMHG | HEIGHT: 69 IN

## 2025-04-30 DIAGNOSIS — Z51.11 ENCOUNTER FOR ANTINEOPLASTIC CHEMOTHERAPY: ICD-10-CM

## 2025-04-30 DIAGNOSIS — C34.91 PRIMARY LUNG ADENOCARCINOMA, RIGHT (HCC): Primary | ICD-10-CM

## 2025-04-30 DIAGNOSIS — Z51.5 PALLIATIVE CARE ENCOUNTER: ICD-10-CM

## 2025-04-30 DIAGNOSIS — G89.3 CANCER RELATED PAIN: Primary | ICD-10-CM

## 2025-04-30 DIAGNOSIS — R91.8 LUNG MASS: ICD-10-CM

## 2025-04-30 DIAGNOSIS — C34.91 PRIMARY ADENOCARCINOMA OF RIGHT LUNG (HCC): ICD-10-CM

## 2025-04-30 DIAGNOSIS — Z79.899 HIGH RISK MEDICATION USE: ICD-10-CM

## 2025-04-30 DIAGNOSIS — C79.51 METASTASIS TO SPINAL COLUMN (HCC): ICD-10-CM

## 2025-04-30 DIAGNOSIS — R52 PAIN: ICD-10-CM

## 2025-04-30 LAB
ALBUMIN SERPL-MCNC: 3.6 G/DL (ref 3.2–4.6)
ALBUMIN/GLOB SERPL: 0.9 (ref 1–1.9)
ALP SERPL-CCNC: 72 U/L (ref 40–129)
ALT SERPL-CCNC: 13 U/L (ref 8–55)
ANION GAP SERPL CALC-SCNC: 10 MMOL/L (ref 7–16)
AST SERPL-CCNC: 22 U/L (ref 15–37)
BASOPHILS # BLD: 0.03 K/UL (ref 0–0.2)
BASOPHILS NFR BLD: 0.3 % (ref 0–2)
BILIRUB SERPL-MCNC: 0.6 MG/DL (ref 0–1.2)
BUN SERPL-MCNC: 35 MG/DL (ref 8–23)
CALCIUM SERPL-MCNC: 9.3 MG/DL (ref 8.8–10.2)
CEA SERPL-MCNC: 261 NG/ML (ref 0–3.8)
CHLORIDE SERPL-SCNC: 100 MMOL/L (ref 98–107)
CO2 SERPL-SCNC: 29 MMOL/L (ref 20–29)
CREAT SERPL-MCNC: 1.73 MG/DL (ref 0.8–1.3)
DIFFERENTIAL METHOD BLD: NORMAL
EOSINOPHIL # BLD: 0.18 K/UL (ref 0–0.8)
EOSINOPHIL NFR BLD: 1.7 % (ref 0.5–7.8)
ERYTHROCYTE [DISTWIDTH] IN BLOOD BY AUTOMATED COUNT: 13.8 % (ref 11.9–14.6)
GLOBULIN SER CALC-MCNC: 3.9 G/DL (ref 2.3–3.5)
GLUCOSE SERPL-MCNC: 108 MG/DL (ref 70–99)
HCT VFR BLD AUTO: 42.5 % (ref 41.1–50.3)
HGB BLD-MCNC: 13.9 G/DL (ref 13.6–17.2)
IMM GRANULOCYTES # BLD AUTO: 0.03 K/UL (ref 0–0.5)
IMM GRANULOCYTES NFR BLD AUTO: 0.3 % (ref 0–5)
LYMPHOCYTES # BLD: 2.22 K/UL (ref 0.5–4.6)
LYMPHOCYTES NFR BLD: 21.2 % (ref 13–44)
MCH RBC QN AUTO: 31.1 PG (ref 26.1–32.9)
MCHC RBC AUTO-ENTMCNC: 32.7 G/DL (ref 31.4–35)
MCV RBC AUTO: 95.1 FL (ref 82–102)
MONOCYTES # BLD: 0.91 K/UL (ref 0.1–1.3)
MONOCYTES NFR BLD: 8.7 % (ref 4–12)
NEUTS SEG # BLD: 7.1 K/UL (ref 1.7–8.2)
NEUTS SEG NFR BLD: 67.8 % (ref 43–78)
NRBC # BLD: 0 K/UL (ref 0–0.2)
PLATELET # BLD AUTO: 189 K/UL (ref 150–450)
PMV BLD AUTO: 9.6 FL (ref 9.4–12.3)
POTASSIUM SERPL-SCNC: 4.8 MMOL/L (ref 3.5–5.1)
PROT SERPL-MCNC: 7.5 G/DL (ref 6.3–8.2)
RBC # BLD AUTO: 4.47 M/UL (ref 4.23–5.6)
SODIUM SERPL-SCNC: 139 MMOL/L (ref 136–145)
WBC # BLD AUTO: 10.5 K/UL (ref 4.3–11.1)

## 2025-04-30 PROCEDURE — 1160F RVW MEDS BY RX/DR IN RCRD: CPT | Performed by: NURSE PRACTITIONER

## 2025-04-30 PROCEDURE — 1159F MED LIST DOCD IN RCRD: CPT | Performed by: INTERNAL MEDICINE

## 2025-04-30 PROCEDURE — 1159F MED LIST DOCD IN RCRD: CPT | Performed by: NURSE PRACTITIONER

## 2025-04-30 PROCEDURE — 82378 CARCINOEMBRYONIC ANTIGEN: CPT

## 2025-04-30 PROCEDURE — 1123F ACP DISCUSS/DSCN MKR DOCD: CPT | Performed by: NURSE PRACTITIONER

## 2025-04-30 PROCEDURE — 1160F RVW MEDS BY RX/DR IN RCRD: CPT | Performed by: INTERNAL MEDICINE

## 2025-04-30 PROCEDURE — 85025 COMPLETE CBC W/AUTO DIFF WBC: CPT

## 2025-04-30 PROCEDURE — 1123F ACP DISCUSS/DSCN MKR DOCD: CPT | Performed by: INTERNAL MEDICINE

## 2025-04-30 PROCEDURE — 99205 OFFICE O/P NEW HI 60 MIN: CPT | Performed by: INTERNAL MEDICINE

## 2025-04-30 PROCEDURE — 1125F AMNT PAIN NOTED PAIN PRSNT: CPT | Performed by: INTERNAL MEDICINE

## 2025-04-30 PROCEDURE — 99205 OFFICE O/P NEW HI 60 MIN: CPT | Performed by: NURSE PRACTITIONER

## 2025-04-30 PROCEDURE — 36415 COLL VENOUS BLD VENIPUNCTURE: CPT

## 2025-04-30 PROCEDURE — 80053 COMPREHEN METABOLIC PANEL: CPT

## 2025-04-30 RX ORDER — HYDROCORTISONE SODIUM SUCCINATE 100 MG/2ML
100 INJECTION INTRAMUSCULAR; INTRAVENOUS
OUTPATIENT
Start: 2025-05-14

## 2025-04-30 RX ORDER — ONDANSETRON 2 MG/ML
8 INJECTION INTRAMUSCULAR; INTRAVENOUS
OUTPATIENT
Start: 2025-05-14

## 2025-04-30 RX ORDER — LIDOCAINE AND PRILOCAINE 25; 25 MG/G; MG/G
CREAM TOPICAL
Qty: 1 EACH | Refills: 3 | Status: SHIPPED | OUTPATIENT
Start: 2025-04-30

## 2025-04-30 RX ORDER — ONDANSETRON 2 MG/ML
8 INJECTION INTRAMUSCULAR; INTRAVENOUS ONCE
OUTPATIENT
Start: 2025-05-14 | End: 2025-05-14

## 2025-04-30 RX ORDER — FAMOTIDINE 10 MG/ML
20 INJECTION, SOLUTION INTRAVENOUS ONCE
OUTPATIENT
Start: 2025-05-14 | End: 2025-05-14

## 2025-04-30 RX ORDER — SODIUM CHLORIDE 9 MG/ML
5-250 INJECTION, SOLUTION INTRAVENOUS PRN
OUTPATIENT
Start: 2025-05-14

## 2025-04-30 RX ORDER — SODIUM CHLORIDE 0.9 % (FLUSH) 0.9 %
5-40 SYRINGE (ML) INJECTION PRN
OUTPATIENT
Start: 2025-05-14

## 2025-04-30 RX ORDER — FAMOTIDINE 10 MG/ML
20 INJECTION, SOLUTION INTRAVENOUS
OUTPATIENT
Start: 2025-05-14

## 2025-04-30 RX ORDER — PROCHLORPERAZINE MALEATE 10 MG
10 TABLET ORAL EVERY 6 HOURS PRN
Qty: 120 TABLET | Refills: 3 | Status: SHIPPED | OUTPATIENT
Start: 2025-04-30

## 2025-04-30 RX ORDER — HEPARIN SODIUM (PORCINE) LOCK FLUSH IV SOLN 100 UNIT/ML 100 UNIT/ML
500 SOLUTION INTRAVENOUS PRN
OUTPATIENT
Start: 2025-05-14

## 2025-04-30 RX ORDER — EPINEPHRINE 1 MG/ML
0.3 INJECTION, SOLUTION, CONCENTRATE INTRAVENOUS PRN
OUTPATIENT
Start: 2025-05-14

## 2025-04-30 RX ORDER — PROCHLORPERAZINE EDISYLATE 5 MG/ML
5 INJECTION INTRAMUSCULAR; INTRAVENOUS
OUTPATIENT
Start: 2025-05-14

## 2025-04-30 RX ORDER — SODIUM CHLORIDE 9 MG/ML
INJECTION, SOLUTION INTRAVENOUS CONTINUOUS
OUTPATIENT
Start: 2025-05-14

## 2025-04-30 RX ORDER — MEPERIDINE HYDROCHLORIDE 50 MG/ML
12.5 INJECTION INTRAMUSCULAR; INTRAVENOUS; SUBCUTANEOUS PRN
OUTPATIENT
Start: 2025-05-14

## 2025-04-30 RX ORDER — ACETAMINOPHEN 325 MG/1
650 TABLET ORAL
OUTPATIENT
Start: 2025-05-14

## 2025-04-30 RX ORDER — HYDROCODONE BITARTRATE AND ACETAMINOPHEN 5; 325 MG/1; MG/1
1 TABLET ORAL EVERY 6 HOURS PRN
Qty: 56 TABLET | Refills: 0 | Status: SHIPPED | OUTPATIENT
Start: 2025-04-30 | End: 2025-05-14

## 2025-04-30 RX ORDER — DIPHENHYDRAMINE HYDROCHLORIDE 50 MG/ML
50 INJECTION, SOLUTION INTRAMUSCULAR; INTRAVENOUS
OUTPATIENT
Start: 2025-05-14

## 2025-04-30 RX ORDER — ALBUTEROL SULFATE 90 UG/1
4 INHALANT RESPIRATORY (INHALATION) PRN
OUTPATIENT
Start: 2025-05-14

## 2025-04-30 RX ORDER — DIPHENHYDRAMINE HYDROCHLORIDE 50 MG/ML
50 INJECTION, SOLUTION INTRAMUSCULAR; INTRAVENOUS ONCE
OUTPATIENT
Start: 2025-05-14 | End: 2025-05-14

## 2025-04-30 RX ORDER — ONDANSETRON 8 MG/1
8 TABLET, FILM COATED ORAL EVERY 8 HOURS PRN
Qty: 90 TABLET | Refills: 2 | Status: SHIPPED | OUTPATIENT
Start: 2025-04-30

## 2025-04-30 ASSESSMENT — PATIENT HEALTH QUESTIONNAIRE - PHQ9
SUM OF ALL RESPONSES TO PHQ QUESTIONS 1-9: 0
2. FEELING DOWN, DEPRESSED OR HOPELESS: NOT AT ALL
SUM OF ALL RESPONSES TO PHQ QUESTIONS 1-9: 0
1. LITTLE INTEREST OR PLEASURE IN DOING THINGS: NOT AT ALL
SUM OF ALL RESPONSES TO PHQ QUESTIONS 1-9: 0
SUM OF ALL RESPONSES TO PHQ QUESTIONS 1-9: 0

## 2025-04-30 ASSESSMENT — ENCOUNTER SYMPTOMS
COUGH: 1
BACK PAIN: 1

## 2025-04-30 NOTE — PATIENT INSTRUCTIONS
RBC 04/30/2025 4.47  4.23 - 5.6 M/uL Final    Hemoglobin 04/30/2025 13.9  13.6 - 17.2 g/dL Final    Hematocrit 04/30/2025 42.5  41.1 - 50.3 % Final    MCV 04/30/2025 95.1  82.0 - 102.0 FL Final    MCH 04/30/2025 31.1  26.1 - 32.9 PG Final    MCHC 04/30/2025 32.7  31.4 - 35.0 g/dL Final    RDW 04/30/2025 13.8  11.9 - 14.6 % Final    Platelets 04/30/2025 189  150 - 450 K/uL Final    MPV 04/30/2025 9.6  9.4 - 12.3 FL Final    nRBC 04/30/2025 0.00  0.0 - 0.2 K/uL Final    **Note: Absolute NRBC parameter is now reported with Hemogram**    Neutrophils % 04/30/2025 67.8  43.0 - 78.0 % Final    Lymphocytes % 04/30/2025 21.2  13.0 - 44.0 % Final    Monocytes % 04/30/2025 8.7  4.0 - 12.0 % Final    Eosinophils % 04/30/2025 1.7  0.5 - 7.8 % Final    Basophils % 04/30/2025 0.3  0.0 - 2.0 % Final    Immature Granulocytes % 04/30/2025 0.3  0.0 - 5.0 % Final    Neutrophils Absolute 04/30/2025 7.10  1.70 - 8.20 K/UL Final    Lymphocytes Absolute 04/30/2025 2.22  0.50 - 4.60 K/UL Final    Monocytes Absolute 04/30/2025 0.91  0.10 - 1.30 K/UL Final    Eosinophils Absolute 04/30/2025 0.18  0.00 - 0.80 K/UL Final    Basophils Absolute 04/30/2025 0.03  0.00 - 0.20 K/UL Final    Immature Granulocytes Absolute 04/30/2025 0.03  0.00 - 0.50 K/UL Final    Differential Type 04/30/2025 AUTOMATED    Final    Sodium 04/30/2025 139  136 - 145 mmol/L Final    Potassium 04/30/2025 4.8  3.5 - 5.1 mmol/L Final    Chloride 04/30/2025 100  98 - 107 mmol/L Final    CO2 04/30/2025 29  20 - 29 mmol/L Final    Anion Gap 04/30/2025 10  7 - 16 mmol/L Final    Glucose 04/30/2025 108 (H)  70 - 99 mg/dL Final    Comment: <70 mg/dL Consistent with, but not fully diagnostic of hypoglycemia.  100 - 125 mg/dL Impaired fasting glucose/consistent with pre-diabetes mellitus.  > 126 mg/dl Fasting glucose consistent with overt diabetes mellitus      BUN 04/30/2025 35 (H)  8 - 23 MG/DL Final    Creatinine 04/30/2025 1.73 (H)  0.80 - 1.30 MG/DL Final    Est, Glom Filt

## 2025-04-30 NOTE — PROGRESS NOTES
Outpatient Palliative Care at the  [facility]: Office Visit New Patient H & P    Diagnosis:  Lung Adenocarcinoma     Treatment Plan: TBD - likely chemotherapy + immunotherapy    Treatment Intent: Palliative    Medical Oncologist: Dr. Gerard    Radiation Oncologist: Dr. Ortiz    Navigator:      Chief Complaint:    Chief Complaint   Patient presents with    Referral - General         History of Present Illness:  Mr. Stephenson is a 89 y.o. male who presents today to establish with palliative  care in the setting of metastatic lung cancer with metastasis to the bone. In October 2024 he presented to [facility] with fever, cough and shortness of breath. CXR completed with RUL consolidation concerning for focal pneumonia versus pulmonary mass. He was treated with antibiotics. Repeat CXR in November concerning again for pneumonia. CT chest was completed showing mass like area of consolidation in the RUL. PET scan, 12/13,  confirmed large mass to the right upper lobe with extension to the chest. He underwent bronchoscopy in April confirming adenocarcinoma. Repeat PET in April revealed new pleural based mass in the apical RUL with hilar and mediastinal adenopathy. There were also metastasis of the lateral right second rib and osteolytic metastasis to T8. MRIb with small vessel disease. He is pending a bone marrow biopsy on May 6. He has been treated for pain with Tramadol.     PMHx significant for atrial fibrillation on Xarelto, CAD s/p stent, CHF,  HLD, and HTN.    He is seen today alongside oncology.  His wife and daughter are present for the visit.  Dr. Gerard discussed the potential of chemotherapy plus immunotherapy for palliative treatment of his lung cancer.  The patient is focused on quality of life however would like to see how he feels with treatment.  He will likely have some radiation pending bone biopsy.  Pain is located in the right rib cage into the back.  He states this has not been well-controlled

## 2025-04-30 NOTE — ONCOLOGY
START OFF PATHWAY REGIMEN - Non-Small Cell Lung        [id number]:      Pembrolizumab (Keytruda)       Paclitaxel       Carboplatin     **Always confirm dose/schedule in your pharmacy ordering system**    Patient Characteristics:  Stage IV Metastatic, Nonsquamous, Molecular Analysis Completed, Molecular Alteration Present and Targeted Therapy Exhausted OR KRAS G12C+ or HER2+ or NRG1+ Present and No Prior Chemo/Immunotherapy OR No Alteration Present, Initial Chemotherapy/Immunotherapy, PS = 0, 1, No Alteration Present, No Alteration Present, Candidate for Immunotherapy, PD-L1 Expression Positive 1-49% (TPS) / Negative / Not Tested / Awaiting Test Results and Immunotherapy Candidate  Therapeutic Status: Stage IV Metastatic  Histology: Nonsquamous Cell  Broad Molecular Profiling Status: Molecular Analysis Completed  Molecular Analysis Results: No Alteration Present  ECOG Performance Status: 0  Chemotherapy/Immunotherapy Line of Therapy: Initial Chemotherapy/Immunotherapy  EGFR Exons 18-21 Mutation Testing Status: Completed and Negative  ALK Fusion/Rearrangement Testing Status: Completed and Negative  BRAF V600 Mutation Testing Status: Completed and Negative  KRAS G12C Mutation Testing Status: Completed and Negative  MET Exon 14 Mutation Testing Status: Completed and Negative  RET Fusion/Rearrangement Testing Status: Completed and Negative  NRG1 Fusion/Rearrangement Testing Status: Completed and Negative  HER2 Mutation Testing Status: Completed and Negative  NTRK Fusion/Rearrangement Testing Status: Completed and Negative  ROS1 Fusion/Rearrangement Testing Status: Completed and Negative  Immunotherapy Candidate Status: Candidate for Immunotherapy  PD-L1 Expression Status: PD-L1 Negative

## 2025-04-30 NOTE — TELEPHONE ENCOUNTER
Physician provider: Dr. Gerard  Reason for today's call (Please detail here patients chief complaint): Medication  Last office visit:NA  Patient Callback Number: [phone]  Was callback number verified?: Yes  Preferred pharmacy (If refill request): [facility]  Veriified that patient confirmed no refills left at pharmacy? :Yes  Calls to office within the last 48 hours?:No    Warm Transfer to (For Red Words): no    Patient notified that their information will be routed to the [facility] clinical triage team for review. Patient is advised that they will receive a phone call from the triage department. If symptom related and symptoms worsen before receiving a call back, the patient has been advised to proceed to the nearest ED.      Eugenia from [facility]  [phone]  Patient received tramadole from a different provider and want to be clear about the Hydrocodone 5-325 mg

## 2025-04-30 NOTE — PROGRESS NOTES
Protein 7.5 6.3 - 8.2 g/dL    Albumin 3.6 3.2 - 4.6 g/dL    Globulin 3.9 (H) 2.3 - 3.5 g/dL    Albumin/Globulin Ratio 0.9 (L) 1.0 - 1.9     CEA    Collection Time: 04/30/25  9:17 AM   Result Value Ref Range    .0 (H) 0.0 - 3.8 ng/mL       Imaging:  No results found for this or any previous visit.    ASSESSMENT/PLAN:   Diagnosis Orders   1. Primary lung adenocarcinoma, right (HCC)  [facility] - Referal to Interventional Radiology - [facility]    ondansetron (ZOFRAN) 8 MG tablet    prochlorperazine (COMPAZINE) 10 MG tablet    lidocaine-prilocaine (EMLA) 2.5-2.5 % cream      2. 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 Pain        4. High risk medication use        5. Encounter for antineoplastic chemotherapy          89 y.o. M consulted for right lung adenocarcinoma with spine metastasis presented to [facility] with wife and daughter on/30/25, significant PMH includes coronary artery stent/A-fib/long-term Xarelto use, main symptom now is back pain using tramadol, discussed stage IV lung adenocarcinoma treatment options, no valid mutation for targeted therapy, low PD-L1 1%, creatinine clearance 38 cautious of Alimta, discussed CarboTaxol Keytruda and patient would like to start arrangement and tentatively starting 2 weeks, arrange antiemetics, port placement, chemo education, pain management, call as needed.    Assessment & Plan  1. Stage IV lung adenocarcinoma.  Cancer metastasized to bone and spine. No mutations for targeted therapy. PD-L1 <1%, likely poor response to immunotherapy. Goal: control disease, improve quality of life. Recommend chemotherapy + immunotherapy; if not covered, use conventional IV chemotherapy. Discussed potential side effects: neuropathy, numbness, tingling, fatigue, taste changes, nausea. Advised hydration, avoid excessive NSAID use. Initiate carboplatin, paclitaxel, pembrolizumab every 3 weeks for 4 cycles, followed by additional immunotherapy. Arrange port placement

## 2025-05-01 ENCOUNTER — TELEPHONE (OUTPATIENT)
Dept: ONCOLOGY | Age: 89
End: 2025-05-01

## 2025-05-01 ENCOUNTER — TELEPHONE (OUTPATIENT)
Dept: PALLATIVE CARE | Age: 89
End: 2025-05-01

## 2025-05-01 ENCOUNTER — CLINICAL DOCUMENTATION (OUTPATIENT)
Dept: INFUSION THERAPY | Age: 89
End: 2025-05-01

## 2025-05-01 SDOH — ECONOMIC STABILITY: INCOME INSECURITY: IN THE LAST 12 MONTHS, WAS THERE A TIME WHEN YOU WERE NOT ABLE TO PAY THE MORTGAGE OR RENT ON TIME?: NO

## 2025-05-01 SDOH — ECONOMIC STABILITY: FOOD INSECURITY: WITHIN THE PAST 12 MONTHS, YOU WORRIED THAT YOUR FOOD WOULD RUN OUT BEFORE YOU GOT MONEY TO BUY MORE.: NEVER TRUE

## 2025-05-01 SDOH — ECONOMIC STABILITY: FOOD INSECURITY: WITHIN THE PAST 12 MONTHS, THE FOOD YOU BOUGHT JUST DIDN'T LAST AND YOU DIDN'T HAVE MONEY TO GET MORE.: NEVER TRUE

## 2025-05-01 ASSESSMENT — SOCIAL DETERMINANTS OF HEALTH (SDOH)
WITHIN THE LAST YEAR, HAVE YOU BEEN AFRAID OF YOUR PARTNER OR EX-PARTNER?: PATIENT UNABLE TO ANSWER
WITHIN THE LAST YEAR, HAVE TO BEEN RAPED OR FORCED TO HAVE ANY KIND OF SEXUAL ACTIVITY BY YOUR PARTNER OR EX-PARTNER?: PATIENT UNABLE TO ANSWER
HOW HARD IS IT FOR YOU TO PAY FOR THE VERY BASICS LIKE FOOD, HOUSING, MEDICAL CARE, AND HEATING?: NOT HARD AT ALL
IN A TYPICAL WEEK, HOW MANY TIMES DO YOU TALK ON THE PHONE WITH FAMILY, FRIENDS, OR NEIGHBORS?: MORE THAN THREE TIMES A WEEK
WITHIN THE LAST YEAR, HAVE YOU BEEN HUMILIATED OR EMOTIONALLY ABUSED IN OTHER WAYS BY YOUR PARTNER OR EX-PARTNER?: PATIENT UNABLE TO ANSWER
HOW OFTEN DO YOU GET TOGETHER WITH FRIENDS OR RELATIVES?: MORE THAN THREE TIMES A WEEK
WITHIN THE LAST YEAR, HAVE YOU BEEN KICKED, HIT, SLAPPED, OR OTHERWISE PHYSICALLY HURT BY YOUR PARTNER OR EX-PARTNER?: PATIENT UNABLE TO ANSWER

## 2025-05-01 ASSESSMENT — LIFESTYLE VARIABLES
HOW MANY STANDARD DRINKS CONTAINING ALCOHOL DO YOU HAVE ON A TYPICAL DAY: PATIENT DOES NOT DRINK
HOW OFTEN DO YOU HAVE A DRINK CONTAINING ALCOHOL: NEVER

## 2025-05-01 NOTE — PROGRESS NOTES
Patient Assistance    Met with: Bertrand Stephenson    Navigator Type: Infusion  Documentation Type: New Patient  Contact Type: Telephone  Status of Patient Insurance Coverage: Patient has active coverage       Additional notes: Patient was mailed financial counseling folder     Drug Name: Keytruda  Form of PAP Assistance: Free Drug (Pending)

## 2025-05-01 NOTE — TELEPHONE ENCOUNTER
I called and spoke with the patient's wife and patient. They understand he should not take the tramadol and the Norco together. He is to discontinue tramadol moving forward and treat pain with Norco.

## 2025-05-05 ENCOUNTER — CLINICAL DOCUMENTATION (OUTPATIENT)
Dept: INFUSION THERAPY | Age: 89
End: 2025-05-05

## 2025-05-05 ENCOUNTER — TELEPHONE (OUTPATIENT)
Dept: ONCOLOGY | Age: 89
End: 2025-05-05

## 2025-05-05 NOTE — TELEPHONE ENCOUNTER
Pt's spouse calling to r/s financial counseling appt. Pt's spouse requesting call back to confirm appt details because she was unaware of 5/1 appt.    [phone]

## 2025-05-06 ENCOUNTER — HOSPITAL ENCOUNTER (OUTPATIENT)
Dept: CT IMAGING | Age: 89
Discharge: HOME | End: 2025-05-08
Attending: STUDENT IN AN ORGANIZED HEALTH CARE EDUCATION/TRAINING PROGRAM
Payer: MEDICARE

## 2025-05-06 VITALS
SYSTOLIC BLOOD PRESSURE: 135 MMHG | OXYGEN SATURATION: 95 % | HEIGHT: 69 IN | WEIGHT: 205.5 LBS | HEART RATE: 54 BPM | TEMPERATURE: 98 F | BODY MASS INDEX: 30.44 KG/M2 | DIASTOLIC BLOOD PRESSURE: 74 MMHG | RESPIRATION RATE: 20 BRPM

## 2025-05-06 DIAGNOSIS — R94.8 ABNORMAL POSITRON EMISSION TOMOGRAPHY (PET) SCAN: ICD-10-CM

## 2025-05-06 LAB
DNA RANGE(S) EXAMINED NAR: NORMAL
GENE DIS ANL INTERP-IMP: POSITIVE
GENE DIS ASSESSED: NORMAL
GENE MUT TESTED BLD/T: 4.7 M/MB
HLA-A HIGH RES: NORMAL
HLA-B HIGH RES: NORMAL
HLA-C HIGH RES: NORMAL
MSI CA SPEC-IMP: NORMAL
REASON FOR STUDY: NORMAL
TEMPUS GERMLINE NOTE: NORMAL
TEMPUS LCA: NORMAL
TEMPUS PD-L1 (22C3) COMBINED POSITIVE SCORE: 1
TEMPUS PD-L1 (22C3) TUMOR PROPORTION SCORE: <1 %
TEMPUS PORTAL: NORMAL
TEMPUS TREATMENT IMPLICATIONS NOTE: NORMAL
TEMPUS TRIAL1: NORMAL
TEMPUS TRIALCOUNT: 1
TEMPUS XR RESULT 1: NORMAL

## 2025-05-06 PROCEDURE — 20225 BONE BIOPSY TROCAR/NDL DEEP: CPT | Performed by: RADIOLOGY

## 2025-05-06 PROCEDURE — 88311 DECALCIFY TISSUE: CPT

## 2025-05-06 PROCEDURE — 99152 MOD SED SAME PHYS/QHP 5/>YRS: CPT | Performed by: RADIOLOGY

## 2025-05-06 PROCEDURE — 88307 TISSUE EXAM BY PATHOLOGIST: CPT

## 2025-05-06 PROCEDURE — 99152 MOD SED SAME PHYS/QHP 5/>YRS: CPT

## 2025-05-06 PROCEDURE — 77012 CT SCAN FOR NEEDLE BIOPSY: CPT | Performed by: RADIOLOGY

## 2025-05-06 PROCEDURE — 6360000002 HC RX W HCPCS: Performed by: RADIOLOGY

## 2025-05-06 PROCEDURE — 20220 BONE BIOPSY TROCAR/NDL SUPFC: CPT

## 2025-05-06 PROCEDURE — 2580000003 HC RX 258: Performed by: RADIOLOGY

## 2025-05-06 RX ORDER — MIDAZOLAM HYDROCHLORIDE 2 MG/2ML
INJECTION, SOLUTION INTRAMUSCULAR; INTRAVENOUS PRN
Status: COMPLETED | OUTPATIENT
Start: 2025-05-06 | End: 2025-05-06

## 2025-05-06 RX ORDER — OXYCODONE HYDROCHLORIDE 5 MG/1
10 TABLET ORAL EVERY 4 HOURS PRN
Refills: 0 | Status: DISCONTINUED | OUTPATIENT
Start: 2025-05-06 | End: 2025-05-09 | Stop reason: HOSPADM

## 2025-05-06 RX ORDER — LIDOCAINE HYDROCHLORIDE 10 MG/ML
INJECTION, SOLUTION INFILTRATION; PERINEURAL PRN
Status: COMPLETED | OUTPATIENT
Start: 2025-05-06 | End: 2025-05-06

## 2025-05-06 RX ORDER — OXYCODONE HYDROCHLORIDE 5 MG/1
5 TABLET ORAL EVERY 4 HOURS PRN
Refills: 0 | Status: DISCONTINUED | OUTPATIENT
Start: 2025-05-06 | End: 2025-05-09 | Stop reason: HOSPADM

## 2025-05-06 RX ORDER — FENTANYL CITRATE 50 UG/ML
INJECTION, SOLUTION INTRAMUSCULAR; INTRAVENOUS PRN
Status: COMPLETED | OUTPATIENT
Start: 2025-05-06 | End: 2025-05-06

## 2025-05-06 RX ORDER — SODIUM CHLORIDE 9 MG/ML
INJECTION, SOLUTION INTRAVENOUS CONTINUOUS PRN
Status: COMPLETED | OUTPATIENT
Start: 2025-05-06 | End: 2025-05-06

## 2025-05-06 RX ADMIN — MIDAZOLAM HYDROCHLORIDE 1 MG: 1 INJECTION, SOLUTION INTRAMUSCULAR; INTRAVENOUS at 10:53

## 2025-05-06 RX ADMIN — SODIUM CHLORIDE 50 ML/HR: 9 INJECTION, SOLUTION INTRAVENOUS at 10:50

## 2025-05-06 RX ADMIN — FENTANYL CITRATE 50 MCG: 50 INJECTION, SOLUTION INTRAMUSCULAR; INTRAVENOUS at 10:53

## 2025-05-06 RX ADMIN — MIDAZOLAM HYDROCHLORIDE 0.5 MG: 1 INJECTION, SOLUTION INTRAMUSCULAR; INTRAVENOUS at 11:01

## 2025-05-06 RX ADMIN — LIDOCAINE HYDROCHLORIDE 7 ML: 10 INJECTION, SOLUTION INFILTRATION; PERINEURAL at 11:17

## 2025-05-06 RX ADMIN — FENTANYL CITRATE 25 MCG: 50 INJECTION, SOLUTION INTRAMUSCULAR; INTRAVENOUS at 11:01

## 2025-05-06 NOTE — BRIEF OP NOTE
[facility]  Department of Interventional Radiology  [phone]        Brief Procedure Note    Patient: Bertrand Stephenson MRN: [id number]  SSN: [id number]    YOB: 1935  Age: 89 y.o.  Sex: male      Date of Procedure: 5/6/2025     Pre-Procedure Diagnosis:   bone lesion    Post-Procedure Diagnosis:  SAME    Procedure(s):  Image Guided Biopsy    Brief Description of Procedure:  as above    Performed By:  KAY GASPRA MD     Assistants:  None    Anesthesia:  Moderate Sedation    Estimated Blood Loss:  Less than 10ml    Specimens:  Pathology    Implants:  None    Findings:  Necrotic, destructive, bone lesion    Complications:  None    Recommendations:  bedrest x 1 hour.  Resume OAC tomorrow.       Follow Up:  PRN    Signed By: KAY GASPAR MD     May 6, 2025

## 2025-05-06 NOTE — DISCHARGE INSTRUCTIONS
If you have any questions about your procedure, please call the Interventional Radiology department at [phone].      After business hours (5pm) and weekends, call the answering service at [phone] and ask for the Interventional Radiologist on call to be paged.

## 2025-05-06 NOTE — H&P
[facility]  Department of Interventional Radiology  [phone]    History and Physical    Patient:  Bertrand Stephenson MRN:  [id number]  SSN:  [id number]    YOB: 1935  Age:  89 y.o.  Sex:  male      Primary Care Provider:  Dean Navarro MD  Referring Physician:  Wicho Andre*    Subjective:     Chief Complaint: biopsy    History of the Present Illness:  The patient is a 89 y.o. male with lung cancer, bone  metastases who presents for biopsy of a PET + T8 lesion.  NPO.  Xarelto held.        Past Medical History:   Diagnosis Date    Arthritis 5 years ago    Atrial fibrillation (HCC)     CAD (coronary artery disease)     S/P stents x 2 in 2019    CHF (congestive heart failure) (HCC)     Followed by Dr. Winn; Echo- 11/08/2020    H/O heart artery stent 2019    x 2    History of blood transfusion     after colonoscopy    Hyperlipidemia     Hypertension, essential 09/29/2022    Lung cancer (HCC)     Right upper lobe     Past Surgical History:   Procedure Laterality Date    BRONCHOSCOPY N/A 4/8/2025    BRONCHOSCOPY ENDOBRONCHIAL ULTRASOUND FINE NEEDLE ASPIRATION ROBOTIC in fluoro copy to Sine performed by Wicho Andre MD at [facility]    DIAGNOSTIC CARDIAC CATH LAB PROCEDURE  2 years ago    SALIVARY GLAND SURGERY      UMBILICAL HERNIA REPAIR          Review of Systems:    Pertinent items are noted in HPI.    Prior to Admission medications    Medication Sig Start Date End Date Taking? Authorizing Provider   HYDROcodone-acetaminophen (NORCO) 5-325 MG per tablet Take 1 tablet by mouth every 6 hours as needed for Pain for up to 14 days. Intended supply: 3 days. Take lowest dose possible to manage pain Max Daily Amount: 4 tablets 4/30/25 5/14/25  Carole Tsai APRN - NP   ondansetron (ZOFRAN) 8 MG tablet Take 1 tablet by mouth every 8 hours as needed for Nausea or Vomiting 4/30/25   Jeyson Gerard MD   prochlorperazine (COMPAZINE) 10 MG tablet Take

## 2025-05-06 NOTE — PROGRESS NOTES
TRANSFER - OUT REPORT:    Verbal report given to RODRIGO Robert on Bertrand Stephenson  being transferred to Irprep room 2 for routine post-op       Report consisted of patient's Situation, Background, Assessment and   Recommendations(SBAR).     Information from the following report(s) Surgery Report and MAR was reviewed with the receiving nurse.           Lines:   Peripheral IV 05/06/25 Posterior;Right Hand (Active)   Site Assessment Clean, dry & intact 05/06/25 1003   Line Status Blood return noted;Normal saline locked 05/06/25 1003   Phlebitis Assessment No symptoms 05/06/25 1003   Infiltration Assessment 0 05/06/25 1003   Alcohol Cap Used No 05/06/25 1003   Dressing Status New dressing applied 05/06/25 1003   Dressing Type Transparent 05/06/25 1003   Dressing Intervention New 05/06/25 1003        Opportunity for questions and clarification was provided.  Recover patient until 12:15.

## 2025-05-06 NOTE — OR NURSING
Recovery period without difficulty. Pt alert and oriented and denies pain. Dressing is clean, dry, and intact. Reviewed discharge instructions with patient and family at bedside, both verbalized understanding. Pt escorted to lobby discharge area via wheelchair. Vital signs and Aleisha score completed.

## 2025-05-06 NOTE — PRE SEDATION
Sedation Pre-Procedure Note    Patient Name: Bertrand Stephenson   YOB: 1935  Room/Bed: Room/bed info not found  Medical Record Number: [id number]  Date: 5/6/2025   Time: 10:27 AM       Indication:  bone mets    Consent: I have discussed with the patient and/or the patient representative the indication, alternatives, and the possible risks and/or complications of the planned procedure and the anesthesia methods. The patient and/or patient representative appear to understand and agree to proceed.    Vital Signs:   Vitals:    05/06/25 0921   BP: (!) 157/91   Pulse: 54   Resp: 16   Temp: 98 °F (36.7 °C)   SpO2: 95%       Past Medical History:   has a past medical history of Arthritis, Atrial fibrillation (HCC), CAD (coronary artery disease), CHF (congestive heart failure) (HCC), H/O heart artery stent, History of blood transfusion, Hyperlipidemia, Hypertension, essential, and Lung cancer (HCC).    Past Surgical History:   has a past surgical history that includes Umbilical hernia repair; Salivary gland surgery; Diagnostic Cardiac Cath Lab Procedure (2 years ago); and bronchoscopy (N/A, 4/8/2025).    Medications:   Scheduled Meds:   Continuous Infusions:   PRN Meds:   Home Meds:   Prior to Admission medications    Medication Sig Start Date End Date Taking? Authorizing Provider   HYDROcodone-acetaminophen (NORCO) 5-325 MG per tablet Take 1 tablet by mouth every 6 hours as needed for Pain for up to 14 days. Intended supply: 3 days. Take lowest dose possible to manage pain Max Daily Amount: 4 tablets 4/30/25 5/14/25  Carole Tsai APRN - NP   ondansetron (ZOFRAN) 8 MG tablet Take 1 tablet by mouth every 8 hours as needed for Nausea or Vomiting 4/30/25   Jeyson Gerard MD   prochlorperazine (COMPAZINE) 10 MG tablet Take 1 tablet by mouth every 6 hours as needed (nausea/vomiting) 4/30/25   Jeyson Gerard MD   lidocaine-prilocaine (EMLA) 2.5-2.5 % cream Apply generously to port site and cover with sarjose

## 2025-05-08 ENCOUNTER — OFFICE VISIT (OUTPATIENT)
Dept: ONCOLOGY | Age: 89
End: 2025-05-08
Payer: MEDICARE

## 2025-05-08 ENCOUNTER — CLINICAL DOCUMENTATION (OUTPATIENT)
Dept: INFUSION THERAPY | Age: 89
End: 2025-05-08

## 2025-05-08 DIAGNOSIS — C34.91 PRIMARY LUNG ADENOCARCINOMA, RIGHT (HCC): Primary | ICD-10-CM

## 2025-05-08 DIAGNOSIS — Z71.9 HEALTH EDUCATION/COUNSELING: ICD-10-CM

## 2025-05-08 PROCEDURE — 1123F ACP DISCUSS/DSCN MKR DOCD: CPT | Performed by: NURSE PRACTITIONER

## 2025-05-08 PROCEDURE — 99214 OFFICE O/P EST MOD 30 MIN: CPT | Performed by: NURSE PRACTITIONER

## 2025-05-08 RX ORDER — PROCHLORPERAZINE MALEATE 10 MG
10 TABLET ORAL EVERY 6 HOURS PRN
Qty: 120 TABLET | Refills: 3 | Status: SHIPPED | OUTPATIENT
Start: 2025-05-08

## 2025-05-08 NOTE — PROGRESS NOTES
IV Chemotherapy/Biotherapy Education:  Bertrand Stephenson  is a 89 y.o. year old male with lung cancer who presents for Chemotherapy/Biotherapy education for the following medication(s): Carboplatin, Paclitaxel, Pembrolizumab.  Schedule and frequency of the therapy plan were discussed with the patient and every 3 weeks.    The patient was given handouts published by the [employer] [place] titled \"Chemotherapy and You\" and \"Eating Hints Before, During, and After Cancer Treatment\" for reference.  Medication specific information was printed from [employer] Cancer Drug Index and distributed to the patient.    Self-care guidelines were distributed and discussed with the patient and included the followin) Potential long term and short term side effects of therapy including fertility risks for appropriate patients    2) Symptoms and side effects that require the patient to contact [facility] or require immediate attention    3) Symptoms or events that require immediate discontinuation of oral or self-administered treatments    4) Procedures for handling medications at home, including storage, safe handling, and management of unused medications    5) Procedures for handling bodily fluids and waste in the home    6) The [facility]'s contact information with availability and instructions on who and when to call    7) The [facility] missed appointment policy and expectations for rescheduling or canceling    Patient denies any needs or referrals at this time.  Prescriptions for Zofran, Compazine, and EMLA have been sent electronically to the local pharmacy.  Proper use and frequency of these medications were reviewed with patient and patient verbalized understanding of the treatment recommendations.    Patient asked appropriate questions and was involved and engaged during the educational session.  There were no barriers to learning that were observed

## 2025-05-09 ENCOUNTER — HOSPITAL ENCOUNTER (OUTPATIENT)
Dept: INTERVENTIONAL RADIOLOGY/VASCULAR | Age: 89
Discharge: HOME | End: 2025-05-11
Attending: INTERNAL MEDICINE
Payer: MEDICARE

## 2025-05-09 VITALS
BODY MASS INDEX: 30.44 KG/M2 | TEMPERATURE: 98.1 F | RESPIRATION RATE: 14 BRPM | SYSTOLIC BLOOD PRESSURE: 163 MMHG | DIASTOLIC BLOOD PRESSURE: 75 MMHG | HEART RATE: 53 BPM | WEIGHT: 205.5 LBS | OXYGEN SATURATION: 99 % | HEIGHT: 69 IN

## 2025-05-09 DIAGNOSIS — C34.91 PRIMARY LUNG ADENOCARCINOMA, RIGHT (HCC): ICD-10-CM

## 2025-05-09 PROCEDURE — 99152 MOD SED SAME PHYS/QHP 5/>YRS: CPT | Performed by: RADIOLOGY

## 2025-05-09 PROCEDURE — 6360000002 HC RX W HCPCS: Performed by: RADIOLOGY

## 2025-05-09 PROCEDURE — 36561 INSERT TUNNELED CV CATH: CPT | Performed by: PHYSICIAN ASSISTANT

## 2025-05-09 PROCEDURE — 77001 FLUOROGUIDE FOR VEIN DEVICE: CPT | Performed by: PHYSICIAN ASSISTANT

## 2025-05-09 PROCEDURE — 76937 US GUIDE VASCULAR ACCESS: CPT

## 2025-05-09 PROCEDURE — 6360000002 HC RX W HCPCS: Performed by: PHYSICIAN ASSISTANT

## 2025-05-09 PROCEDURE — 76937 US GUIDE VASCULAR ACCESS: CPT | Performed by: PHYSICIAN ASSISTANT

## 2025-05-09 RX ORDER — FENTANYL CITRATE 50 UG/ML
INJECTION, SOLUTION INTRAMUSCULAR; INTRAVENOUS PRN
Status: COMPLETED | OUTPATIENT
Start: 2025-05-09 | End: 2025-05-09

## 2025-05-09 RX ORDER — LIDOCAINE HYDROCHLORIDE AND EPINEPHRINE BITARTRATE 20; .01 MG/ML; MG/ML
INJECTION, SOLUTION SUBCUTANEOUS PRN
Status: COMPLETED | OUTPATIENT
Start: 2025-05-09 | End: 2025-05-09

## 2025-05-09 RX ORDER — MIDAZOLAM HYDROCHLORIDE 2 MG/2ML
INJECTION, SOLUTION INTRAMUSCULAR; INTRAVENOUS PRN
Status: COMPLETED | OUTPATIENT
Start: 2025-05-09 | End: 2025-05-09

## 2025-05-09 RX ORDER — HEPARIN 100 UNIT/ML
SYRINGE INTRAVENOUS PRN
Status: COMPLETED | OUTPATIENT
Start: 2025-05-09 | End: 2025-05-09

## 2025-05-09 RX ADMIN — MIDAZOLAM HYDROCHLORIDE 0.5 MG: 1 INJECTION, SOLUTION INTRAMUSCULAR; INTRAVENOUS at 09:41

## 2025-05-09 RX ADMIN — FENTANYL CITRATE 25 MCG: 50 INJECTION, SOLUTION INTRAMUSCULAR; INTRAVENOUS at 09:41

## 2025-05-09 RX ADMIN — FENTANYL CITRATE 50 MCG: 50 INJECTION, SOLUTION INTRAMUSCULAR; INTRAVENOUS at 09:36

## 2025-05-09 RX ADMIN — MIDAZOLAM HYDROCHLORIDE 1 MG: 1 INJECTION, SOLUTION INTRAMUSCULAR; INTRAVENOUS at 09:36

## 2025-05-09 RX ADMIN — Medication 2000 MG: at 09:36

## 2025-05-09 RX ADMIN — MIDAZOLAM HYDROCHLORIDE 0.5 MG: 1 INJECTION, SOLUTION INTRAMUSCULAR; INTRAVENOUS at 09:45

## 2025-05-09 RX ADMIN — LIDOCAINE HYDROCHLORIDE AND EPINEPHRINE 10 ML: 20; 10 INJECTION, SOLUTION INFILTRATION; PERINEURAL at 09:46

## 2025-05-09 RX ADMIN — FENTANYL CITRATE 25 MCG: 50 INJECTION, SOLUTION INTRAMUSCULAR; INTRAVENOUS at 09:45

## 2025-05-09 RX ADMIN — HEPARIN 500 UNITS: 100 SYRINGE at 09:56

## 2025-05-09 NOTE — H&P
[facility]  Department of Interventional Radiology  [phone]    History and Physical    Patient:  Bertrand Stephenson MRN:  [id number]  SSN:  [id number]    YOB: 1935  Age:  89 y.o.  Sex:  male      Primary Care Provider:  Dean Navarro MD  Referring Physician:  Jeyson Gerard MD    Subjective:     Chief Complaint: port    History of the Present Illness:  The patient is a 89 y.o. male with lung cancer, bone mets who presents for port placement. NPO.        Past Medical History:   Diagnosis Date    Arthritis 5 years ago    Atrial fibrillation (HCC)     CAD (coronary artery disease)     S/P stents x 2 in 2019    CHF (congestive heart failure) (HCC)     Followed by Dr. Winn; Echo- 11/08/2020    H/O heart artery stent 2019    x 2    History of blood transfusion     after colonoscopy    Hyperlipidemia     Hypertension, essential 09/29/2022    Lung cancer (HCC)     Right upper lobe     Past Surgical History:   Procedure Laterality Date    BRONCHOSCOPY N/A 4/8/2025    BRONCHOSCOPY ENDOBRONCHIAL ULTRASOUND FINE NEEDLE ASPIRATION ROBOTIC in fluoro copy to Sine performed by Wicho Andre MD at [facility]    CT BIOPSY SUPERFICIAL BONE PERCUTANEOUS  5/6/2025    CT BIOPSY SUPERFICIAL BONE PERCUTANEOUS 5/6/2025 [facility] RADIOLOGY CT SCAN    DIAGNOSTIC CARDIAC CATH LAB PROCEDURE  2 years ago    SALIVARY GLAND SURGERY      UMBILICAL HERNIA REPAIR          Review of Systems:    Pertinent items are noted in HPI.    Prior to Admission medications    Medication Sig Start Date End Date Taking? Authorizing Provider   prochlorperazine (COMPAZINE) 10 MG tablet Take 1 tablet by mouth every 6 hours as needed (nausea/vomiting) 5/8/25   Bianca Goldman APRN - NP   HYDROcodone-acetaminophen (NORCO) 5-325 MG per tablet Take 1 tablet by mouth every 6 hours as needed for Pain for up to 14 days. Intended supply: 3 days. Take lowest dose possible to manage pain Max Daily Amount: 4  exposure: Never    Smokeless tobacco: Never   Substance Use Topics    Alcohol use: Yes     Alcohol/week: 5.0 standard drinks of alcohol     Types: 4 Glasses of wine, 1 Drinks containing 0.5 oz of alcohol per week     Comment: soc        Not in a hospital admission.    Objective:       Physical Examination:    Vitals:    05/09/25 0815   BP: (!) 168/82   Pulse: 54   Resp: 16   Temp: 98.1 °F (36.7 °C)   TempSrc: Oral   SpO2: 94%   Weight: 93.2 kg (205 lb 8 oz)   Height: 1.753 m (5' 9.02\")       Pain Assessment       denies                                              HEART: regular rate and rhythm  LUNG: clear to auscultation bilaterally  ABDOMEN: normal findings: soft, non-tender  EXTREMITIES: warm, no edema    Laboratory:     Lab Results   Component Value Date/Time     04/30/2025 09:17 AM     07/23/2024 09:45 AM    K 4.8 04/30/2025 09:17 AM    K 4.9 07/23/2024 09:45 AM     04/30/2025 09:17 AM     07/23/2024 09:45 AM    CO2 29 04/30/2025 09:17 AM    CO2 25 07/23/2024 09:45 AM    BUN 35 04/30/2025 09:17 AM    BUN 23 07/23/2024 09:45 AM    GLOB 3.9 04/30/2025 09:17 AM    GLOB 3.8 04/21/2025 08:41 AM    ALT 13 04/30/2025 09:17 AM    ALT 14 04/21/2025 08:41 AM     Lab Results   Component Value Date/Time    WBC 10.5 04/30/2025 09:17 AM    WBC 6.6 07/23/2024 09:45 AM    HGB 13.9 04/30/2025 09:17 AM    HGB 14.5 07/23/2024 09:45 AM    HCT 42.5 04/30/2025 09:17 AM    HCT 45.7 07/23/2024 09:45 AM     04/30/2025 09:17 AM     07/23/2024 09:45 AM     No results found for: \"APTT\", \"INR\"    Assessment:     Lung cancer, bone mets.        Plan:     Planned Procedure:  port placement.     Risks, benefits, and alternatives reviewed with patient and he agrees to proceed with the procedure.      Signed By: Gabriela Balderrama PA-C     May 9, 2025

## 2025-05-09 NOTE — OR NURSING
Prep complete. Patient ready for procedure. Patient and family educated on power port using [facility] supplied materials. Opportunity for questions provided. All verbalized understanding.

## 2025-05-09 NOTE — DISCHARGE INSTRUCTIONS
If you have any questions about your procedure, please call the Interventional Radiology department at [phone].     After business hours (5pm) and weekends, call the answering service at [phone] and ask for the Interventional Radiologist on call to be paged.

## 2025-05-09 NOTE — BRIEF OP NOTE
[facility]  Department of Interventional Radiology  [phone]        Interventional Radiology Brief Procedure Note    Patient: Bertrand Stephenson MRN: [id number]  SSN: [id number]    YOB: 1935  Age: 89 y.o.  Sex: male      Date of Procedure: 5/9/2025    Pre-Procedure Diagnosis: lung cancer    Post-Procedure Diagnosis: SAME    Procedure(s): Venous Chest Port Placement    Brief Description of Procedure: US, fluoro guided left IJ venous chest port placement.     Performed By: Gabriela Balderrama PA-C     Assistants: None    Anesthesia:Moderate Sedation per DILMA Hernandez MD    Estimated Blood Loss: Less than 10ml    Specimens:  none    Implants:  Subcutaneous Port    Findings: catheter tip in right atrium     Complications: None    Recommendations: ok to use port     Follow Up: prn    Signed By: Gabriela Balderrama PA-C     May 9, 2025

## 2025-05-09 NOTE — FLOWSHEET NOTE
Recovery period without difficulty. Pt alert and oriented and denies pain. Dressing is clean, dry, and intact. Reviewed discharge instructions with patient and family, both verbalized understanding. Pt escorted to lobby discharge area via wheelchair. Vital signs and Aleisha score completed.

## 2025-05-09 NOTE — PRE SEDATION
Sedation Pre-Procedure Note    Patient Name: Bertrand Stephenson   YOB: 1935  Room/Bed: Room/bed info not found  Medical Record Number: [id number]  Date: 5/9/2025   Time: 9:29 AM       Indication:  lung cancer    Consent: I have discussed with the patient and/or the patient representative the indication, alternatives, and the possible risks and/or complications of the planned procedure and the anesthesia methods. The patient and/or patient representative appear to understand and agree to proceed.    Vital Signs:   Vitals:    05/09/25 0815   BP: (!) 168/82   Pulse: 54   Resp: 16   Temp: 98.1 °F (36.7 °C)   SpO2: 94%       Past Medical History:   has a past medical history of Arthritis, Atrial fibrillation (HCC), CAD (coronary artery disease), CHF (congestive heart failure) (HCC), H/O heart artery stent, History of blood transfusion, Hyperlipidemia, Hypertension, essential, and Lung cancer (HCC).    Past Surgical History:   has a past surgical history that includes Umbilical hernia repair; Salivary gland surgery; Diagnostic Cardiac Cath Lab Procedure (2 years ago); bronchoscopy (N/A, 4/8/2025); and CT BIOPSY SUPERFICIAL BONE PERCUTANEOUS (5/6/2025).    Medications:   Scheduled Meds:   Continuous Infusions:   PRN Meds:   Home Meds:   Prior to Admission medications    Medication Sig Start Date End Date Taking? Authorizing Provider   prochlorperazine (COMPAZINE) 10 MG tablet Take 1 tablet by mouth every 6 hours as needed (nausea/vomiting) 5/8/25   Bianca Goldman APRN - NP   HYDROcodone-acetaminophen (NORCO) 5-325 MG per tablet Take 1 tablet by mouth every 6 hours as needed for Pain for up to 14 days. Intended supply: 3 days. Take lowest dose possible to manage pain Max Daily Amount: 4 tablets 4/30/25 5/14/25  Carole Tsai APRN - NP   ondansetron (ZOFRAN) 8 MG tablet Take 1 tablet by mouth every 8 hours as needed for Nausea or Vomiting 4/30/25   Jeyson Gerard MD   lidocaine-prilocaine (EMLA)  and fentanyl intravenously    Patient is an appropriate candidate for plan of sedation: yes    Electronically signed by Gabriela Balderrama PA-C on 5/9/2025 at 9:29 AM

## 2025-05-09 NOTE — OP NOTE
Title: Chest port placement through the left internal jugular vein using  ultrasound and fluoroscopic guidance with maximal sterile barrier appropriately  documented and fluoroscopy time and images documented in report.    History: 89-year-old male with lung cancer, bone metastases    :  Gabriela Balderrama PA-C    Supervising Physician: Jessee Redmond MD,  The physician attests to  supervising this procedure and agrees with the report as written.    Consent: Informed written and oral consent was obtained from the patient after  explanation of benefits and risks (including, but not limited to: infection,  vascular injury, lung injury, and thrombus formation) of procedure. The  patient's questions were answered to their satisfaction. They stated  understanding and requested that we proceed.    Procedure: This port was inserted with all elements of maximal sterile barrier  technique, cap and mask and sterile gown and sterile gloves and sterile full  body drape and hygiene and 2% chlorhexidine for cutaneous antisepsis and sterile  ultrasound gel and sterile ultrasound probe cover.    Following routine prep and drape of the left neck and chest, a local field block  with lidocaine was achieved.  Ultrasound evaluation of all potential access  sites was performed due to lack of a palpable vein. The vein was not palpable  due to overlying adipose tissue. Using real-time ultrasound guidance, with  appropriate image recording, the patent left internal jugular vein was accessed.  Using fluoroscopy, a peel-away sheath was placed over a wire.    A 1-inch incision was made on the right,left chest wall and a subcutaneous  pocket created. The catheter was tunneled subcutaneously and passed down the  peel-away sheath positioning the tip in the right atrium, confirmed  fluoroscopically. The catheter was cut to the appropriate length and connected  to the Port which was then placed in the pocket.  The Port-A-Cath

## 2025-05-13 ENCOUNTER — HOSPITAL ENCOUNTER (OUTPATIENT)
Dept: LAB | Age: 89
Discharge: HOME | End: 2025-05-13
Payer: MEDICARE

## 2025-05-13 ENCOUNTER — OFFICE VISIT (OUTPATIENT)
Dept: ONCOLOGY | Age: 89
End: 2025-05-13
Payer: MEDICARE

## 2025-05-13 VITALS
BODY MASS INDEX: 30.13 KG/M2 | TEMPERATURE: 97.8 F | DIASTOLIC BLOOD PRESSURE: 65 MMHG | WEIGHT: 203.4 LBS | OXYGEN SATURATION: 96 % | SYSTOLIC BLOOD PRESSURE: 137 MMHG | HEART RATE: 58 BPM | RESPIRATION RATE: 18 BRPM | HEIGHT: 69 IN

## 2025-05-13 DIAGNOSIS — C34.91 PRIMARY LUNG ADENOCARCINOMA, RIGHT (HCC): Primary | ICD-10-CM

## 2025-05-13 DIAGNOSIS — Z51.11 ENCOUNTER FOR ANTINEOPLASTIC CHEMOTHERAPY: ICD-10-CM

## 2025-05-13 DIAGNOSIS — Z79.899 HIGH RISK MEDICATION USE: ICD-10-CM

## 2025-05-13 DIAGNOSIS — R79.89 ELEVATED SERUM CREATININE: ICD-10-CM

## 2025-05-13 DIAGNOSIS — T45.1X5A CINV (CHEMOTHERAPY-INDUCED NAUSEA AND VOMITING): ICD-10-CM

## 2025-05-13 DIAGNOSIS — C34.91 PRIMARY ADENOCARCINOMA OF RIGHT LUNG (HCC): ICD-10-CM

## 2025-05-13 DIAGNOSIS — R52 PAIN: ICD-10-CM

## 2025-05-13 DIAGNOSIS — R11.2 CINV (CHEMOTHERAPY-INDUCED NAUSEA AND VOMITING): ICD-10-CM

## 2025-05-13 LAB
ALBUMIN SERPL-MCNC: 3.5 G/DL (ref 3.2–4.6)
ALBUMIN/GLOB SERPL: 0.9 (ref 1–1.9)
ALP SERPL-CCNC: 77 U/L (ref 40–129)
ALT SERPL-CCNC: 11 U/L (ref 8–55)
ANION GAP SERPL CALC-SCNC: 11 MMOL/L (ref 7–16)
AST SERPL-CCNC: 21 U/L (ref 15–37)
BASOPHILS # BLD: 0.03 K/UL (ref 0–0.2)
BASOPHILS NFR BLD: 0.3 % (ref 0–2)
BILIRUB SERPL-MCNC: 0.4 MG/DL (ref 0–1.2)
BUN SERPL-MCNC: 25 MG/DL (ref 8–23)
CALCIUM SERPL-MCNC: 9.4 MG/DL (ref 8.8–10.2)
CHLORIDE SERPL-SCNC: 102 MMOL/L (ref 98–107)
CO2 SERPL-SCNC: 26 MMOL/L (ref 20–29)
CORTIS SERPL-MCNC: 12.6 UG/DL
CREAT SERPL-MCNC: 1.46 MG/DL (ref 0.8–1.3)
DIFFERENTIAL METHOD BLD: ABNORMAL
EOSINOPHIL # BLD: 0.17 K/UL (ref 0–0.8)
EOSINOPHIL NFR BLD: 1.9 % (ref 0.5–7.8)
ERYTHROCYTE [DISTWIDTH] IN BLOOD BY AUTOMATED COUNT: 13.6 % (ref 11.9–14.6)
GLOBULIN SER CALC-MCNC: 3.8 G/DL (ref 2.3–3.5)
GLUCOSE SERPL-MCNC: 110 MG/DL (ref 70–99)
HBV SURFACE AB SERPL IA-ACNC: <3.5 MIU/ML
HBV SURFACE AG SER QL: NONREACTIVE
HCT VFR BLD AUTO: 39.4 % (ref 41.1–50.3)
HGB BLD-MCNC: 12.9 G/DL (ref 13.6–17.2)
IMM GRANULOCYTES # BLD AUTO: 0.03 K/UL (ref 0–0.5)
IMM GRANULOCYTES NFR BLD AUTO: 0.3 % (ref 0–5)
LYMPHOCYTES # BLD: 2.1 K/UL (ref 0.5–4.6)
LYMPHOCYTES NFR BLD: 22.9 % (ref 13–44)
MCH RBC QN AUTO: 30.8 PG (ref 26.1–32.9)
MCHC RBC AUTO-ENTMCNC: 32.7 G/DL (ref 31.4–35)
MCV RBC AUTO: 94 FL (ref 82–102)
MONOCYTES # BLD: 0.69 K/UL (ref 0.1–1.3)
MONOCYTES NFR BLD: 7.5 % (ref 4–12)
NEUTS SEG # BLD: 6.14 K/UL (ref 1.7–8.2)
NEUTS SEG NFR BLD: 67.1 % (ref 43–78)
NRBC # BLD: 0 K/UL (ref 0–0.2)
PLATELET # BLD AUTO: 206 K/UL (ref 150–450)
PMV BLD AUTO: 9.4 FL (ref 9.4–12.3)
POTASSIUM SERPL-SCNC: 4.5 MMOL/L (ref 3.5–5.1)
PROT SERPL-MCNC: 7.2 G/DL (ref 6.3–8.2)
RBC # BLD AUTO: 4.19 M/UL (ref 4.23–5.6)
SODIUM SERPL-SCNC: 139 MMOL/L (ref 136–145)
TSH, 3RD GENERATION: 1.2 UIU/ML (ref 0.27–4.2)
WBC # BLD AUTO: 9.2 K/UL (ref 4.3–11.1)

## 2025-05-13 PROCEDURE — 1123F ACP DISCUSS/DSCN MKR DOCD: CPT | Performed by: INTERNAL MEDICINE

## 2025-05-13 PROCEDURE — 86704 HEP B CORE ANTIBODY TOTAL: CPT

## 2025-05-13 PROCEDURE — 1160F RVW MEDS BY RX/DR IN RCRD: CPT | Performed by: INTERNAL MEDICINE

## 2025-05-13 PROCEDURE — 82533 TOTAL CORTISOL: CPT

## 2025-05-13 PROCEDURE — 86706 HEP B SURFACE ANTIBODY: CPT

## 2025-05-13 PROCEDURE — 87340 HEPATITIS B SURFACE AG IA: CPT

## 2025-05-13 PROCEDURE — 80053 COMPREHEN METABOLIC PANEL: CPT

## 2025-05-13 PROCEDURE — 1159F MED LIST DOCD IN RCRD: CPT | Performed by: INTERNAL MEDICINE

## 2025-05-13 PROCEDURE — 84443 ASSAY THYROID STIM HORMONE: CPT

## 2025-05-13 PROCEDURE — 99215 OFFICE O/P EST HI 40 MIN: CPT | Performed by: INTERNAL MEDICINE

## 2025-05-13 PROCEDURE — 1125F AMNT PAIN NOTED PAIN PRSNT: CPT | Performed by: INTERNAL MEDICINE

## 2025-05-13 PROCEDURE — 85025 COMPLETE CBC W/AUTO DIFF WBC: CPT

## 2025-05-13 PROCEDURE — 36415 COLL VENOUS BLD VENIPUNCTURE: CPT

## 2025-05-13 NOTE — PATIENT INSTRUCTIONS
TSH, 3rd Generation 05/13/2025 1.200  0.270 - 4.200 uIU/mL Final           Please refer to After Visit Summary or [facility]es for upcoming appointment information. If you have any questions regarding your upcoming schedule please reach out to your care team through Mozes or call [phone].    Please notify your assigned Nurse Navigator of any unplanned hospital admissions or Emergency Room visits within 24 hours of discharge.    -------------------------------------------------------------------------------------------------------------------  Please call our office at [phone] if you have any  of the following symptoms:   Fever of 100.5 or greater  Chills  Shortness of breath  Swelling or pain in one leg    After office hours an answering service is available and will contact a provider for emergencies or if you are experiencing any of the above symptoms.

## 2025-05-13 NOTE — PROGRESS NOTES
[facility] RADIATION ONCOLOGY CONSULTATION    Patient: Bertrand Stephenson MRN: [id number]  SSN: [id number]    YOB: 1935  Age: 89 y.o.  Sex: male      Other Providers:  Wicho Pillai     CHIEF COMPLAINT: Cough, fever    DIAGNOSIS: [id number] NSCLC    PREVIOUS RADIATION TREATMENT:  None    HISTORY OF PRESENT ILLNESS:  Bertrand Stephenson is a 89 y.o. male who I am seeing at the request of Wicho Pillai.    Mr. Stephenson was in his usual state which time he underwent a chest x-ray for cough and fever.  There was a pulmonary consolidation in the right upper lobe which could have been focal pneumonia but malignancy not excluded.  Repeat chest x-ray 11/18/2024 showed the right midlung opacity suspicious for pneumonia.  CT chest 11/2024 showed a masslike area of consolidation in the right upper adjacent to the right fissure concerning for malignancy.  PET/CT/18/0.4 showed a large mass in the right upper lobe and noted extend to the chest measuring 0.9 cm, SUV 5.8.  There is no evidence of metastatic lymphadenopathy or other metastases.  On 4/825 he underwent bronchoscopy and biopsy.  The right upper lobe showed adenocarcinoma.  Level 11L was negative malignancy, level 4L was negative for malignancy, level 4R was positive for adenocarcinoma, level 11R was negative for malignancy. PET/CT 4/22/25 shows a new pleural based mass in the apical RUL and a dominant nodular area more inferiorly in the RUL with right hilar and mediastinal adenopathy. There is a metastasis of the lateral right second rib and large osteolytic metastasis involving the right T8 transverse process. Bone biopsy is pending.      INTERVAL HISTORY:  Biopsy 5/9/25 shows well to moderately differentiated adenocarcinoma. He had his first infusion of Carbo/ Alimta 5/14/25 which he tolerated extremely well. He has pain in the mid back which wraps around the front on the right.  He does

## 2025-05-13 NOTE — PROGRESS NOTES
[facility]: Office Visit Progress Note    Chief Complaint:    Lung adenocarcinoma    History of Present Illness:  Referral Diagnosis: Adenocarcinoma: Lung mass     Referring Provider: Wicho Andre MD     Primary Care Provider: Dean Navarro MD     Presenting Symptoms: RUL mass     Family History of Cancer: Cancer-related family history includes Colon Cancer in his mother.     Past Medical History:   Past Medical History        Past Medical History:   Diagnosis Date    Arthritis 5 years ago    Atrial fibrillation (HCC)      CAD (coronary artery disease)       S/P stents x 2 in 2019    CHF (congestive heart failure) (HCC)       Followed by Dr. Winn; Echo- 11/08/2020    H/O heart artery stent 2019     x 2    History of blood transfusion       after colonoscopy    Hyperlipidemia      Hypertension, essential 09/29/2022    Lung cancer (HCC)       Right upper lobe            Chronological History of Pertinent Events (From Onset of Presenting Symptoms): 89 y.o. M patient was referred to Pulmonary in December 2024 for a lung mass in right upper lobe on CT, which was confirmed PET positive. Patient was scheduled for bronchoscopy for February 2025 but had to cancel due to the illness and death of his brother in [place]. EBUS for April reveals Adenocarcinoma. Tempus results reveal no potentially actionable variants and no reportable treatment options.       4/21/25- MRI of the brain  IMPRESSION:  1. No acute process or pathologic enhancement.  2. Atrophy and chronic small vessel ischemic disease.     4/22/25- PET/CT base of skull to mid thigh  IMPRESSION:  Known carcinoma of the right upper lobe that appears multicentric.  Right hilar and mediastinal metastases. Bony metastases as described.     4/29- Patient seen by Dr. Ortiz:  Mr. Stephenson was in his usual state which time he underwent a chest x-ray for cough and fever.  There was a pulmonary consolidation in the right upper lobe which could

## 2025-05-14 ENCOUNTER — OFFICE VISIT (OUTPATIENT)
Dept: PALLATIVE CARE | Age: 89
End: 2025-05-14
Payer: MEDICARE

## 2025-05-14 ENCOUNTER — HOSPITAL ENCOUNTER (OUTPATIENT)
Dept: INFUSION THERAPY | Age: 89
Setting detail: INFUSION SERIES
Discharge: HOME | End: 2025-05-14
Payer: MEDICARE

## 2025-05-14 VITALS
BODY MASS INDEX: 30.24 KG/M2 | SYSTOLIC BLOOD PRESSURE: 134 MMHG | WEIGHT: 204.8 LBS | HEART RATE: 58 BPM | RESPIRATION RATE: 18 BRPM | TEMPERATURE: 97.7 F | OXYGEN SATURATION: 95 % | DIASTOLIC BLOOD PRESSURE: 71 MMHG

## 2025-05-14 DIAGNOSIS — C34.91 PRIMARY ADENOCARCINOMA OF RIGHT LUNG (HCC): Primary | ICD-10-CM

## 2025-05-14 DIAGNOSIS — Z51.5 PALLIATIVE CARE ENCOUNTER: ICD-10-CM

## 2025-05-14 DIAGNOSIS — G89.3 CANCER RELATED PAIN: Primary | ICD-10-CM

## 2025-05-14 LAB — HBV CORE AB SERPL QL IA: NEGATIVE

## 2025-05-14 PROCEDURE — 96367 TX/PROPH/DG ADDL SEQ IV INF: CPT

## 2025-05-14 PROCEDURE — 99214 OFFICE O/P EST MOD 30 MIN: CPT | Performed by: NURSE PRACTITIONER

## 2025-05-14 PROCEDURE — 2580000003 HC RX 258: Performed by: INTERNAL MEDICINE

## 2025-05-14 PROCEDURE — 96417 CHEMO IV INFUS EACH ADDL SEQ: CPT

## 2025-05-14 PROCEDURE — 96415 CHEMO IV INFUSION ADDL HR: CPT

## 2025-05-14 PROCEDURE — 6360000002 HC RX W HCPCS: Performed by: INTERNAL MEDICINE

## 2025-05-14 PROCEDURE — 1159F MED LIST DOCD IN RCRD: CPT | Performed by: NURSE PRACTITIONER

## 2025-05-14 PROCEDURE — 96413 CHEMO IV INFUSION 1 HR: CPT

## 2025-05-14 PROCEDURE — 2500000003 HC RX 250 WO HCPCS: Performed by: INTERNAL MEDICINE

## 2025-05-14 PROCEDURE — 1123F ACP DISCUSS/DSCN MKR DOCD: CPT | Performed by: NURSE PRACTITIONER

## 2025-05-14 PROCEDURE — 96375 TX/PRO/DX INJ NEW DRUG ADDON: CPT

## 2025-05-14 RX ORDER — DIPHENHYDRAMINE HYDROCHLORIDE 50 MG/ML
50 INJECTION, SOLUTION INTRAMUSCULAR; INTRAVENOUS
Status: DISCONTINUED | OUTPATIENT
Start: 2025-05-14 | End: 2025-05-15 | Stop reason: HOSPADM

## 2025-05-14 RX ORDER — SODIUM CHLORIDE 9 MG/ML
5-250 INJECTION, SOLUTION INTRAVENOUS PRN
Status: DISCONTINUED | OUTPATIENT
Start: 2025-05-14 | End: 2025-05-15 | Stop reason: HOSPADM

## 2025-05-14 RX ORDER — ONDANSETRON 2 MG/ML
8 INJECTION INTRAMUSCULAR; INTRAVENOUS ONCE
Status: COMPLETED | OUTPATIENT
Start: 2025-05-14 | End: 2025-05-14

## 2025-05-14 RX ORDER — OXYCODONE HYDROCHLORIDE 5 MG/1
5 TABLET ORAL
Qty: 84 TABLET | Refills: 0 | Status: SHIPPED | OUTPATIENT
Start: 2025-05-14 | End: 2025-05-28

## 2025-05-14 RX ORDER — DIPHENHYDRAMINE HYDROCHLORIDE 50 MG/ML
50 INJECTION, SOLUTION INTRAMUSCULAR; INTRAVENOUS ONCE
Status: COMPLETED | OUTPATIENT
Start: 2025-05-14 | End: 2025-05-14

## 2025-05-14 RX ORDER — SODIUM CHLORIDE 0.9 % (FLUSH) 0.9 %
5-40 SYRINGE (ML) INJECTION PRN
Status: DISCONTINUED | OUTPATIENT
Start: 2025-05-14 | End: 2025-05-15 | Stop reason: HOSPADM

## 2025-05-14 RX ORDER — HYDROCORTISONE SODIUM SUCCINATE 100 MG/2ML
100 INJECTION INTRAMUSCULAR; INTRAVENOUS
Status: DISCONTINUED | OUTPATIENT
Start: 2025-05-14 | End: 2025-05-15 | Stop reason: HOSPADM

## 2025-05-14 RX ADMIN — SODIUM CHLORIDE, PRESERVATIVE FREE 10 ML: 5 INJECTION INTRAVENOUS at 14:21

## 2025-05-14 RX ADMIN — SODIUM CHLORIDE 150 MG: 0.9 INJECTION, SOLUTION INTRAVENOUS at 09:43

## 2025-05-14 RX ADMIN — SODIUM CHLORIDE 50 ML/HR: 0.9 INJECTION, SOLUTION INTRAVENOUS at 08:40

## 2025-05-14 RX ADMIN — ONDANSETRON 8 MG: 2 INJECTION, SOLUTION INTRAMUSCULAR; INTRAVENOUS at 09:13

## 2025-05-14 RX ADMIN — DIPHENHYDRAMINE HYDROCHLORIDE 50 MG: 50 INJECTION INTRAMUSCULAR; INTRAVENOUS at 09:18

## 2025-05-14 RX ADMIN — DEXAMETHASONE SODIUM PHOSPHATE 12 MG: 4 INJECTION INTRA-ARTICULAR; INTRALESIONAL; INTRAMUSCULAR; INTRAVENOUS; SOFT TISSUE at 09:25

## 2025-05-14 RX ADMIN — SODIUM CHLORIDE 200 MG: 9 INJECTION, SOLUTION INTRAVENOUS at 08:42

## 2025-05-14 RX ADMIN — CARBOPLATIN 385 MG: 10 INJECTION, SOLUTION INTRAVENOUS at 13:23

## 2025-05-14 RX ADMIN — FAMOTIDINE 20 MG: 10 INJECTION, SOLUTION INTRAVENOUS at 09:16

## 2025-05-14 RX ADMIN — PACLITAXEL 426 MG: 6 INJECTION, SOLUTION INTRAVENOUS at 10:20

## 2025-05-14 ASSESSMENT — ENCOUNTER SYMPTOMS
COUGH: 1
BACK PAIN: 1

## 2025-05-14 NOTE — PROGRESS NOTES
Outpatient Palliative Care at the  [facility]: Office Visit New Patient H & P    Diagnosis:  Lung Adenocarcinoma     Treatment Plan: Carboplatin, paclitaxel, and pembrolizumab    Treatment Intent: Palliative    Medical Oncologist: Dr. Gerard    Radiation Oncologist: Dr. Ortzi    Navigator:      Chief Complaint:    Chief Complaint   Patient presents with    Follow-up         History of Present Illness:  Mr. Stephenson is a 89 y.o. male who presents today to establish with palliative  care in the setting of metastatic lung cancer with metastasis to the bone. In October 2024 he presented to [facility] with fever, cough and shortness of breath. CXR completed with RUL consolidation concerning for focal pneumonia versus pulmonary mass. He was treated with antibiotics. Repeat CXR in November concerning again for pneumonia. CT chest was completed showing mass like area of consolidation in the RUL. PET scan, 12/13,  confirmed large mass to the right upper lobe with extension to the chest. He underwent bronchoscopy in April confirming adenocarcinoma. Repeat PET in April revealed new pleural based mass in the apical RUL with hilar and mediastinal adenopathy. There were also metastasis of the lateral right second rib and osteolytic metastasis to T8. MRIb with small vessel disease. He is pending a bone marrow biopsy on May 6. He has been treated for pain with Tramadol.     PMHx significant for atrial fibrillation on Xarelto, CAD s/p stent, CHF,  HLD, and HTN.    He is seen today alongside oncology.  His wife and daughter are present for the visit.  Dr. Gerard discussed the potential of chemotherapy plus immunotherapy for palliative treatment of his lung cancer.  The patient is focused on quality of life however would like to see how he feels with treatment.  He will likely have some radiation pending bone biopsy.  Pain is located in the right rib cage into the back.  He states this has not been well-controlled with the

## 2025-05-14 NOTE — PROGRESS NOTES
Arrived to the Infusion Center. C1 Keytruda, Taxol and Carbo completed. Patient tolerated well.   Any issues or concerns during appointment: no.  Patient aware of next infusion appointment on 6/4 @ 1030  Patient instructed to call provider with temperature of 100.4 or greater or nausea/vomiting/ diarrhea or pain not controlled by medications  Discharged to home via WC with family.

## 2025-05-19 ENCOUNTER — CLINICAL DOCUMENTATION (OUTPATIENT)
Dept: CASE MANAGEMENT | Age: 89
End: 2025-05-19

## 2025-05-19 NOTE — PROGRESS NOTES
Nurse Navigation outreach related to post C1D1 symptom management    Prophylactic medications for symptoms management reviewed.    Barriers to ongoing treatment none.    No symptoms routed to triage for further assessment.    Education on how to contact your team was reiterated:  Three Stage Mediat:   Use FeZo to send non-urgent messages or inquiries to your clinical team anytime. You can anticipate receiving a response within a 24-hour timeframe. This is the quickest way to reach your team however please do not utilize this method for urgent/emergent needs.   Clinical Concerns Monday-Friday 8:00AM-4:00PM:   Call [facility] [phone], press option to speak to a nurse. This can be used for routine or urgent clinical questions (i.e. treatment side effects, symptoms related to diagnosis, prescription refills).  Clinical Concerns After Hours and Weekends:   If you have an urgent need after hours and weekends, call [phone] to reach our on call provider.     Navigation Assessment: Patient denies nausea; c/o mild fatigue. Patient is eating well enough and taking medications as prescribed. Wife is retired nurse.  The following were identified as potential barriers to care:   1. Pain (managed by Palliative Care)   2. Fatigue (mild)  Interventions and Plan: Patient is resting but still up and active as tolerated. Patient to see Palliative tomorrow at [facility] visit.    Goal of next outreach: follow up after completion of chemotherapy  Time Spent: 12 minutes

## 2025-05-20 ENCOUNTER — OFFICE VISIT (OUTPATIENT)
Dept: PALLATIVE CARE | Age: 89
End: 2025-05-20
Payer: MEDICARE

## 2025-05-20 ENCOUNTER — HOSPITAL ENCOUNTER (OUTPATIENT)
Dept: RADIATION ONCOLOGY | Age: 89
Setting detail: RECURRING SERIES
Discharge: HOME | End: 2025-05-23
Payer: MEDICARE

## 2025-05-20 VITALS
BODY MASS INDEX: 29.83 KG/M2 | WEIGHT: 202 LBS | SYSTOLIC BLOOD PRESSURE: 112 MMHG | RESPIRATION RATE: 16 BRPM | HEART RATE: 73 BPM | TEMPERATURE: 97.6 F | DIASTOLIC BLOOD PRESSURE: 70 MMHG | OXYGEN SATURATION: 94 %

## 2025-05-20 VITALS
SYSTOLIC BLOOD PRESSURE: 112 MMHG | HEIGHT: 69 IN | OXYGEN SATURATION: 94 % | HEART RATE: 73 BPM | BODY MASS INDEX: 30.04 KG/M2 | WEIGHT: 202.8 LBS | DIASTOLIC BLOOD PRESSURE: 70 MMHG | RESPIRATION RATE: 16 BRPM | TEMPERATURE: 97.6 F

## 2025-05-20 DIAGNOSIS — T40.2X5A THERAPEUTIC OPIOID-INDUCED CONSTIPATION (OIC): ICD-10-CM

## 2025-05-20 DIAGNOSIS — G89.3 CANCER RELATED PAIN: Primary | ICD-10-CM

## 2025-05-20 DIAGNOSIS — Z51.5 PALLIATIVE CARE ENCOUNTER: ICD-10-CM

## 2025-05-20 DIAGNOSIS — K59.03 THERAPEUTIC OPIOID-INDUCED CONSTIPATION (OIC): ICD-10-CM

## 2025-05-20 DIAGNOSIS — C34.91 PRIMARY ADENOCARCINOMA OF RIGHT LUNG (HCC): ICD-10-CM

## 2025-05-20 PROCEDURE — 99213 OFFICE O/P EST LOW 20 MIN: CPT | Performed by: PHYSICIAN ASSISTANT

## 2025-05-20 PROCEDURE — 1123F ACP DISCUSS/DSCN MKR DOCD: CPT | Performed by: PHYSICIAN ASSISTANT

## 2025-05-20 PROCEDURE — 1125F AMNT PAIN NOTED PAIN PRSNT: CPT | Performed by: PHYSICIAN ASSISTANT

## 2025-05-20 PROCEDURE — 99211 OFF/OP EST MAY X REQ PHY/QHP: CPT

## 2025-05-20 RX ORDER — FENTANYL 12.5 UG/1
1 PATCH TRANSDERMAL
Qty: 10 PATCH | Refills: 0 | Status: SHIPPED | OUTPATIENT
Start: 2025-05-20 | End: 2025-06-19

## 2025-05-20 ASSESSMENT — PAIN DESCRIPTION - ORIENTATION: ORIENTATION: RIGHT

## 2025-05-20 ASSESSMENT — ENCOUNTER SYMPTOMS
BACK PAIN: 1
COUGH: 1

## 2025-05-20 ASSESSMENT — PATIENT HEALTH QUESTIONNAIRE - PHQ9
SUM OF ALL RESPONSES TO PHQ QUESTIONS 1-9: 0
1. LITTLE INTEREST OR PLEASURE IN DOING THINGS: NOT AT ALL
SUM OF ALL RESPONSES TO PHQ QUESTIONS 1-9: 0
2. FEELING DOWN, DEPRESSED OR HOPELESS: NOT AT ALL

## 2025-05-20 ASSESSMENT — PAIN DESCRIPTION - LOCATION: LOCATION: BACK

## 2025-05-20 ASSESSMENT — PAIN SCALES - GENERAL: PAINLEVEL_OUTOF10: 5

## 2025-05-20 NOTE — PROGRESS NOTES
Radiation Oncology - Follow Up        Bertrand Stephenson  is a 89 y.o. year old male with SPINE METS who is following up for:: Initiation of radiation therapy    Proposed Radiotherapy: External Beam Radiation Therapy       Intent of therapy and anticipated number of fractions (length of treatment) reviewed by MD. See MD note.     Bowel Prep:N/A    Consent for Radiotherapy: Consent not obtained during today's visit. Awaiting further workup.   CONSENTS TO BE SIGNED IN SIM APT.     CT Sim Scheduled: Yes -SPINE-CONSENTS TO BE SIGNED AT [facility].    Pertinent Information:   C/O PAIN TO BACK/RIGHT SIDE.  SAW PALLIATIVE CARE TODAY.  NEW FENTANYL PATCH PRESCRIBED.  TAKING OXYCODONE PRN  VITALS UPSTAIRS.     Previous Radiation Treatment Reported: No    Pacemaker or Other Chest Implant Reported: No    Collagen Vascular Disease Reported: No    Test Results, if applicable:  PSA:  AUA:  PET / CT / PSMA:  Colonoscopy:     Education: Education material provided and reviewed by MD.

## 2025-05-20 NOTE — PROGRESS NOTES
three times a week     Frequency of Social Gatherings with Friends and Family: More than three times a week     Attends [employer] Services: Not on file     Active Member of Clubs or Organizations: Not on file     Attends Club or Organization Meetings: Not on file     Marital Status:    Intimate Partner Violence: Patient Unable To Answer (5/1/2025)    Humiliation, Afraid, Rape, and Kick questionnaire     Fear of Current or Ex-Partner: Patient unable to answer     Emotionally Abused: Patient unable to answer     Physically Abused: Patient unable to answer     Sexually Abused: Patient unable to answer   Housing Stability: Low Risk  (5/1/2025)    Housing Stability Vital Sign     Unable to Pay for Housing in the Last Year: No     Number of Times Moved in the Last Year: 0     Homeless in the Last Year: No     Current Outpatient Medications   Medication Sig Dispense Refill    fentaNYL (DURAGESIC) 12 MCG/HR Place 1 patch onto the skin every 3 days for 30 days. Intended supply: 30 days Max Daily Amount: 1 patch (Patient not taking: Reported on 5/20/2025) 10 patch 0    oxyCODONE (ROXICODONE) 5 MG immediate release tablet Take 1 tablet by mouth every 4-6 hours as needed for Pain for up to 14 days. Intended supply: 3 days. Take lowest dose possible to manage pain Max Daily Amount: 30 mg 84 tablet 0    prochlorperazine (COMPAZINE) 10 MG tablet Take 1 tablet by mouth every 6 hours as needed (nausea/vomiting) (Patient not taking: Reported on 5/20/2025) 120 tablet 3    ondansetron (ZOFRAN) 8 MG tablet Take 1 tablet by mouth every 8 hours as needed for Nausea or Vomiting 90 tablet 2    lidocaine-prilocaine (EMLA) 2.5-2.5 % cream Apply generously to port site and cover with saran wrap 30-60 min prior to needle stick. 1 each 3    benazepril (LOTENSIN) 20 MG tablet Take 1 tablet by mouth daily 100 tablet 3    metoprolol succinate (TOPROL XL) 25 MG extended release tablet Take 1 tablet by mouth daily 90 tablet 0    amiodarone

## 2025-05-21 ENCOUNTER — CLINICAL DOCUMENTATION (OUTPATIENT)
Dept: CASE MANAGEMENT | Age: 89
End: 2025-05-21

## 2025-05-21 NOTE — PROGRESS NOTES
Nurse Navigation outreach related to care coordination    Patient contacted via telephone to educate and ensure there are no barriers to upcoming chemo/rad appointments.  Navigation reviewed caregiver support.  Patient to have CT Sim next week and receive palliative radiation between chemo cycles    Navigation Assessment:  Daughter is requesting FMLA update. Request sent to MA and PSR in writing for status update.  Interventions and Plan: Patient requesting Handicap placard. Prepared and left at upstairs lobby desk.    Goal of next outreach: follow up after therapy completion  Time Spent: 20 minutes

## 2025-05-22 ENCOUNTER — TELEPHONE (OUTPATIENT)
Dept: ONCOLOGY | Age: 89
End: 2025-05-22

## 2025-05-22 NOTE — TELEPHONE ENCOUNTER
Physician provider: Dr. Gerard  Reason for today's call (Please detail here patients chief complaint): FMLA paperwork  Last office visit:NA  Patient Callback Number: [phone]  Was callback number verified?: Yes  Preferred pharmacy (If refill request): NA  Veriified that patient confirmed no refills left at pharmacy? :No  Calls to office within the last 48 hours?:No    Warm Transfer to (For Red Words): no    Patient notified that their information will be routed to the [facility] clinical triage team for review. Patient is advised that they will receive a phone call from the triage department. If symptom related and symptoms worsen before receiving a call back, the patient has been advised to proceed to the nearest ED.      The patient daughter called about FMLA paperwork she said it was dropped off 04/30/2025 April [phone]

## 2025-05-22 NOTE — TELEPHONE ENCOUNTER
Call back to April that FMLA was faxed and confirmation received. She should be receiving call from front office staff once it has been scanned into chart and ready for her to  at 2nd floor reception desk.

## 2025-05-28 ENCOUNTER — HOSPITAL ENCOUNTER (OUTPATIENT)
Dept: RADIATION ONCOLOGY | Age: 89
Setting detail: RECURRING SERIES
Discharge: HOME | End: 2025-05-31
Payer: MEDICARE

## 2025-05-28 PROCEDURE — 77290 THER RAD SIMULAJ FIELD CPLX: CPT

## 2025-05-29 ENCOUNTER — APPOINTMENT (OUTPATIENT)
Dept: RADIATION ONCOLOGY | Age: 89
End: 2025-05-29
Payer: MEDICARE

## 2025-06-04 ENCOUNTER — OFFICE VISIT (OUTPATIENT)
Dept: ONCOLOGY | Age: 89
End: 2025-06-04
Payer: MEDICARE

## 2025-06-04 ENCOUNTER — HOSPITAL ENCOUNTER (OUTPATIENT)
Dept: LAB | Age: 89
Discharge: HOME | End: 2025-06-04
Payer: MEDICARE

## 2025-06-04 ENCOUNTER — OFFICE VISIT (OUTPATIENT)
Dept: PALLATIVE CARE | Age: 89
End: 2025-06-04

## 2025-06-04 ENCOUNTER — HOSPITAL ENCOUNTER (OUTPATIENT)
Dept: INFUSION THERAPY | Age: 89
Setting detail: INFUSION SERIES
Discharge: HOME | End: 2025-06-04
Payer: MEDICARE

## 2025-06-04 VITALS
OXYGEN SATURATION: 95 % | TEMPERATURE: 98 F | HEIGHT: 69 IN | BODY MASS INDEX: 29.33 KG/M2 | SYSTOLIC BLOOD PRESSURE: 94 MMHG | DIASTOLIC BLOOD PRESSURE: 62 MMHG | WEIGHT: 198 LBS | HEART RATE: 80 BPM | RESPIRATION RATE: 16 BRPM

## 2025-06-04 DIAGNOSIS — R53.0 NEOPLASTIC MALIGNANT RELATED FATIGUE: ICD-10-CM

## 2025-06-04 DIAGNOSIS — Z79.899 HIGH RISK MEDICATION USE: ICD-10-CM

## 2025-06-04 DIAGNOSIS — C79.51 METASTASIS TO SPINAL COLUMN (HCC): ICD-10-CM

## 2025-06-04 DIAGNOSIS — I95.9 HYPOTENSION, UNSPECIFIED HYPOTENSION TYPE: ICD-10-CM

## 2025-06-04 DIAGNOSIS — K59.03 OPIOID-INDUCED CONSTIPATION: ICD-10-CM

## 2025-06-04 DIAGNOSIS — K59.03 THERAPEUTIC OPIOID-INDUCED CONSTIPATION (OIC): ICD-10-CM

## 2025-06-04 DIAGNOSIS — N18.32 CHRONIC KIDNEY DISEASE, STAGE 3B (HCC): ICD-10-CM

## 2025-06-04 DIAGNOSIS — T40.2X5A OPIOID-INDUCED CONSTIPATION: ICD-10-CM

## 2025-06-04 DIAGNOSIS — Z51.5 PALLIATIVE CARE ENCOUNTER: ICD-10-CM

## 2025-06-04 DIAGNOSIS — C34.91 PRIMARY ADENOCARCINOMA OF RIGHT LUNG (HCC): ICD-10-CM

## 2025-06-04 DIAGNOSIS — C34.91 PRIMARY ADENOCARCINOMA OF RIGHT LUNG (HCC): Primary | ICD-10-CM

## 2025-06-04 DIAGNOSIS — T40.2X5A THERAPEUTIC OPIOID-INDUCED CONSTIPATION (OIC): ICD-10-CM

## 2025-06-04 DIAGNOSIS — G89.3 CANCER RELATED PAIN: Primary | ICD-10-CM

## 2025-06-04 DIAGNOSIS — R53.83 FATIGUE DUE TO TREATMENT: ICD-10-CM

## 2025-06-04 LAB
ALBUMIN SERPL-MCNC: 3 G/DL (ref 3.2–4.6)
ALBUMIN/GLOB SERPL: 0.8 (ref 1–1.9)
ALP SERPL-CCNC: 73 U/L (ref 40–129)
ALT SERPL-CCNC: 9 U/L (ref 8–55)
ANION GAP SERPL CALC-SCNC: 12 MMOL/L (ref 7–16)
AST SERPL-CCNC: 16 U/L (ref 15–37)
BASOPHILS # BLD: 0.02 K/UL (ref 0–0.2)
BASOPHILS NFR BLD: 0.2 % (ref 0–2)
BILIRUB SERPL-MCNC: 0.4 MG/DL (ref 0–1.2)
BUN SERPL-MCNC: 30 MG/DL (ref 8–23)
CALCIUM SERPL-MCNC: 9.1 MG/DL (ref 8.8–10.2)
CHLORIDE SERPL-SCNC: 104 MMOL/L (ref 98–107)
CO2 SERPL-SCNC: 23 MMOL/L (ref 20–29)
CREAT SERPL-MCNC: 1.38 MG/DL (ref 0.8–1.3)
DIFFERENTIAL METHOD BLD: ABNORMAL
EOSINOPHIL # BLD: 0.07 K/UL (ref 0–0.8)
EOSINOPHIL NFR BLD: 0.6 % (ref 0.5–7.8)
ERYTHROCYTE [DISTWIDTH] IN BLOOD BY AUTOMATED COUNT: 13.7 % (ref 11.9–14.6)
GLOBULIN SER CALC-MCNC: 4 G/DL (ref 2.3–3.5)
GLUCOSE SERPL-MCNC: 107 MG/DL (ref 70–99)
HCT VFR BLD AUTO: 34.6 % (ref 41.1–50.3)
HGB BLD-MCNC: 11.3 G/DL (ref 13.6–17.2)
IMM GRANULOCYTES # BLD AUTO: 0.07 K/UL (ref 0–0.5)
IMM GRANULOCYTES NFR BLD AUTO: 0.6 % (ref 0–5)
LYMPHOCYTES # BLD: 1.9 K/UL (ref 0.5–4.6)
LYMPHOCYTES NFR BLD: 17.6 % (ref 13–44)
MCH RBC QN AUTO: 30.6 PG (ref 26.1–32.9)
MCHC RBC AUTO-ENTMCNC: 32.7 G/DL (ref 31.4–35)
MCV RBC AUTO: 93.8 FL (ref 82–102)
MONOCYTES # BLD: 0.88 K/UL (ref 0.1–1.3)
MONOCYTES NFR BLD: 8.2 % (ref 4–12)
NEUTS SEG # BLD: 7.85 K/UL (ref 1.7–8.2)
NEUTS SEG NFR BLD: 72.8 % (ref 43–78)
NRBC # BLD: 0 K/UL (ref 0–0.2)
PLATELET # BLD AUTO: 230 K/UL (ref 150–450)
PMV BLD AUTO: 9.2 FL (ref 9.4–12.3)
POTASSIUM SERPL-SCNC: 4.3 MMOL/L (ref 3.5–5.1)
PROT SERPL-MCNC: 7 G/DL (ref 6.3–8.2)
RBC # BLD AUTO: 3.69 M/UL (ref 4.23–5.6)
SODIUM SERPL-SCNC: 139 MMOL/L (ref 136–145)
WBC # BLD AUTO: 10.8 K/UL (ref 4.3–11.1)

## 2025-06-04 PROCEDURE — 2580000003 HC RX 258: Performed by: NURSE PRACTITIONER

## 2025-06-04 PROCEDURE — 2500000003 HC RX 250 WO HCPCS: Performed by: INTERNAL MEDICINE

## 2025-06-04 PROCEDURE — 96413 CHEMO IV INFUSION 1 HR: CPT

## 2025-06-04 PROCEDURE — 1159F MED LIST DOCD IN RCRD: CPT | Performed by: NURSE PRACTITIONER

## 2025-06-04 PROCEDURE — 1123F ACP DISCUSS/DSCN MKR DOCD: CPT | Performed by: NURSE PRACTITIONER

## 2025-06-04 PROCEDURE — 99214 OFFICE O/P EST MOD 30 MIN: CPT | Performed by: NURSE PRACTITIONER

## 2025-06-04 PROCEDURE — 96367 TX/PROPH/DG ADDL SEQ IV INF: CPT

## 2025-06-04 PROCEDURE — 96375 TX/PRO/DX INJ NEW DRUG ADDON: CPT

## 2025-06-04 PROCEDURE — 96415 CHEMO IV INFUSION ADDL HR: CPT

## 2025-06-04 PROCEDURE — 96417 CHEMO IV INFUS EACH ADDL SEQ: CPT

## 2025-06-04 PROCEDURE — 1125F AMNT PAIN NOTED PAIN PRSNT: CPT | Performed by: NURSE PRACTITIONER

## 2025-06-04 PROCEDURE — 80053 COMPREHEN METABOLIC PANEL: CPT

## 2025-06-04 PROCEDURE — 85025 COMPLETE CBC W/AUTO DIFF WBC: CPT

## 2025-06-04 PROCEDURE — 2500000003 HC RX 250 WO HCPCS: Performed by: NURSE PRACTITIONER

## 2025-06-04 PROCEDURE — 6360000002 HC RX W HCPCS: Performed by: NURSE PRACTITIONER

## 2025-06-04 RX ORDER — FAMOTIDINE 10 MG/ML
20 INJECTION, SOLUTION INTRAVENOUS
Status: CANCELLED | OUTPATIENT
Start: 2025-06-04

## 2025-06-04 RX ORDER — SODIUM CHLORIDE 0.9 % (FLUSH) 0.9 %
5-40 SYRINGE (ML) INJECTION PRN
Status: CANCELLED | OUTPATIENT
Start: 2025-06-04

## 2025-06-04 RX ORDER — SODIUM CHLORIDE 0.9 % (FLUSH) 0.9 %
5-40 SYRINGE (ML) INJECTION PRN
Status: DISCONTINUED | OUTPATIENT
Start: 2025-06-04 | End: 2025-06-05 | Stop reason: HOSPADM

## 2025-06-04 RX ORDER — ONDANSETRON 2 MG/ML
8 INJECTION INTRAMUSCULAR; INTRAVENOUS ONCE
Status: COMPLETED | OUTPATIENT
Start: 2025-06-04 | End: 2025-06-04

## 2025-06-04 RX ORDER — DIPHENHYDRAMINE HYDROCHLORIDE 50 MG/ML
50 INJECTION, SOLUTION INTRAMUSCULAR; INTRAVENOUS ONCE
Status: CANCELLED | OUTPATIENT
Start: 2025-06-04 | End: 2025-06-04

## 2025-06-04 RX ORDER — LACTULOSE 10 G/15ML
20 SOLUTION ORAL 3 TIMES DAILY PRN
Qty: 473 ML | Refills: 1 | Status: SHIPPED | OUTPATIENT
Start: 2025-06-04 | End: 2025-06-06 | Stop reason: SDUPTHER

## 2025-06-04 RX ORDER — ONDANSETRON 2 MG/ML
8 INJECTION INTRAMUSCULAR; INTRAVENOUS
Status: DISCONTINUED | OUTPATIENT
Start: 2025-06-04 | End: 2025-06-05 | Stop reason: HOSPADM

## 2025-06-04 RX ORDER — ACETAMINOPHEN 325 MG/1
650 TABLET ORAL
Status: DISCONTINUED | OUTPATIENT
Start: 2025-06-04 | End: 2025-06-05 | Stop reason: HOSPADM

## 2025-06-04 RX ORDER — OXYCODONE HYDROCHLORIDE 5 MG/1
5 TABLET ORAL EVERY 4 HOURS PRN
Qty: 180 TABLET | Refills: 0 | Status: SHIPPED | OUTPATIENT
Start: 2025-06-04 | End: 2025-07-04

## 2025-06-04 RX ORDER — EPINEPHRINE 1 MG/ML
0.3 INJECTION, SOLUTION, CONCENTRATE INTRAVENOUS PRN
Status: DISCONTINUED | OUTPATIENT
Start: 2025-06-04 | End: 2025-06-05 | Stop reason: HOSPADM

## 2025-06-04 RX ORDER — SODIUM CHLORIDE 9 MG/ML
5-250 INJECTION, SOLUTION INTRAVENOUS PRN
Status: CANCELLED | OUTPATIENT
Start: 2025-06-04

## 2025-06-04 RX ORDER — PROCHLORPERAZINE EDISYLATE 5 MG/ML
5 INJECTION INTRAMUSCULAR; INTRAVENOUS
Status: CANCELLED | OUTPATIENT
Start: 2025-06-04

## 2025-06-04 RX ORDER — HYDROCORTISONE SODIUM SUCCINATE 100 MG/2ML
100 INJECTION INTRAMUSCULAR; INTRAVENOUS
Status: DISCONTINUED | OUTPATIENT
Start: 2025-06-04 | End: 2025-06-05 | Stop reason: HOSPADM

## 2025-06-04 RX ORDER — ONDANSETRON 2 MG/ML
8 INJECTION INTRAMUSCULAR; INTRAVENOUS
Status: CANCELLED | OUTPATIENT
Start: 2025-06-04

## 2025-06-04 RX ORDER — DIPHENHYDRAMINE HYDROCHLORIDE 50 MG/ML
50 INJECTION, SOLUTION INTRAMUSCULAR; INTRAVENOUS
Status: CANCELLED | OUTPATIENT
Start: 2025-06-04

## 2025-06-04 RX ORDER — MEPERIDINE HYDROCHLORIDE 25 MG/ML
12.5 INJECTION INTRAMUSCULAR; INTRAVENOUS; SUBCUTANEOUS PRN
Status: DISCONTINUED | OUTPATIENT
Start: 2025-06-04 | End: 2025-06-05 | Stop reason: HOSPADM

## 2025-06-04 RX ORDER — SODIUM CHLORIDE 9 MG/ML
INJECTION, SOLUTION INTRAVENOUS CONTINUOUS
Status: CANCELLED | OUTPATIENT
Start: 2025-06-04

## 2025-06-04 RX ORDER — FENTANYL 25 UG/1
1 PATCH TRANSDERMAL
Qty: 10 PATCH | Refills: 0 | Status: SHIPPED | OUTPATIENT
Start: 2025-06-04 | End: 2025-07-04

## 2025-06-04 RX ORDER — HEPARIN SODIUM (PORCINE) LOCK FLUSH IV SOLN 100 UNIT/ML 100 UNIT/ML
500 SOLUTION INTRAVENOUS PRN
Status: CANCELLED | OUTPATIENT
Start: 2025-06-04

## 2025-06-04 RX ORDER — DIPHENHYDRAMINE HYDROCHLORIDE 50 MG/ML
50 INJECTION, SOLUTION INTRAMUSCULAR; INTRAVENOUS ONCE
Status: COMPLETED | OUTPATIENT
Start: 2025-06-04 | End: 2025-06-04

## 2025-06-04 RX ORDER — ACETAMINOPHEN 325 MG/1
650 TABLET ORAL
Status: CANCELLED | OUTPATIENT
Start: 2025-06-04

## 2025-06-04 RX ORDER — EPINEPHRINE 1 MG/ML
0.3 INJECTION, SOLUTION, CONCENTRATE INTRAVENOUS PRN
Status: CANCELLED | OUTPATIENT
Start: 2025-06-04

## 2025-06-04 RX ORDER — ONDANSETRON 2 MG/ML
8 INJECTION INTRAMUSCULAR; INTRAVENOUS ONCE
Status: CANCELLED | OUTPATIENT
Start: 2025-06-04 | End: 2025-06-04

## 2025-06-04 RX ORDER — FAMOTIDINE 10 MG/ML
20 INJECTION, SOLUTION INTRAVENOUS ONCE
Status: CANCELLED | OUTPATIENT
Start: 2025-06-04 | End: 2025-06-04

## 2025-06-04 RX ORDER — ALBUTEROL SULFATE 90 UG/1
4 INHALANT RESPIRATORY (INHALATION) PRN
Status: CANCELLED | OUTPATIENT
Start: 2025-06-04

## 2025-06-04 RX ORDER — HYDROCORTISONE SODIUM SUCCINATE 100 MG/2ML
100 INJECTION INTRAMUSCULAR; INTRAVENOUS
Status: CANCELLED | OUTPATIENT
Start: 2025-06-04

## 2025-06-04 RX ORDER — SODIUM CHLORIDE 9 MG/ML
5-250 INJECTION, SOLUTION INTRAVENOUS PRN
Status: DISCONTINUED | OUTPATIENT
Start: 2025-06-04 | End: 2025-06-05 | Stop reason: HOSPADM

## 2025-06-04 RX ORDER — MEPERIDINE HYDROCHLORIDE 50 MG/ML
12.5 INJECTION INTRAMUSCULAR; INTRAVENOUS; SUBCUTANEOUS PRN
Status: CANCELLED | OUTPATIENT
Start: 2025-06-04

## 2025-06-04 RX ORDER — ALBUTEROL SULFATE 90 UG/1
4 INHALANT RESPIRATORY (INHALATION) PRN
Status: DISCONTINUED | OUTPATIENT
Start: 2025-06-04 | End: 2025-06-05 | Stop reason: HOSPADM

## 2025-06-04 RX ORDER — DIPHENHYDRAMINE HYDROCHLORIDE 50 MG/ML
50 INJECTION, SOLUTION INTRAMUSCULAR; INTRAVENOUS
Status: DISCONTINUED | OUTPATIENT
Start: 2025-06-04 | End: 2025-06-05 | Stop reason: HOSPADM

## 2025-06-04 RX ADMIN — CARBOPLATIN 400 MG: 10 INJECTION, SOLUTION INTRAVENOUS at 16:18

## 2025-06-04 RX ADMIN — SODIUM CHLORIDE, PRESERVATIVE FREE 10 ML: 5 INJECTION INTRAVENOUS at 16:51

## 2025-06-04 RX ADMIN — SODIUM CHLORIDE, PRESERVATIVE FREE 20 ML: 5 INJECTION INTRAVENOUS at 09:00

## 2025-06-04 RX ADMIN — SODIUM CHLORIDE 25 ML/HR: 0.9 INJECTION, SOLUTION INTRAVENOUS at 11:50

## 2025-06-04 RX ADMIN — SODIUM CHLORIDE 150 MG: 0.9 INJECTION, SOLUTION INTRAVENOUS at 12:29

## 2025-06-04 RX ADMIN — FAMOTIDINE 20 MG: 10 INJECTION, SOLUTION INTRAVENOUS at 11:56

## 2025-06-04 RX ADMIN — SODIUM CHLORIDE 200 MG: 9 INJECTION, SOLUTION INTRAVENOUS at 11:16

## 2025-06-04 RX ADMIN — ONDANSETRON 8 MG: 2 INJECTION, SOLUTION INTRAMUSCULAR; INTRAVENOUS at 11:59

## 2025-06-04 RX ADMIN — DIPHENHYDRAMINE HYDROCHLORIDE 50 MG: 50 INJECTION INTRAMUSCULAR; INTRAVENOUS at 11:53

## 2025-06-04 RX ADMIN — DEXAMETHASONE SODIUM PHOSPHATE 12 MG: 4 INJECTION, SOLUTION INTRAMUSCULAR; INTRAVENOUS at 12:05

## 2025-06-04 RX ADMIN — SODIUM CHLORIDE, PRESERVATIVE FREE 10 ML: 5 INJECTION INTRAVENOUS at 11:55

## 2025-06-04 RX ADMIN — PACLITAXEL 426 MG: 6 INJECTION, SOLUTION INTRAVENOUS at 13:16

## 2025-06-04 ASSESSMENT — PAIN DESCRIPTION - LOCATION: LOCATION: BACK

## 2025-06-04 ASSESSMENT — PATIENT HEALTH QUESTIONNAIRE - PHQ9
1. LITTLE INTEREST OR PLEASURE IN DOING THINGS: NOT AT ALL
SUM OF ALL RESPONSES TO PHQ QUESTIONS 1-9: 0
2. FEELING DOWN, DEPRESSED OR HOPELESS: NOT AT ALL
SUM OF ALL RESPONSES TO PHQ QUESTIONS 1-9: 0

## 2025-06-04 ASSESSMENT — ENCOUNTER SYMPTOMS
COUGH: 1
BACK PAIN: 1

## 2025-06-04 ASSESSMENT — PAIN SCALES - GENERAL: PAINLEVEL_OUTOF10: 6

## 2025-06-04 ASSESSMENT — PAIN DESCRIPTION - ORIENTATION: ORIENTATION: LOWER

## 2025-06-04 NOTE — PROGRESS NOTES
to display                Elements of this note have been dictated via voice recognition software.  Text and phrases may be limited by the accuracy and autoconversion of the software.  The chart has been reviewed, but errors may still be present.          Elizabeth Lucia) RODRI Zacarias  [facility]  [address] [place] [address]  Office : [phone]  Fax : [phone]

## 2025-06-04 NOTE — PROGRESS NOTES
Patient to port lab for port access and lab draw. Port accessed per protocol; using 20g 0.75\" guzman needle without difficulty. Labs drawn from port and port flushed. Port remains accessed. Patient tolerated well. Discharged ambulatory.

## 2025-06-04 NOTE — PROGRESS NOTES
Diagnosis Orders   1. Cancer related pain  fentaNYL (DURAGESIC) 25 MCG/HR    oxyCODONE (ROXICODONE) 5 MG immediate release tablet      2. Palliative care encounter  fentaNYL (DURAGESIC) 25 MCG/HR    oxyCODONE (ROXICODONE) 5 MG immediate release tablet      3. Primary adenocarcinoma of right lung (HCC)  fentaNYL (DURAGESIC) 25 MCG/HR    oxyCODONE (ROXICODONE) 5 MG immediate release tablet      4. Chronic kidney disease, stage 3b (N18.32)        5. Neoplastic malignant related fatigue        6. Therapeutic opioid-induced constipation (OIC)                  PLAN:  Lab studies and imaging studies were personally reviewed.    Pertinent old records were reviewed from [facility]    Case discussed with Dr. Gerard.    Cancer related pain: Slightly improved, but could be better. Instructed to apply two Fentanyl 12 mcg patches to total 24 mcg and see if this helps lessen his oxycodone use. Will Rx Fentanyl 25 mcg patches and can resume oxyIR 5 mg tabs, with option to take 2 tabs as needed for more severe pain. Will be undergoing XRT x 10 fx for his tumor, so hopefully this will lessen his pain in the near future.   Naloxone nasal spray prescribed per [place] Bill 571, Act 22 at initial PC visit. Wife confirms she has this and knows how to use this.    Constipation: Continue Senna and Miralax. Will add Lactulose to utilize as needed. If these fail, consider Movantik.     Advanced Care Planning Discussed: Minimal today. Discussed likely will have some more worsening fatigue and slight increase of pain with XRT. He continues to be motivated to accomplish treatment.     Will follow up in: 1-2 weeks    All questions were asked and answered to the best of my ability.  In all, I spent 20 minutes in the care of Mr. Stephenson today, over 50% of which was in direct counseling and coordination of care.    I have reviewed the patient's controlled substance prescription history, as maintained in the [place] prescription monitoring

## 2025-06-04 NOTE — PROGRESS NOTES
Arrived to the Infusion Center.  Pembro/taxol/carbo completed. Patient tolerated well.   Any issues or concerns during appointment: none.  Patient aware of next lab/bsho/infusion appointment on 6/25, daily XRT beginning 6/9.  Patient instructed to call provider with temperature of 100.4 or greater or nausea/vomiting/ diarrhea or pain not controlled by medications  Discharged ambulatory.

## 2025-06-06 ENCOUNTER — CLINICAL DOCUMENTATION (OUTPATIENT)
Dept: CASE MANAGEMENT | Age: 89
End: 2025-06-06

## 2025-06-06 RX ORDER — LACTULOSE 10 G/15ML
20 SOLUTION ORAL 3 TIMES DAILY PRN
Qty: 473 ML | Refills: 1 | Status: SHIPPED | OUTPATIENT
Start: 2025-06-06 | End: 2025-06-20

## 2025-06-06 NOTE — PROGRESS NOTES
Spouse called regarding availability of lactulose prescription. She would like it sent to [facility]. Provider notified and script sent.  Goal of next outreach: follow up after completion of chemotherapy

## 2025-06-09 ENCOUNTER — HOSPITAL ENCOUNTER (OUTPATIENT)
Dept: RADIATION ONCOLOGY | Age: 89
Setting detail: RECURRING SERIES
Discharge: HOME | End: 2025-06-12
Payer: MEDICARE

## 2025-06-09 VITALS
TEMPERATURE: 97.7 F | HEART RATE: 79 BPM | OXYGEN SATURATION: 95 % | SYSTOLIC BLOOD PRESSURE: 121 MMHG | DIASTOLIC BLOOD PRESSURE: 76 MMHG

## 2025-06-09 LAB
RAD ONC ARIA COURSE FIRST TREATMENT DATE: NORMAL
RAD ONC ARIA COURSE ID: NORMAL
RAD ONC ARIA COURSE INTENT: NORMAL
RAD ONC ARIA COURSE LAST TREATMENT DATE: NORMAL
RAD ONC ARIA COURSE SESSION NUMBER: 1
RAD ONC ARIA COURSE START DATE: NORMAL
RAD ONC ARIA COURSE TREATMENT ELAPSED DAYS: 0
RAD ONC ARIA PLAN FRACTIONS TREATED TO DATE: 1
RAD ONC ARIA PLAN ID: NORMAL
RAD ONC ARIA PLAN PRESCRIBED DOSE PER FRACTION: 3 GY
RAD ONC ARIA PLAN PRIMARY REFERENCE POINT: NORMAL
RAD ONC ARIA PLAN TOTAL FRACTIONS PRESCRIBED: 10
RAD ONC ARIA PLAN TOTAL PRESCRIBED DOSE: 3000 CGY
RAD ONC ARIA REFERENCE POINT DOSAGE GIVEN TO DATE: 3 GY
RAD ONC ARIA REFERENCE POINT ID: NORMAL
RAD ONC ARIA REFERENCE POINT SESSION DOSAGE GIVEN: 3 GY

## 2025-06-09 PROCEDURE — 77280 THER RAD SIMULAJ FIELD SMPL: CPT

## 2025-06-09 PROCEDURE — 77412 RADIATION TX DELIVERY LVL 3: CPT

## 2025-06-09 NOTE — ON TREATMENT VISIT
[facility] RADIATION ONCOLOGY ON TREATMENT VISIT    Patient: Bertrand Stephenson MRN: [id number]  SSN: [id number]    YOB: 1935  Age: 89 y.o.  Sex: male      06/09/25    Diagnosis:  Metastatic NSCLC    This is a 89 y.o. male who is currently receiving palliative radiation.    Current RT dose: 3/30 Gy in 1/10 fractions.     No concurrent systemic therapy    Subjective:  Week 1: No complaints    Objective:  There were no vitals filed for this visit.  Pain 0/10    General: Alert and conversant, in NAD  Skin: Intact    Assessment:  Patient is tolerating radiation therapy well without toxicities early in treatment.    Plan:  -Continue RT as planned.  -Treatment images reviewed.  -The patient has a documented plan of care to address pain.  Pain is controlled on current regimen.    Kellie Ortiz MD  06/09/25

## 2025-06-10 ENCOUNTER — HOSPITAL ENCOUNTER (OUTPATIENT)
Dept: RADIATION ONCOLOGY | Age: 89
Setting detail: RECURRING SERIES
Discharge: HOME | End: 2025-06-13
Payer: MEDICARE

## 2025-06-10 LAB
RAD ONC ARIA COURSE FIRST TREATMENT DATE: NORMAL
RAD ONC ARIA COURSE ID: NORMAL
RAD ONC ARIA COURSE INTENT: NORMAL
RAD ONC ARIA COURSE LAST TREATMENT DATE: NORMAL
RAD ONC ARIA COURSE SESSION NUMBER: 2
RAD ONC ARIA COURSE START DATE: NORMAL
RAD ONC ARIA COURSE TREATMENT ELAPSED DAYS: 1
RAD ONC ARIA PLAN FRACTIONS TREATED TO DATE: 2
RAD ONC ARIA PLAN ID: NORMAL
RAD ONC ARIA PLAN PRESCRIBED DOSE PER FRACTION: 3 GY
RAD ONC ARIA PLAN PRIMARY REFERENCE POINT: NORMAL
RAD ONC ARIA PLAN TOTAL FRACTIONS PRESCRIBED: 10
RAD ONC ARIA PLAN TOTAL PRESCRIBED DOSE: 3000 CGY
RAD ONC ARIA REFERENCE POINT DOSAGE GIVEN TO DATE: 6 GY
RAD ONC ARIA REFERENCE POINT ID: NORMAL
RAD ONC ARIA REFERENCE POINT SESSION DOSAGE GIVEN: 3 GY

## 2025-06-10 PROCEDURE — 77412 RADIATION TX DELIVERY LVL 3: CPT

## 2025-06-11 ENCOUNTER — HOSPITAL ENCOUNTER (OUTPATIENT)
Dept: RADIATION ONCOLOGY | Age: 89
Setting detail: RECURRING SERIES
Discharge: HOME | End: 2025-06-14
Payer: MEDICARE

## 2025-06-11 LAB
RAD ONC ARIA COURSE FIRST TREATMENT DATE: NORMAL
RAD ONC ARIA COURSE ID: NORMAL
RAD ONC ARIA COURSE INTENT: NORMAL
RAD ONC ARIA COURSE LAST TREATMENT DATE: NORMAL
RAD ONC ARIA COURSE SESSION NUMBER: 3
RAD ONC ARIA COURSE START DATE: NORMAL
RAD ONC ARIA COURSE TREATMENT ELAPSED DAYS: 2
RAD ONC ARIA PLAN FRACTIONS TREATED TO DATE: 3
RAD ONC ARIA PLAN ID: NORMAL
RAD ONC ARIA PLAN PRESCRIBED DOSE PER FRACTION: 3 GY
RAD ONC ARIA PLAN PRIMARY REFERENCE POINT: NORMAL
RAD ONC ARIA PLAN TOTAL FRACTIONS PRESCRIBED: 10
RAD ONC ARIA PLAN TOTAL PRESCRIBED DOSE: 3000 CGY
RAD ONC ARIA REFERENCE POINT DOSAGE GIVEN TO DATE: 9 GY
RAD ONC ARIA REFERENCE POINT ID: NORMAL
RAD ONC ARIA REFERENCE POINT SESSION DOSAGE GIVEN: 3 GY

## 2025-06-11 PROCEDURE — 77412 RADIATION TX DELIVERY LVL 3: CPT

## 2025-06-12 ENCOUNTER — HOSPITAL ENCOUNTER (OUTPATIENT)
Dept: RADIATION ONCOLOGY | Age: 89
Setting detail: RECURRING SERIES
Discharge: HOME | End: 2025-06-15
Payer: MEDICARE

## 2025-06-12 LAB
RAD ONC ARIA COURSE FIRST TREATMENT DATE: NORMAL
RAD ONC ARIA COURSE ID: NORMAL
RAD ONC ARIA COURSE INTENT: NORMAL
RAD ONC ARIA COURSE LAST TREATMENT DATE: NORMAL
RAD ONC ARIA COURSE SESSION NUMBER: 4
RAD ONC ARIA COURSE START DATE: NORMAL
RAD ONC ARIA COURSE TREATMENT ELAPSED DAYS: 3
RAD ONC ARIA PLAN FRACTIONS TREATED TO DATE: 4
RAD ONC ARIA PLAN ID: NORMAL
RAD ONC ARIA PLAN PRESCRIBED DOSE PER FRACTION: 3 GY
RAD ONC ARIA PLAN PRIMARY REFERENCE POINT: NORMAL
RAD ONC ARIA PLAN TOTAL FRACTIONS PRESCRIBED: 10
RAD ONC ARIA PLAN TOTAL PRESCRIBED DOSE: 3000 CGY
RAD ONC ARIA REFERENCE POINT DOSAGE GIVEN TO DATE: 12 GY
RAD ONC ARIA REFERENCE POINT ID: NORMAL
RAD ONC ARIA REFERENCE POINT SESSION DOSAGE GIVEN: 3 GY

## 2025-06-12 PROCEDURE — 77412 RADIATION TX DELIVERY LVL 3: CPT

## 2025-06-13 ENCOUNTER — HOSPITAL ENCOUNTER (OUTPATIENT)
Dept: RADIATION ONCOLOGY | Age: 89
Setting detail: RECURRING SERIES
Discharge: HOME | End: 2025-06-16
Payer: MEDICARE

## 2025-06-13 LAB
RAD ONC ARIA COURSE FIRST TREATMENT DATE: NORMAL
RAD ONC ARIA COURSE ID: NORMAL
RAD ONC ARIA COURSE INTENT: NORMAL
RAD ONC ARIA COURSE LAST TREATMENT DATE: NORMAL
RAD ONC ARIA COURSE SESSION NUMBER: 5
RAD ONC ARIA COURSE START DATE: NORMAL
RAD ONC ARIA COURSE TREATMENT ELAPSED DAYS: 4
RAD ONC ARIA PLAN FRACTIONS TREATED TO DATE: 5
RAD ONC ARIA PLAN ID: NORMAL
RAD ONC ARIA PLAN PRESCRIBED DOSE PER FRACTION: 3 GY
RAD ONC ARIA PLAN PRIMARY REFERENCE POINT: NORMAL
RAD ONC ARIA PLAN TOTAL FRACTIONS PRESCRIBED: 10
RAD ONC ARIA PLAN TOTAL PRESCRIBED DOSE: 3000 CGY
RAD ONC ARIA REFERENCE POINT DOSAGE GIVEN TO DATE: 15 GY
RAD ONC ARIA REFERENCE POINT ID: NORMAL
RAD ONC ARIA REFERENCE POINT SESSION DOSAGE GIVEN: 3 GY

## 2025-06-13 PROCEDURE — 77336 RADIATION PHYSICS CONSULT: CPT

## 2025-06-13 PROCEDURE — 77412 RADIATION TX DELIVERY LVL 3: CPT

## 2025-06-16 ENCOUNTER — HOSPITAL ENCOUNTER (OUTPATIENT)
Dept: RADIATION ONCOLOGY | Age: 89
Setting detail: RECURRING SERIES
Discharge: HOME | End: 2025-06-19
Payer: MEDICARE

## 2025-06-16 VITALS
DIASTOLIC BLOOD PRESSURE: 80 MMHG | OXYGEN SATURATION: 98 % | WEIGHT: 197.2 LBS | SYSTOLIC BLOOD PRESSURE: 152 MMHG | RESPIRATION RATE: 16 BRPM | TEMPERATURE: 98 F | BODY MASS INDEX: 29.12 KG/M2 | HEART RATE: 72 BPM

## 2025-06-16 LAB
RAD ONC ARIA COURSE FIRST TREATMENT DATE: NORMAL
RAD ONC ARIA COURSE ID: NORMAL
RAD ONC ARIA COURSE INTENT: NORMAL
RAD ONC ARIA COURSE LAST TREATMENT DATE: NORMAL
RAD ONC ARIA COURSE SESSION NUMBER: 6
RAD ONC ARIA COURSE START DATE: NORMAL
RAD ONC ARIA COURSE TREATMENT ELAPSED DAYS: 7
RAD ONC ARIA PLAN FRACTIONS TREATED TO DATE: 6
RAD ONC ARIA PLAN ID: NORMAL
RAD ONC ARIA PLAN PRESCRIBED DOSE PER FRACTION: 3 GY
RAD ONC ARIA PLAN PRIMARY REFERENCE POINT: NORMAL
RAD ONC ARIA PLAN TOTAL FRACTIONS PRESCRIBED: 10
RAD ONC ARIA PLAN TOTAL PRESCRIBED DOSE: 3000 CGY
RAD ONC ARIA REFERENCE POINT DOSAGE GIVEN TO DATE: 18 GY
RAD ONC ARIA REFERENCE POINT ID: NORMAL
RAD ONC ARIA REFERENCE POINT SESSION DOSAGE GIVEN: 3 GY

## 2025-06-16 PROCEDURE — 77412 RADIATION TX DELIVERY LVL 3: CPT

## 2025-06-16 PROCEDURE — 77417 THER RADIOLOGY PORT IMAGE(S): CPT

## 2025-06-16 RX ORDER — METHYLPREDNISOLONE 4 MG/1
TABLET ORAL
Qty: 1 KIT | Refills: 0 | Status: SHIPPED | OUTPATIENT
Start: 2025-06-16 | End: 2025-06-22

## 2025-06-16 ASSESSMENT — PAIN DESCRIPTION - LOCATION: LOCATION: BACK

## 2025-06-16 ASSESSMENT — PAIN SCALES - GENERAL: PAINLEVEL_OUTOF10: 5

## 2025-06-16 NOTE — ON TREATMENT VISIT
[facility] RADIATION ONCOLOGY ON TREATMENT VISIT    Patient: Bertrand Stephenson MRN: [id number]  SSN: [id number]    YOB: 1935  Age: 89 y.o.  Sex: male      06/16/25    Diagnosis:  Metastatic NSCLC    This is a 89 y.o. male who is currently receiving palliative radiation.    Current RT dose: 18/30 Gy in 6/10 fractions.     No concurrent systemic therapy    Subjective:  Week 1: No complaints  Week 2: Increased pain in the back this week now up to a 5/10.     Objective:  There were no vitals filed for this visit.  Pain 5/10    General: Alert and conversant, in NAD  Skin: Intact    Assessment:  Patient is tolerating radiation therapy well without toxicities early in treatment.    Plan:  -Continue RT as planned.  -medrol dose pack prescribed for suspected mild pain flare.   -Treatment images reviewed.  -The patient has a documented plan of care to address pain.  Pain is controlled on current regimen.    Brenden Camara MD  06/16/25

## 2025-06-17 ENCOUNTER — HOSPITAL ENCOUNTER (OUTPATIENT)
Dept: RADIATION ONCOLOGY | Age: 89
Setting detail: RECURRING SERIES
Discharge: HOME | End: 2025-06-20
Payer: MEDICARE

## 2025-06-17 LAB
RAD ONC ARIA COURSE FIRST TREATMENT DATE: NORMAL
RAD ONC ARIA COURSE ID: NORMAL
RAD ONC ARIA COURSE INTENT: NORMAL
RAD ONC ARIA COURSE LAST TREATMENT DATE: NORMAL
RAD ONC ARIA COURSE SESSION NUMBER: 7
RAD ONC ARIA COURSE START DATE: NORMAL
RAD ONC ARIA COURSE TREATMENT ELAPSED DAYS: 8
RAD ONC ARIA PLAN FRACTIONS TREATED TO DATE: 7
RAD ONC ARIA PLAN ID: NORMAL
RAD ONC ARIA PLAN PRESCRIBED DOSE PER FRACTION: 3 GY
RAD ONC ARIA PLAN PRIMARY REFERENCE POINT: NORMAL
RAD ONC ARIA PLAN TOTAL FRACTIONS PRESCRIBED: 10
RAD ONC ARIA PLAN TOTAL PRESCRIBED DOSE: 3000 CGY
RAD ONC ARIA REFERENCE POINT DOSAGE GIVEN TO DATE: 21 GY
RAD ONC ARIA REFERENCE POINT ID: NORMAL
RAD ONC ARIA REFERENCE POINT SESSION DOSAGE GIVEN: 3 GY

## 2025-06-17 PROCEDURE — 77412 RADIATION TX DELIVERY LVL 3: CPT

## 2025-06-18 ENCOUNTER — HOSPITAL ENCOUNTER (OUTPATIENT)
Dept: RADIATION ONCOLOGY | Age: 89
Setting detail: RECURRING SERIES
Discharge: HOME | End: 2025-06-21
Payer: MEDICARE

## 2025-06-18 ENCOUNTER — OFFICE VISIT (OUTPATIENT)
Dept: PALLATIVE CARE | Age: 89
End: 2025-06-18
Payer: MEDICARE

## 2025-06-18 VITALS
SYSTOLIC BLOOD PRESSURE: 119 MMHG | OXYGEN SATURATION: 95 % | WEIGHT: 195 LBS | HEART RATE: 70 BPM | HEIGHT: 69 IN | RESPIRATION RATE: 16 BRPM | BODY MASS INDEX: 28.88 KG/M2 | TEMPERATURE: 97.9 F | DIASTOLIC BLOOD PRESSURE: 72 MMHG

## 2025-06-18 DIAGNOSIS — T40.2X5A THERAPEUTIC OPIOID-INDUCED CONSTIPATION (OIC): ICD-10-CM

## 2025-06-18 DIAGNOSIS — Z51.5 ENCOUNTER FOR PALLIATIVE CARE: ICD-10-CM

## 2025-06-18 DIAGNOSIS — G89.3 CANCER ASSOCIATED PAIN: Primary | ICD-10-CM

## 2025-06-18 DIAGNOSIS — C34.91 PRIMARY ADENOCARCINOMA OF RIGHT LUNG (HCC): ICD-10-CM

## 2025-06-18 DIAGNOSIS — K59.03 THERAPEUTIC OPIOID-INDUCED CONSTIPATION (OIC): ICD-10-CM

## 2025-06-18 LAB
RAD ONC ARIA COURSE FIRST TREATMENT DATE: NORMAL
RAD ONC ARIA COURSE ID: NORMAL
RAD ONC ARIA COURSE INTENT: NORMAL
RAD ONC ARIA COURSE LAST TREATMENT DATE: NORMAL
RAD ONC ARIA COURSE SESSION NUMBER: 8
RAD ONC ARIA COURSE START DATE: NORMAL
RAD ONC ARIA COURSE TREATMENT ELAPSED DAYS: 9
RAD ONC ARIA PLAN FRACTIONS TREATED TO DATE: 8
RAD ONC ARIA PLAN ID: NORMAL
RAD ONC ARIA PLAN PRESCRIBED DOSE PER FRACTION: 3 GY
RAD ONC ARIA PLAN PRIMARY REFERENCE POINT: NORMAL
RAD ONC ARIA PLAN TOTAL FRACTIONS PRESCRIBED: 10
RAD ONC ARIA PLAN TOTAL PRESCRIBED DOSE: 3000 CGY
RAD ONC ARIA REFERENCE POINT DOSAGE GIVEN TO DATE: 24 GY
RAD ONC ARIA REFERENCE POINT ID: NORMAL
RAD ONC ARIA REFERENCE POINT SESSION DOSAGE GIVEN: 3 GY

## 2025-06-18 PROCEDURE — 99213 OFFICE O/P EST LOW 20 MIN: CPT | Performed by: PHYSICIAN ASSISTANT

## 2025-06-18 PROCEDURE — 1123F ACP DISCUSS/DSCN MKR DOCD: CPT | Performed by: PHYSICIAN ASSISTANT

## 2025-06-18 PROCEDURE — 77412 RADIATION TX DELIVERY LVL 3: CPT

## 2025-06-18 PROCEDURE — 1125F AMNT PAIN NOTED PAIN PRSNT: CPT | Performed by: PHYSICIAN ASSISTANT

## 2025-06-18 RX ORDER — LACTULOSE 10 G/15ML
20 SOLUTION ORAL 3 TIMES DAILY PRN
Qty: 473 ML | Refills: 1 | Status: SHIPPED | OUTPATIENT
Start: 2025-06-18 | End: 2025-07-02

## 2025-06-18 ASSESSMENT — ENCOUNTER SYMPTOMS
BACK PAIN: 1
COUGH: 1

## 2025-06-18 NOTE — PROGRESS NOTES
amiodarone (CORDARONE) 200 MG tablet Take 1 tablet by mouth daily 90 tablet 3    rivaroxaban (XARELTO) 15 MG TABS tablet Take 1 tablet by mouth daily (with breakfast) 90 tablet 3    prochlorperazine (COMPAZINE) 10 MG tablet Take 1 tablet by mouth every 6 hours as needed (nausea/vomiting) (Patient not taking: Reported on 5/20/2025) 120 tablet 3    Saw Palmetto, Serenoa repens, (SAW PALMETTO PO) Take by mouth daily (Patient not taking: Reported on 4/29/2025)      Multiple Vitamins-Minerals (THERAPEUTIC MULTIVITAMIN-MINERALS) tablet Take 1 tablet by mouth daily (Patient not taking: Reported on 4/29/2025)      ascorbic acid (VITAMIN C) 1000 MG tablet Take by mouth 2 times daily (Patient not taking: Reported on 4/29/2025)      loratadine (CLARITIN) 10 MG capsule Take 1 capsule by mouth nightly as needed (Allergies) (Patient not taking: Reported on 4/29/2025)      Cholecalciferol 400 UNIT TABS tablet Take 1 tablet by mouth daily (Patient not taking: Reported on 4/29/2025)       No current facility-administered medications for this visit.       OBJECTIVE:  Wt Readings from Last 1 Encounters:   06/18/25 88.5 kg (195 lb)     Temp Readings from Last 1 Encounters:   06/18/25 97.9 °F (36.6 °C) (Oral)     BP Readings from Last 1 Encounters:   06/18/25 119/72     Pulse Readings from Last 1 Encounters:   06/18/25 70        Pain Score: Two (Fatigue-3)          Physical Exam:  Constitutional: Well developed, well nourished male in no acute distress.   HEENT: Normocephalic and atraumatic. Oropharynx is clear, mucous membranes are moist.  Pupils are equal, round, and reactive to light. Extraocular muscles are intact.  Sclerae anicteric. Neck supple without JVD.   Lymph node   deferred   Skin Warm and dry.  No bruising and no rash noted.  No erythema.  No pallor.    Respiratory Lungs are clear to auscultation bilaterally without wheezes, rales or rhonchi, unlabored respiratory effort.    CVS Normal rate, regular rhythm and normal S1

## 2025-06-19 ENCOUNTER — HOSPITAL ENCOUNTER (OUTPATIENT)
Dept: RADIATION ONCOLOGY | Age: 89
Setting detail: RECURRING SERIES
Discharge: HOME | End: 2025-06-22
Payer: MEDICARE

## 2025-06-19 LAB
RAD ONC ARIA COURSE FIRST TREATMENT DATE: NORMAL
RAD ONC ARIA COURSE ID: NORMAL
RAD ONC ARIA COURSE INTENT: NORMAL
RAD ONC ARIA COURSE LAST TREATMENT DATE: NORMAL
RAD ONC ARIA COURSE SESSION NUMBER: 9
RAD ONC ARIA COURSE START DATE: NORMAL
RAD ONC ARIA COURSE TREATMENT ELAPSED DAYS: 10
RAD ONC ARIA PLAN FRACTIONS TREATED TO DATE: 9
RAD ONC ARIA PLAN ID: NORMAL
RAD ONC ARIA PLAN PRESCRIBED DOSE PER FRACTION: 3 GY
RAD ONC ARIA PLAN PRIMARY REFERENCE POINT: NORMAL
RAD ONC ARIA PLAN TOTAL FRACTIONS PRESCRIBED: 10
RAD ONC ARIA PLAN TOTAL PRESCRIBED DOSE: 3000 CGY
RAD ONC ARIA REFERENCE POINT DOSAGE GIVEN TO DATE: 27 GY
RAD ONC ARIA REFERENCE POINT ID: NORMAL
RAD ONC ARIA REFERENCE POINT SESSION DOSAGE GIVEN: 3 GY

## 2025-06-19 PROCEDURE — 77412 RADIATION TX DELIVERY LVL 3: CPT

## 2025-06-20 ENCOUNTER — HOSPITAL ENCOUNTER (OUTPATIENT)
Dept: RADIATION ONCOLOGY | Age: 89
Setting detail: RECURRING SERIES
Discharge: HOME | End: 2025-06-23
Payer: MEDICARE

## 2025-06-20 LAB
RAD ONC ARIA COURSE FIRST TREATMENT DATE: NORMAL
RAD ONC ARIA COURSE ID: NORMAL
RAD ONC ARIA COURSE INTENT: NORMAL
RAD ONC ARIA COURSE LAST TREATMENT DATE: NORMAL
RAD ONC ARIA COURSE SESSION NUMBER: 10
RAD ONC ARIA COURSE START DATE: NORMAL
RAD ONC ARIA COURSE TREATMENT ELAPSED DAYS: 11
RAD ONC ARIA PLAN FRACTIONS TREATED TO DATE: 10
RAD ONC ARIA PLAN ID: NORMAL
RAD ONC ARIA PLAN PRESCRIBED DOSE PER FRACTION: 3 GY
RAD ONC ARIA PLAN PRIMARY REFERENCE POINT: NORMAL
RAD ONC ARIA PLAN TOTAL FRACTIONS PRESCRIBED: 10
RAD ONC ARIA PLAN TOTAL PRESCRIBED DOSE: 3000 CGY
RAD ONC ARIA REFERENCE POINT DOSAGE GIVEN TO DATE: 30 GY
RAD ONC ARIA REFERENCE POINT ID: NORMAL
RAD ONC ARIA REFERENCE POINT SESSION DOSAGE GIVEN: 3 GY

## 2025-06-20 PROCEDURE — 77336 RADIATION PHYSICS CONSULT: CPT

## 2025-06-20 PROCEDURE — 77412 RADIATION TX DELIVERY LVL 3: CPT

## 2025-06-23 DIAGNOSIS — I48.0 PAROXYSMAL ATRIAL FIBRILLATION (HCC): ICD-10-CM

## 2025-06-23 DIAGNOSIS — I25.10 CORONARY ARTERY DISEASE INVOLVING NATIVE CORONARY ARTERY OF NATIVE HEART WITHOUT ANGINA PECTORIS: ICD-10-CM

## 2025-06-23 DIAGNOSIS — Z78.9 STATIN INTOLERANCE: ICD-10-CM

## 2025-06-23 DIAGNOSIS — Z79.899 AT RISK FOR AMIODARONE TOXICITY WITH LONG TERM USE: ICD-10-CM

## 2025-06-23 DIAGNOSIS — Z91.89 AT RISK FOR AMIODARONE TOXICITY WITH LONG TERM USE: ICD-10-CM

## 2025-06-23 RX ORDER — METOPROLOL SUCCINATE 25 MG/1
25 TABLET, EXTENDED RELEASE ORAL DAILY
Qty: 100 TABLET | Refills: 3 | Status: SHIPPED | OUTPATIENT
Start: 2025-06-23

## 2025-06-25 ENCOUNTER — OFFICE VISIT (OUTPATIENT)
Dept: PALLATIVE CARE | Age: 89
End: 2025-06-25
Payer: MEDICARE

## 2025-06-25 ENCOUNTER — OFFICE VISIT (OUTPATIENT)
Dept: ONCOLOGY | Age: 89
End: 2025-06-25
Payer: MEDICARE

## 2025-06-25 ENCOUNTER — HOSPITAL ENCOUNTER (OUTPATIENT)
Dept: INFUSION THERAPY | Age: 89
Setting detail: INFUSION SERIES
Discharge: HOME | End: 2025-06-25
Payer: MEDICARE

## 2025-06-25 ENCOUNTER — HOSPITAL ENCOUNTER (OUTPATIENT)
Dept: LAB | Age: 89
Discharge: HOME | End: 2025-06-25
Payer: MEDICARE

## 2025-06-25 VITALS
HEIGHT: 69 IN | BODY MASS INDEX: 28.47 KG/M2 | WEIGHT: 192.2 LBS | RESPIRATION RATE: 18 BRPM | OXYGEN SATURATION: 94 % | SYSTOLIC BLOOD PRESSURE: 95 MMHG | HEART RATE: 65 BPM | TEMPERATURE: 97.9 F | DIASTOLIC BLOOD PRESSURE: 49 MMHG

## 2025-06-25 DIAGNOSIS — C34.91 PRIMARY ADENOCARCINOMA OF RIGHT LUNG (HCC): ICD-10-CM

## 2025-06-25 DIAGNOSIS — C34.91 PRIMARY ADENOCARCINOMA OF RIGHT LUNG (HCC): Primary | ICD-10-CM

## 2025-06-25 DIAGNOSIS — R63.4 WEIGHT LOSS: ICD-10-CM

## 2025-06-25 DIAGNOSIS — Z51.5 ENCOUNTER FOR PALLIATIVE CARE: ICD-10-CM

## 2025-06-25 DIAGNOSIS — Z51.11 ENCOUNTER FOR ANTINEOPLASTIC CHEMOTHERAPY: ICD-10-CM

## 2025-06-25 DIAGNOSIS — Z79.899 HIGH RISK MEDICATION USE: ICD-10-CM

## 2025-06-25 DIAGNOSIS — T40.2X5A THERAPEUTIC OPIOID-INDUCED CONSTIPATION (OIC): ICD-10-CM

## 2025-06-25 DIAGNOSIS — G89.3 CANCER ASSOCIATED PAIN: Primary | ICD-10-CM

## 2025-06-25 DIAGNOSIS — E86.1 HYPOTENSION DUE TO HYPOVOLEMIA: ICD-10-CM

## 2025-06-25 DIAGNOSIS — R53.0 NEOPLASTIC MALIGNANT RELATED FATIGUE: ICD-10-CM

## 2025-06-25 DIAGNOSIS — K59.03 THERAPEUTIC OPIOID-INDUCED CONSTIPATION (OIC): ICD-10-CM

## 2025-06-25 LAB
ALBUMIN SERPL-MCNC: 2.9 G/DL (ref 3.2–4.6)
ALBUMIN/GLOB SERPL: 0.7 (ref 1–1.9)
ALP SERPL-CCNC: 79 U/L (ref 40–129)
ALT SERPL-CCNC: 14 U/L (ref 8–55)
ANION GAP SERPL CALC-SCNC: 11 MMOL/L (ref 7–16)
AST SERPL-CCNC: 41 U/L (ref 15–37)
BASOPHILS # BLD: 0.02 K/UL (ref 0–0.2)
BASOPHILS NFR BLD: 0.2 % (ref 0–2)
BILIRUB SERPL-MCNC: 0.5 MG/DL (ref 0–1.2)
BUN SERPL-MCNC: 37 MG/DL (ref 8–23)
CALCIUM SERPL-MCNC: 9 MG/DL (ref 8.8–10.2)
CEA SERPL-MCNC: 208 NG/ML (ref 0–3.8)
CHLORIDE SERPL-SCNC: 102 MMOL/L (ref 98–107)
CO2 SERPL-SCNC: 23 MMOL/L (ref 20–29)
CREAT SERPL-MCNC: 1.51 MG/DL (ref 0.8–1.3)
DIFFERENTIAL METHOD BLD: ABNORMAL
EOSINOPHIL # BLD: 0.02 K/UL (ref 0–0.8)
EOSINOPHIL NFR BLD: 0.2 % (ref 0.5–7.8)
ERYTHROCYTE [DISTWIDTH] IN BLOOD BY AUTOMATED COUNT: 15 % (ref 11.9–14.6)
GLOBULIN SER CALC-MCNC: 3.9 G/DL (ref 2.3–3.5)
GLUCOSE SERPL-MCNC: 170 MG/DL (ref 70–99)
HCT VFR BLD AUTO: 35.4 % (ref 41.1–50.3)
HGB BLD-MCNC: 11.4 G/DL (ref 13.6–17.2)
IMM GRANULOCYTES # BLD AUTO: 0.06 K/UL (ref 0–0.5)
IMM GRANULOCYTES NFR BLD AUTO: 0.6 % (ref 0–5)
LYMPHOCYTES # BLD: 0.92 K/UL (ref 0.5–4.6)
LYMPHOCYTES NFR BLD: 9.3 % (ref 13–44)
MCH RBC QN AUTO: 30.4 PG (ref 26.1–32.9)
MCHC RBC AUTO-ENTMCNC: 32.2 G/DL (ref 31.4–35)
MCV RBC AUTO: 94.4 FL (ref 82–102)
MONOCYTES # BLD: 0.93 K/UL (ref 0.1–1.3)
MONOCYTES NFR BLD: 9.4 % (ref 4–12)
NEUTS SEG # BLD: 7.96 K/UL (ref 1.7–8.2)
NEUTS SEG NFR BLD: 80.3 % (ref 43–78)
NRBC # BLD: 0 K/UL (ref 0–0.2)
PLATELET # BLD AUTO: 135 K/UL (ref 150–450)
PMV BLD AUTO: 9.7 FL (ref 9.4–12.3)
POTASSIUM SERPL-SCNC: 4.5 MMOL/L (ref 3.5–5.1)
PROT SERPL-MCNC: 6.8 G/DL (ref 6.3–8.2)
RBC # BLD AUTO: 3.75 M/UL (ref 4.23–5.6)
SODIUM SERPL-SCNC: 136 MMOL/L (ref 136–145)
TSH, 3RD GENERATION: 0.71 UIU/ML (ref 0.27–4.2)
WBC # BLD AUTO: 9.9 K/UL (ref 4.3–11.1)

## 2025-06-25 PROCEDURE — 80053 COMPREHEN METABOLIC PANEL: CPT

## 2025-06-25 PROCEDURE — 6360000002 HC RX W HCPCS: Performed by: INTERNAL MEDICINE

## 2025-06-25 PROCEDURE — 96413 CHEMO IV INFUSION 1 HR: CPT

## 2025-06-25 PROCEDURE — 96367 TX/PROPH/DG ADDL SEQ IV INF: CPT

## 2025-06-25 PROCEDURE — 96375 TX/PRO/DX INJ NEW DRUG ADDON: CPT

## 2025-06-25 PROCEDURE — 1123F ACP DISCUSS/DSCN MKR DOCD: CPT | Performed by: INTERNAL MEDICINE

## 2025-06-25 PROCEDURE — 2500000003 HC RX 250 WO HCPCS: Performed by: INTERNAL MEDICINE

## 2025-06-25 PROCEDURE — 1159F MED LIST DOCD IN RCRD: CPT | Performed by: NURSE PRACTITIONER

## 2025-06-25 PROCEDURE — 1125F AMNT PAIN NOTED PAIN PRSNT: CPT | Performed by: INTERNAL MEDICINE

## 2025-06-25 PROCEDURE — 82378 CARCINOEMBRYONIC ANTIGEN: CPT

## 2025-06-25 PROCEDURE — 2580000003 HC RX 258: Performed by: INTERNAL MEDICINE

## 2025-06-25 PROCEDURE — 1159F MED LIST DOCD IN RCRD: CPT | Performed by: INTERNAL MEDICINE

## 2025-06-25 PROCEDURE — 1123F ACP DISCUSS/DSCN MKR DOCD: CPT | Performed by: NURSE PRACTITIONER

## 2025-06-25 PROCEDURE — 99215 OFFICE O/P EST HI 40 MIN: CPT | Performed by: INTERNAL MEDICINE

## 2025-06-25 PROCEDURE — 96415 CHEMO IV INFUSION ADDL HR: CPT

## 2025-06-25 PROCEDURE — 96417 CHEMO IV INFUS EACH ADDL SEQ: CPT

## 2025-06-25 PROCEDURE — 99214 OFFICE O/P EST MOD 30 MIN: CPT | Performed by: NURSE PRACTITIONER

## 2025-06-25 PROCEDURE — 85025 COMPLETE CBC W/AUTO DIFF WBC: CPT

## 2025-06-25 PROCEDURE — 84443 ASSAY THYROID STIM HORMONE: CPT

## 2025-06-25 RX ORDER — HYDROCORTISONE SODIUM SUCCINATE 100 MG/2ML
100 INJECTION INTRAMUSCULAR; INTRAVENOUS
Status: CANCELLED | OUTPATIENT
Start: 2025-06-25

## 2025-06-25 RX ORDER — SODIUM CHLORIDE 9 MG/ML
5-250 INJECTION, SOLUTION INTRAVENOUS PRN
Status: CANCELLED | OUTPATIENT
Start: 2025-06-25

## 2025-06-25 RX ORDER — DIPHENHYDRAMINE HYDROCHLORIDE 50 MG/ML
50 INJECTION, SOLUTION INTRAMUSCULAR; INTRAVENOUS ONCE
Status: CANCELLED | OUTPATIENT
Start: 2025-06-25 | End: 2025-06-25

## 2025-06-25 RX ORDER — DIPHENHYDRAMINE HYDROCHLORIDE 50 MG/ML
50 INJECTION, SOLUTION INTRAMUSCULAR; INTRAVENOUS ONCE
Status: COMPLETED | OUTPATIENT
Start: 2025-06-25 | End: 2025-06-25

## 2025-06-25 RX ORDER — FAMOTIDINE 10 MG/ML
20 INJECTION, SOLUTION INTRAVENOUS
Status: CANCELLED | OUTPATIENT
Start: 2025-06-25

## 2025-06-25 RX ORDER — ACETAMINOPHEN 325 MG/1
650 TABLET ORAL
OUTPATIENT
Start: 2025-06-25

## 2025-06-25 RX ORDER — SODIUM CHLORIDE 9 MG/ML
5-250 INJECTION, SOLUTION INTRAVENOUS PRN
Status: DISCONTINUED | OUTPATIENT
Start: 2025-06-25 | End: 2025-06-26 | Stop reason: HOSPADM

## 2025-06-25 RX ORDER — EPINEPHRINE 1 MG/ML
0.3 INJECTION, SOLUTION, CONCENTRATE INTRAVENOUS PRN
Status: CANCELLED | OUTPATIENT
Start: 2025-06-25

## 2025-06-25 RX ORDER — ALBUTEROL SULFATE 90 UG/1
4 INHALANT RESPIRATORY (INHALATION) PRN
Status: CANCELLED | OUTPATIENT
Start: 2025-06-25

## 2025-06-25 RX ORDER — MEPERIDINE HYDROCHLORIDE 25 MG/ML
12.5 INJECTION INTRAMUSCULAR; INTRAVENOUS; SUBCUTANEOUS PRN
Status: DISCONTINUED | OUTPATIENT
Start: 2025-06-25 | End: 2025-06-26 | Stop reason: HOSPADM

## 2025-06-25 RX ORDER — DIPHENHYDRAMINE HYDROCHLORIDE 50 MG/ML
50 INJECTION, SOLUTION INTRAMUSCULAR; INTRAVENOUS
Status: DISCONTINUED | OUTPATIENT
Start: 2025-06-25 | End: 2025-06-26 | Stop reason: HOSPADM

## 2025-06-25 RX ORDER — HEPARIN SODIUM (PORCINE) LOCK FLUSH IV SOLN 100 UNIT/ML 100 UNIT/ML
500 SOLUTION INTRAVENOUS PRN
OUTPATIENT
Start: 2025-06-25

## 2025-06-25 RX ORDER — ONDANSETRON 2 MG/ML
8 INJECTION INTRAMUSCULAR; INTRAVENOUS
Status: CANCELLED | OUTPATIENT
Start: 2025-06-25

## 2025-06-25 RX ORDER — FAMOTIDINE 10 MG/ML
20 INJECTION, SOLUTION INTRAVENOUS ONCE
Status: CANCELLED | OUTPATIENT
Start: 2025-06-25 | End: 2025-06-25

## 2025-06-25 RX ORDER — DIPHENHYDRAMINE HYDROCHLORIDE 50 MG/ML
50 INJECTION, SOLUTION INTRAMUSCULAR; INTRAVENOUS
Status: CANCELLED | OUTPATIENT
Start: 2025-06-25

## 2025-06-25 RX ORDER — ONDANSETRON 2 MG/ML
8 INJECTION INTRAMUSCULAR; INTRAVENOUS
Status: DISCONTINUED | OUTPATIENT
Start: 2025-06-25 | End: 2025-06-26 | Stop reason: HOSPADM

## 2025-06-25 RX ORDER — SODIUM CHLORIDE 9 MG/ML
5-250 INJECTION, SOLUTION INTRAVENOUS PRN
OUTPATIENT
Start: 2025-06-25

## 2025-06-25 RX ORDER — SODIUM CHLORIDE 0.9 % (FLUSH) 0.9 %
5-40 SYRINGE (ML) INJECTION PRN
Status: CANCELLED | OUTPATIENT
Start: 2025-06-25

## 2025-06-25 RX ORDER — SODIUM CHLORIDE 0.9 % (FLUSH) 0.9 %
5-40 SYRINGE (ML) INJECTION PRN
Status: DISCONTINUED | OUTPATIENT
Start: 2025-06-25 | End: 2025-06-26 | Stop reason: HOSPADM

## 2025-06-25 RX ORDER — ALBUTEROL SULFATE 90 UG/1
4 INHALANT RESPIRATORY (INHALATION) PRN
Status: DISCONTINUED | OUTPATIENT
Start: 2025-06-25 | End: 2025-06-26 | Stop reason: HOSPADM

## 2025-06-25 RX ORDER — MEPERIDINE HYDROCHLORIDE 50 MG/ML
12.5 INJECTION INTRAMUSCULAR; INTRAVENOUS; SUBCUTANEOUS PRN
Status: CANCELLED | OUTPATIENT
Start: 2025-06-25

## 2025-06-25 RX ORDER — ONDANSETRON 2 MG/ML
8 INJECTION INTRAMUSCULAR; INTRAVENOUS ONCE
Status: CANCELLED | OUTPATIENT
Start: 2025-06-25 | End: 2025-06-25

## 2025-06-25 RX ORDER — ACETAMINOPHEN 325 MG/1
650 TABLET ORAL
Status: DISCONTINUED | OUTPATIENT
Start: 2025-06-25 | End: 2025-06-26 | Stop reason: HOSPADM

## 2025-06-25 RX ORDER — HYDROCORTISONE SODIUM SUCCINATE 100 MG/2ML
100 INJECTION INTRAMUSCULAR; INTRAVENOUS
Status: DISCONTINUED | OUTPATIENT
Start: 2025-06-25 | End: 2025-06-26 | Stop reason: HOSPADM

## 2025-06-25 RX ORDER — DIPHENHYDRAMINE HYDROCHLORIDE 50 MG/ML
50 INJECTION, SOLUTION INTRAMUSCULAR; INTRAVENOUS
OUTPATIENT
Start: 2025-06-25

## 2025-06-25 RX ORDER — EPINEPHRINE 1 MG/ML
0.3 INJECTION, SOLUTION, CONCENTRATE INTRAVENOUS PRN
Status: DISCONTINUED | OUTPATIENT
Start: 2025-06-25 | End: 2025-06-26 | Stop reason: HOSPADM

## 2025-06-25 RX ORDER — SODIUM CHLORIDE 9 MG/ML
INJECTION, SOLUTION INTRAVENOUS CONTINUOUS
OUTPATIENT
Start: 2025-06-25

## 2025-06-25 RX ORDER — PROCHLORPERAZINE EDISYLATE 5 MG/ML
5 INJECTION INTRAMUSCULAR; INTRAVENOUS
OUTPATIENT
Start: 2025-06-25

## 2025-06-25 RX ORDER — ACETAMINOPHEN 325 MG/1
650 TABLET ORAL
Status: CANCELLED | OUTPATIENT
Start: 2025-06-25

## 2025-06-25 RX ORDER — ONDANSETRON 2 MG/ML
8 INJECTION INTRAMUSCULAR; INTRAVENOUS ONCE
Status: COMPLETED | OUTPATIENT
Start: 2025-06-25 | End: 2025-06-25

## 2025-06-25 RX ADMIN — SODIUM CHLORIDE, PRESERVATIVE FREE 20 ML: 5 INJECTION INTRAVENOUS at 10:15

## 2025-06-25 RX ADMIN — SODIUM CHLORIDE, PRESERVATIVE FREE 10 ML: 5 INJECTION INTRAVENOUS at 17:57

## 2025-06-25 RX ADMIN — CARBOPLATIN 370 MG: 10 INJECTION, SOLUTION INTRAVENOUS at 17:26

## 2025-06-25 RX ADMIN — PACLITAXEL 426 MG: 6 INJECTION, SOLUTION INTRAVENOUS at 14:23

## 2025-06-25 RX ADMIN — DIPHENHYDRAMINE HYDROCHLORIDE 50 MG: 50 INJECTION INTRAMUSCULAR; INTRAVENOUS at 13:21

## 2025-06-25 RX ADMIN — SODIUM CHLORIDE 200 MG: 9 INJECTION, SOLUTION INTRAVENOUS at 12:46

## 2025-06-25 RX ADMIN — SODIUM CHLORIDE 50 ML/HR: 0.9 INJECTION, SOLUTION INTRAVENOUS at 11:46

## 2025-06-25 RX ADMIN — DEXAMETHASONE SODIUM PHOSPHATE 12 MG: 4 INJECTION, SOLUTION INTRAMUSCULAR; INTRAVENOUS at 13:25

## 2025-06-25 RX ADMIN — ONDANSETRON 8 MG: 2 INJECTION, SOLUTION INTRAMUSCULAR; INTRAVENOUS at 13:17

## 2025-06-25 RX ADMIN — SODIUM CHLORIDE 150 MG: 0.9 INJECTION, SOLUTION INTRAVENOUS at 13:47

## 2025-06-25 RX ADMIN — FAMOTIDINE 20 MG: 10 INJECTION, SOLUTION INTRAVENOUS at 13:19

## 2025-06-25 ASSESSMENT — ENCOUNTER SYMPTOMS
COUGH: 1
BACK PAIN: 1

## 2025-06-25 NOTE — PROGRESS NOTES
Outpatient Palliative Care at the  [facility]: Office Visit Established Patient    Diagnosis:  Lung Adenocarcinoma     Treatment Plan: Carboplatin, paclitaxel, and pembrolizumab + XRT (completed 06/20)    Treatment Intent: Palliative    Medical Oncologist: Dr. Gerard    Radiation Oncologist: Dr. Ortiz    Navigator: ASHLIE Dorsey RN      Chief Complaint:    Chief Complaint   Patient presents with    Follow-up         History of Present Illness:  Mr. Stephenson is a 89 y.o. male who presents today to establish with palliative  care in the setting of metastatic lung cancer with metastasis to the bone. In October 2024 he presented to [facility] with fever, cough and shortness of breath. CXR completed with RUL consolidation concerning for focal pneumonia versus pulmonary mass. He was treated with antibiotics. Repeat CXR in November concerning again for pneumonia. CT chest was completed showing mass like area of consolidation in the RUL. PET scan, 12/13,  confirmed large mass to the right upper lobe with extension to the chest. He underwent bronchoscopy in April confirming adenocarcinoma. Repeat PET in April revealed new pleural based mass in the apical RUL with hilar and mediastinal adenopathy. There were also metastasis of the lateral right second rib and osteolytic metastasis to T8. MRIb with small vessel disease. He is pending a bone marrow biopsy on May 6. He has been treated for pain with Tramadol.     PMHx significant for atrial fibrillation on Xarelto, CAD s/p stent, CHF,  HLD, and HTN.    He is seen today alongside oncology.  His wife and daughter are present for the visit.  Dr. Gerard discussed the potential of chemotherapy plus immunotherapy for palliative treatment of his lung cancer.  The patient is focused on quality of life however would like to see how he feels with treatment.  He will likely have some radiation pending bone biopsy.  Pain is located in the right rib cage into the back.  He states

## 2025-06-25 NOTE — PROGRESS NOTES
Patient to port lab for port access and lab draw. Port accessed per protocol; using 20g 0.75\" guzman needle without difficulty. Labs drawn from port and port flushed. Port remains accessed. Patient tolerated well. Discharged via wheelchair.

## 2025-06-25 NOTE — PATIENT INSTRUCTIONS
Patient Information from Today's Visit    The members of your [facility] are listed below:    Physician Provider: Dr. Jeyson Gerard  Advanced Practice Clinician: Bianca Goldman  Registered Nurse: CHIKIS  Nurse Navigator: Steven BO RN  Medical Assistant: Simone NOVAK   : Zaria MONTOYA  Supportive Care Services: TONIO Hein    Diagnosis (Information Sheet Provided on Day of Diagnosis): lung cancer    Follow Up Instructions:   - labs reviewed  - proceed with treatment today    Has Treatment Plan Been Finalized? N/A     Current Labs:   Hospital Outpatient Visit on 06/25/2025   Component Date Value Ref Range Status    WBC 06/25/2025 9.9  4.3 - 11.1 K/uL Final    RBC 06/25/2025 3.75 (L)  4.23 - 5.6 M/uL Final    Hemoglobin 06/25/2025 11.4 (L)  13.6 - 17.2 g/dL Final    Hematocrit 06/25/2025 35.4 (L)  41.1 - 50.3 % Final    MCV 06/25/2025 94.4  82.0 - 102.0 FL Final    MCH 06/25/2025 30.4  26.1 - 32.9 PG Final    MCHC 06/25/2025 32.2  31.4 - 35.0 g/dL Final    RDW 06/25/2025 15.0 (H)  11.9 - 14.6 % Final    Platelets 06/25/2025 135 (L)  150 - 450 K/uL Final    MPV 06/25/2025 9.7  9.4 - 12.3 FL Final    nRBC 06/25/2025 0.00  0.0 - 0.2 K/uL Final    **Note: Absolute NRBC parameter is now reported with Hemogram**    Neutrophils % 06/25/2025 80.3 (H)  43.0 - 78.0 % Final    Lymphocytes % 06/25/2025 9.3 (L)  13.0 - 44.0 % Final    Monocytes % 06/25/2025 9.4  4.0 - 12.0 % Final    Eosinophils % 06/25/2025 0.2 (L)  0.5 - 7.8 % Final    Basophils % 06/25/2025 0.2  0.0 - 2.0 % Final    Immature Granulocytes % 06/25/2025 0.6  0.0 - 5.0 % Final    Neutrophils Absolute 06/25/2025 7.96  1.70 - 8.20 K/UL Final    Lymphocytes Absolute 06/25/2025 0.92  0.50 - 4.60 K/UL Final    Monocytes Absolute 06/25/2025 0.93  0.10 - 1.30 K/UL Final    Eosinophils Absolute 06/25/2025 0.02  0.00 - 0.80 K/UL Final    Basophils Absolute 06/25/2025 0.02  0.00 - 0.20 K/UL Final    Immature Granulocytes Absolute 06/25/2025 0.06  0.00 -

## 2025-06-25 NOTE — PROGRESS NOTES
Arrived to the Infusion Center.  Keytruda/taxol/carbo completed. Patient tolerated ***.   Any issues or concerns during appointment: ***.  Patient aware of next lab/bsho/infusion appointment on 7/16. Patient instructed to call provider with temperature of 100.4 or greater or nausea/vomiting/ diarrhea or pain not controlled by medications  Discharged ***.

## 2025-06-25 NOTE — PROGRESS NOTES
baseline, labs reviewed and proceed to cycle 1 day 1 palliative CarboTaxol Keytruda, return on 6/25/2025 for cycle 3, completed palliative thoracic spine radiation with good pain control, CBC tolerated but losing weight and hypotensive, discussed increase high-protein high-calorie diet, improve hydration, hold antihypertensive if SBP less than 100 proceed to cycle 3 CarboTaxol Keytruda, return in 3 weeks for cycle 4 but call as needed.    Assessment & Plan  1. Stage IV lung adenocarcinoma.  Cancer metastasized to bone and spine. No mutations for targeted therapy. PD-L1 <1%, likely poor response to immunotherapy. Goal: control disease, improve quality of life. Recommend chemotherapy + immunotherapy; if not covered, use conventional IV chemotherapy. Discussed potential side effects: neuropathy, numbness, tingling, fatigue, taste changes, nausea. Advised hydration, avoid excessive NSAID use. Initiate carboplatin, paclitaxel, pembrolizumab every 3 weeks for 4 cycles, followed by additional immunotherapy. Arrange port placement for chemotherapy. Provide antiemetic for nausea.    2. Pain management.  Pain managed with tramadol and naproxen. Max dose tramadol: 8 pills/day, can increase if needed. Consider radiation therapy for pain control.    3. Atrial fibrillation.  On Xarelto, in sinus rhythm since cardioversion. Also takes amiodarone and metoprolol.        All questions are answered to their satisfaction. They will call for further questions and concerns.        ECOG PERFORMANCE STATUS - 0-Fully active, able to carry on all pre-disease performance without restriction.    Pain - 2/10. Mild to moderate pain, requiring medication - see MAR     Fatigue - Failed to redirect to the Timeline version of the [employer] SmartLink.  Distress -        No data to display                Elements of this note have been dictated via voice recognition software.  Text and phrases may be limited by the accuracy and autoconversion of the

## 2025-06-25 NOTE — PROGRESS NOTES
Report received from RODRIGO Walton.  Patient tolerated treatment well.  Discharged via w/c accompanied by family.

## 2025-06-27 ENCOUNTER — TELEPHONE (OUTPATIENT)
Dept: ONCOLOGY | Age: 89
End: 2025-06-27

## 2025-06-27 NOTE — TELEPHONE ENCOUNTER
Call transferred.  Per wife, patient is SOB with pink tinged sputum.  Instructed to present patient to ER for evaluation.  Verbalized understanding.

## 2025-06-27 NOTE — TELEPHONE ENCOUNTER
Physician provider: Dr. Gerard  Reason for today's call (Please detail here patients chief complaint): Shortness of breath, pink tinged mucus    Last office visit:na  Patient Callback Number: [phone]  Was callback number verified?: Yes  Preferred pharmacy (If refill request): na  Veriified that patient confirmed no refills left at pharmacy? :No  Has the patient called the office for this concern within the last 48 hours?:No    Red Word Mentioned  Warm Transfer Phone Call to (Name): Yes, for shortness of breath    Patient notified that their information will be routed to the appropriate team for review. Patient is advised that they will receive a phone call from the appropriate department. If awaiting a call from the triage department and symptoms worsen before receiving a call back, the patient has been advised to proceed to the nearest ED.      Shortness of breath, pink tinged mucus  after coughing

## 2025-07-16 ENCOUNTER — OFFICE VISIT (OUTPATIENT)
Dept: ONCOLOGY | Age: 89
End: 2025-07-16
Payer: MEDICARE

## 2025-07-16 ENCOUNTER — HOSPITAL ENCOUNTER (OUTPATIENT)
Dept: LAB | Age: 89
Discharge: HOME | End: 2025-07-16
Payer: MEDICARE

## 2025-07-16 ENCOUNTER — HOSPITAL ENCOUNTER (OUTPATIENT)
Dept: INFUSION THERAPY | Age: 89
Setting detail: INFUSION SERIES
Discharge: HOME | End: 2025-07-16
Payer: MEDICARE

## 2025-07-16 ENCOUNTER — OFFICE VISIT (OUTPATIENT)
Dept: PALLATIVE CARE | Age: 89
End: 2025-07-16
Payer: MEDICARE

## 2025-07-16 VITALS
BODY MASS INDEX: 28.69 KG/M2 | WEIGHT: 193.7 LBS | SYSTOLIC BLOOD PRESSURE: 90 MMHG | TEMPERATURE: 97 F | DIASTOLIC BLOOD PRESSURE: 56 MMHG | HEIGHT: 69 IN | RESPIRATION RATE: 16 BRPM | HEART RATE: 83 BPM

## 2025-07-16 DIAGNOSIS — D64.81 ANEMIA DUE TO ANTINEOPLASTIC CHEMOTHERAPY: ICD-10-CM

## 2025-07-16 DIAGNOSIS — F32.A ANXIETY AND DEPRESSION: Primary | ICD-10-CM

## 2025-07-16 DIAGNOSIS — C34.91 PRIMARY ADENOCARCINOMA OF RIGHT LUNG (HCC): Primary | ICD-10-CM

## 2025-07-16 DIAGNOSIS — Z79.899 HIGH RISK MEDICATION USE: ICD-10-CM

## 2025-07-16 DIAGNOSIS — Z51.5 ENCOUNTER FOR PALLIATIVE CARE: ICD-10-CM

## 2025-07-16 DIAGNOSIS — F41.9 ANXIETY AND DEPRESSION: Primary | ICD-10-CM

## 2025-07-16 DIAGNOSIS — C34.91 PRIMARY ADENOCARCINOMA OF RIGHT LUNG (HCC): ICD-10-CM

## 2025-07-16 DIAGNOSIS — G47.00 INSOMNIA, UNSPECIFIED TYPE: ICD-10-CM

## 2025-07-16 DIAGNOSIS — Z51.11 ENCOUNTER FOR ANTINEOPLASTIC CHEMOTHERAPY: ICD-10-CM

## 2025-07-16 DIAGNOSIS — R53.0 NEOPLASTIC MALIGNANT RELATED FATIGUE: ICD-10-CM

## 2025-07-16 DIAGNOSIS — G89.3 CANCER ASSOCIATED PAIN: ICD-10-CM

## 2025-07-16 DIAGNOSIS — T45.1X5A ANEMIA DUE TO ANTINEOPLASTIC CHEMOTHERAPY: ICD-10-CM

## 2025-07-16 DIAGNOSIS — R52 PAIN: ICD-10-CM

## 2025-07-16 DIAGNOSIS — G47.09 OTHER INSOMNIA: ICD-10-CM

## 2025-07-16 LAB
ALBUMIN SERPL-MCNC: 2.8 G/DL (ref 3.2–4.6)
ALBUMIN/GLOB SERPL: 0.7 (ref 1–1.9)
ALP SERPL-CCNC: 79 U/L (ref 40–129)
ALT SERPL-CCNC: 8 U/L (ref 8–55)
ANION GAP SERPL CALC-SCNC: 13 MMOL/L (ref 7–16)
AST SERPL-CCNC: 18 U/L (ref 15–37)
BASOPHILS # BLD: 0.03 K/UL (ref 0–0.2)
BASOPHILS NFR BLD: 0.5 % (ref 0–2)
BILIRUB SERPL-MCNC: 0.4 MG/DL (ref 0–1.2)
BUN SERPL-MCNC: 24 MG/DL (ref 8–23)
CALCIUM SERPL-MCNC: 8.9 MG/DL (ref 8.8–10.2)
CEA SERPL-MCNC: 75.2 NG/ML (ref 0–3.8)
CHLORIDE SERPL-SCNC: 102 MMOL/L (ref 98–107)
CO2 SERPL-SCNC: 23 MMOL/L (ref 20–29)
CREAT SERPL-MCNC: 1.41 MG/DL (ref 0.8–1.3)
DIFFERENTIAL METHOD BLD: ABNORMAL
EOSINOPHIL # BLD: 0.03 K/UL (ref 0–0.8)
EOSINOPHIL NFR BLD: 0.5 % (ref 0.5–7.8)
ERYTHROCYTE [DISTWIDTH] IN BLOOD BY AUTOMATED COUNT: 15.6 % (ref 11.9–14.6)
GLOBULIN SER CALC-MCNC: 3.9 G/DL (ref 2.3–3.5)
GLUCOSE SERPL-MCNC: 141 MG/DL (ref 70–99)
HCT VFR BLD AUTO: 30.6 % (ref 41.1–50.3)
HGB BLD-MCNC: 9.8 G/DL (ref 13.6–17.2)
IMM GRANULOCYTES # BLD AUTO: 0.05 K/UL (ref 0–0.5)
IMM GRANULOCYTES NFR BLD AUTO: 0.8 % (ref 0–5)
LYMPHOCYTES # BLD: 0.84 K/UL (ref 0.5–4.6)
LYMPHOCYTES NFR BLD: 14.1 % (ref 13–44)
MCH RBC QN AUTO: 30.5 PG (ref 26.1–32.9)
MCHC RBC AUTO-ENTMCNC: 32 G/DL (ref 31.4–35)
MCV RBC AUTO: 95.3 FL (ref 82–102)
MONOCYTES # BLD: 0.71 K/UL (ref 0.1–1.3)
MONOCYTES NFR BLD: 11.9 % (ref 4–12)
NEUTS SEG # BLD: 4.29 K/UL (ref 1.7–8.2)
NEUTS SEG NFR BLD: 72.2 % (ref 43–78)
NRBC # BLD: 0 K/UL (ref 0–0.2)
PLATELET # BLD AUTO: 163 K/UL (ref 150–450)
PMV BLD AUTO: 8.8 FL (ref 9.4–12.3)
POTASSIUM SERPL-SCNC: 4.1 MMOL/L (ref 3.5–5.1)
PROT SERPL-MCNC: 6.7 G/DL (ref 6.3–8.2)
RBC # BLD AUTO: 3.21 M/UL (ref 4.23–5.6)
SODIUM SERPL-SCNC: 138 MMOL/L (ref 136–145)
WBC # BLD AUTO: 6 K/UL (ref 4.3–11.1)

## 2025-07-16 PROCEDURE — 2500000003 HC RX 250 WO HCPCS: Performed by: INTERNAL MEDICINE

## 2025-07-16 PROCEDURE — 99215 OFFICE O/P EST HI 40 MIN: CPT | Performed by: INTERNAL MEDICINE

## 2025-07-16 PROCEDURE — 82378 CARCINOEMBRYONIC ANTIGEN: CPT

## 2025-07-16 PROCEDURE — 96375 TX/PRO/DX INJ NEW DRUG ADDON: CPT

## 2025-07-16 PROCEDURE — 1123F ACP DISCUSS/DSCN MKR DOCD: CPT | Performed by: PHYSICIAN ASSISTANT

## 2025-07-16 PROCEDURE — 96413 CHEMO IV INFUSION 1 HR: CPT

## 2025-07-16 PROCEDURE — 1125F AMNT PAIN NOTED PAIN PRSNT: CPT | Performed by: INTERNAL MEDICINE

## 2025-07-16 PROCEDURE — 96367 TX/PROPH/DG ADDL SEQ IV INF: CPT

## 2025-07-16 PROCEDURE — 85025 COMPLETE CBC W/AUTO DIFF WBC: CPT

## 2025-07-16 PROCEDURE — 80053 COMPREHEN METABOLIC PANEL: CPT

## 2025-07-16 PROCEDURE — 96417 CHEMO IV INFUS EACH ADDL SEQ: CPT

## 2025-07-16 PROCEDURE — 2580000003 HC RX 258: Performed by: INTERNAL MEDICINE

## 2025-07-16 PROCEDURE — 6360000002 HC RX W HCPCS: Performed by: INTERNAL MEDICINE

## 2025-07-16 PROCEDURE — 96415 CHEMO IV INFUSION ADDL HR: CPT

## 2025-07-16 PROCEDURE — 1159F MED LIST DOCD IN RCRD: CPT | Performed by: INTERNAL MEDICINE

## 2025-07-16 PROCEDURE — 1159F MED LIST DOCD IN RCRD: CPT | Performed by: PHYSICIAN ASSISTANT

## 2025-07-16 PROCEDURE — 99214 OFFICE O/P EST MOD 30 MIN: CPT | Performed by: PHYSICIAN ASSISTANT

## 2025-07-16 PROCEDURE — 1123F ACP DISCUSS/DSCN MKR DOCD: CPT | Performed by: INTERNAL MEDICINE

## 2025-07-16 RX ORDER — SODIUM CHLORIDE 9 MG/ML
INJECTION, SOLUTION INTRAVENOUS CONTINUOUS
Status: CANCELLED | OUTPATIENT
Start: 2025-07-16

## 2025-07-16 RX ORDER — ALBUTEROL SULFATE 90 UG/1
4 INHALANT RESPIRATORY (INHALATION) PRN
Status: DISCONTINUED | OUTPATIENT
Start: 2025-07-16 | End: 2025-07-17 | Stop reason: HOSPADM

## 2025-07-16 RX ORDER — SODIUM CHLORIDE 9 MG/ML
5-250 INJECTION, SOLUTION INTRAVENOUS PRN
Status: CANCELLED | OUTPATIENT
Start: 2025-07-16

## 2025-07-16 RX ORDER — DIPHENHYDRAMINE HYDROCHLORIDE 50 MG/ML
50 INJECTION, SOLUTION INTRAMUSCULAR; INTRAVENOUS
Status: DISCONTINUED | OUTPATIENT
Start: 2025-07-16 | End: 2025-07-17 | Stop reason: HOSPADM

## 2025-07-16 RX ORDER — ONDANSETRON 2 MG/ML
8 INJECTION INTRAMUSCULAR; INTRAVENOUS
Status: DISCONTINUED | OUTPATIENT
Start: 2025-07-16 | End: 2025-07-17 | Stop reason: HOSPADM

## 2025-07-16 RX ORDER — FAMOTIDINE 10 MG/ML
20 INJECTION, SOLUTION INTRAVENOUS
Status: CANCELLED | OUTPATIENT
Start: 2025-07-16

## 2025-07-16 RX ORDER — DIPHENHYDRAMINE HYDROCHLORIDE 50 MG/ML
50 INJECTION, SOLUTION INTRAMUSCULAR; INTRAVENOUS
Status: CANCELLED | OUTPATIENT
Start: 2025-07-16

## 2025-07-16 RX ORDER — SODIUM CHLORIDE 9 MG/ML
5-250 INJECTION, SOLUTION INTRAVENOUS PRN
Status: DISCONTINUED | OUTPATIENT
Start: 2025-07-16 | End: 2025-07-17 | Stop reason: HOSPADM

## 2025-07-16 RX ORDER — ACETAMINOPHEN 325 MG/1
650 TABLET ORAL
Status: CANCELLED | OUTPATIENT
Start: 2025-07-16

## 2025-07-16 RX ORDER — FAMOTIDINE 10 MG/ML
20 INJECTION, SOLUTION INTRAVENOUS ONCE
Status: CANCELLED | OUTPATIENT
Start: 2025-07-16 | End: 2025-07-16

## 2025-07-16 RX ORDER — ONDANSETRON 2 MG/ML
8 INJECTION INTRAMUSCULAR; INTRAVENOUS ONCE
Status: CANCELLED | OUTPATIENT
Start: 2025-07-16 | End: 2025-07-16

## 2025-07-16 RX ORDER — DIPHENHYDRAMINE HYDROCHLORIDE 50 MG/ML
50 INJECTION, SOLUTION INTRAMUSCULAR; INTRAVENOUS ONCE
Status: COMPLETED | OUTPATIENT
Start: 2025-07-16 | End: 2025-07-16

## 2025-07-16 RX ORDER — HYDROCORTISONE SODIUM SUCCINATE 100 MG/2ML
100 INJECTION INTRAMUSCULAR; INTRAVENOUS
Status: DISCONTINUED | OUTPATIENT
Start: 2025-07-16 | End: 2025-07-17 | Stop reason: HOSPADM

## 2025-07-16 RX ORDER — HEPARIN SODIUM (PORCINE) LOCK FLUSH IV SOLN 100 UNIT/ML 100 UNIT/ML
500 SOLUTION INTRAVENOUS PRN
Status: CANCELLED | OUTPATIENT
Start: 2025-07-16

## 2025-07-16 RX ORDER — SODIUM CHLORIDE 0.9 % (FLUSH) 0.9 %
5-40 SYRINGE (ML) INJECTION PRN
Status: CANCELLED | OUTPATIENT
Start: 2025-07-16

## 2025-07-16 RX ORDER — TEMAZEPAM 7.5 MG/1
7.5-15 CAPSULE ORAL NIGHTLY PRN
Qty: 60 CAPSULE | Refills: 1 | Status: SHIPPED | OUTPATIENT
Start: 2025-07-16 | End: 2025-07-16

## 2025-07-16 RX ORDER — SODIUM CHLORIDE 0.9 % (FLUSH) 0.9 %
5-40 SYRINGE (ML) INJECTION PRN
Status: DISCONTINUED | OUTPATIENT
Start: 2025-07-16 | End: 2025-07-17 | Stop reason: HOSPADM

## 2025-07-16 RX ORDER — PROCHLORPERAZINE EDISYLATE 5 MG/ML
5 INJECTION INTRAMUSCULAR; INTRAVENOUS
Status: CANCELLED | OUTPATIENT
Start: 2025-07-16

## 2025-07-16 RX ORDER — MEPERIDINE HYDROCHLORIDE 25 MG/ML
12.5 INJECTION INTRAMUSCULAR; INTRAVENOUS; SUBCUTANEOUS PRN
Status: DISCONTINUED | OUTPATIENT
Start: 2025-07-16 | End: 2025-07-17 | Stop reason: HOSPADM

## 2025-07-16 RX ORDER — EPINEPHRINE 1 MG/ML
0.3 INJECTION, SOLUTION, CONCENTRATE INTRAVENOUS PRN
Status: DISCONTINUED | OUTPATIENT
Start: 2025-07-16 | End: 2025-07-17 | Stop reason: HOSPADM

## 2025-07-16 RX ORDER — EPINEPHRINE 1 MG/ML
0.3 INJECTION, SOLUTION, CONCENTRATE INTRAVENOUS PRN
Status: CANCELLED | OUTPATIENT
Start: 2025-07-16

## 2025-07-16 RX ORDER — MEPERIDINE HYDROCHLORIDE 50 MG/ML
12.5 INJECTION INTRAMUSCULAR; INTRAVENOUS; SUBCUTANEOUS PRN
Status: CANCELLED | OUTPATIENT
Start: 2025-07-16

## 2025-07-16 RX ORDER — ALBUTEROL SULFATE 90 UG/1
4 INHALANT RESPIRATORY (INHALATION) PRN
Status: CANCELLED | OUTPATIENT
Start: 2025-07-16

## 2025-07-16 RX ORDER — ONDANSETRON 2 MG/ML
8 INJECTION INTRAMUSCULAR; INTRAVENOUS ONCE
Status: COMPLETED | OUTPATIENT
Start: 2025-07-16 | End: 2025-07-16

## 2025-07-16 RX ORDER — ONDANSETRON 2 MG/ML
8 INJECTION INTRAMUSCULAR; INTRAVENOUS
Status: CANCELLED | OUTPATIENT
Start: 2025-07-16

## 2025-07-16 RX ORDER — DIPHENHYDRAMINE HYDROCHLORIDE 50 MG/ML
50 INJECTION, SOLUTION INTRAMUSCULAR; INTRAVENOUS ONCE
Status: CANCELLED | OUTPATIENT
Start: 2025-07-16 | End: 2025-07-16

## 2025-07-16 RX ORDER — TEMAZEPAM 15 MG/1
15 CAPSULE ORAL NIGHTLY PRN
Qty: 60 CAPSULE | Refills: 1 | Status: SHIPPED | OUTPATIENT
Start: 2025-07-16 | End: 2025-11-13

## 2025-07-16 RX ORDER — HYDROCORTISONE SODIUM SUCCINATE 100 MG/2ML
100 INJECTION INTRAMUSCULAR; INTRAVENOUS
Status: CANCELLED | OUTPATIENT
Start: 2025-07-16

## 2025-07-16 RX ORDER — ACETAMINOPHEN 325 MG/1
650 TABLET ORAL
Status: DISCONTINUED | OUTPATIENT
Start: 2025-07-16 | End: 2025-07-17 | Stop reason: HOSPADM

## 2025-07-16 RX ADMIN — SODIUM CHLORIDE, PRESERVATIVE FREE 20 ML: 5 INJECTION INTRAVENOUS at 09:35

## 2025-07-16 RX ADMIN — SODIUM CHLORIDE 200 MG: 9 INJECTION, SOLUTION INTRAVENOUS at 12:42

## 2025-07-16 RX ADMIN — DIPHENHYDRAMINE HYDROCHLORIDE 50 MG: 50 INJECTION INTRAMUSCULAR; INTRAVENOUS at 13:17

## 2025-07-16 RX ADMIN — SODIUM CHLORIDE 150 MG: 0.9 INJECTION, SOLUTION INTRAVENOUS at 13:46

## 2025-07-16 RX ADMIN — SODIUM CHLORIDE, PRESERVATIVE FREE 10 ML: 5 INJECTION INTRAVENOUS at 17:47

## 2025-07-16 RX ADMIN — FAMOTIDINE 20 MG: 10 INJECTION, SOLUTION INTRAVENOUS at 13:21

## 2025-07-16 RX ADMIN — CARBOPLATIN 380 MG: 10 INJECTION, SOLUTION INTRAVENOUS at 17:16

## 2025-07-16 RX ADMIN — SODIUM CHLORIDE 100 ML/HR: 0.9 INJECTION, SOLUTION INTRAVENOUS at 11:53

## 2025-07-16 RX ADMIN — PACLITAXEL 426 MG: 6 INJECTION, SOLUTION, CONCENTRATE INTRAVENOUS at 14:13

## 2025-07-16 RX ADMIN — DEXAMETHASONE SODIUM PHOSPHATE 12 MG: 4 INJECTION, SOLUTION INTRAMUSCULAR; INTRAVENOUS at 13:26

## 2025-07-16 RX ADMIN — SODIUM CHLORIDE, PRESERVATIVE FREE 10 ML: 5 INJECTION INTRAVENOUS at 13:16

## 2025-07-16 RX ADMIN — ONDANSETRON 8 MG: 2 INJECTION, SOLUTION INTRAMUSCULAR; INTRAVENOUS at 13:23

## 2025-07-16 ASSESSMENT — ENCOUNTER SYMPTOMS: BACK PAIN: 1

## 2025-07-16 NOTE — PROGRESS NOTES
Outpatient Palliative Care at the  [facility]: Office Visit Established Patient    Diagnosis:  Lung Adenocarcinoma     Treatment Plan: Carboplatin, paclitaxel, and pembrolizumab + XRT (completed 06/20)    Treatment Intent: Palliative    Medical Oncologist: Dr. Gerard    Radiation Oncologist: Dr. Ortiz    Navigator: ASHLIE Dorsey RN      Chief Complaint:    Chief Complaint   Patient presents with    Follow-up         History of Present Illness:  Mr. Stephenson is a 89 y.o. male who presents today to establish with palliative  care in the setting of metastatic lung cancer with metastasis to the bone. In October 2024 he presented to [facility] with fever, cough and shortness of breath. CXR completed with RUL consolidation concerning for focal pneumonia versus pulmonary mass. He was treated with antibiotics. Repeat CXR in November concerning again for pneumonia. CT chest was completed showing mass like area of consolidation in the RUL. PET scan, 12/13,  confirmed large mass to the right upper lobe with extension to the chest. He underwent bronchoscopy in April confirming adenocarcinoma. Repeat PET in April revealed new pleural based mass in the apical RUL with hilar and mediastinal adenopathy. There were also metastasis of the lateral right second rib and osteolytic metastasis to T8. MRIb with small vessel disease. He is pending a bone marrow biopsy on May 6. He has been treated for pain with Tramadol.     PMHx significant for atrial fibrillation on Xarelto, CAD s/p stent, CHF,  HLD, and HTN.    He is seen today alongside oncology.  His wife and daughter are present for the visit.  Dr. Gerard discussed the potential of chemotherapy plus immunotherapy for palliative treatment of his lung cancer.  The patient is focused on quality of life however would like to see how he feels with treatment.  He will likely have some radiation pending bone biopsy.  Pain is located in the right rib cage into the back.  He states

## 2025-07-16 NOTE — PROGRESS NOTES
[facility]: Office Visit Progress Note    Chief Complaint:    Lung adenocarcinoma    History of Present Illness:  Referral Diagnosis: Adenocarcinoma: Lung mass     Referring Provider: Wicho Andre MD     Primary Care Provider: Dean Navarro MD     Presenting Symptoms: RUL mass     Family History of Cancer: Cancer-related family history includes Colon Cancer in his mother.     Past Medical History:   Past Medical History        Past Medical History:   Diagnosis Date    Arthritis 5 years ago    Atrial fibrillation (HCC)      CAD (coronary artery disease)       S/P stents x 2 in 2019    CHF (congestive heart failure) (HCC)       Followed by Dr. Winn; Echo- 11/08/2020    H/O heart artery stent 2019     x 2    History of blood transfusion       after colonoscopy    Hyperlipidemia      Hypertension, essential 09/29/2022    Lung cancer (HCC)       Right upper lobe            Chronological History of Pertinent Events (From Onset of Presenting Symptoms): 89 y.o. M patient was referred to Pulmonary in December 2024 for a lung mass in right upper lobe on CT, which was confirmed PET positive. Patient was scheduled for bronchoscopy for February 2025 but had to cancel due to the illness and death of his brother in [place]. EBUS for April reveals Adenocarcinoma. Tempus results reveal no potentially actionable variants and no reportable treatment options.       4/21/25- MRI of the brain  IMPRESSION:  1. No acute process or pathologic enhancement.  2. Atrophy and chronic small vessel ischemic disease.     4/22/25- PET/CT base of skull to mid thigh  IMPRESSION:  Known carcinoma of the right upper lobe that appears multicentric.  Right hilar and mediastinal metastases. Bony metastases as described.     4/29- Patient seen by Dr. Ortiz:  Mr. Stephenson was in his usual state which time he underwent a chest x-ray for cough and fever.  There was a pulmonary consolidation in the right upper lobe which could

## 2025-07-16 NOTE — PATIENT INSTRUCTIONS
Patient Information from Today's Visit  Physician Provider: Dr. Jeyson Gerard  Advanced Practice Clinician: Bianca Goldman  Registered Nurse: CHIKIS  Nurse Navigator: Steven BO RN  Medical Assistant: Simone NOVAK   : Zaria MONTOYA  Supportive Care Services: TONIO Hein     Diagnosis (Information Sheet Provided on Day of Diagnosis): lung cancer     Follow Up Instructions:     Infusion as scheduled today    Follow up/infusion in 3 weeks as scheduled (for Keytruda)  With CT chest/abd/pelvis without IV contrast to be done a week before  (Dr Gerard is ok with oral contrast)  A radiology scheduler will call you in the next few days to set up your scan. If you do not hear from them, call the radiology scheduling line: [phone].      Treatment Summary has been discussed and given to patient: N/A      Current Labs:   Hospital Outpatient Visit on 07/16/2025   Component Date Value Ref Range Status    Sodium 07/16/2025 138  136 - 145 mmol/L Final    Potassium 07/16/2025 4.1  3.5 - 5.1 mmol/L Final    Chloride 07/16/2025 102  98 - 107 mmol/L Final    CO2 07/16/2025 23  20 - 29 mmol/L Final    Anion Gap 07/16/2025 13  7 - 16 mmol/L Final    Glucose 07/16/2025 141 (H)  70 - 99 mg/dL Final    Comment: <70 mg/dL Consistent with, but not fully diagnostic of hypoglycemia.  100 - 125 mg/dL Impaired fasting glucose/consistent with pre-diabetes mellitus.  > 126 mg/dl Fasting glucose consistent with overt diabetes mellitus      BUN 07/16/2025 24 (H)  8 - 23 MG/DL Final    Creatinine 07/16/2025 1.41 (H)  0.80 - 1.30 MG/DL Final    Est, Glom Filt Rate 07/16/2025 48 (L)  >60 ml/min/1.73m2 Final    Comment:    Pediatric calculator link: https://www.kidney.org/professionals/kdoqi/gfr_calculatorped     These results are not intended for use in patients <18 years of age.     eGFR results are calculated without a race factor using  the 2021 CKD-EPI equation. Careful clinical correlation is recommended, particularly when comparing to

## 2025-07-16 NOTE — PROGRESS NOTES
Arrived to the Infusion Center.  Keytruda and taxol completed, carboplatin currently infusing.  Pt tolerating well at this time.   Any issues or concerns during appointment: none.  Patient aware of next lab/bsho/ infusion appointment on 8/6. Patient instructed to call provider with temperature of 100.4 or greater or nausea/vomiting/ diarrhea or pain not controlled by medications  Report to RODRIGO Bhardwaj, to assume pt care at this time.

## 2025-07-18 NOTE — H&P
Physician H&P    Patient ID:  Alonso Holder  [id number]  84 y.o. 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    HPI:      84-year-old male who underwent a long trip 3 months ago and subsequently felt significant fatigue underwent echocardiogram and stress test, results as above presents to undergo elective coronary angiography for further evaluation and treatment.    Past Medical History:  has a past medical history of Arthritis, Essential hypertension (1/29/2018), Hemorrhagic disorder (HCC), High cholesterol, and Persistent atrial fibrillation (1/28/2020).  Past Surgical History:  has a past surgical history that includes other (1961).  Past Social History:  reports that he has never smoked. He has never used smokeless tobacco. He reports current alcohol use of about 0.6 oz of alcohol per week. He reports that he does not use drugs.  Past Family History:   Family History   Problem Relation Age of Onset   • Heart Disease Neg Hx      Allergies: Codeine    Current Medications:  Prior to Admission medications    Medication Sig Start Date End Date Taking? Authorizing Provider   metoprolol (LOPRESSOR) 25 MG Tab Take 25 mg by mouth 2 times a day.    Physician Outpatient   XARELTO 20 MG Tab tablet every day. 1/9/20   Physician Outpatient   benazepril (LOTENSIN) 10 MG Tab Take 1 Tab by mouth every day. 1/28/20   Rober Blas M.D.   amiodarone (CORDARONE) 200 MG Tab Take 1 Tab by mouth every day. 1/28/20   Rober Blas M.D.   aspirin 81 MG tablet Take 81 mg by mouth every day.    Physician Outpatient   tadalafil (CIALIS) 20 MG tablet Take 20 mg by mouth as needed for Erectile Dysfunction.    Physician Outpatient       Review of Systems:  Review of Systems   Cardiovascular: Negative for chest pain, palpitations, orthopnea,  Rx Refill Note  Requested Prescriptions     Pending Prescriptions Disp Refills    multivitamin with minerals (Thera-Tabs M) tablet tablet [Pharmacy Med Name: THERA-M CAPLET] 100 tablet 0     Sig: TAKE 1 TABLET BY MOUTH DAILY      Last office visit with prescribing clinician: 6/16/2025   Last telemedicine visit with prescribing clinician: Visit date not found   Next office visit with prescribing clinician: 7/28/2025                         Would you like a call back once the refill request has been completed: [] Yes [] No    If the office needs to give you a call back, can they leave a voicemail: [] Yes [] No    Cassia Junior Rep  07/18/25, 14:04 EDT   "claudication, leg swelling and PND.     /94   Pulse 79   Temp 36.6 °C (97.8 °F) (Temporal)   Resp 20   Ht 1.778 m (5' 10\")   Wt 105.5 kg (232 lb 9.4 oz)     Physical Examination:  Physical Exam  Cardiovascular:      Rate and Rhythm: Rhythm irregular.      Pulses: Normal pulses.   Pulmonary:      Effort: Pulmonary effort is normal.   Abdominal:      General: Abdomen is flat.   Skin:     General: Skin is warm.   Neurological:      General: No focal deficit present.      Mental Status: He is alert.         Impression:  Atrial fibrillation, persistent  Successful DCCV to sinus rhythm from A. fib 2/7/2020 (back in A. 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      Plan:      Coronary angiography planned for 2/28/2020 for further evaluation and treatment.    No contrast allergy    The risks, benefits, and alternatives to coronary angiography with IV sedation were discussed in great detail. Specific risks mentioned include bleeding, infection, kidney damage, allergic reaction, cardiac perforation with possible tamponade requiring pericardiocentesis or possibly open heart surgery. In addition, we discussed that 10% of patients will experience small to moderate bruising at the site of the arterial puncture. Lastly, the risks of heart attack, stroke and death were discussed; the risk of major complications such as heart attack or stroke caused by the angiogram is approximately 1%; the risk of death is approximately 1 in 1000. The patient verbalized understanding of the potential complications and wishes to proceed with this " procedure.      MIGUEL Dean  2/28/2020

## 2025-07-28 ENCOUNTER — APPOINTMENT (OUTPATIENT)
Dept: GENERAL RADIOLOGY | Age: 89
End: 2025-07-28
Payer: MEDICARE

## 2025-07-28 ENCOUNTER — HOSPITAL ENCOUNTER (INPATIENT)
Age: 89
LOS: 4 days | End: 2025-08-01
Attending: STUDENT IN AN ORGANIZED HEALTH CARE EDUCATION/TRAINING PROGRAM | Admitting: INTERNAL MEDICINE
Payer: MEDICARE

## 2025-07-28 ENCOUNTER — APPOINTMENT (OUTPATIENT)
Dept: CT IMAGING | Age: 89
End: 2025-07-28
Payer: MEDICARE

## 2025-07-28 DIAGNOSIS — J18.9 MULTIFOCAL PNEUMONIA: ICD-10-CM

## 2025-07-28 DIAGNOSIS — R65.20 SEVERE SEPSIS (HCC): ICD-10-CM

## 2025-07-28 DIAGNOSIS — I48.91 ATRIAL FIBRILLATION WITH RAPID VENTRICULAR RESPONSE (HCC): Primary | ICD-10-CM

## 2025-07-28 DIAGNOSIS — R79.89 ELEVATED TROPONIN: ICD-10-CM

## 2025-07-28 DIAGNOSIS — Z85.118 HISTORY OF LUNG CANCER: ICD-10-CM

## 2025-07-28 DIAGNOSIS — R06.02 SHORTNESS OF BREATH: ICD-10-CM

## 2025-07-28 DIAGNOSIS — A41.9 SEVERE SEPSIS (HCC): ICD-10-CM

## 2025-07-28 LAB
ALBUMIN SERPL-MCNC: 2.4 G/DL (ref 3.2–4.6)
ALBUMIN/GLOB SERPL: 0.6 (ref 1–1.9)
ALP SERPL-CCNC: 68 U/L (ref 40–129)
ALT SERPL-CCNC: 32 U/L (ref 8–55)
ANION GAP SERPL CALC-SCNC: 12 MMOL/L (ref 7–16)
APPEARANCE UR: CLEAR
AST SERPL-CCNC: 44 U/L (ref 15–37)
BACTERIA URNS QL MICRO: ABNORMAL /HPF
BASOPHILS # BLD: 0.01 K/UL (ref 0–0.2)
BASOPHILS NFR BLD: 0.9 % (ref 0–2)
BILIRUB SERPL-MCNC: 0.4 MG/DL (ref 0–1.2)
BILIRUB UR QL: NEGATIVE
BUN SERPL-MCNC: 23 MG/DL (ref 8–23)
CALCIUM SERPL-MCNC: 8 MG/DL (ref 8.8–10.2)
CHLORIDE SERPL-SCNC: 102 MMOL/L (ref 98–107)
CO2 SERPL-SCNC: 21 MMOL/L (ref 20–29)
COLOR UR: ABNORMAL
CREAT SERPL-MCNC: 1.17 MG/DL (ref 0.8–1.3)
DIFFERENTIAL METHOD BLD: ABNORMAL
EKG ATRIAL RATE: 152 BPM
EKG DIAGNOSIS: NORMAL
EKG Q-T INTERVAL: 360 MS
EKG QRS DURATION: 109 MS
EKG QTC CALCULATION (BAZETT): 474 MS
EKG R AXIS: -2 DEGREES
EKG T AXIS: 107 DEGREES
EKG VENTRICULAR RATE: 104 BPM
EOSINOPHIL # BLD: 0 K/UL (ref 0–0.8)
EOSINOPHIL NFR BLD: 0 % (ref 0.5–7.8)
EPI CELLS #/AREA URNS HPF: ABNORMAL /HPF
ERYTHROCYTE [DISTWIDTH] IN BLOOD BY AUTOMATED COUNT: 16.1 % (ref 11.9–14.6)
FLUAV RNA SPEC QL NAA+PROBE: NOT DETECTED
FLUBV RNA SPEC QL NAA+PROBE: NOT DETECTED
GLOBULIN SER CALC-MCNC: 3.7 G/DL (ref 2.3–3.5)
GLUCOSE SERPL-MCNC: 124 MG/DL (ref 70–99)
GLUCOSE UR STRIP.AUTO-MCNC: NEGATIVE MG/DL
HCT VFR BLD AUTO: 25 % (ref 41.1–50.3)
HGB BLD-MCNC: 8 G/DL (ref 13.6–17.2)
HGB UR QL STRIP: NEGATIVE
IMM GRANULOCYTES # BLD AUTO: 0 K/UL (ref 0–0.5)
IMM GRANULOCYTES NFR BLD AUTO: 0 % (ref 0–5)
KETONES UR QL STRIP.AUTO: NEGATIVE MG/DL
LACTATE SERPL-SCNC: 1.2 MMOL/L (ref 0.5–2)
LEUKOCYTE ESTERASE UR QL STRIP.AUTO: NEGATIVE
LYMPHOCYTES # BLD: 0.58 K/UL (ref 0.5–4.6)
LYMPHOCYTES NFR BLD: 52.7 % (ref 13–44)
MAGNESIUM SERPL-MCNC: 1.8 MG/DL (ref 1.8–2.4)
MCH RBC QN AUTO: 30.1 PG (ref 26.1–32.9)
MCHC RBC AUTO-ENTMCNC: 32 G/DL (ref 31.4–35)
MCV RBC AUTO: 94 FL (ref 82–102)
MONOCYTES # BLD: 0.34 K/UL (ref 0.1–1.3)
MONOCYTES NFR BLD: 30.9 % (ref 4–12)
NEUTS SEG # BLD: 0.17 K/UL (ref 1.7–8.2)
NEUTS SEG NFR BLD: 15.5 % (ref 43–78)
NITRITE UR QL STRIP.AUTO: NEGATIVE
NRBC # BLD: 0 K/UL (ref 0–0.2)
NT PRO BNP: ABNORMAL PG/ML (ref 0–450)
OTHER OBSERVATIONS: ABNORMAL
PH UR STRIP: 5 (ref 5–9)
PLATELET # BLD AUTO: 146 K/UL (ref 150–450)
PMV BLD AUTO: 9.6 FL (ref 9.4–12.3)
POTASSIUM SERPL-SCNC: 4 MMOL/L (ref 3.5–5.1)
PROCALCITONIN SERPL-MCNC: 0.39 NG/ML (ref 0–0.1)
PROT SERPL-MCNC: 6 G/DL (ref 6.3–8.2)
PROT UR STRIP-MCNC: 30 MG/DL
RBC # BLD AUTO: 2.66 M/UL (ref 4.23–5.6)
RBC #/AREA URNS HPF: ABNORMAL /HPF
SARS-COV-2 RDRP RESP QL NAA+PROBE: NOT DETECTED
SODIUM SERPL-SCNC: 135 MMOL/L (ref 136–145)
SOURCE: NORMAL
SP GR UR REFRACTOMETRY: >1.035 (ref 1–1.02)
TROPONIN T SERPL HS-MCNC: 151 NG/L (ref 0–22)
TROPONIN T SERPL HS-MCNC: 155 NG/L (ref 0–22)
UROBILINOGEN UR QL STRIP.AUTO: 1 EU/DL (ref 0.2–1)
WBC # BLD AUTO: 1.1 K/UL (ref 4.3–11.1)
WBC URNS QL MICRO: ABNORMAL /HPF

## 2025-07-28 PROCEDURE — 93005 ELECTROCARDIOGRAM TRACING: CPT | Performed by: HOSPITALIST

## 2025-07-28 PROCEDURE — 71045 X-RAY EXAM CHEST 1 VIEW: CPT

## 2025-07-28 PROCEDURE — 2580000003 HC RX 258: Performed by: INTERNAL MEDICINE

## 2025-07-28 PROCEDURE — 84145 PROCALCITONIN (PCT): CPT

## 2025-07-28 PROCEDURE — 83735 ASSAY OF MAGNESIUM: CPT

## 2025-07-28 PROCEDURE — 87636 SARSCOV2 & INF A&B AMP PRB: CPT

## 2025-07-28 PROCEDURE — 6360000004 HC RX CONTRAST MEDICATION: Performed by: STUDENT IN AN ORGANIZED HEALTH CARE EDUCATION/TRAINING PROGRAM

## 2025-07-28 PROCEDURE — 85025 COMPLETE CBC W/AUTO DIFF WBC: CPT

## 2025-07-28 PROCEDURE — 93005 ELECTROCARDIOGRAM TRACING: CPT | Performed by: STUDENT IN AN ORGANIZED HEALTH CARE EDUCATION/TRAINING PROGRAM

## 2025-07-28 PROCEDURE — 93010 ELECTROCARDIOGRAM REPORT: CPT | Performed by: INTERNAL MEDICINE

## 2025-07-28 PROCEDURE — 6360000002 HC RX W HCPCS: Performed by: STUDENT IN AN ORGANIZED HEALTH CARE EDUCATION/TRAINING PROGRAM

## 2025-07-28 PROCEDURE — 96365 THER/PROPH/DIAG IV INF INIT: CPT

## 2025-07-28 PROCEDURE — 6360000002 HC RX W HCPCS: Performed by: NURSE PRACTITIONER

## 2025-07-28 PROCEDURE — 83605 ASSAY OF LACTIC ACID: CPT

## 2025-07-28 PROCEDURE — 80053 COMPREHEN METABOLIC PANEL: CPT

## 2025-07-28 PROCEDURE — 36415 COLL VENOUS BLD VENIPUNCTURE: CPT

## 2025-07-28 PROCEDURE — 71260 CT THORAX DX C+: CPT

## 2025-07-28 PROCEDURE — 87040 BLOOD CULTURE FOR BACTERIA: CPT

## 2025-07-28 PROCEDURE — 99285 EMERGENCY DEPT VISIT HI MDM: CPT

## 2025-07-28 PROCEDURE — 2500000003 HC RX 250 WO HCPCS: Performed by: INTERNAL MEDICINE

## 2025-07-28 PROCEDURE — 2140000000 HC CCU INTERMEDIATE R&B

## 2025-07-28 PROCEDURE — 84484 ASSAY OF TROPONIN QUANT: CPT

## 2025-07-28 PROCEDURE — 2580000003 HC RX 258: Performed by: STUDENT IN AN ORGANIZED HEALTH CARE EDUCATION/TRAINING PROGRAM

## 2025-07-28 PROCEDURE — 1100000000 HC RM PRIVATE

## 2025-07-28 PROCEDURE — 83880 ASSAY OF NATRIURETIC PEPTIDE: CPT

## 2025-07-28 PROCEDURE — 99223 1ST HOSP IP/OBS HIGH 75: CPT | Performed by: INTERNAL MEDICINE

## 2025-07-28 PROCEDURE — 6370000000 HC RX 637 (ALT 250 FOR IP): Performed by: STUDENT IN AN ORGANIZED HEALTH CARE EDUCATION/TRAINING PROGRAM

## 2025-07-28 PROCEDURE — 81001 URINALYSIS AUTO W/SCOPE: CPT

## 2025-07-28 PROCEDURE — 6360000002 HC RX W HCPCS: Performed by: INTERNAL MEDICINE

## 2025-07-28 PROCEDURE — 96375 TX/PRO/DX INJ NEW DRUG ADDON: CPT

## 2025-07-28 RX ORDER — ACETAMINOPHEN 325 MG/1
650 TABLET ORAL EVERY 6 HOURS PRN
Status: DISCONTINUED | OUTPATIENT
Start: 2025-07-28 | End: 2025-08-01 | Stop reason: HOSPADM

## 2025-07-28 RX ORDER — POTASSIUM CHLORIDE 1500 MG/1
40 TABLET, EXTENDED RELEASE ORAL PRN
Status: DISCONTINUED | OUTPATIENT
Start: 2025-07-28 | End: 2025-07-31

## 2025-07-28 RX ORDER — ACETAMINOPHEN 500 MG
1000 TABLET ORAL ONCE
Status: COMPLETED | OUTPATIENT
Start: 2025-07-28 | End: 2025-07-28

## 2025-07-28 RX ORDER — METOPROLOL SUCCINATE 25 MG/1
25 TABLET, EXTENDED RELEASE ORAL DAILY
Status: DISCONTINUED | OUTPATIENT
Start: 2025-07-28 | End: 2025-08-01 | Stop reason: HOSPADM

## 2025-07-28 RX ORDER — FUROSEMIDE 10 MG/ML
20 INJECTION INTRAMUSCULAR; INTRAVENOUS ONCE
Status: COMPLETED | OUTPATIENT
Start: 2025-07-28 | End: 2025-07-28

## 2025-07-28 RX ORDER — IOPAMIDOL 755 MG/ML
75 INJECTION, SOLUTION INTRAVASCULAR
Status: COMPLETED | OUTPATIENT
Start: 2025-07-28 | End: 2025-07-28

## 2025-07-28 RX ORDER — POTASSIUM CHLORIDE 7.45 MG/ML
10 INJECTION INTRAVENOUS PRN
Status: DISCONTINUED | OUTPATIENT
Start: 2025-07-28 | End: 2025-07-31

## 2025-07-28 RX ORDER — MIDODRINE HYDROCHLORIDE 5 MG/1
5 TABLET ORAL ONCE
Status: DISCONTINUED | OUTPATIENT
Start: 2025-07-28 | End: 2025-07-31

## 2025-07-28 RX ORDER — SODIUM CHLORIDE 0.9 % (FLUSH) 0.9 %
5-40 SYRINGE (ML) INJECTION EVERY 12 HOURS SCHEDULED
Status: DISCONTINUED | OUTPATIENT
Start: 2025-07-28 | End: 2025-07-31

## 2025-07-28 RX ORDER — SODIUM CHLORIDE 9 MG/ML
INJECTION, SOLUTION INTRAVENOUS CONTINUOUS
Status: DISCONTINUED | OUTPATIENT
Start: 2025-07-28 | End: 2025-07-28

## 2025-07-28 RX ORDER — ONDANSETRON 4 MG/1
4 TABLET, ORALLY DISINTEGRATING ORAL EVERY 8 HOURS PRN
Status: DISCONTINUED | OUTPATIENT
Start: 2025-07-28 | End: 2025-08-01 | Stop reason: HOSPADM

## 2025-07-28 RX ORDER — POLYETHYLENE GLYCOL 3350 17 G/17G
17 POWDER, FOR SOLUTION ORAL DAILY PRN
Status: DISCONTINUED | OUTPATIENT
Start: 2025-07-28 | End: 2025-07-31

## 2025-07-28 RX ORDER — ONDANSETRON 2 MG/ML
4 INJECTION INTRAMUSCULAR; INTRAVENOUS EVERY 6 HOURS PRN
Status: DISCONTINUED | OUTPATIENT
Start: 2025-07-28 | End: 2025-08-01 | Stop reason: HOSPADM

## 2025-07-28 RX ORDER — FUROSEMIDE 10 MG/ML
40 INJECTION INTRAMUSCULAR; INTRAVENOUS ONCE
Status: COMPLETED | OUTPATIENT
Start: 2025-07-28 | End: 2025-07-28

## 2025-07-28 RX ORDER — TEMAZEPAM 15 MG/1
15 CAPSULE ORAL NIGHTLY PRN
Status: DISCONTINUED | OUTPATIENT
Start: 2025-07-28 | End: 2025-08-01 | Stop reason: HOSPADM

## 2025-07-28 RX ORDER — ENOXAPARIN SODIUM 100 MG/ML
40 INJECTION SUBCUTANEOUS DAILY
Status: DISCONTINUED | OUTPATIENT
Start: 2025-07-28 | End: 2025-07-28

## 2025-07-28 RX ORDER — AMIODARONE HYDROCHLORIDE 200 MG/1
200 TABLET ORAL DAILY
Status: DISCONTINUED | OUTPATIENT
Start: 2025-07-28 | End: 2025-08-01 | Stop reason: HOSPADM

## 2025-07-28 RX ORDER — IPRATROPIUM BROMIDE AND ALBUTEROL SULFATE 2.5; .5 MG/3ML; MG/3ML
1 SOLUTION RESPIRATORY (INHALATION) EVERY 4 HOURS PRN
Status: DISCONTINUED | OUTPATIENT
Start: 2025-07-28 | End: 2025-07-31

## 2025-07-28 RX ORDER — MAGNESIUM SULFATE IN WATER 40 MG/ML
2000 INJECTION, SOLUTION INTRAVENOUS PRN
Status: DISCONTINUED | OUTPATIENT
Start: 2025-07-28 | End: 2025-07-31

## 2025-07-28 RX ORDER — SODIUM CHLORIDE 0.9 % (FLUSH) 0.9 %
5-40 SYRINGE (ML) INJECTION PRN
Status: DISCONTINUED | OUTPATIENT
Start: 2025-07-28 | End: 2025-07-31

## 2025-07-28 RX ORDER — ACETAMINOPHEN 650 MG/1
650 SUPPOSITORY RECTAL EVERY 6 HOURS PRN
Status: DISCONTINUED | OUTPATIENT
Start: 2025-07-28 | End: 2025-08-01 | Stop reason: HOSPADM

## 2025-07-28 RX ORDER — SODIUM CHLORIDE 9 MG/ML
INJECTION, SOLUTION INTRAVENOUS PRN
Status: DISCONTINUED | OUTPATIENT
Start: 2025-07-28 | End: 2025-07-31

## 2025-07-28 RX ORDER — 0.9 % SODIUM CHLORIDE 0.9 %
500 INTRAVENOUS SOLUTION INTRAVENOUS
Status: COMPLETED | OUTPATIENT
Start: 2025-07-28 | End: 2025-07-28

## 2025-07-28 RX ADMIN — SODIUM CHLORIDE, PRESERVATIVE FREE 10 ML: 5 INJECTION INTRAVENOUS at 20:00

## 2025-07-28 RX ADMIN — FUROSEMIDE 20 MG: 10 INJECTION, SOLUTION INTRAMUSCULAR; INTRAVENOUS at 17:04

## 2025-07-28 RX ADMIN — AMIODARONE HYDROCHLORIDE 0.5 MG/MIN: 50 INJECTION, SOLUTION INTRAVENOUS at 14:03

## 2025-07-28 RX ADMIN — AMIODARONE HYDROCHLORIDE 150 MG: 50 INJECTION, SOLUTION INTRAVENOUS at 13:40

## 2025-07-28 RX ADMIN — FUROSEMIDE 40 MG: 10 INJECTION, SOLUTION INTRAMUSCULAR; INTRAVENOUS at 21:48

## 2025-07-28 RX ADMIN — VANCOMYCIN HYDROCHLORIDE 2000 MG: 10 INJECTION, POWDER, LYOPHILIZED, FOR SOLUTION INTRAVENOUS at 16:59

## 2025-07-28 RX ADMIN — AZITHROMYCIN MONOHYDRATE 500 MG: 500 INJECTION, POWDER, LYOPHILIZED, FOR SOLUTION INTRAVENOUS at 12:56

## 2025-07-28 RX ADMIN — ACETAMINOPHEN 1000 MG: 500 TABLET, FILM COATED ORAL at 11:08

## 2025-07-28 RX ADMIN — WATER 2000 MG: 1 INJECTION INTRAMUSCULAR; INTRAVENOUS; SUBCUTANEOUS at 20:00

## 2025-07-28 RX ADMIN — IOPAMIDOL 75 ML: 755 INJECTION, SOLUTION INTRAVENOUS at 12:31

## 2025-07-28 RX ADMIN — SODIUM CHLORIDE 500 ML: 0.9 INJECTION, SOLUTION INTRAVENOUS at 13:47

## 2025-07-28 ASSESSMENT — PAIN SCALES - GENERAL
PAINLEVEL_OUTOF10: 0

## 2025-07-28 ASSESSMENT — PAIN - FUNCTIONAL ASSESSMENT: PAIN_FUNCTIONAL_ASSESSMENT: 0-10

## 2025-07-28 NOTE — PROGRESS NOTES
4 Eyes Skin Assessment     NAME:  Bertrand Stephenson  YOB: 1935  MEDICAL RECORD NUMBER:  [id number]    The patient is being assessed for  Admission    I agree that at least one RN has performed a thorough Head to Toe Skin Assessment on the patient. ALL assessment sites listed below have been assessed.      Areas assessed by both nurses:    Head, Face, Ears, Shoulders, Back, Chest, Arms, Elbows, Hands, Sacrum. Buttock, Coccyx, Ischium, and Legs. Feet and Heels        Does the Patient have a Wound? No noted wound(s)       Pete Prevention initiated by RN: Yes  Wound Care Orders initiated by RN: No    For hospital-acquired stage 1 & 2 and ALL Stage 3,4, Unstageable, DTI, NWPT, and Complex wounds: place order “IP Wound Care/Ostomy Nurse Eval and Treat” by RN under : No    New Ostomies, if present place, Ostomy referral order under : No     Nurse 1 eSignature: Electronically signed by Lary Pritchett RN on 7/28/25 at 7:26 PM EDT    **SHARE this note so that the co-signing nurse can place an eSignature**    Nurse 2 eSignature: Electronically signed by AMALIA CROWLEY RN on 7/28/25 at 7:31 PM EDT

## 2025-07-28 NOTE — PROGRESS NOTES
VANCO DAILY FOLLOW UP NOTE  [facility]   Pharmacy Pharmacokinetic Monitoring Service - Vancomycin    Consulting Provider: Dr. Veliz   Indication: PNA  Target Concentration: Goal AUC/JERRY 400-600 mg*hr/L  Day of Therapy: 1  Additional Antimicrobials: Cefepime    Pertinent Laboratory Values:   Wt Readings from Last 1 Encounters:   07/28/25 87.5 kg (193 lb)     Temp Readings from Last 1 Encounters:   07/28/25 99.1 °F (37.3 °C) (Oral)     Recent Labs     07/28/25  1101   BUN 23   CREATININE 1.17   WBC 1.1*   PROCAL 0.39*   LACTSEPSIS 1.2     Estimated Creatinine Clearance: 47 mL/min (based on SCr of 1.17 mg/dL).    No results found for: \"VANCOTROUGH\", \"VANCORANDOM\"    MRSA Nasal Swab: Was ordered by provider, awaiting results    Assessment:  Date/Time Dose Concentration AUC         Note: Serum concentrations collected for AUC dosing may appear elevated if collected in close proximity to the dose administered, this is not necessarily an indication of toxicity    Plan:  Dosing recommendations based on Bayesian software  Start vancomycin 2000 mg x1 loading dose, followed by 1000 mg IV every 24 hours  Anticipated AUC of 460 and trough concentration of 13.3 at steady state  Renal labs as indicated   Vancomycin concentrations will be ordered as clinically appropriate  Pharmacy will continue to monitor patient and adjust therapy as indicated    Thank you for the consult,  Armaan Moyer [facility]

## 2025-07-28 NOTE — ED PROVIDER NOTES
Emergency Department Provider Note       [facility] EMERGENCY DEPT   PCP: Dean Navarro MD   Age: 89 y.o.   Sex: male     DISPOSITION Decision To Admit 07/28/2025 02:15:54 PM    ICD-10-CM    1. Atrial fibrillation with rapid ventricular response (HCC)  I48.91       2. Severe sepsis (HCC)  A41.9     R65.20       3. Multifocal pneumonia  J18.9       4. Elevated troponin  R79.89       5. History of lung cancer  Z85.118           Medical Decision Making     89-year-old male patient with known history of lung cancer under active chemotherapy presenting this department via EMS with progressive shortness of breath, cough and weakness.  Found to have fever this morning.  Meeting sepsis criteria per EMS and route, status post 1 L bolus and 2 g of Rocephin prior to arrival.  Patient does report some history of heart disease, prior stents.  He did not have knowledge of his fever prior arrival to this department.  He is currently under active chemotherapy and sees Dr. Bowles of the oncology service.    Lab work reveals leukopenia, elevated troponin and BNP, COVID and flu are negative.  Procalcitonin mildly elevated with stable lactic acid.  Patient's rate is much better controlled after amiodarone bolus and drip.  He is hypotensive with maps greater than 65 currently so we have held Levophed.  He is receiving a small fluid bolus after initial bolus from EMS.  Will hydrate cautiously given signs of heart failure on imaging today and lab work.  Consult was placed to the hospitalist service who asked that I speak with oncology.  Oncology request hospitalist primary admission and will consult.  Have also consulted cardiology at the request of the hospitalist service.    Patient family have been updated with plan of care    ED Course as of 07/28/25 1415   Mon Jul 28, 2025   1144 EKG interpretation: Narrow complex tachycardia, somewhat irregular suspect underlying A-fib though at times patient does have P waves present [BR]   1256 Elevated

## 2025-07-28 NOTE — ED TRIAGE NOTES
Patient arrives to ED via EMS from home. Patient complaining of SOB and productive cough for a couple of days. Patient has history of lung cancer. Sepsis protocol started in route. Blood cultures x 1, liter of NS, and 2g of Rocephin. Patient does not wear oxygen at home. Patient was 89% on RA. Placed on 4L. Patient's temp 102.1

## 2025-07-28 NOTE — H&P
Hospitalist History and Physical   Admit Date:  2025 10:47 AM   Name:  Bertrand Stephenson   Age:  89 y.o.  Sex:  male  :  1935   MRN:  [id number]   Room:  01/    Presenting/Chief Complaint: Shortness of Breath     Reason(s) for Admission: Sepsis (HCC) [A41.9]     History of Present Illness:   Bertrand Stephenson is a 89 y.o. male with medical history of lung cancer undergoing active chemotherapy, CKD stage III, hyperlipidemia, hypertension and paroxysmal atrial fibrillation.  Patient being treated for underlying lung cancer and has completed 4 treatments at this time and family was stating he had 1 more and then was to be placed on Keytruda for maintenance.  Family called EMS as patient was more lethargic at home and complaining of some slight shortness of breath.  Upon my evaluation patient answering questions appropriately but is hard of hearing.  He denied any fevers or chills while at home and did state he felt slightly short of breath still but better compared to initial presentation.  Patient denied any other complaints on exam.      Assessment & Plan:     Sepsis  Multifocal pneumonia  Neutropenia  Pneumonia likely related to active chemotherapy for underlying cancer given patient severely neutropenic  Currently on 2 L nasal cannula; wean oxygen as tolerated  Given patient actively being treated with chemotherapy will place on broad-spectrum antibiotics  Patient had episode of wide-complex tachycardia and had to be placed on amiodarone drip holding up antibiotic administration.  Patient to be started on vancomycin and cefepime at this time  Pharmacy consulted for vancomycin  Follow-up blood culture x 2   MRSA swab ordered  Neutrophils noted to be 170  Neutropenic precautions  Continue to monitor    Hypotension  Patient's blood pressure still noted to be low while in the emergency department  MAP greater than 65  Levophed if needed  Continue IV fluids  ICU monitoring.  If blood  Pneumonia (CAP)    iopamidol (ISOVUE-370) 76 % injection 75 mL    amiodarone (CORDARONE) 150 mg in dextrose 5 % 100 mL bolus ([id number])    amiodarone (CORDARONE) 450 mg in dextrose 5 % 250 mL infusion ([id number])    sodium chloride 0.9 % bolus 500 mL    norepinephrine (LEVOPHED) 8 mg/ mL infusion     Titrate Infusion?:   Yes     Initial Infusion Dose::   5 mcg/min     Goal of Therapy is::   MAP greater than 65 mmHg     Contact Provider if::   Patient is receiving the maximum dose and is not achieving the goal of therapy    rivaroxaban (XARELTO) tablet 15 mg     Indication of Use:   A Fib/A Flutter    DISCONTD: enoxaparin (LOVENOX) injection 40 mg     Indication of Use:   Prophylaxis-DVT/PE    0.9 % sodium chloride infusion    DISCONTD: vancomycin (VANCOCIN) 1500 mg in sodium chloride 0.9 % 250 mL IVPB     Antimicrobial Indications:   Sepsis of Unknown Etiology     Sepsis duration of therapy:   7 days    ceFEPIme (MAXIPIME) 2,000 mg in sterile water 20 mL IV syringe     Antimicrobial Indications:   Pneumonia (Nosocomial)     Antimicrobial Indications:   Sepsis of Unknown Etiology     Sepsis duration of therapy:   7 days     Pnuemonia (Nosocomial) duration of therapy:   7 days    ceFEPIme (MAXIPIME) 2,000 mg in sodium chloride 0.9 % 100 mL IVPB (addEASE)     Antimicrobial Indications:   Pneumonia (Nosocomial)     Antimicrobial Indications:   Sepsis of Unknown Etiology     Sepsis duration of therapy:   7 days     Pnuemonia (Nosocomial) duration of therapy:   7 days    vancomycin (VANCOCIN) 2000 mg in 0.9% sodium chloride 500 mL IVPB     Antimicrobial Indications:   Pneumonia (CAP)     CAP duration of therapy:   7 days    vancomycin (VANCOCIN) 1,000 mg in sodium chloride 0.9 % 250 mL IVPB (Nivi1Vdg)     Antimicrobial Indications:   Sepsis of Unknown Etiology     Sepsis duration of therapy:   7 days       Prior to Admit Medications:  Current Outpatient Medications   Medication Instructions    amiodarone (CORDARONE)

## 2025-07-28 NOTE — ED NOTES
Patient unable to urinate. 370 cc result on bladder scan. MD Jose Alfredo made aware. Order to straight cath.      Rj Elizabeth RN  07/28/25 [id number]

## 2025-07-28 NOTE — CONSULTS
[facility] Cardiology Initial Cardiac Evaluation                 Date of  Admission: 7/28/2025 10:47 AM     Primary Care Physician: Dean Navarro MD  Primary Cardiologist: Dr Headley  Referring Physician: Dr Buckner  Attending Physician: Dr El    CC: a fib      Bertrand Stephenson is a 89 y.o. male who presented to the ER w SOB.  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 He presented to the ER w SOB.    In ER , K 4, cr 1.1, procal .39, trop 155, pBNP 18,000, albumin 2.4, WBC 1, hgb 8, platelets 146, Covid neg, BC pending, CT chest extensive B multifocal PNA, EKG multifocal atrial tach w  w NSST/T wave changes, /74, temp 99.5. Being admitted by hospitalist, started on antibiotics.     The pt is hard of hearing, daughters at bedside and wife assist w history.  The pt denies recent CP but has had increased WELLS, orthopnea, mild cough and wheezing, worse this AM, better w inspiratory spirometry which his wife does multiple times a day.  Pt denies palpitations, dizziness, syncope.  No LE edema, is losing weight.  He has finished the taxol and is about to be on only keytruda. While in ER he went into a wide complex tach w rate 139, possibly a fib w aberrancy, started on IV amio, has converted back to NSR but appears to be having occasional short runs of NSVT.  Per nursing pt went into wide complex tach for 15-20 seconds and appeared to be holding his chest.  Now having very brief wide comlex tach and occasional PACs.     Home cardiac meds:  Amio 200 mg qd  Toprol 25 mg qd  Xarelto 15 mg qd     Past cardiac work up:  STATIN INTOLERANT  Afib - DCCV, reverted to af-repeat DCCF and amiodarone ([place], [place])  Transient tachycardia induced CM  Jan 2020- in afib - EF 35%, bi atrial enlargement, EF 23% on stress perfusion study  March 2020-  PCI  ADMIT

## 2025-07-29 ENCOUNTER — APPOINTMENT (OUTPATIENT)
Dept: NON INVASIVE DIAGNOSTICS | Age: 89
End: 2025-07-29
Payer: MEDICARE

## 2025-07-29 PROBLEM — I47.20 V TACH (HCC): Status: ACTIVE | Noted: 2025-07-29

## 2025-07-29 LAB
ALBUMIN SERPL-MCNC: 2.3 G/DL (ref 3.2–4.6)
ALBUMIN/GLOB SERPL: 0.6 (ref 1–1.9)
ALP SERPL-CCNC: 66 U/L (ref 40–129)
ALT SERPL-CCNC: 33 U/L (ref 8–55)
ANION GAP SERPL CALC-SCNC: 14 MMOL/L (ref 7–16)
ANION GAP SERPL CALC-SCNC: 14 MMOL/L (ref 7–16)
AST SERPL-CCNC: 49 U/L (ref 15–37)
B PERT DNA SPEC QL NAA+PROBE: NOT DETECTED
BASOPHILS # BLD: 0.01 K/UL (ref 0–0.2)
BASOPHILS NFR BLD: 0.8 % (ref 0–2)
BILIRUB SERPL-MCNC: 0.5 MG/DL (ref 0–1.2)
BORDETELLA PARAPERTUSSIS BY PCR: NOT DETECTED
BUN SERPL-MCNC: 24 MG/DL (ref 8–23)
BUN SERPL-MCNC: 29 MG/DL (ref 8–23)
C PNEUM DNA SPEC QL NAA+PROBE: NOT DETECTED
CALCIUM SERPL-MCNC: 8 MG/DL (ref 8.8–10.2)
CALCIUM SERPL-MCNC: 8 MG/DL (ref 8.8–10.2)
CHLORIDE SERPL-SCNC: 96 MMOL/L (ref 98–107)
CHLORIDE SERPL-SCNC: 98 MMOL/L (ref 98–107)
CO2 SERPL-SCNC: 18 MMOL/L (ref 20–29)
CO2 SERPL-SCNC: 20 MMOL/L (ref 20–29)
CREAT SERPL-MCNC: 1.37 MG/DL (ref 0.8–1.3)
CREAT SERPL-MCNC: 1.56 MG/DL (ref 0.8–1.3)
DIFFERENTIAL METHOD BLD: ABNORMAL
ECHO AO ASC DIAM: 4.6 CM
ECHO AO ASCENDING AORTA INDEX: 2.28 CM/M2
ECHO AO ROOT DIAM: 4 CM
ECHO AO ROOT INDEX: 1.98 CM/M2
ECHO AV AREA PEAK VELOCITY: 2.9 CM2
ECHO AV AREA VTI: 2.6 CM2
ECHO AV AREA/BSA PEAK VELOCITY: 1.4 CM2/M2
ECHO AV AREA/BSA VTI: 1.3 CM2/M2
ECHO AV MEAN GRADIENT: 5 MMHG
ECHO AV MEAN VELOCITY: 1.1 M/S
ECHO AV PEAK GRADIENT: 8 MMHG
ECHO AV PEAK VELOCITY: 1.4 M/S
ECHO AV VELOCITY RATIO: 0.71
ECHO AV VTI: 19.6 CM
ECHO BSA: 2.06 M2
ECHO EST RA PRESSURE: 3 MMHG
ECHO IVC PROX: 1.6 CM
ECHO LA AREA 2C: 26.1 CM2
ECHO LA AREA 4C: 22.1 CM2
ECHO LA DIAMETER INDEX: 1.78 CM/M2
ECHO LA DIAMETER: 3.6 CM
ECHO LA MAJOR AXIS: 7.1 CM
ECHO LA MINOR AXIS: 6.9 CM
ECHO LA TO AORTIC ROOT RATIO: 0.9
ECHO LA VOL BP: 70 ML (ref 18–58)
ECHO LA VOL MOD A2C: 83 ML (ref 18–58)
ECHO LA VOL MOD A4C: 57 ML (ref 18–58)
ECHO LA VOL/BSA BIPLANE: 35 ML/M2 (ref 16–34)
ECHO LA VOLUME INDEX MOD A2C: 41 ML/M2 (ref 16–34)
ECHO LA VOLUME INDEX MOD A4C: 28 ML/M2 (ref 16–34)
ECHO LV EDV A2C: 104 ML
ECHO LV EDV A4C: 116 ML
ECHO LV EDV INDEX A4C: 57 ML/M2
ECHO LV EDV NDEX A2C: 51 ML/M2
ECHO LV EF PHYSICIAN: 35 %
ECHO LV EJECTION FRACTION A2C: 31 %
ECHO LV EJECTION FRACTION A4C: 35 %
ECHO LV EJECTION FRACTION BIPLANE: 31 % (ref 55–100)
ECHO LV ESV A2C: 71 ML
ECHO LV ESV A4C: 76 ML
ECHO LV ESV INDEX A2C: 35 ML/M2
ECHO LV ESV INDEX A4C: 38 ML/M2
ECHO LV FRACTIONAL SHORTENING: 16 % (ref 28–44)
ECHO LV INTERNAL DIMENSION DIASTOLE INDEX: 2.72 CM/M2
ECHO LV INTERNAL DIMENSION DIASTOLIC: 5.5 CM (ref 4.2–5.9)
ECHO LV INTERNAL DIMENSION SYSTOLIC INDEX: 2.28 CM/M2
ECHO LV INTERNAL DIMENSION SYSTOLIC: 4.6 CM
ECHO LV IVSD: 1.2 CM (ref 0.6–1)
ECHO LV MASS 2D: 304.3 G (ref 88–224)
ECHO LV MASS INDEX 2D: 150.7 G/M2 (ref 49–115)
ECHO LV POSTERIOR WALL DIASTOLIC: 1.4 CM (ref 0.6–1)
ECHO LV RELATIVE WALL THICKNESS RATIO: 0.51
ECHO LVOT AREA: 4.2 CM2
ECHO LVOT AV VTI INDEX: 0.63
ECHO LVOT DIAM: 2.3 CM
ECHO LVOT MEAN GRADIENT: 2 MMHG
ECHO LVOT PEAK GRADIENT: 4 MMHG
ECHO LVOT PEAK VELOCITY: 1 M/S
ECHO LVOT STROKE VOLUME INDEX: 25.3 ML/M2
ECHO LVOT SV: 51.1 ML
ECHO LVOT VTI: 12.3 CM
ECHO MV A VELOCITY: 0.49 M/S
ECHO MV AREA VTI: 2.7 CM2
ECHO MV E DECELERATION TIME (DT): 140 MS
ECHO MV E VELOCITY: 1.07 M/S
ECHO MV E/A RATIO: 2.18
ECHO MV LVOT VTI INDEX: 1.51
ECHO MV MAX VELOCITY: 1.1 M/S
ECHO MV MEAN GRADIENT: 2 MMHG
ECHO MV MEAN VELOCITY: 0.7 M/S
ECHO MV PEAK GRADIENT: 5 MMHG
ECHO MV VTI: 18.6 CM
ECHO PV ACCELERATION TIME (AT): 58 MS
ECHO PV MAX VELOCITY: 0.9 M/S
ECHO PV PEAK GRADIENT: 3 MMHG
ECHO RIGHT VENTRICULAR SYSTOLIC PRESSURE (RVSP): 28 MMHG
ECHO RV FREE WALL PEAK S': 10 CM/S
ECHO TV REGURGITANT MAX VELOCITY: 2.49 M/S
ECHO TV REGURGITANT PEAK GRADIENT: 25 MMHG
ECHO TV REGURGITANT PEAK GRADIENT: 25 MMHG
EKG ATRIAL RATE: 139 BPM
EKG DIAGNOSIS: NORMAL
EKG Q-T INTERVAL: 367 MS
EKG QRS DURATION: 139 MS
EKG QTC CALCULATION (BAZETT): 559 MS
EKG R AXIS: 92 DEGREES
EKG T AXIS: 265 DEGREES
EKG VENTRICULAR RATE: 139 BPM
EOSINOPHIL # BLD: 0 K/UL (ref 0–0.8)
EOSINOPHIL NFR BLD: 0 % (ref 0.5–7.8)
ERYTHROCYTE [DISTWIDTH] IN BLOOD BY AUTOMATED COUNT: 16.4 % (ref 11.9–14.6)
FLUAV SUBTYP SPEC NAA+PROBE: NOT DETECTED
FLUBV RNA SPEC QL NAA+PROBE: NOT DETECTED
GLOBULIN SER CALC-MCNC: 3.9 G/DL (ref 2.3–3.5)
GLUCOSE BLD STRIP.AUTO-MCNC: 160 MG/DL (ref 65–100)
GLUCOSE SERPL-MCNC: 132 MG/DL (ref 70–99)
GLUCOSE SERPL-MCNC: 168 MG/DL (ref 70–99)
HADV DNA SPEC QL NAA+PROBE: NOT DETECTED
HCOV 229E RNA SPEC QL NAA+PROBE: NOT DETECTED
HCOV HKU1 RNA SPEC QL NAA+PROBE: NOT DETECTED
HCOV NL63 RNA SPEC QL NAA+PROBE: NOT DETECTED
HCOV OC43 RNA SPEC QL NAA+PROBE: NOT DETECTED
HCT VFR BLD AUTO: 27.7 % (ref 41.1–50.3)
HGB BLD-MCNC: 8.8 G/DL (ref 13.6–17.2)
HMPV RNA SPEC QL NAA+PROBE: NOT DETECTED
HPIV1 RNA SPEC QL NAA+PROBE: NOT DETECTED
HPIV2 RNA SPEC QL NAA+PROBE: NOT DETECTED
HPIV3 RNA SPEC QL NAA+PROBE: NOT DETECTED
HPIV4 RNA SPEC QL NAA+PROBE: NOT DETECTED
IMM GRANULOCYTES # BLD AUTO: 0.01 K/UL (ref 0–0.5)
IMM GRANULOCYTES NFR BLD AUTO: 0.8 % (ref 0–5)
LYMPHOCYTES # BLD: 0.63 K/UL (ref 0.5–4.6)
LYMPHOCYTES NFR BLD: 48.5 % (ref 13–44)
M PNEUMO DNA SPEC QL NAA+PROBE: NOT DETECTED
MAGNESIUM SERPL-MCNC: 2.2 MG/DL (ref 1.8–2.4)
MCH RBC QN AUTO: 29.8 PG (ref 26.1–32.9)
MCHC RBC AUTO-ENTMCNC: 31.8 G/DL (ref 31.4–35)
MCV RBC AUTO: 93.9 FL (ref 82–102)
MONOCYTES # BLD: 0.33 K/UL (ref 0.1–1.3)
MONOCYTES NFR BLD: 25 % (ref 4–12)
NEUTS SEG # BLD: 0.32 K/UL (ref 1.7–8.2)
NEUTS SEG NFR BLD: 24.9 % (ref 43–78)
NRBC # BLD: 0 K/UL (ref 0–0.2)
PLATELET # BLD AUTO: 170 K/UL (ref 150–450)
PLATELET COMMENT: ADEQUATE
PMV BLD AUTO: 10.2 FL (ref 9.4–12.3)
POTASSIUM SERPL-SCNC: 3.8 MMOL/L (ref 3.5–5.1)
POTASSIUM SERPL-SCNC: 4.4 MMOL/L (ref 3.5–5.1)
PROT SERPL-MCNC: 6.2 G/DL (ref 6.3–8.2)
RBC # BLD AUTO: 2.95 M/UL (ref 4.23–5.6)
RBC MORPH BLD: ABNORMAL
RSV RNA SPEC QL NAA+PROBE: NOT DETECTED
RV+EV RNA SPEC QL NAA+PROBE: NOT DETECTED
SARS-COV-2 RNA RESP QL NAA+PROBE: NOT DETECTED
SERVICE CMNT-IMP: ABNORMAL
SODIUM SERPL-SCNC: 128 MMOL/L (ref 136–145)
SODIUM SERPL-SCNC: 132 MMOL/L (ref 136–145)
WBC # BLD AUTO: 1.3 K/UL (ref 4.3–11.1)
WBC MORPH BLD: ABNORMAL

## 2025-07-29 PROCEDURE — 6370000000 HC RX 637 (ALT 250 FOR IP): Performed by: PHYSICIAN ASSISTANT

## 2025-07-29 PROCEDURE — 82962 GLUCOSE BLOOD TEST: CPT

## 2025-07-29 PROCEDURE — 93005 ELECTROCARDIOGRAM TRACING: CPT | Performed by: HOSPITALIST

## 2025-07-29 PROCEDURE — 36415 COLL VENOUS BLD VENIPUNCTURE: CPT

## 2025-07-29 PROCEDURE — 99232 SBSQ HOSP IP/OBS MODERATE 35: CPT | Performed by: INTERNAL MEDICINE

## 2025-07-29 PROCEDURE — 1100000000 HC RM PRIVATE

## 2025-07-29 PROCEDURE — 2580000003 HC RX 258: Performed by: INTERNAL MEDICINE

## 2025-07-29 PROCEDURE — 0202U NFCT DS 22 TRGT SARS-COV-2: CPT

## 2025-07-29 PROCEDURE — 6360000002 HC RX W HCPCS: Performed by: NURSE PRACTITIONER

## 2025-07-29 PROCEDURE — 6360000002 HC RX W HCPCS: Performed by: INTERNAL MEDICINE

## 2025-07-29 PROCEDURE — 94640 AIRWAY INHALATION TREATMENT: CPT

## 2025-07-29 PROCEDURE — 87449 NOS EACH ORGANISM AG IA: CPT

## 2025-07-29 PROCEDURE — 83735 ASSAY OF MAGNESIUM: CPT

## 2025-07-29 PROCEDURE — 5A0945A ASSISTANCE WITH RESPIRATORY VENTILATION, 24-96 CONSECUTIVE HOURS, HIGH NASAL FLOW/VELOCITY: ICD-10-PCS | Performed by: HOSPITALIST

## 2025-07-29 PROCEDURE — 6370000000 HC RX 637 (ALT 250 FOR IP): Performed by: INTERNAL MEDICINE

## 2025-07-29 PROCEDURE — 94761 N-INVAS EAR/PLS OXIMETRY MLT: CPT

## 2025-07-29 PROCEDURE — 6370000000 HC RX 637 (ALT 250 FOR IP): Performed by: NURSE PRACTITIONER

## 2025-07-29 PROCEDURE — 85025 COMPLETE CBC W/AUTO DIFF WBC: CPT

## 2025-07-29 PROCEDURE — 80053 COMPREHEN METABOLIC PANEL: CPT

## 2025-07-29 PROCEDURE — 99223 1ST HOSP IP/OBS HIGH 75: CPT | Performed by: INTERNAL MEDICINE

## 2025-07-29 PROCEDURE — 6360000002 HC RX W HCPCS: Performed by: PHYSICIAN ASSISTANT

## 2025-07-29 PROCEDURE — 6370000000 HC RX 637 (ALT 250 FOR IP): Performed by: HOSPITALIST

## 2025-07-29 PROCEDURE — 87205 SMEAR GRAM STAIN: CPT

## 2025-07-29 PROCEDURE — 2700000000 HC OXYGEN THERAPY PER DAY

## 2025-07-29 PROCEDURE — 93306 TTE W/DOPPLER COMPLETE: CPT

## 2025-07-29 PROCEDURE — 2140000000 HC CCU INTERMEDIATE R&B

## 2025-07-29 PROCEDURE — 2500000003 HC RX 250 WO HCPCS: Performed by: INTERNAL MEDICINE

## 2025-07-29 PROCEDURE — 2580000003 HC RX 258: Performed by: NURSE PRACTITIONER

## 2025-07-29 PROCEDURE — 93306 TTE W/DOPPLER COMPLETE: CPT | Performed by: INTERNAL MEDICINE

## 2025-07-29 PROCEDURE — 87070 CULTURE OTHR SPECIMN AEROBIC: CPT

## 2025-07-29 PROCEDURE — 93010 ELECTROCARDIOGRAM REPORT: CPT | Performed by: INTERNAL MEDICINE

## 2025-07-29 RX ORDER — FUROSEMIDE 10 MG/ML
40 INJECTION INTRAMUSCULAR; INTRAVENOUS ONCE
Status: COMPLETED | OUTPATIENT
Start: 2025-07-29 | End: 2025-07-29

## 2025-07-29 RX ORDER — LORAZEPAM 0.5 MG/1
0.5 TABLET ORAL EVERY 6 HOURS PRN
Status: DISCONTINUED | OUTPATIENT
Start: 2025-07-29 | End: 2025-07-31

## 2025-07-29 RX ORDER — LANOLIN ALCOHOL/MO/W.PET/CERES
400 CREAM (GRAM) TOPICAL 2 TIMES DAILY
Status: DISCONTINUED | OUTPATIENT
Start: 2025-07-29 | End: 2025-07-31

## 2025-07-29 RX ORDER — MAGNESIUM SULFATE IN WATER 40 MG/ML
2000 INJECTION, SOLUTION INTRAVENOUS ONCE
Status: COMPLETED | OUTPATIENT
Start: 2025-07-29 | End: 2025-07-29

## 2025-07-29 RX ORDER — LORAZEPAM 0.5 MG/1
0.5 TABLET ORAL ONCE
Status: COMPLETED | OUTPATIENT
Start: 2025-07-29 | End: 2025-07-29

## 2025-07-29 RX ORDER — POTASSIUM CHLORIDE 1500 MG/1
40 TABLET, EXTENDED RELEASE ORAL ONCE
Status: COMPLETED | OUTPATIENT
Start: 2025-07-29 | End: 2025-07-29

## 2025-07-29 RX ADMIN — RIVAROXABAN 15 MG: 15 TABLET, FILM COATED ORAL at 07:42

## 2025-07-29 RX ADMIN — MAGNESIUM GLUCONATE 500 MG ORAL TABLET 400 MG: 500 TABLET ORAL at 19:53

## 2025-07-29 RX ADMIN — SODIUM CHLORIDE, PRESERVATIVE FREE 10 ML: 5 INJECTION INTRAVENOUS at 19:48

## 2025-07-29 RX ADMIN — CEFEPIME 2000 MG: 2 INJECTION, POWDER, FOR SOLUTION INTRAVENOUS at 07:51

## 2025-07-29 RX ADMIN — MAGNESIUM GLUCONATE 500 MG ORAL TABLET 400 MG: 500 TABLET ORAL at 11:40

## 2025-07-29 RX ADMIN — AMIODARONE HYDROCHLORIDE 0.5 MG/MIN: 50 INJECTION, SOLUTION INTRAVENOUS at 03:28

## 2025-07-29 RX ADMIN — ACETAMINOPHEN 650 MG: 325 TABLET ORAL at 21:09

## 2025-07-29 RX ADMIN — AMIODARONE HYDROCHLORIDE 1 MG/MIN: 50 INJECTION, SOLUTION INTRAVENOUS at 13:42

## 2025-07-29 RX ADMIN — FUROSEMIDE 40 MG: 10 INJECTION, SOLUTION INTRAMUSCULAR; INTRAVENOUS at 19:48

## 2025-07-29 RX ADMIN — LORAZEPAM 0.5 MG: 0.5 TABLET ORAL at 16:01

## 2025-07-29 RX ADMIN — IPRATROPIUM BROMIDE AND ALBUTEROL SULFATE 1 DOSE: 2.5; .5 SOLUTION RESPIRATORY (INHALATION) at 20:10

## 2025-07-29 RX ADMIN — DEXTROSE 150 MG: 50 INJECTION, SOLUTION INTRAVENOUS at 23:28

## 2025-07-29 RX ADMIN — CEFEPIME 2000 MG: 2 INJECTION, POWDER, FOR SOLUTION INTRAVENOUS at 20:01

## 2025-07-29 RX ADMIN — POTASSIUM CHLORIDE 40 MEQ: 1500 TABLET, EXTENDED RELEASE ORAL at 09:27

## 2025-07-29 RX ADMIN — ACETAMINOPHEN 650 MG: 325 TABLET ORAL at 07:42

## 2025-07-29 RX ADMIN — SODIUM CHLORIDE, PRESERVATIVE FREE 10 ML: 5 INJECTION INTRAVENOUS at 07:45

## 2025-07-29 RX ADMIN — AMIODARONE HYDROCHLORIDE 1 MG/MIN: 50 INJECTION, SOLUTION INTRAVENOUS at 21:09

## 2025-07-29 RX ADMIN — LORAZEPAM 0.5 MG: 0.5 TABLET ORAL at 19:53

## 2025-07-29 RX ADMIN — MAGNESIUM SULFATE HEPTAHYDRATE 2000 MG: 40 INJECTION, SOLUTION INTRAVENOUS at 10:37

## 2025-07-29 RX ADMIN — VANCOMYCIN HYDROCHLORIDE 1000 MG: 1 INJECTION, POWDER, LYOPHILIZED, FOR SOLUTION INTRAVENOUS at 15:42

## 2025-07-29 ASSESSMENT — PAIN SCALES - GENERAL
PAINLEVEL_OUTOF10: 0

## 2025-07-29 NOTE — CARE COORDINATION
Pt presented to the ED c/o SOB, increased WELLS, orthopnea, mild cough and wheezing. While in the ED he had occasional short runs of NSVT. PMHx includes: HTN, multivessel CAD, lung adenocarcinoma with spinal metastasis, PAF with a h/o tachycardia induced cardiomyopathy. Pt admitted for severe sepsis. Pts spouse and dtr at bedside. PTA, pt needed ADL assistance, spouse helped as needed. Lives in a one story private residence and their 2 dtrs live closes by and help as needed. Pt A/Ox4 and [place]. On 8L supplemental, denies home O2 use. Uses a RW for ambulation assistance, stated that he would benefit from a WC. Writer offered to order one and advised that there will be an OOP cost. Wife reported understanding and was ok to order the WC, will send order to PM prior to d/c. Denies current HH services. PT/OT consulted and have attempted to work with the pt. Pt does not drive, spouse or dtrs transport the pt to [facility]. PCP established, last saw Dr. Navarro in January and generally sees him once a year for f/u. [facility] Medicare verified and able to afford home meds. Will continue to monitor for potential d/c needs.    1457-PM no longer in network with [facility], referral for WC had to be sent to [facility].      07/29/25 2065   Service Assessment   Patient Orientation Alert and Oriented   Cognition Alert   History Provided By Spouse   Primary Caregiver Family   Accompanied By/Relationship Spouse & Daughter   Support Systems Spouse/Significant Other;Children;Family Members;[employer];Friends/Neighbors   Patient's Healthcare Decision Maker is: Legal Next of Kin   PCP Verified by CM Yes   Last Visit to PCP Within last year   Prior Functional Level Assistance with the following:;Bathing;Cooking;Housework;Shopping;Mobility   Current Functional Level Assistance with the following:;Bathing;Cooking;Housework;Shopping;Mobility   Can patient return to prior living arrangement Yes   Ability to make needs known: Good   Family able to assist

## 2025-07-29 NOTE — PROGRESS NOTES
VANCO DAILY FOLLOW UP NOTE  [facility]   Pharmacy Pharmacokinetic Monitoring Service - Vancomycin    Consulting Provider: Dr. Veliz   Indication: CAP  Target Concentration: Goal AUC/JERRY 400-600 mg*hr/L  Day of Therapy: 2 of  7  Additional Antimicrobials: Cefepime    Pertinent Laboratory Values:   Wt Readings from Last 1 Encounters:   07/29/25 86.1 kg (189 lb 14.4 oz)     Temp Readings from Last 1 Encounters:   07/29/25 97.9 °F (36.6 °C) (Oral)     Recent Labs     07/28/25  1101 07/29/25  0437   BUN 23 24*   CREATININE 1.17 1.37*   WBC 1.1* 1.3*   PROCAL 0.39*  --    LACTSEPSIS 1.2  --      Estimated Creatinine Clearance: 40 mL/min (A) (based on SCr of 1.37 mg/dL (H)).    No results found for: \"VANCOTROUGH\", \"VANCORANDOM\"    MRSA Nasal Swab: Was ordered by provider, awaiting results    Assessment:  Date/Time Dose Concentration AUC         Note: Serum concentrations collected for AUC dosing may appear elevated if collected in close proximity to the dose administered, this is not necessarily an indication of toxicity    Plan:  Dosing recommendations based on Bayesian software  Continue vancomycin 1000 mg every 24 hours  Renal labs as indicated   Vancomycin concentration ordered for 7/30 @ 0400  Pharmacy will continue to monitor patient and adjust therapy as indicated    Thank you for the consult,  Carole Riggins [facility]

## 2025-07-29 NOTE — PROGRESS NOTES
Occupational Therapy Note:    Attempted to see patient this AM for occupational therapy evaluation session. Patient getting echo at time of attempt. Will follow and re-attempt as schedule permits/patient available. Thank you,    Alta Patel, OT     Rehab Caseload Tracker

## 2025-07-29 NOTE — PROGRESS NOTES
Hospitalist Progress Note   Admit Date:  2025 10:47 AM   Name:  Bertrand Stephenson   Age:  89 y.o.  Sex:  male  :  1935   MRN:  [id number]   Room:  [facility]/    Presenting/Chief Complaint: Shortness of Breath     Reason(s) for Admission: Elevated troponin [R79.89]  History of lung cancer [Z85.118]  Atrial fibrillation with rapid ventricular response (HCC) [I48.91]  Sepsis (HCC) [A41.9]  Severe sepsis (HCC) [A41.9, R65.20]  Multifocal pneumonia [J18.9]     Hospital Course:   Bertrand Stephenson is a 89 y.o. male with medical history of lung cancer currently on chemotherapy, chronic kidney disease stage III, hypertension, dyslipidemia, paroxysmal atrial fibrillation, presented to the ER as he was more lethargic at home with shortness of breath.  He was admitted for sepsis secondary to neutropenic fever/multifocal pneumonia.  He was also found to have multifocal atrial tachycardia and nonsustained V. tach.  Cardiology consulted.  Patient is currently on amiodarone drip.      Subjective & 24hr Events:   Patient is somnolent.  Wakes up easily and answers questions.  He has fever on admission.  Temperature of 100.8 F.  Tachycardic with heart rate 100 to 120/min.  Family at bedside.  Updated regarding patient condition and current plan of care.      Assessment & Plan:     This is a 89-year-old male with    Severe sepsis secondary to neutropenic fever/multifocal pneumonia  Continue broad-spectrum antibiotics cefepime, vancomycin.  Blood cultures so far negative.  MRSA swab ordered.  Oncology consulted.  Appreciate recommendations.    Acute hypoxemic respiratory failure secondary to multifocal pneumonia  Patient is currently needing 6 L high flow nasal cannula.  Wean oxygen as tolerated.  Continue cefepime, vancomycin.    Multifocal atrial tachycardia/atrial fibrillation/secondary hypercoagulable state  Continue amiodarone dose increased to 1 mg/min.  Echocardiogram shows moderately reduced

## 2025-07-29 NOTE — ICUWATCH
RRT Clinical Rounding Nurse Update    Vitals:    07/29/25 1320 07/29/25 1545 07/29/25 1613 07/29/25 1621   BP:   134/83    Pulse:  (!) 119 (!) 128    Resp: 29 (!) 34 26    Temp:  100 °F (37.8 °C) 98.2 °F (36.8 °C)    TempSrc:  Axillary Oral    SpO2:  93% 94% 95%   Weight:       Height:            DETERIORATION INDEX SCORE: 60    ASSESSMENT:  Previous outreach assessment was reviewed. Still having multiple runs of VT, latest episode lasting approximately 54 seconds long. Increasing anxiety reported by the primary RN, orders for 0.5mg of Ativan given per Hospitalist. VSS.    PLAN:  Will follow per RRT Clinical Rounding Program protocol.    Baudilio Kaminski RN  [facility]: [phone]  [facility]: [phone]

## 2025-07-29 NOTE — PROGRESS NOTES
Physical Therapy Note:    Attempted to see patient this PM for physical therapy evaluation session. RN requested hold due to medical status this PM. Will follow and re-attempt as schedule permits/patient available. Thank you,    Anastasia Staton, PT     Rehab Caseload Tracker

## 2025-07-29 NOTE — PROGRESS NOTES
Occupational Therapy Note:    Attempted to see patient this AM for occupational therapy evaluation session. Patient getting echo at time of attempt.     In PM, RN requests therapy HOLD d/t medical status.     Will follow and re-attempt as schedule permits/patient available. Thank you,    Alta Patel, OT     Rehab Caseload Tracker

## 2025-07-29 NOTE — PROGRESS NOTES
Physical Therapy Note:    Attempted to see patient this AM for physical therapy evaluation session. Patient getting echo at time of attempt. Will follow and re-attempt as schedule permits/patient available. Thank you,    Anastasia Staton, PT     Rehab Caseload Tracker

## 2025-07-29 NOTE — PROGRESS NOTES
0926- 9 beat run vtach.  Followed by 25 beat vtach.  Asymptomatic. Ferdinand Roper NP notified. NP reply: Thumbs up    0938- 43 beat run vtach. Asymptomatic.  Ferdinand Roper NP notified.  NP reply: thumbs up    1024- 50+ beat run vtach.  A few beats of afib.  Followed by 50+ beats vtach. ARSH Bland notified in person.  PO and IV mag ordered.     1538 54 seconds of vtach. ARSH Bland notified.      1545 ARSH Bland at bedside.  Baudilio [facility] updated in person    1549- Dr. Sanchez notified of patient's increasing anxiety and respirations.  0.5mg PO Ativan ordered and administered

## 2025-07-29 NOTE — PROGRESS NOTES
Patient has IVF to start. Patient's lung are crackly at the bases and seems short of breath while resting. Patient on 5L of Oxygen, O2 is 90-93%. Notified Mary Mendoza NP. NP said to hold IVF.     2100 Patient having worsening SOB and use of accessory muscles. Congestion mid chest with productive, wet cough. Notified Mary Mendoza NP. NP ordered Lasix 40 MG IVP once, PRN breathing treatment, and Midodrine 5 mg once.     2145 Last /90. BP has been normal since arriving to the floor. Notified NP. NP said okay to hold Midodrine.

## 2025-07-29 NOTE — ICUWATCH
RRT Clinical Rounding Nurse Progress Report      SUBJECTIVE: Patient assessed secondary to RN/provider concern - Recurrent wide-complex tachycardia.      Vitals:    07/29/25 0724 07/29/25 0726 07/29/25 0930 07/29/25 1000   BP: 119/74 107/82 (!) 88/58 (!) 87/55   Pulse: (!) 108 (!) 114 (!) 121    Resp: (!) 38      Temp: (!) 100.8 °F (38.2 °C)      TempSrc: Oral      SpO2: 98%      Weight:       Height:            DETERIORATION INDEX SCORE: 79    ASSESSMENT:  Patient recently admitted under the hospitalist service for severe sepsis with cardiology consults for episodes of wide-complex tachycardia. On exam, patient is alert and oriented, following commands and answering questions appropriately. Unlabored, regular breathing on 6L HFNC, SpO2 94-95% during exam. Titrated up to 8L for higher SpO2. Lung sounds coarse, endorses productive cough. Telemetry monitor -120's, appears irregular, MAT vs. AF. No major complaints barring weakness.    During exam, patient had multiple runs of VT on the monitor, ranging approx. 20-40 beats. Patient remained conscious during these episodes, has a palpable pulse. No complaints. VSS remain stable, however borderline BP on Amio infusion.    Echo: completed, EF 30-35%, indeterminate diastolic  Oxygen: 6L HFNC  Electrolytes: K 3.8, Mg 1.8 (both being replaced)  Corrected Ca: 9.36  Antiarrhythmics: Amiodarone 1mg/min    Talked to sister and daughters at bedside. Goals of care was discussed for in the event of decompensation requiring resuscitation. Wishes to remain FULL CODE in such event.    PLAN:  Will follow per RRT Clinical Rounding Program protocol.    Baudilio Kaminski RN  [facility]: [phone]  [facility]: [phone]

## 2025-07-29 NOTE — PROGRESS NOTES
SPIRITUAL HEALTH  Note for Med/Surg Visit                  Room # 415/01    Name: Bertrand Stephenson           Age: 89 y.o.    Gender: male          MRN: [id number]  Orthodoxy: Unknown       Preferred Language: English    Date: 07/29/25  Visit Time: Begin Time: (P) 1525 End Time : (P) 1530 Complexity of Encounter: (P) Low      Visit Summary:  offered spiritual care visit with patient. Patient declined a visit at this time.  is available for follow up at patient's request.       Referral/Consult From: (P) Rounding  Encounter Overview/Reason: (P) Spiritual/Emotional Needs  Encounter Code:     Crisis (if applicable):    Service Provided For: (P) Patient     Patient was not available. Patient declined visit but aware  can be notified as needed/desired    Talia, Belief, Meaning:   Patient unable to assess at this time  Family/Friends   Rituals (if applicable)      Importance and Influence:  Patient unable to assess at this time  Family/Friends     Community:  Patient   unable to assess at this time   Family/Friends       Assessment and Plan of Care:   Emotions Expressed by Patient:   Assessment: (P) Unable to assess    Interventions by :   Intervention: (P) Sustaining Presence/Ministry of presence     Result/ Response by Patient:   Outcome: (P) Refused/Declined    Patient Plan of Care:   Plan and Referrals  Plan/Referrals: (P) Continue Support (comment)     Electronically signed by AMILCAR Cerrato, on 7/29/2025 at 4:04 PM.  Spiritual Health  [facility]  [phone]

## 2025-07-29 NOTE — PROGRESS NOTES
[facility] CARDIOLOGY PROGRESS NOTE           7/29/2025 7:23 AM    Admit Date: 7/28/2025      Subjective:   Patient febrile this morning.  Remains tachycardic and what appears to be multifocal atrial tachycardia.  Intermittent episodes of wide-complex tachycardia.  On amiodarone 0.5 mg/min.    ROS:  Cardiovascular:  As noted above    Objective:      Vitals:    07/29/25 0445 07/29/25 0500 07/29/25 0530 07/29/25 0600   BP: 107/68 120/72 129/73 111/67   Pulse: (!) 111 (!) 114 (!) 110 (!) 107   Resp: 20      Temp: 100 °F (37.8 °C)      TempSrc: Oral      SpO2: 94% 94% 94% 95%   Weight: 86.1 kg (189 lb 14.4 oz)      Height:           Physical Exam:  General-elderly white male in no acute distress.  Neck- supple, mild JVD  CV- regular rate and rhythm no MRG  Lung-coarse breath sounds  Abd- soft, nontender, nondistended  Ext- trivial edema bilaterally.  Skin- warm and dry      Data Review:   Recent Labs     07/29/25  0437 07/28/25  1101 07/16/25  0934   WBC 1.3* 1.1* 6.0   HGB 8.8* 8.0* 9.8*   HCT 27.7* 25.0* 30.6*   MCV 93.9 94.0 95.3    146* 163       Recent Labs     07/29/25  0437   *   K 3.8   CL 98   CO2 20   BUN 24*   CREATININE 1.37*   GLUCOSE 132*   CALCIUM 8.0*   BILITOT 0.5   ALKPHOS 66   AST 49*   ALT 33   LABGLOM 49*   GLOB 3.9*       No results for input(s): \"CKTOTAL\", \"CKMB\", \"CKMBINDEX\", \"DDIMER\", \"TROPONINI\" in the last 720 hours.           Assessment/Plan:     Active Hospital Problems    Paroxysmal atrial fibrillation (HCC)  Currently appears to have multifocal atrial tachycardia not atrial fibrillation but high risk for degeneration to this arrhythmia.  Continue IV amiodarone but will increase to 1 mg/min.  Continue Xarelto.  Will continue to monitor on telemetry.      *Severe sepsis (HCC)  Continue antibiotics per primary team.      Multifocal pneumonia  Continue antibiotics per primary team.      History of lung cancer      Primary adenocarcinoma of right lung (HCC)  Oncology

## 2025-07-29 NOTE — CONSULTS
PULMONARY/CRITICAL CARE CONSULT NOTE           7/29/2025    Bertrand Stephenson                        Date of Admission:  7/28/2025    The patient's chart is reviewed and the patient is discussed with the staff.    Subjective:     Patient is a 89 y.o.  male with a hx of lung ca stage 4 adenoca,  a fib, cad, chf, hpt,   Admitted 7/28 after he presented to the er with sob.   CT showed multifocal pneumonia, procal was 0.36, bnp was 18k and EKG with multifocal atrial tachycardia.   Wbc only 1.3 with 25 % neutraphils.  While in er the pt had episodes of wide complex tachycardia and was given iv amiodarone.  Today he is still having multiple runs of v tach  Echo was done noting ef of 30-35%.  Temp has been as high as 100.8 but he has mostly been afebrile.  Lung cancer was dxed in march by bronchoscopy and was noted to metastatic to bone and spine. He was started on chemorx in may with carbotaxol and was given radiation in June.   He has been on amiodarone for 6 or 7 years  after he had an episose of a fib.  Review of Systems: Comprehensive ROS negative except in HPI    Current Outpatient Medications   Medication Instructions    amiodarone (CORDARONE) 200 mg, Oral, DAILY    ascorbic acid (VITAMIN C) 1000 MG tablet 2 TIMES DAILY    cholecalciferol (VITAMIN D3) 400 Units, DAILY    lidocaine-prilocaine (EMLA) 2.5-2.5 % cream Apply generously to port site and cover with saran wrap 30-60 min prior to needle stick.    loratadine (CLARITIN) 10 mg, NIGHTLY PRN    metoprolol succinate (TOPROL XL) 25 mg, Oral, DAILY    Multiple Vitamins-Minerals (THERAPEUTIC MULTIVITAMIN-MINERALS) tablet 1 tablet, DAILY    ondansetron (ZOFRAN) 8 mg, Oral, EVERY 8 HOURS PRN    prochlorperazine (COMPAZINE) 10 mg, Oral, EVERY 6 HOURS PRN    rivaroxaban (XARELTO) 15 mg, Oral, DAILY WITH BREAKFAST    Saw Palmetto, Serenoa repens, (SAW PALMETTO PO) DAILY    temazepam (RESTORIL) 15 mg, Oral, NIGHTLY PRN      Past Medical History:  (189 lb 14.4 oz), SpO2 95%.   Intake/Output Summary (Last 24 hours) at 7/29/2025 1640  Last data filed at 7/29/2025 1236  Gross per 24 hour   Intake 2875.54 ml   Output 2100 ml   Net 775.54 ml     PHYSICAL EXAM   Constitutional:  the patient is well developed and in no acute distress  EENMT:  Sclera clear, pupils equal, oral mucosa moist  Respiratory: symmetric chest rise. Crackles bilateral  Cardiovascular:  RRR without M,G,R. There is no lower extremity edema.  Gastrointestinal: soft and non-tender; with positive bowel sounds.  Musculoskeletal: warm without cyanosis. Normal muscle tone.   Skin:  no jaundice or rashes, no wounds   Neurologic: symmetric strength, fluent speech  Psychiatric:  calm, appropriate, oriented x 4    Imaging: I performed an independent interpretation of the patient's images.  CXR:      CT Chest: extensive bilateral infiltrates, R effusion, no pe        Recent Labs     07/28/25  1101 07/29/25  0437   WBC 1.1* 1.3*   HGB 8.0* 8.8*   HCT 25.0* 27.7*   * 170   * 132*   K 4.0 3.8    98   CO2 21 20   BUN 23 24*   CREATININE 1.17 1.37*   MG 1.8  --    BILITOT 0.4 0.5   AST 44* 49*   ALT 32 33   ALKPHOS 68 66     ECHO: 07/28/25    ECHO (TTE) COMPLETE (PRN CONTRAST/BUBBLE/STRAIN/3D) 07/29/2025 11:04 AM (Final)    Interpretation Summary    Left Ventricle: Moderately reduced left ventricular systolic function with a visually estimated EF of 30 - 35%. Left ventricle size is normal. Mildly increased wall thickness. There is severe hypokinesis of the inferolateral/anterolateral wall.    Left Atrium: Left atrium is mildly dilated.    Aorta: Mildly dilated aortic root. Ao root diameter is 4.0 cm. Moderately dilated ascending aorta. Ao ascending diameter is 4.6 cm.    Image quality is adequate.    Signed by: David Duff MD on 7/29/2025 11:04 AM    MICRO:   Recent Labs     07/28/25  1030 07/28/25  1119   CULTURE NO GROWTH AFTER 19 HOURS NO GROWTH AFTER 19 HOURS     Recent Labs

## 2025-07-29 NOTE — MANAGEMENT PLAN
Pt having longer bouts of VT for 8-10 seconds.  Asymptoamtic, bp low.  Amio increased to 1 mg this AM.    Mag 1.8- will replace, K 3.8 and being replaced.    TTE pending.     Pt full code.  Xochitl Atkins PA

## 2025-07-30 LAB
ANION GAP SERPL CALC-SCNC: 14 MMOL/L (ref 7–16)
BASOPHILS # BLD: 0.03 K/UL (ref 0–0.2)
BASOPHILS NFR BLD: 1.1 % (ref 0–2)
BUN SERPL-MCNC: 33 MG/DL (ref 8–23)
CALCIUM SERPL-MCNC: 8.3 MG/DL (ref 8.8–10.2)
CHLORIDE SERPL-SCNC: 95 MMOL/L (ref 98–107)
CO2 SERPL-SCNC: 22 MMOL/L (ref 20–29)
CREAT SERPL-MCNC: 2.06 MG/DL (ref 0.8–1.3)
DIFFERENTIAL METHOD BLD: ABNORMAL
EKG DIAGNOSIS: NORMAL
EKG Q-T INTERVAL: 384 MS
EKG QRS DURATION: 142 MS
EKG QTC CALCULATION (BAZETT): 590 MS
EKG R AXIS: 92 DEGREES
EKG T AXIS: 267 DEGREES
EKG VENTRICULAR RATE: 142 BPM
EOSINOPHIL # BLD: 0.01 K/UL (ref 0–0.8)
EOSINOPHIL NFR BLD: 0.4 % (ref 0.5–7.8)
ERYTHROCYTE [DISTWIDTH] IN BLOOD BY AUTOMATED COUNT: 16.6 % (ref 11.9–14.6)
GLUCOSE SERPL-MCNC: 145 MG/DL (ref 70–99)
HCT VFR BLD AUTO: 30.5 % (ref 41.1–50.3)
HGB BLD-MCNC: 9.6 G/DL (ref 13.6–17.2)
IMM GRANULOCYTES # BLD AUTO: 0.02 K/UL (ref 0–0.5)
IMM GRANULOCYTES NFR BLD AUTO: 0.8 % (ref 0–5)
LYMPHOCYTES # BLD: 0.71 K/UL (ref 0.5–4.6)
LYMPHOCYTES NFR BLD: 27.4 % (ref 13–44)
MAGNESIUM SERPL-MCNC: 2.3 MG/DL (ref 1.8–2.4)
MCH RBC QN AUTO: 29.2 PG (ref 26.1–32.9)
MCHC RBC AUTO-ENTMCNC: 31.5 G/DL (ref 31.4–35)
MCV RBC AUTO: 92.7 FL (ref 82–102)
MONOCYTES # BLD: 0.37 K/UL (ref 0.1–1.3)
MONOCYTES NFR BLD: 14.1 % (ref 4–12)
MRSA DNA SPEC QL NAA+PROBE: NOT DETECTED
NEUTS SEG # BLD: 1.46 K/UL (ref 1.7–8.2)
NEUTS SEG NFR BLD: 56.2 % (ref 43–78)
NRBC # BLD: 0 K/UL (ref 0–0.2)
PLATELET # BLD AUTO: 205 K/UL (ref 150–450)
PMV BLD AUTO: 10 FL (ref 9.4–12.3)
POTASSIUM SERPL-SCNC: 4 MMOL/L (ref 3.5–5.1)
PROCALCITONIN SERPL-MCNC: 1.5 NG/ML (ref 0–0.1)
RBC # BLD AUTO: 3.29 M/UL (ref 4.23–5.6)
S AUREUS CPE NOSE QL NAA+PROBE: NOT DETECTED
SODIUM SERPL-SCNC: 131 MMOL/L (ref 136–145)
VANCOMYCIN SERPL-MCNC: 13.8 UG/ML
WBC # BLD AUTO: 2.6 K/UL (ref 4.3–11.1)

## 2025-07-30 PROCEDURE — 99232 SBSQ HOSP IP/OBS MODERATE 35: CPT | Performed by: INTERNAL MEDICINE

## 2025-07-30 PROCEDURE — 93010 ELECTROCARDIOGRAM REPORT: CPT | Performed by: INTERNAL MEDICINE

## 2025-07-30 PROCEDURE — 2580000003 HC RX 258: Performed by: STUDENT IN AN ORGANIZED HEALTH CARE EDUCATION/TRAINING PROGRAM

## 2025-07-30 PROCEDURE — 85025 COMPLETE CBC W/AUTO DIFF WBC: CPT

## 2025-07-30 PROCEDURE — 2580000003 HC RX 258: Performed by: INTERNAL MEDICINE

## 2025-07-30 PROCEDURE — 6370000000 HC RX 637 (ALT 250 FOR IP): Performed by: NURSE PRACTITIONER

## 2025-07-30 PROCEDURE — 84145 PROCALCITONIN (PCT): CPT

## 2025-07-30 PROCEDURE — 6370000000 HC RX 637 (ALT 250 FOR IP): Performed by: INTERNAL MEDICINE

## 2025-07-30 PROCEDURE — 80202 ASSAY OF VANCOMYCIN: CPT

## 2025-07-30 PROCEDURE — 83735 ASSAY OF MAGNESIUM: CPT

## 2025-07-30 PROCEDURE — 6360000002 HC RX W HCPCS: Performed by: INTERNAL MEDICINE

## 2025-07-30 PROCEDURE — 97535 SELF CARE MNGMENT TRAINING: CPT

## 2025-07-30 PROCEDURE — 87641 MR-STAPH DNA AMP PROBE: CPT

## 2025-07-30 PROCEDURE — 97112 NEUROMUSCULAR REEDUCATION: CPT

## 2025-07-30 PROCEDURE — 6360000002 HC RX W HCPCS: Performed by: HOSPITALIST

## 2025-07-30 PROCEDURE — 6370000000 HC RX 637 (ALT 250 FOR IP): Performed by: PHYSICIAN ASSISTANT

## 2025-07-30 PROCEDURE — 6370000000 HC RX 637 (ALT 250 FOR IP): Performed by: HOSPITALIST

## 2025-07-30 PROCEDURE — 1100000000 HC RM PRIVATE

## 2025-07-30 PROCEDURE — 94640 AIRWAY INHALATION TREATMENT: CPT

## 2025-07-30 PROCEDURE — 6360000002 HC RX W HCPCS: Performed by: NURSE PRACTITIONER

## 2025-07-30 PROCEDURE — 36415 COLL VENOUS BLD VENIPUNCTURE: CPT

## 2025-07-30 PROCEDURE — 94761 N-INVAS EAR/PLS OXIMETRY MLT: CPT

## 2025-07-30 PROCEDURE — 2700000000 HC OXYGEN THERAPY PER DAY

## 2025-07-30 PROCEDURE — 80048 BASIC METABOLIC PNL TOTAL CA: CPT

## 2025-07-30 PROCEDURE — 2580000003 HC RX 258: Performed by: HOSPITALIST

## 2025-07-30 PROCEDURE — 2500000003 HC RX 250 WO HCPCS: Performed by: INTERNAL MEDICINE

## 2025-07-30 PROCEDURE — 2140000000 HC CCU INTERMEDIATE R&B

## 2025-07-30 PROCEDURE — 97166 OT EVAL MOD COMPLEX 45 MIN: CPT

## 2025-07-30 RX ORDER — DIGOXIN 0.25 MG/ML
250 INJECTION INTRAMUSCULAR; INTRAVENOUS ONCE
Status: COMPLETED | OUTPATIENT
Start: 2025-07-30 | End: 2025-07-30

## 2025-07-30 RX ORDER — SODIUM CHLORIDE FOR INHALATION 3 %
4 VIAL, NEBULIZER (ML) INHALATION 2 TIMES DAILY
Status: DISCONTINUED | OUTPATIENT
Start: 2025-07-30 | End: 2025-07-31

## 2025-07-30 RX ADMIN — RIVAROXABAN 15 MG: 15 TABLET, FILM COATED ORAL at 08:31

## 2025-07-30 RX ADMIN — VANCOMYCIN HYDROCHLORIDE 1000 MG: 1 INJECTION, POWDER, LYOPHILIZED, FOR SOLUTION INTRAVENOUS at 10:58

## 2025-07-30 RX ADMIN — LORAZEPAM 0.5 MG: 0.5 TABLET ORAL at 22:05

## 2025-07-30 RX ADMIN — CEFEPIME 2000 MG: 2 INJECTION, POWDER, FOR SOLUTION INTRAVENOUS at 20:09

## 2025-07-30 RX ADMIN — MAGNESIUM GLUCONATE 500 MG ORAL TABLET 400 MG: 500 TABLET ORAL at 20:05

## 2025-07-30 RX ADMIN — IPRATROPIUM BROMIDE AND ALBUTEROL SULFATE 1 DOSE: 2.5; .5 SOLUTION RESPIRATORY (INHALATION) at 12:00

## 2025-07-30 RX ADMIN — SODIUM CHLORIDE, PRESERVATIVE FREE 10 ML: 5 INJECTION INTRAVENOUS at 20:05

## 2025-07-30 RX ADMIN — SODIUM CHLORIDE, PRESERVATIVE FREE 10 ML: 5 INJECTION INTRAVENOUS at 08:32

## 2025-07-30 RX ADMIN — AMIODARONE HYDROCHLORIDE 1 MG/MIN: 50 INJECTION, SOLUTION INTRAVENOUS at 13:03

## 2025-07-30 RX ADMIN — MAGNESIUM GLUCONATE 500 MG ORAL TABLET 400 MG: 500 TABLET ORAL at 08:32

## 2025-07-30 RX ADMIN — Medication 4 ML: at 12:00

## 2025-07-30 RX ADMIN — DIGOXIN 250 MCG: 0.25 INJECTION INTRAMUSCULAR; INTRAVENOUS at 04:06

## 2025-07-30 RX ADMIN — Medication 4 ML: at 20:08

## 2025-07-30 RX ADMIN — CEFEPIME 2000 MG: 2 INJECTION, POWDER, FOR SOLUTION INTRAVENOUS at 08:35

## 2025-07-30 RX ADMIN — AMIODARONE HYDROCHLORIDE 1 MG/MIN: 50 INJECTION, SOLUTION INTRAVENOUS at 05:06

## 2025-07-30 RX ADMIN — LORAZEPAM 0.5 MG: 0.5 TABLET ORAL at 13:03

## 2025-07-30 RX ADMIN — AMIODARONE HYDROCHLORIDE 1 MG/MIN: 50 INJECTION, SOLUTION INTRAVENOUS at 20:10

## 2025-07-30 ASSESSMENT — PAIN SCALES - GENERAL
PAINLEVEL_OUTOF10: 0

## 2025-07-30 NOTE — CARE COORDINATION
CM following. Pt now on airvo, 40L HF. Therapy recommending STR. Pt will need to be on 5L or less before arranging rehab. PT to see pt and assess for WC needs in order for the DME to be covered by Medicare. Will send PT note to [facility] to process the DME order.

## 2025-07-30 NOTE — PROGRESS NOTES
Physical Therapy Note:    Attempted to see patient this AM for physical therapy evaluation session. Per nursing, pt to be held at this time due to cardiac status. Will follow and re-attempt as schedule permits/patient available. Thank you,    Silva Yun, PT     Rehab Caseload Tracker

## 2025-07-30 NOTE — PROGRESS NOTES
[facility] CARDIOLOGY PROGRESS NOTE           7/30/2025 7:37 AM    Admit Date: 7/28/2025      Subjective:   Patient remains critically ill.  Still with significant dyspnea.  Multiple runs of wide-complex tachycardia despite amiodarone at 1 mg/min.  Reviewed with electrophysiology.  Likely atrial fibrillation with aberrancy.    ROS:  Cardiovascular:  As noted above    Objective:      Vitals:    07/30/25 0530 07/30/25 0544 07/30/25 0600 07/30/25 0630   BP: 126/77  123/78 119/74   Pulse: (!) 122  (!) 122 (!) 123   Resp:       Temp:       TempSrc:       SpO2: 92%  92% 92%   Weight:  85.6 kg (188 lb 12.8 oz)     Height:           Physical Exam:  General-elderly white male in no acute distress.  Neck- supple, mild JVD  CV-irregular irregular rhythm with tachycardic rate.  Lung-coarse breath sounds  Abd- soft, nontender, nondistended  Ext- trivial edema bilaterally.  Skin- warm and dry      Data Review:   Recent Labs     07/30/25 0418 07/29/25 0437 07/28/25  1101   WBC 2.6* 1.3* 1.1*   HGB 9.6* 8.8* 8.0*   HCT 30.5* 27.7* 25.0*   MCV 92.7 93.9 94.0    170 146*       Recent Labs     07/30/25 0418 07/29/25 2016 07/29/25  0437   *   < > 132*   K 4.0   < > 3.8   CL 95*   < > 98   CO2 22   < > 20   BUN 33*   < > 24*   CREATININE 2.06*   < > 1.37*   GLUCOSE 145*   < > 132*   CALCIUM 8.3*   < > 8.0*   BILITOT  --   --  0.5   ALKPHOS  --   --  66   AST  --   --  49*   ALT  --   --  33   LABGLOM 30*   < > 49*   GLOB  --   --  3.9*    < > = values in this interval not displayed.       No results for input(s): \"CKTOTAL\", \"CKMB\", \"CKMBINDEX\", \"DDIMER\", \"TROPONINI\" in the last 720 hours.           Assessment/Plan:     Active Hospital Problems    Paroxysmal atrial fibrillation (HCC)  Recurrent runs of wide-complex tachycardia concerning for A-fib with aberrancy.  Limited options at the present time as on amiodarone 1 mg/min.  Would continue this.  Treatment of his underlying lung condition would be

## 2025-07-30 NOTE — PROGRESS NOTES
Hospitalist Progress Note   Admit Date:  2025 10:47 AM   Name:  Bertrand Stephenson   Age:  89 y.o.  Sex:  male  :  1935   MRN:  [id number]   Room:  [facility]/    Presenting/Chief Complaint: Shortness of Breath     Reason(s) for Admission: Elevated troponin [R79.89]  History of lung cancer [Z85.118]  Atrial fibrillation with rapid ventricular response (HCC) [I48.91]  Sepsis (HCC) [A41.9]  Severe sepsis (HCC) [A41.9, R65.20]  Multifocal pneumonia [J18.9]     Hospital Course:   Bertrand Stephenson is a 89 y.o. male with medical history of lung cancer currently on chemotherapy, chronic kidney disease stage III, hypertension, dyslipidemia, paroxysmal atrial fibrillation, presented to the ER as he was more lethargic at home with shortness of breath.  He was admitted for sepsis secondary to neutropenic fever/multifocal pneumonia.  He was also found to have multifocal atrial tachycardia and nonsustained V. tach.  Cardiology consulted.  Patient is currently on amiodarone drip.      Subjective & 24hr Events:   Patient still has increased work of breathing.  He is placed on Airvo.  No chest pain.  Still tachycardic with heart rate in the 100 to 120/min.  Received a dose of IV digoxin earlier this morning.  He had fever last night with temperature of 103.1F.    Assessment & Plan:     This is a 89-year-old male with    Severe sepsis secondary to neutropenic fever/multifocal pneumonia  Continue broad-spectrum antibiotics cefepime, vancomycin.  Blood cultures so far negative.  MRSA swab ordered.  Oncology consulted.  Appreciate recommendations.  Creatinine slightly worse at 2 this morning.  Monitor renal function on IV vancomycin.    Acute hypoxemic respiratory failure secondary to multifocal pneumonia  Patient is currently needing AirVo.  Wean oxygen as tolerated.  Continue cefepime, vancomycin.  Due to increased oxygen requirements yesterday evening, pulmonology consulted.  Appreciate     LVOT Peak Gradient 4 mmHg    LVPWd 1.4 (A) 0.6 - 1.0 cm    LV Ejection Fraction A2C 31 %    LV Ejection Fraction A4C 35 %    EF BP 31 (A) 55 - 100 %    LVOT Area 4.2 cm2    LVOT SV 51.1 ml    LA Minor Axis 6.9 cm    LA Major Axis 7.1 cm    LA Area 2C 26.1 cm2    LA Area 4C 22.1 cm2    LA Volume MOD A2C 83 (A) 18 - 58 mL    LA Volume MOD A4C 57 18 - 58 mL    LA Volume BP 70 (A) 18 - 58 mL    LA Diameter 3.6 cm    AV Mean Velocity 1.1 m/s    AV Mean Gradient 5 mmHg    AV VTI 19.6 cm    AV Peak Velocity 1.4 m/s    AV Peak Gradient 8 mmHg    AV Peak Gradient 8 mmHg    AV Peak Gradient 8 mmHg    AV Area by VTI 2.6 cm2    AV Area by Peak Velocity 2.9 cm2    Aortic Root 4.0 cm    Ascending Aorta 4.6 cm    IVC Proxmal 1.6 cm    MV E Wave Deceleration Time 140.0 ms    MV A Velocity 0.49 m/s    MV E Velocity 1.07 m/s    MV Mean Gradient 2 mmHg    MV VTI 18.6 cm    MV Mean Velocity 0.7 m/s    MV Max Velocity 1.1 m/s    MV Peak Gradient 5 mmHg    MV Area by VTI 2.7 cm2    PV AT 58.0 ms    PV Max Velocity 0.9 m/s    PV Peak Gradient 3 mmHg    Est. RA Pressure 3 mmHg    RV Free Wall Peak S' 10.0 cm/s    TR Max Velocity 2.49 m/s    TR Peak Gradient 25 mmHg    TR Peak Gradient 25 mmHg    Fractional Shortening 2D 16 28 - 44 %    LV ESV Index A4C 38 mL/m2    LV EDV Index A4C 57 mL/m2    LV ESV Index A2C 35 mL/m2    LV EDV Index A2C 51 mL/m2    LVIDd Index 2.72 cm/m2    LVIDs Index 2.28 cm/m2    LV RWT Ratio 0.51     LV Mass 2D 304.3 (A) 88 - 224 g    LV Mass 2D Index 150.7 (A) 49 - 115 g/m2    MV E/A 2.18     LA Volume Index BP 35 (A) 16 - 34 ml/m2    LVOT Stroke Volume Index 25.3 mL/m2    LA Volume Index MOD A2C 41 (A) 16 - 34 ml/m2    LA Volume Index MOD A4C 28 16 - 34 ml/m2    LA Size Index 1.78 cm/m2    LA/AO Root Ratio 0.90     Ao Root Index 1.98 cm/m2    Ascending Aorta Index 2.28 cm/m2    AV Velocity Ratio 0.71     LVOT:AV VTI Index 0.63     RODOLFO/BSA VTI 1.3 cm2/m2    RODOLFO/BSA Peak Velocity 1.4 cm2/m2    MV:LVOT VTI Index  1.51     RVSP 28 mmHg    EF Physician 35 %   Respiratory Panel, Molecular, with COVID-19 (Restricted: peds pts or suitable admitted adults)    Collection Time: 07/29/25  8:07 PM    Specimen: Nasopharyngeal   Result Value Ref Range    Adenovirus by PCR NOT DETECTED NOTDET      Coronavirus 229E by PCR NOT DETECTED NOTDET      Coronavirus HKU1 by PCR NOT DETECTED NOTDET      Coronavirus NL63 by PCR NOT DETECTED NOTDET      Coronavirus OC43 by PCR NOT DETECTED NOTDET      SARS-CoV-2, PCR NOT DETECTED NOTDET      Human Metapneumovirus by PCR NOT DETECTED NOTDET      Rhinovirus Enterovirus PCR NOT DETECTED NOTDET      Influenza A by PCR NOT DETECTED NOTDET      Influenza B PCR NOT DETECTED NOTDET      Parainfluenza 1 PCR NOT DETECTED NOTDET      Parainfluenza 2 PCR NOT DETECTED NOTDET      Parainfluenza 3 PCR NOT DETECTED NOTDET      Parainfluenza 4 PCR NOT DETECTED NOTDET      Respiratory Syncytial Virus by PCR NOT DETECTED NOTDET      Bordetella parapertussis by PCR NOT DETECTED NOTDET      Bordetella pertussis by PCR NOT DETECTED NOTDET      Chlamydophila Pneumonia PCR NOT DETECTED NOTDET      Mycoplasma pneumo by PCR NOT DETECTED NOTDET     Culture, Respiratory    Collection Time: 07/29/25  8:07 PM    Specimen: Sputum Expectorated   Result Value Ref Range    Special Requests NO SPECIAL REQUESTS      Gram Stain FEW WBCS SEEN      Gram Stain OCCASIONAL EPITHELIAL CELLS SEEN      Gram Stain NO DEFINITE ORGANISM SEEN      Gram Stain 1+ MUCOUS      Culture        No growth after short period of incubation. Further results to follow after overnight incubation.   Basic Metabolic Panel    Collection Time: 07/29/25  8:16 PM   Result Value Ref Range    Sodium 128 (L) 136 - 145 mmol/L    Potassium 4.4 3.5 - 5.1 mmol/L    Chloride 96 (L) 98 - 107 mmol/L    CO2 18 (L) 20 - 29 mmol/L    Anion Gap 14 7 - 16 mmol/L    Glucose 168 (H) 70 - 99 mg/dL    BUN 29 (H) 8 - 23 MG/DL    Creatinine 1.56 (H) 0.80 - 1.30 MG/DL    Est, Glom Filt

## 2025-07-30 NOTE — ICUWATCH
RRT Clinical Rounding Nurse Update    Vitals:    07/30/25 0530 07/30/25 0544 07/30/25 0600 07/30/25 0630   BP: 126/77  123/78 119/74   Pulse: (!) 122  (!) 122 (!) 123   Resp:       Temp:       TempSrc:       SpO2: 92%  92% 92%   Weight:  85.6 kg (188 lb 12.8 oz)     Height:            DETERIORATION INDEX SCORE: 57    ASSESSMENT:  Previous outreach assessment was reviewed. Patient is resting upright in bed, alert and oriented, following commands and answering questions appropriately. Labored, tachypneic breathing on 9L HFNC. Abdominal accessory muscle use as well. SpO2 92-94%. Titrated to 10L. Patient explains that he feels slightly better today, however has increasing SOB while talking during exam. Telemetry monitor -120's, appears irregular still. Multiple episodes of ventricular and atrial tachycardias overnight on telemetry, see progress notes for interventions.       Would recommend transitioning to Airvo/HHFNC if possible for increased WOB, deterioration of hypoxemia and tachycardia.     PLAN:  Will follow per RRT Clinical Rounding Program protocol.    Baudilio Kaminski RN  [facility]: [phone]  [facility]: [phone]

## 2025-07-30 NOTE — PROGRESS NOTES
Occupational Therapy Note:    Attempted to see patient this AM for occupational therapy evaluation session. Patient is a HOLD due to cardiac status per RN. Will follow and re-attempt as schedule permits/patient available. Thank you,    SIMON JAUREGUI, OT    Rehab Caseload Tracker

## 2025-07-30 NOTE — ICUWATCH
RRT Clinical Rounding Nurse Progress Report      SUBJECTIVE: Patient assessed secondary to RN/provider concern - elevated HR, increased WOB.      Vitals:    07/30/25 0200 07/30/25 0230 07/30/25 0300 07/30/25 0343   BP: (!) 88/60 113/66 122/78 119/81   Pulse: (!) 110 (!) 115 (!) 114 (!) 126   Resp:       Temp:    98.1 °F (36.7 °C)   TempSrc:       SpO2: 97% 95% 95% 93%   Weight:       Height:            DETERIORATION INDEX SCORE: 55    ASSESSMENT:  Upon arrival to room, pt in bed awake with primary RN and family at bedside. A&Ox4, follows commands, pale and appears unwell and anxious. Pt is complaining of continuous shortness of breath, O2 sat 96% on 8L HFNC. Respirations, even/rapid/shallow, with bilateral coarse crackles.  40mg IV lasix given by primary RN. Continues to have episodes of tachycardic events, K=4.4, Mag=2.2.    PLAN:  Will follow per RRT Clinical Rounding Program protocol.    Hiral Handley RN  [facility]: [phone]  [facility]: [phone]

## 2025-07-30 NOTE — PROGRESS NOTES
Patient very tachypneic and short of breath at rest using accessory muscles. Breathing very shallow. Left lung sounds crackles in the bases and right lung sounds are diminished. Patient also having 10+ beats of V-tach. Patient very restless and anxious. Patient back in A. Fib, -130s. BP: 123/80. Notified Mary Mendoza NP. NP ordered IVP Lasix 40 mg once, Ativan 0.5 mg once, and a PRN Duoneb breathing treatment. Labs also ordered. NP at bedside to assess patient.       2300 Patient HR 140s, alarming V-tach. Patient extremely restless and pulling at gown. Patient is diaphoretic. Patient denies any chest pain or discomfort. BP 89/61. Notified Jamilah Andre NP. . NP at bedside and gave orders for STAT EKG and Amio bolus to infuse over 30 minutes instead of 10 minutes.     0340 Patient having runs of A-tach. -140s. /83. Patient is asymptomatic. Notified Jamilah Andre NP. NP gave order for Digoxin 250 mcg IV once.

## 2025-07-30 NOTE — PLAN OF CARE
Problem: Chronic Conditions and Co-morbidities  Goal: Patient's chronic conditions and co-morbidity symptoms are monitored and maintained or improved  7/30/2025 1047 by Nando Chandra RN  Outcome: Progressing  7/30/2025 0444 by Colleen Rick RN  Outcome: Progressing     Problem: Discharge Planning  Goal: Discharge to home or other facility with appropriate resources  7/30/2025 1047 by Nando Chandra RN  Outcome: Progressing  7/30/2025 0444 by Colleen Rick RN  Outcome: Progressing     Problem: Pain  Goal: Verbalizes/displays adequate comfort level or baseline comfort level  7/30/2025 1047 by Nando Chandra RN  Outcome: Progressing  7/30/2025 0444 by Colleen Rick RN  Outcome: Progressing     Problem: Skin/Tissue Integrity  Goal: Skin integrity remains intact  Description: 1.  Monitor for areas of redness and/or skin breakdown  2.  Assess vascular access sites hourly  3.  Every 4-6 hours minimum:  Change oxygen saturation probe site  4.  Every 4-6 hours:  If on nasal continuous positive airway pressure, respiratory therapy assess nares and determine need for appliance change or resting period  7/30/2025 1047 by Nando Chandra RN  Outcome: Progressing  7/30/2025 0444 by Colleen Rick RN  Outcome: Progressing     Problem: Safety - Adult  Goal: Free from fall injury  7/30/2025 1047 by Nando Chandra RN  Outcome: Progressing  7/30/2025 0444 by Colleen Rick RN  Outcome: Progressing

## 2025-07-30 NOTE — MANAGEMENT PLAN
Called to the bedside by primary RN for sustained tachycardia with marginal blood pressure of 84/67. Family at bedside redirecting patient. He denies chest pain. Currently on IV amiodarone drip at 1mg/min. Electrolytes replaced earlier today and WNL.     EKG done now that shows AFIB w RVR with rate of 142.     Plan:   - give additional 150mg IV amiodarone bolus now in attempt to help with rate control.     Jamilah Andre, APRN - CNP  11:17 PM

## 2025-07-30 NOTE — ICUWATCH
RRT Clinical Rounding Nurse Update    Vitals:    07/30/25 0343 07/30/25 0406 07/30/25 0430 07/30/25 0500   BP: 119/81 (!) 122/92 127/87 127/86   Pulse: (!) 126 (!) 124 (!) 119 (!) 120   Resp: 20      Temp: 98.1 °F (36.7 °C)      TempSrc: Oral      SpO2: 93%  92% 91%   Weight:       Height:            DETERIORATION INDEX SCORE: 55    ASSESSMENT:  Previous outreach assessment was reviewed. Continues to have tachy-arrhythmias. 250mcg Digoxin per NP.    PLAN:  Will follow per RRT Clinical Rounding Program protocol.    Hiral Handley RN  [facility]: [phone]  [facility]: [phone]

## 2025-07-30 NOTE — THERAPY EVALUATION
ACUTE OCCUPATIONAL THERAPY GOALS:   (Developed with and agreed upon by patient and/or caregiver.)  1. Patient will complete upper body bathing and dressing with min A, additional time, and adaptive equipment as needed.   2. Patient will complete toileting with mod A.   3. Patient will complete functional transfers with min A and adaptive equipment as needed.   4. Patient will tolerate at least 15 minutes of OT activity with less than 2 rest breaks while maintaining O2 sats >90%.   5. Patient will verbalize at least 3 energy conservation technique to utilize during ADL/IADL.   6. Patient will complete grooming in supported sitting with min A with additional time.  7. Patient will complete household distances with min A, rest breaks as needed and adaptive equipment as needed.    Timeframe: 7 visits      OCCUPATIONAL THERAPY Initial Assessment, Daily Note, and PM       OT Visit Days: 1  Acknowledge Orders  Time  OT Charge Capture  Rehab Caseload Tracker      Falls risk    Bertrand Stephenson is a 89 y.o. male   PRIMARY DIAGNOSIS: Severe sepsis (HCC)  Elevated troponin [R79.89]  History of lung cancer [Z85.118]  Atrial fibrillation with rapid ventricular response (HCC) [I48.91]  Sepsis (HCC) [A41.9]  Severe sepsis (HCC) [A41.9, R65.20]  Multifocal pneumonia [J18.9]       Reason for Referral: Generalized Muscle Weakness (M62.81)  Other lack of cordination (R27.8)  Difficulty in walking, Not elsewhere classified (R26.2)  Other abnormalities of gait and mobility (R26.89)  Inpatient: Payor: [facility] MEDICARE / Plan: [facility] MEDICARE ADVANTAGE / Product Type: *No Product type* /     ASSESSMENT:     REHAB RECOMMENDATIONS:   Recommendation to date pending progress:  Setting:  Short-term Rehab    Equipment:    To Be Determined  Has rolling walker, shower chair, HH nozzle in walk in shower, wife asking for WC if going home     ASSESSMENT:  Mr. Stephenson is a 89 y.o. right hand dominant White (non-) male admitted with  Guard Assistance, Min=Minimal Assistance, Mod=Moderate Assistance, Max=Maximal Assistance, Total=Total Assistance, NT=Not Tested    ACTIVITIES OF DAILY LIVING: I Mod I S SBA CGA Min Mod Max Total NT Comments   BASIC ADLs:              Upper Body Bathing  [] [] [] [] [] [] [] [] [] [x]     Lower Body Bathing [] [] [] [] [] [] [] [] [] [x]     Toileting [] [] [] [] [] [] [] [] [x] [] Brief and purewick   Upper Body Dressing [] [] [] [] [] [] [] [x] [] [] gown   Lower Body Dressing [] [] [] [] [] [] [] [] [x] [] socks   Feeding [] [] [] [] [] [] [] [x] [] [] Due to weakness   Grooming [] [] [] [] [] [] [] [x] [] [] Washing face w time, hands after toileting   Personal Device Care [] [] [] [] [] [] [] [] [] [x]    Functional Mobility [] [] [] [] [] [] [] [x] [] [] x2   I=Independent, Mod I=Modified Independent, S=Supervision/Setup, SBA=Standby Assistance, CGA=Contact Guard Assistance, Min=Minimal Assistance, Mod=Moderate Assistance, Max=Maximal Assistance, Total=Total Assistance, NT=Not Tested    PLAN:   FREQUENCY/DURATION   OT Plan of Care: 3 times/week for duration of hospital stay or until stated goals are met, whichever comes first.    PROBLEM LIST:   (Skilled intervention is medically necessary to address:)  Decreased ADL/Functional Activities  Decreased Activity Tolerance  Decreased AROM/PROM  Decreased Balance  Decreased Cognition  Decreased Coordination  Decreased Gait Ability  Decreased Safety Awareness  Decreased Strength  Decreased Transfer Abilities  Decreased Sensation  Increased Pain  Vestibular Impairment   INTERVENTIONS PLANNED:  (Benefits and precautions of occupational therapy have been discussed with the patient.)  Self Care Training  Therapeutic Activity  Therapeutic Exercise/HEP  Neuromuscular Re-education  Gait Training  Manual Therapy  Modalities  Education         TREATMENT:     EVALUATION: MODERATE COMPLEXITY: (Untimed Charge)  The initial evaluation charge encompasses clinical chart review,

## 2025-07-30 NOTE — PROGRESS NOTES
SHWETA DAILY FOLLOW UP RENAL INSUFFICIENCY PATIENT   [facility]   Pharmacy Pharmacokinetic Monitoring Service - Vancomycin    Consulting Provider: Dr. Veliz   Indication: CAP  Target Concentration: Random level <= 20 mg/L  Day of Therapy: 3 of 7  Additional Antimicrobials: cefepime    Pertinent Laboratory Values:   Wt Readings from Last 1 Encounters:   07/30/25 85.6 kg (188 lb 12.8 oz)     Temp Readings from Last 1 Encounters:   07/30/25 97.6 °F (36.4 °C) (Oral)     Recent Labs     07/28/25  1101 07/29/25  0437 07/29/25 2016 07/30/25  0418   BUN 23 24* 29* 33*   CREATININE 1.17 1.37* 1.56* 2.06*   WBC 1.1* 1.3*  --  2.6*   PROCAL 0.39*  --   --  1.50*   LACTSEPSIS 1.2  --   --   --        Lab Results   Component Value Date/Time    VANCORANDOM 13.8 07/30/2025 04:16 AM     MRSA Nasal Swab: Was ordered by provider, awaiting results    Assessment:  Date:  Dose/Freq Admin Times Level/Time:   7/28 2000 mg x 1 1659    7/29 1000 mg q24h 1542    7/30 1000 mg x 1 (11) Rd @ 0416 = 13.8   7/31   Rd @ (04)           Plan:  Change to concentration-guided dosing due to renal impairment  Random level is therapeutic   Give vancomycin 1000 mg x 1  Repeat vancomycin concentration ordered for 7/31 @ 0400    Pharmacy will continue to monitor patient and adjust therapy as indicated    Thank you for the consult,  Carole Riggins [facility]

## 2025-07-30 NOTE — PROGRESS NOTES
Bertrand Stephenson  Admission Date: 7/28/2025         Daily Progress Note: 7/30/2025    The patient's chart is reviewed and the patient is discussed with the staff.    Background: Patient is a 89 y.o. 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     Admitted 7/28 after he presented to the ER with sob.  CT showed multifocal pneumonia, procal was 0.36, bnp was 18k and EKG with multifocal atrial tachycardia.   Wbc only 1.3 with 25 % neutrophils.  While in ER the pt had episodes of wide complex tachycardia and was given iv amiodarone.  Today he is still having multiple runs of v tach.  Echo was done, noting EF of 30-35%.  Temp has been as high as 100.8 but he has mostly been afebrile.    Lung cancer was dxed in march by bronchoscopy and was noted to metastatic to bone and spine. He was started on chemorx in may with carbotaxol and was given radiation in June.   He has been on amiodarone for 6 or 7 years  after he had an episose of a fib.    Subjective:     States he hasn't been feeling well. Has some mild epistaxis from recent nasal swab. Currently on airvo 40L/50%. Cough with difficulty clearing sputum. Family at bedside.    Current Facility-Administered Medications   Medication Dose Route Frequency    vancomycin (VANCOCIN) intermittent dosing (placeholder)   Other RX Placeholder    vancomycin (VANCOCIN) 1,000 mg in sodium chloride 0.9 % 250 mL IVPB (Flqu8Klb)  1,000 mg IntraVENous Once    magnesium oxide (MAG-OX) tablet 400 mg  400 mg Oral BID    LORazepam (ATIVAN) tablet 0.5 mg  0.5 mg Oral Q6H PRN    amiodarone (CORDARONE) 450 mg in dextrose 5 % 250 mL infusion ([id number])  1 mg/min IntraVENous Continuous    [Held by provider] amiodarone (CORDARONE) tablet 200 mg  200 mg Oral Daily    [Held by provider] metoprolol succinate (TOPROL XL) extended release tablet 25 mg  25 mg Oral Daily    rivaroxaban (XARELTO) tablet 15 mg  15 mg Oral Daily with  13

## 2025-07-30 NOTE — ICUWATCH
RRT Clinical Rounding Nurse Update    Vitals:    07/30/25 0200 07/30/25 0230 07/30/25 0300 07/30/25 0343   BP: (!) 88/60 113/66 122/78 119/81   Pulse: (!) 110 (!) 115 (!) 114 (!) 126   Resp:       Temp:    98.1 °F (36.7 °C)   TempSrc:       SpO2: 97% 95% 95% 93%   Weight:       Height:            DETERIORATION INDEX SCORE: 63    ASSESSMENT:  Previous outreach assessment was reviewed. Pt with repeated tachycardic events, longest lasting [id number]. Pt not diaphoretic and restless in bed, continues to have shortness of breath. NP to bedside- orders received for STAT EKG.    EKG showing afib RVR, HR 140s. 150mg IV amio bolus per NP.    PLAN:  Will follow per RRT Clinical Rounding Program protocol.    Hiral Handley RN  [facility]: [phone]  [facility]: [phone]

## 2025-07-31 LAB
ALBUMIN SERPL-MCNC: 2 G/DL (ref 3.2–4.6)
ALBUMIN/GLOB SERPL: 0.6 (ref 1–1.9)
ALP SERPL-CCNC: 63 U/L (ref 40–129)
ALT SERPL-CCNC: 42 U/L (ref 8–55)
ANION GAP SERPL CALC-SCNC: 14 MMOL/L (ref 7–16)
APPEARANCE UR: ABNORMAL
AST SERPL-CCNC: 61 U/L (ref 15–37)
BACTERIA URNS QL MICRO: ABNORMAL /HPF
BASOPHILS # BLD: 0.02 K/UL (ref 0–0.2)
BASOPHILS NFR BLD: 0.5 % (ref 0–2)
BILIRUB DIRECT SERPL-MCNC: 0.3 MG/DL (ref 0–0.3)
BILIRUB SERPL-MCNC: 0.5 MG/DL (ref 0–1.2)
BILIRUB UR QL: NEGATIVE
BUN SERPL-MCNC: 51 MG/DL (ref 8–23)
CALCIUM SERPL-MCNC: 7.9 MG/DL (ref 8.8–10.2)
CASTS URNS QL MICRO: ABNORMAL /LPF
CHLORIDE SERPL-SCNC: 93 MMOL/L (ref 98–107)
CO2 SERPL-SCNC: 19 MMOL/L (ref 20–29)
COLOR UR: ABNORMAL
CREAT SERPL-MCNC: 3.24 MG/DL (ref 0.8–1.3)
CREAT UR-MCNC: 82 MG/DL (ref 39–259)
DIFFERENTIAL METHOD BLD: ABNORMAL
EOSINOPHIL # BLD: 0.01 K/UL (ref 0–0.8)
EOSINOPHIL NFR BLD: 0.2 % (ref 0.5–7.8)
EPI CELLS #/AREA URNS HPF: ABNORMAL /HPF
ERYTHROCYTE [DISTWIDTH] IN BLOOD BY AUTOMATED COUNT: 16.7 % (ref 11.9–14.6)
GLOBULIN SER CALC-MCNC: 3.2 G/DL (ref 2.3–3.5)
GLUCOSE SERPL-MCNC: 134 MG/DL (ref 70–99)
GLUCOSE UR STRIP.AUTO-MCNC: NEGATIVE MG/DL
HCT VFR BLD AUTO: 25.5 % (ref 41.1–50.3)
HGB BLD-MCNC: 8.2 G/DL (ref 13.6–17.2)
HGB UR QL STRIP: ABNORMAL
IMM GRANULOCYTES # BLD AUTO: 0.04 K/UL (ref 0–0.5)
IMM GRANULOCYTES NFR BLD AUTO: 1 % (ref 0–5)
KETONES UR QL STRIP.AUTO: ABNORMAL MG/DL
LEUKOCYTE ESTERASE UR QL STRIP.AUTO: NEGATIVE
LYMPHOCYTES # BLD: 0.82 K/UL (ref 0.5–4.6)
LYMPHOCYTES NFR BLD: 19.5 % (ref 13–44)
MAGNESIUM SERPL-MCNC: 2.2 MG/DL (ref 1.8–2.4)
MCH RBC QN AUTO: 30.3 PG (ref 26.1–32.9)
MCHC RBC AUTO-ENTMCNC: 32.2 G/DL (ref 31.4–35)
MCV RBC AUTO: 94.1 FL (ref 82–102)
MONOCYTES # BLD: 0.55 K/UL (ref 0.1–1.3)
MONOCYTES NFR BLD: 13.1 % (ref 4–12)
NEUTS SEG # BLD: 2.76 K/UL (ref 1.7–8.2)
NEUTS SEG NFR BLD: 65.7 % (ref 43–78)
NITRITE UR QL STRIP.AUTO: NEGATIVE
NRBC # BLD: 0 K/UL (ref 0–0.2)
OSMOLALITY UR: 240 MOSM/KG H2O (ref 50–1400)
OTHER OBSERVATIONS: ABNORMAL
PH UR STRIP: 5.5 (ref 5–9)
PLATELET # BLD AUTO: 191 K/UL (ref 150–450)
PLATELET COMMENT: ADEQUATE
PMV BLD AUTO: 10.6 FL (ref 9.4–12.3)
POTASSIUM SERPL-SCNC: 4.2 MMOL/L (ref 3.5–5.1)
PROT SERPL-MCNC: 5.1 G/DL (ref 6.3–8.2)
PROT UR STRIP-MCNC: 100 MG/DL
RBC # BLD AUTO: 2.71 M/UL (ref 4.23–5.6)
RBC #/AREA URNS HPF: ABNORMAL /HPF
RBC MORPH BLD: ABNORMAL
RBC MORPH BLD: ABNORMAL
SODIUM SERPL-SCNC: 126 MMOL/L (ref 136–145)
SODIUM UR-SCNC: <20 MMOL/L
SP GR UR REFRACTOMETRY: 1.01 (ref 1–1.02)
UROBILINOGEN UR QL STRIP.AUTO: 0.2 EU/DL (ref 0.2–1)
VANCOMYCIN SERPL-MCNC: 13.8 UG/ML
WBC # BLD AUTO: 4.2 K/UL (ref 4.3–11.1)
WBC MORPH BLD: ABNORMAL
WBC URNS QL MICRO: ABNORMAL /HPF

## 2025-07-31 PROCEDURE — 84300 ASSAY OF URINE SODIUM: CPT

## 2025-07-31 PROCEDURE — 36415 COLL VENOUS BLD VENIPUNCTURE: CPT

## 2025-07-31 PROCEDURE — 80202 ASSAY OF VANCOMYCIN: CPT

## 2025-07-31 PROCEDURE — 6370000000 HC RX 637 (ALT 250 FOR IP): Performed by: HOSPITALIST

## 2025-07-31 PROCEDURE — 6360000002 HC RX W HCPCS: Performed by: INTERNAL MEDICINE

## 2025-07-31 PROCEDURE — 2580000003 HC RX 258: Performed by: INTERNAL MEDICINE

## 2025-07-31 PROCEDURE — 97162 PT EVAL MOD COMPLEX 30 MIN: CPT

## 2025-07-31 PROCEDURE — 94761 N-INVAS EAR/PLS OXIMETRY MLT: CPT

## 2025-07-31 PROCEDURE — 97530 THERAPEUTIC ACTIVITIES: CPT

## 2025-07-31 PROCEDURE — 6370000000 HC RX 637 (ALT 250 FOR IP): Performed by: PHYSICIAN ASSISTANT

## 2025-07-31 PROCEDURE — 99233 SBSQ HOSP IP/OBS HIGH 50: CPT | Performed by: INTERNAL MEDICINE

## 2025-07-31 PROCEDURE — 83735 ASSAY OF MAGNESIUM: CPT

## 2025-07-31 PROCEDURE — 85025 COMPLETE CBC W/AUTO DIFF WBC: CPT

## 2025-07-31 PROCEDURE — 2500000003 HC RX 250 WO HCPCS: Performed by: INTERNAL MEDICINE

## 2025-07-31 PROCEDURE — 80076 HEPATIC FUNCTION PANEL: CPT

## 2025-07-31 PROCEDURE — 97535 SELF CARE MNGMENT TRAINING: CPT

## 2025-07-31 PROCEDURE — 1100000000 HC RM PRIVATE

## 2025-07-31 PROCEDURE — 2580000003 HC RX 258: Performed by: HOSPITALIST

## 2025-07-31 PROCEDURE — 2580000003 HC RX 258: Performed by: STUDENT IN AN ORGANIZED HEALTH CARE EDUCATION/TRAINING PROGRAM

## 2025-07-31 PROCEDURE — 99232 SBSQ HOSP IP/OBS MODERATE 35: CPT | Performed by: INTERNAL MEDICINE

## 2025-07-31 PROCEDURE — 82570 ASSAY OF URINE CREATININE: CPT

## 2025-07-31 PROCEDURE — 80048 BASIC METABOLIC PNL TOTAL CA: CPT

## 2025-07-31 PROCEDURE — 94640 AIRWAY INHALATION TREATMENT: CPT

## 2025-07-31 PROCEDURE — 6360000002 HC RX W HCPCS: Performed by: HOSPITALIST

## 2025-07-31 PROCEDURE — 81001 URINALYSIS AUTO W/SCOPE: CPT

## 2025-07-31 PROCEDURE — 6360000002 HC RX W HCPCS

## 2025-07-31 PROCEDURE — 83935 ASSAY OF URINE OSMOLALITY: CPT

## 2025-07-31 PROCEDURE — 6370000000 HC RX 637 (ALT 250 FOR IP): Performed by: INTERNAL MEDICINE

## 2025-07-31 PROCEDURE — 2500000003 HC RX 250 WO HCPCS

## 2025-07-31 PROCEDURE — 2700000000 HC OXYGEN THERAPY PER DAY

## 2025-07-31 RX ORDER — GLYCOPYRROLATE 0.2 MG/ML
0.1 INJECTION INTRAMUSCULAR; INTRAVENOUS ONCE
Status: COMPLETED | OUTPATIENT
Start: 2025-07-31 | End: 2025-07-31

## 2025-07-31 RX ORDER — MORPHINE SULFATE 2 MG/ML
2 INJECTION, SOLUTION INTRAMUSCULAR; INTRAVENOUS
Status: DISCONTINUED | OUTPATIENT
Start: 2025-07-31 | End: 2025-08-01 | Stop reason: HOSPADM

## 2025-07-31 RX ORDER — OXYCODONE HYDROCHLORIDE 5 MG/1
5 TABLET ORAL EVERY 4 HOURS PRN
Status: DISCONTINUED | OUTPATIENT
Start: 2025-07-31 | End: 2025-07-31

## 2025-07-31 RX ORDER — SODIUM CHLORIDE 9 MG/ML
INJECTION, SOLUTION INTRAVENOUS CONTINUOUS
Status: DISCONTINUED | OUTPATIENT
Start: 2025-07-31 | End: 2025-07-31

## 2025-07-31 RX ORDER — IPRATROPIUM BROMIDE AND ALBUTEROL SULFATE 2.5; .5 MG/3ML; MG/3ML
1 SOLUTION RESPIRATORY (INHALATION) EVERY 4 HOURS PRN
Status: DISCONTINUED | OUTPATIENT
Start: 2025-07-31 | End: 2025-07-31

## 2025-07-31 RX ORDER — DIAZEPAM 10 MG/2ML
5 INJECTION, SOLUTION INTRAMUSCULAR; INTRAVENOUS
Status: DISCONTINUED | OUTPATIENT
Start: 2025-07-31 | End: 2025-08-01 | Stop reason: HOSPADM

## 2025-07-31 RX ADMIN — CEFEPIME 2000 MG: 2 INJECTION, POWDER, FOR SOLUTION INTRAVENOUS at 20:48

## 2025-07-31 RX ADMIN — SODIUM CHLORIDE, PRESERVATIVE FREE 10 ML: 5 INJECTION INTRAVENOUS at 20:48

## 2025-07-31 RX ADMIN — SODIUM CHLORIDE, PRESERVATIVE FREE 10 ML: 5 INJECTION INTRAVENOUS at 08:18

## 2025-07-31 RX ADMIN — MAGNESIUM GLUCONATE 500 MG ORAL TABLET 400 MG: 500 TABLET ORAL at 08:18

## 2025-07-31 RX ADMIN — SODIUM CHLORIDE: 0.9 INJECTION, SOLUTION INTRAVENOUS at 12:01

## 2025-07-31 RX ADMIN — RIVAROXABAN 15 MG: 15 TABLET, FILM COATED ORAL at 08:18

## 2025-07-31 RX ADMIN — AMIODARONE HYDROCHLORIDE 1 MG/MIN: 50 INJECTION, SOLUTION INTRAVENOUS at 13:07

## 2025-07-31 RX ADMIN — MAGNESIUM GLUCONATE 500 MG ORAL TABLET 400 MG: 500 TABLET ORAL at 20:48

## 2025-07-31 RX ADMIN — CEFEPIME 2000 MG: 2 INJECTION, POWDER, FOR SOLUTION INTRAVENOUS at 08:18

## 2025-07-31 RX ADMIN — OXYCODONE 5 MG: 5 TABLET ORAL at 20:52

## 2025-07-31 RX ADMIN — VANCOMYCIN HYDROCHLORIDE 1000 MG: 1 INJECTION, POWDER, LYOPHILIZED, FOR SOLUTION INTRAVENOUS at 16:52

## 2025-07-31 RX ADMIN — MORPHINE SULFATE 2 MG: 2 INJECTION, SOLUTION INTRAMUSCULAR; INTRAVENOUS at 23:18

## 2025-07-31 RX ADMIN — SALINE NASAL SPRAY 1 SPRAY: 1.5 SOLUTION NASAL at 16:49

## 2025-07-31 RX ADMIN — LORAZEPAM 0.5 MG: 0.5 TABLET ORAL at 15:21

## 2025-07-31 RX ADMIN — AMIODARONE HYDROCHLORIDE 1 MG/MIN: 50 INJECTION, SOLUTION INTRAVENOUS at 21:04

## 2025-07-31 RX ADMIN — AMIODARONE HYDROCHLORIDE 1 MG/MIN: 50 INJECTION, SOLUTION INTRAVENOUS at 04:08

## 2025-07-31 RX ADMIN — GLYCOPYRROLATE 0.1 MG: 0.2 INJECTION INTRAMUSCULAR; INTRAVENOUS at 23:18

## 2025-07-31 RX ADMIN — Medication 4 ML: at 07:09

## 2025-07-31 RX ADMIN — Medication 4 ML: at 19:54

## 2025-07-31 RX ADMIN — OXYCODONE 5 MG: 5 TABLET ORAL at 16:49

## 2025-07-31 RX ADMIN — IPRATROPIUM BROMIDE AND ALBUTEROL SULFATE 1 DOSE: 2.5; .5 SOLUTION RESPIRATORY (INHALATION) at 19:56

## 2025-07-31 ASSESSMENT — PAIN SCALES - GENERAL
PAINLEVEL_OUTOF10: 0
PAINLEVEL_OUTOF10: 5
PAINLEVEL_OUTOF10: 0

## 2025-07-31 ASSESSMENT — PAIN - FUNCTIONAL ASSESSMENT: PAIN_FUNCTIONAL_ASSESSMENT: PREVENTS OR INTERFERES SOME ACTIVE ACTIVITIES AND ADLS

## 2025-07-31 ASSESSMENT — PAIN DESCRIPTION - LOCATION: LOCATION: GENERALIZED

## 2025-07-31 ASSESSMENT — PAIN DESCRIPTION - DESCRIPTORS: DESCRIPTORS: SORE

## 2025-07-31 NOTE — PROGRESS NOTES
SHWETA DAILY FOLLOW UP RENAL INSUFFICIENCY PATIENT   [facility]   Pharmacy Pharmacokinetic Monitoring Service - Vancomycin    Consulting Provider: Dr. Veliz   Indication: CAP  Target Concentration: Random level <= 20 mg/L  Day of Therapy: 4 of 7  Additional Antimicrobials: cefepime    Pertinent Laboratory Values:   Wt Readings from Last 1 Encounters:   07/31/25 85.1 kg (187 lb 9.6 oz)     Temp Readings from Last 1 Encounters:   07/31/25 98.2 °F (36.8 °C) (Oral)     Recent Labs     07/29/25  0437 07/29/25 2016 07/30/25  0418 07/31/25  0559   BUN 24* 29* 33* 51*   CREATININE 1.37* 1.56* 2.06* 3.24*   WBC 1.3*  --  2.6* 4.2*   PROCAL  --   --  1.50*  --        Lab Results   Component Value Date/Time    VANCORANDOM 13.8 07/31/2025 05:59 AM     MRSA Nasal Swab: Was ordered by provider, awaiting results    Assessment:  Date:  Dose/Freq Admin Times Level/Time:   7/28 2000 mg x 1 1659    7/29 1000 mg q24h 1542    7/30 1000 mg x 1 1058 Rd @ 0416 = 13.8   7/31 1000 mg x1 (17) Rd @ 0559 = 13.8   8/1   Rd @ 0400     Plan:  Concentration-guided dosing due to renal impairment  Give vancomycin 1000 mg x 1  Repeat vancomycin concentration ordered for 8/1 @ 0400    Pharmacy will continue to monitor patient and adjust therapy as indicated    Thank you for the consult,  Surendra Chery, PharmD, BCOP  Clinical Pharmacist  [employer]

## 2025-07-31 NOTE — ICUWATCH
RRT Clinical Rounding Nurse Progress Report      SUBJECTIVE: Patient assessed secondary to elevated Deterioration Index Score.      Vitals:    07/31/25 0430 07/31/25 0439 07/31/25 0500 07/31/25 0530   BP: (!) 86/55  105/67 111/75   Pulse: (!) 110  (!) 105 100   Resp:       Temp:       TempSrc:       SpO2: 97%  96% 97%   Weight:  85.1 kg (187 lb 9.6 oz)     Height:            DETERIORATION INDEX SCORE: 45    ASSESSMENT:  Upon arrival to room, pt in bed awake with family at bedside. A&O, following commands, restless in bed. Continues to have elevated HR, on Amio gtt at 1mg/min. Respirations rapid and shallow, bilaterally coarse, on Airvo 55L/70%.    PLAN:  Will follow per RRT Clinical Rounding Program protocol.    Hiral Handley RN  [facility]: [phone]  [facility]: [phone]

## 2025-07-31 NOTE — PROGRESS NOTES
7/31/2025 10:51 AM    Admit Date: 7/28/2025    Admit Diagnosis: Elevated troponin [R79.89]  History of lung cancer [Z85.118]  Atrial fibrillation with rapid ventricular response (HCC) [I48.91]  Sepsis (HCC) [A41.9]  Severe sepsis (HCC) [A41.9, R65.20]  Multifocal pneumonia [J18.9]      Subjective:   No chest pain and breathing better      Objective:    /60   Pulse (!) 103   Temp 97.3 °F (36.3 °C)   Resp 20   Ht 1.753 m (5' 9\")   Wt 85.1 kg (187 lb 9.6 oz)   SpO2 97%   BMI 27.70 kg/m²     Physical Exam:  General-Well Developed, Well Nourished, No Acute Distress, Alert & Oriented x 3, appropriate mood.  Neck- supple, no JVD  CV- regular rate and rhythm no MRG  Lung- clear bilaterally  Abd- soft, nontender, nondistended  Ext- no edema bilaterally.  Skin- warm and dry        Data Review:   Recent Labs     07/31/25  0559   *   K 4.2   BUN 51*   WBC 4.2*   HGB 8.2*   HCT 25.5*          Assessment/Plan:     Active Hospital Problems    Paroxysmal atrial fibrillation (HCC-still in A-fib.  No more wide-complex tachycardia.  Continue IV amiodarone for another 1 to 2 days.              Hyperlipidemia, mixed            7/23/24 total 195 HDL 36   on diet            therapy.  6/26/23 total 211 HDL 36              on diet therapy.                          Patient has been statin intolerant and declines            other pharmacotherapy             other than diet.                    V tach (HCC)-wide-complex tachycardia likely A-fib with aberrancy      Severe sepsis (HCC)      Multifocal pneumonia      Atrial fibrillation with rapid ventricular response (HCC)      History of lung cancer      Primary adenocarcinoma of right lung (HCC)      Stage 3b chronic kidney disease (CKD) (HCC)            7/23/24 creatinine 1.49, eGFR 45            1/18/24 creatinine 1.50, eGFR 45            6/26/23 creatinine 1.60, eGFR 42            3/13/23 creatinine 1.60, eGFR 42

## 2025-07-31 NOTE — CARE COORDINATION
CM following. Family has made the decision to go home on [facility] with [facility]. Limited number of hospice agencies that provide airvos, pt is currently on 50L/70%. Hospice able to accept pt on [employer], however, unable to get the airvo equipment until Monday. Pt will have to remain hospitalized unfortunately until the equipment is delivered, ambo transport will be arranged at that time. MD made aware.

## 2025-07-31 NOTE — CONSULTS
Nephrology consult    Admission Date:  7/28/2025    Admission Diagnosis  Elevated troponin [R79.89]  History of lung cancer [Z85.118]  Atrial fibrillation with rapid ventricular response (HCC) [I48.91]  Sepsis (HCC) [A41.9]  Severe sepsis (HCC) [A41.9, R65.20]  Multifocal pneumonia [J18.9]    We are asked by Dr. Sanchez to see this patient for TERENCE    History of Present Illness:    Mr. Stephenson is an 89 yo M with  PMH of stage IV lung adenocarcinoma with mets to the spine on chemotherapy and radiation as well as CAD, HFrEF and afib who presented to the ER with SOB.  Found to have multifocal PNA.   Also noted to have afib with RVR and intermittent runs of wide complex tachycardia.  He was admitted to the hospitalist with pulmonary and cardiology consultations. He was started on broad spectrum antibiotics as well as amiodarone infusion.  He is severely hypoxic and requiring Airvo.  Nephrology consulted this AM for rising Cr up to 3.24 from 1.17 on admission.  On my assessment, he is sitting up in bed with occasional purse lipped breathing, he state states that he has not had anything significant to eat or drink since admission.  He has a pure wick in place and has been non-oliguric.    Past Medical History:   Diagnosis Date    Arthritis 5 years ago    Atrial fibrillation (HCC)     CAD (coronary artery disease)     S/P stents x 2 in 2019    CHF (congestive heart failure) (HCC)     Followed by Dr. Winn; Echo- 11/08/2020    H/O heart artery stent 2019    x 2    History of blood transfusion     after colonoscopy    Hyperlipidemia     Hypertension, essential 09/29/2022    Lung cancer (HCC)     Right upper lobe      Past Surgical History:   Procedure Laterality Date    BRONCHOSCOPY N/A 4/8/2025    BRONCHOSCOPY ENDOBRONCHIAL ULTRASOUND FINE NEEDLE ASPIRATION ROBOTIC in fluoro copy to Sine performed by Wicho Andre MD at [facility]    CT BIOPSY SUPERFICIAL BONE PERCUTANEOUS  5/6/2025    CT BIOPSY SUPERFICIAL  BONE PERCUTANEOUS 5/6/2025 [facility] CT SCAN    DIAGNOSTIC CARDIAC CATH LAB PROCEDURE  2 years ago    IR PORT PLACEMENT > 5 YEARS  5/9/2025    IR PORT PLACEMENT > 5 YEARS 5/9/2025 [facility] RADIOLOG[facility] SPECIALS    SALIVARY GLAND SURGERY      UMBILICAL HERNIA REPAIR        Current Facility-Administered Medications   Medication Dose Route Frequency    0.9 % sodium chloride infusion   IntraVENous Continuous    vancomycin (VANCOCIN) intermittent dosing (placeholder)   Other RX Placeholder    sodium chloride (Inhalant) 3 % nebulizer solution 4 mL  4 mL Nebulization BID    magnesium oxide (MAG-OX) tablet 400 mg  400 mg Oral BID    LORazepam (ATIVAN) tablet 0.5 mg  0.5 mg Oral Q6H PRN    amiodarone (CORDARONE) 450 mg in dextrose 5 % 250 mL infusion ([id number])  1 mg/min IntraVENous Continuous    [Held by provider] amiodarone (CORDARONE) tablet 200 mg  200 mg Oral Daily    [Held by provider] metoprolol succinate (TOPROL XL) extended release tablet 25 mg  25 mg Oral Daily    rivaroxaban (XARELTO) tablet 15 mg  15 mg Oral Daily with breakfast    temazepam (RESTORIL) capsule 15 mg  15 mg Oral Nightly PRN    sodium chloride flush 0.9 % injection 5-40 mL  5-40 mL IntraVENous 2 times per day    sodium chloride flush 0.9 % injection 5-40 mL  5-40 mL IntraVENous PRN    0.9 % sodium chloride infusion   IntraVENous PRN    potassium chloride (KLOR-CON M) extended release tablet 40 mEq  40 mEq Oral PRN    Or    potassium bicarb-citric acid (EFFER-K) effervescent tablet 40 mEq  40 mEq Oral PRN    Or    potassium chloride 10 mEq/100 mL IVPB (Peripheral Line)  10 mEq IntraVENous PRN    magnesium sulfate 2000 mg in 50 mL IVPB premix  2,000 mg IntraVENous PRN    ondansetron (ZOFRAN-ODT) disintegrating tablet 4 mg  4 mg Oral Q8H PRN    Or    ondansetron (ZOFRAN) injection 4 mg  4 mg IntraVENous Q6H PRN    polyethylene glycol (GLYCOLAX) packet 17 g  17 g Oral Daily PRN    acetaminophen (TYLENOL) tablet 650 mg  650 mg Oral Q6H PRN    Or

## 2025-07-31 NOTE — PROGRESS NOTES
Hospitalist Progress Note   Admit Date:  2025 10:47 AM   Name:  Bretrand Stephenson   Age:  89 y.o.  Sex:  male  :  1935   MRN:  [id number]   Room:  [facility]/    Presenting/Chief Complaint: Shortness of Breath     Reason(s) for Admission: Elevated troponin [R79.89]  History of lung cancer [Z85.118]  Atrial fibrillation with rapid ventricular response (HCC) [I48.91]  Sepsis (HCC) [A41.9]  Severe sepsis (HCC) [A41.9, R65.20]  Multifocal pneumonia [J18.9]     Hospital Course:   Bertrand Stephenson is a 89 y.o. male with medical history of lung cancer currently on chemotherapy, chronic kidney disease stage III, hypertension, dyslipidemia, paroxysmal atrial fibrillation, presented to the ER as he was more lethargic at home with shortness of breath.  He was admitted for sepsis secondary to neutropenic fever/multifocal pneumonia.  He was also found to have multifocal atrial tachycardia and nonsustained V. tach.  Cardiology consulted.  Patient is currently on amiodarone drip.      Subjective & 24hr Events:   Patient is alert awake and answering questions appropriately.  Still with increased work of breathing still needing AirVo 50 L 70% FiO2.  No fever.  Still tachycardic with heart rate ranging from 100 to 120/min, on amiodarone drip.  Renal function worse with creatinine of 3.2 this morning from 2 yesterday.    Assessment & Plan:     This is a 89-year-old male with    Severe sepsis secondary to neutropenic fever/multifocal pneumonia  Continue broad-spectrum antibiotics cefepime, vancomycin.  Blood cultures so far negative.  MRSA swab negative.  Patient is still very sick with increased oxygen requirements needing Airvo 50 L 70% FiO2 and therefore will continue with cefepime and vancomycin for now.  Oncology consulted.  Appreciate recommendations.  Creatinine worse at 3.2 this morning.  Monitor renal function on IV vancomycin.    Acute hypoxemic respiratory failure secondary to multifocal     nRBC 0.00 0.0 - 0.2 K/uL    Neutrophils % 65.7 43.0 - 78.0 %    Lymphocytes % 19.5 13.0 - 44.0 %    Monocytes % 13.1 (H) 4.0 - 12.0 %    Eosinophils % 0.2 (L) 0.5 - 7.8 %    Basophils % 0.5 0.0 - 2.0 %    Immature Granulocytes % 1.0 0.0 - 5.0 %    Neutrophils Absolute 2.76 1.70 - 8.20 K/UL    Lymphocytes Absolute 0.82 0.50 - 4.60 K/UL    Monocytes Absolute 0.55 0.10 - 1.30 K/UL    Eosinophils Absolute 0.01 0.00 - 0.80 K/UL    Basophils Absolute 0.02 0.00 - 0.20 K/UL    Immature Granulocytes Absolute 0.04 0.0 - 0.5 K/UL    RBC Comment ANISOCYTOSIS + POIKILOCYTOSIS      RBC Comment OCCASIONAL  OVALOCYTES        WBC Comment Result Confirmed By Smear      Platelet Comment ADEQUATE      Differential Type AUTOMATED     Basic Metabolic Panel    Collection Time: 07/31/25  5:59 AM   Result Value Ref Range    Sodium 126 (L) 136 - 145 mmol/L    Potassium 4.2 3.5 - 5.1 mmol/L    Chloride 93 (L) 98 - 107 mmol/L    CO2 19 (L) 20 - 29 mmol/L    Anion Gap 14 7 - 16 mmol/L    Glucose 134 (H) 70 - 99 mg/dL    BUN 51 (H) 8 - 23 MG/DL    Creatinine 3.24 (H) 0.80 - 1.30 MG/DL    Est, Glom Filt Rate 18 (L) >60 ml/min/1.73m2    Calcium 7.9 (L) 8.8 - 10.2 MG/DL   Magnesium    Collection Time: 07/31/25  5:59 AM   Result Value Ref Range    Magnesium 2.2 1.8 - 2.4 mg/dL   Vancomycin Level, Random    Collection Time: 07/31/25  5:59 AM   Result Value Ref Range    Vancomycin Rm 13.8 UG/ML   Hepatic Function Panel    Collection Time: 07/31/25  5:59 AM   Result Value Ref Range    Total Protein 5.1 (L) 6.3 - 8.2 g/dL    Albumin 2.0 (L) 3.2 - 4.6 g/dL    Globulin 3.2 2.3 - 3.5 g/dL    Albumin/Globulin Ratio 0.6 (L) 1.0 - 1.9      Total Bilirubin 0.5 0.0 - 1.2 MG/DL    Bilirubin, Direct 0.3 0.0 - 0.3 MG/DL    Alk Phosphatase 63 40 - 129 U/L    AST 61 (H) 15 - 37 U/L    ALT 42 8 - 55 U/L       Recent Labs     07/29/25 2007   COVID19 NOT DETECTED       Current Meds:  Current Facility-Administered Medications   Medication Dose Route Frequency    0.9  % sodium chloride infusion   IntraVENous Continuous    vancomycin (VANCOCIN) intermittent dosing (placeholder)   Other RX Placeholder    sodium chloride (Inhalant) 3 % nebulizer solution 4 mL  4 mL Nebulization BID    magnesium oxide (MAG-OX) tablet 400 mg  400 mg Oral BID    LORazepam (ATIVAN) tablet 0.5 mg  0.5 mg Oral Q6H PRN    amiodarone (CORDARONE) 450 mg in dextrose 5 % 250 mL infusion ([id number])  1 mg/min IntraVENous Continuous    [Held by provider] amiodarone (CORDARONE) tablet 200 mg  200 mg Oral Daily    [Held by provider] metoprolol succinate (TOPROL XL) extended release tablet 25 mg  25 mg Oral Daily    rivaroxaban (XARELTO) tablet 15 mg  15 mg Oral Daily with breakfast    temazepam (RESTORIL) capsule 15 mg  15 mg Oral Nightly PRN    sodium chloride flush 0.9 % injection 5-40 mL  5-40 mL IntraVENous 2 times per day    sodium chloride flush 0.9 % injection 5-40 mL  5-40 mL IntraVENous PRN    0.9 % sodium chloride infusion   IntraVENous PRN    potassium chloride (KLOR-CON M) extended release tablet 40 mEq  40 mEq Oral PRN    Or    potassium bicarb-citric acid (EFFER-K) effervescent tablet 40 mEq  40 mEq Oral PRN    Or    potassium chloride 10 mEq/100 mL IVPB (Peripheral Line)  10 mEq IntraVENous PRN    magnesium sulfate 2000 mg in 50 mL IVPB premix  2,000 mg IntraVENous PRN    ondansetron (ZOFRAN-ODT) disintegrating tablet 4 mg  4 mg Oral Q8H PRN    Or    ondansetron (ZOFRAN) injection 4 mg  4 mg IntraVENous Q6H PRN    polyethylene glycol (GLYCOLAX) packet 17 g  17 g Oral Daily PRN    acetaminophen (TYLENOL) tablet 650 mg  650 mg Oral Q6H PRN    Or    acetaminophen (TYLENOL) suppository 650 mg  650 mg Rectal Q6H PRN    ceFEPIme (MAXIPIME) 2,000 mg in sodium chloride 0.9 % 100 mL IVPB (addEASE)  2,000 mg IntraVENous Q12H    midodrine (PROAMATINE) tablet 5 mg  5 mg Oral Once       Signed:  Bladimir Sanchez MD    Part of this note may have been written by using a voice dictation software.  The note has been proof

## 2025-07-31 NOTE — THERAPY EVALUATION
ACUTE PHYSICAL THERAPY GOALS:   (Developed with and agreed upon by patient and/or caregiver.)    (1.) Bertrand Stephenson  will move from supine to sit and sit to supine , scoot up and down, and roll side to side with MINIMAL ASSIST within 7 treatment day(s).    (2.) Bertrand Stephenson will transfer from bed to chair and chair to bed with MINIMAL ASSIST using the least restrictive device within 7 treatment day(s).    (3.) Bertrand Stephenson will ambulate with MINIMAL ASSIST for 50 feet with the least restrictive device within 7 treatment day(s).   (4.) Bertrand Stephenson will perform therapeutic exercises x 15 min for HEP with CONTACT GUARD ASSIST to improve strength, endurance, and functional mobility within 7 treatment day(s).      PHYSICAL THERAPY Initial Assessment, Daily Note, and AM  (Link to Caseload Tracking: PT Visit Days : 1  Acknowledge Orders  Time In/Out  PT Charge Capture  Rehab Caseload Tracker    Bertrand Stephenson is a 89 y.o. male   PRIMARY DIAGNOSIS: Severe sepsis (HCC)  Elevated troponin [R79.89]  History of lung cancer [Z85.118]  Atrial fibrillation with rapid ventricular response (HCC) [I48.91]  Sepsis (HCC) [A41.9]  Severe sepsis (HCC) [A41.9, R65.20]  Multifocal pneumonia [J18.9]       Reason for Referral: Generalized Muscle Weakness (M62.81)  Difficulty in walking, Not elsewhere classified (R26.2)  Inpatient: Payor: [facility] MEDICARE / Plan: [facility] MEDICARE ADVANTAGE / Product Type: *No Product type* /     ASSESSMENT:     REHAB RECOMMENDATIONS:   Recommendation to date pending progress:  Setting:  Short-term Rehab (pending goals of care)    Equipment:    Manual Wheelchair     ASSESSMENT:  Mr. Stephenson is a 89 year old male who presents with worsening shortness of  breath, fever, and weakness and is admitted with severe sepsis. He has history of metastatic lung cancer, currently on chemo. At baseline he lives with his wife and is MI with mobility using 2WW. However has needed  NT=Not Tested    GAIT: I Mod I S SBA CGA Min Mod Max Total  NT x2 Comments:   Level of Assistance [] [] [] [] [] [] [] [] [] [x] []    Distance   feet    DME N/A    Gait Quality N/A    Weightbearing Status      Stairs      I=Independent, Mod I=Modified Independent, S=Supervision, SBA=Standby Assistance, CGA=Contact Guard Assistance,   Min=Minimal Assistance, Mod=Moderate Assistance, Max=Maximal Assistance, Total=Total Assistance, NT=Not Tested    PLAN:   FREQUENCY AND DURATION: 2 times/week for duration of hospital stay or until stated goals are met, whichever comes first.    THERAPY PROGNOSIS: Fair    PROBLEM LIST:   (Skilled intervention is medically necessary to address:)  Decreased Activity Tolerance  Decreased AROM/PROM  Decreased Balance  Decreased Coordination  Decreased Gait Ability  Decreased Safety Awareness  Decreased Strength  Decreased Transfer Abilities  Increased Pain INTERVENTIONS PLANNED:   (Benefits and precautions of physical therapy have been discussed with the patient.)  Therapeutic Activity  Therapeutic Exercise/HEP  Neuromuscular Re-education  Gait Training  Education       TREATMENT:   EVALUATION: MODERATE COMPLEXITY: (Untimed Charge)  The initial evaluation charge encompasses clinical chart review, objective assessment, interpretation of assessment, and skilled monitoring of the patient's response to treatment in order to develop a plan of care.     TREATMENT:   Co-Treatment PT/OT necessary due to patient's decreased overall endurance/tolerance levels, as well as need for high level skilled assistance to complete functional transfers/mobility and functional tasks  Therapeutic Activity (15 Minutes): Therapeutic activity included Rolling, Supine to Sit, Scooting, Transfer Training, Ambulation on level ground, Sitting balance , and Standing balance to improve functional Activity tolerance, Balance, Mobility, and Strength.    TREATMENT GRID:  N/A    AFTER TREATMENT PRECAUTIONS: Bed/Chair Locked,

## 2025-07-31 NOTE — PLAN OF CARE
Problem: Chronic Conditions and Co-morbidities  Goal: Patient's chronic conditions and co-morbidity symptoms are monitored and maintained or improved  7/31/2025 1421 by Nando Chandra RN  Outcome: Progressing  7/31/2025 0158 by Colleen Rick RN  Outcome: Progressing     Problem: Discharge Planning  Goal: Discharge to home or other facility with appropriate resources  7/31/2025 1421 by Nando Chandra RN  Outcome: Progressing  7/31/2025 0158 by Colleen Rick RN  Outcome: Progressing     Problem: Pain  Goal: Verbalizes/displays adequate comfort level or baseline comfort level  7/31/2025 1421 by Nando Chandra RN  Outcome: Progressing  7/31/2025 0158 by Colleen Rick RN  Outcome: Progressing     Problem: Skin/Tissue Integrity  Goal: Skin integrity remains intact  Description: 1.  Monitor for areas of redness and/or skin breakdown  2.  Assess vascular access sites hourly  3.  Every 4-6 hours minimum:  Change oxygen saturation probe site  4.  Every 4-6 hours:  If on nasal continuous positive airway pressure, respiratory therapy assess nares and determine need for appliance change or resting period  7/31/2025 1421 by Nando Chandra RN  Outcome: Progressing  7/31/2025 0158 by Colleen Rick RN  Outcome: Progressing     Problem: Safety - Adult  Goal: Free from fall injury  7/31/2025 1421 by Nando Chandra RN  Outcome: Progressing  7/31/2025 0158 by Colleen Rick RN  Outcome: Progressing     Problem: Respiratory - Adult  Goal: Achieves optimal ventilation and oxygenation  7/31/2025 1421 by Nando Chandra RN  Outcome: Progressing  7/31/2025 0713 by Kellie Joaquin RCP  Outcome: Progressing

## 2025-07-31 NOTE — PROGRESS NOTES
ACUTE OCCUPATIONAL THERAPY GOALS:   (Developed with and agreed upon by patient and/or caregiver.)  1. Patient will complete upper body bathing and dressing with min A, additional time, and adaptive equipment as needed.   2. Patient will complete toileting with mod A.   3. Patient will complete functional transfers with min A and adaptive equipment as needed.   4. Patient will tolerate at least 15 minutes of OT activity with less than 2 rest breaks while maintaining O2 sats >90%.   5. Patient will verbalize at least 3 energy conservation technique to utilize during ADL/IADL.   6. Patient will complete grooming in supported sitting with min A with additional time.  7. Patient will complete household distances with min A, rest breaks as needed and adaptive equipment as needed.     Timeframe: 7 visits      OCCUPATIONAL THERAPY: Daily Note AM   OT Visit Days: 2   Time In/Out  OT Charge Capture  Rehab Caseload Tracker  OT Orders    Bertrand Stephenson is a 89 y.o. male   PRIMARY DIAGNOSIS: Severe sepsis (HCC)  Elevated troponin [R79.89]  History of lung cancer [Z85.118]  Atrial fibrillation with rapid ventricular response (HCC) [I48.91]  Sepsis (HCC) [A41.9]  Severe sepsis (HCC) [A41.9, R65.20]  Multifocal pneumonia [J18.9]       Inpatient: Payor: [facility] MEDICARE / Plan: [facility] MEDICARE ADVANTAGE / Product Type: *No Product type* /     ASSESSMENT:     REHAB RECOMMENDATIONS:   Recommendation to date pending progress:  Setting:  STR pending goals of care    Equipment:    To Be Determined     ASSESSMENT:  Mr. Stephenson continues to be limited by deficits in strength, balance, activity tolerance, ADLs and functional mobility. He presents with increased O2 demands, on Airvo 50L, 70-71%. O2 sats remained > 90% during session. He requires Mod A x 2 for bed mobility to edge of bed. While edge of bed, he requires fluctuating assist levels to maintain sitting balance. He then performs sit to stand and stand pivot transfer to chair  therapy, compensatory technique during ADL , strategies to improve safety, proper use of assistive device, transfer training and safety, and energy conservation techniques during ADL/IADLS and patient and family verbalized understanding and will need reinforcement at next session.     TREATMENT GRID:  N/A    AFTER TREATMENT PRECAUTIONS: Bed/Chair Locked, Call light within reach, Chair, Heels floated, Needs within reach, RN notified, and Visitors at bedside    INTERDISCIPLINARY COLLABORATION:  RN/ PCT, PT/ PTA, and OT/ BRITO    EDUCATION:  OT educated patient  on role of occupational therapy and plan of care and OT discharge recommendations. Patient will need continued education at next session.          TOTAL TREATMENT DURATION AND TIME:  Time In: 1104  Time Out: 1127  Minutes: 23    Alta Patel, OT

## 2025-07-31 NOTE — PROGRESS NOTES
Bertrand Stephenson  Admission Date: 7/28/2025         Daily Progress Note: 7/31/2025    The patient's chart is reviewed and the patient is discussed with the staff.    Background: Patient is a 89 y.o. 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     Admitted 7/28 after he presented to the ER with sob.  CT showed multifocal pneumonia, procal was 0.36, bnp was 18k and EKG with multifocal atrial tachycardia.   Wbc only 1.3 with 25 % neutrophils.  While in ER the pt had episodes of wide complex tachycardia and was given iv amiodarone.  Today he is still having multiple runs of v tach.  Echo was done, noting EF of 30-35%.  Temp has been as high as 100.8 but he has mostly been afebrile.    Lung cancer was dxed in march by bronchoscopy and was noted to metastatic to bone and spine. He was started on chemorx in may with carbotaxol and was given radiation in June.   He has been on amiodarone for 6 or 7 years  after he had an episose of a fib.    Subjective:     Family at bedside. States he feels a little better. Coughing a little easier. On airvo 50L/70%- SpO2 98%.     Current Facility-Administered Medications   Medication Dose Route Frequency    vancomycin (VANCOCIN) intermittent dosing (placeholder)   Other RX Placeholder    sodium chloride (Inhalant) 3 % nebulizer solution 4 mL  4 mL Nebulization BID    magnesium oxide (MAG-OX) tablet 400 mg  400 mg Oral BID    LORazepam (ATIVAN) tablet 0.5 mg  0.5 mg Oral Q6H PRN    amiodarone (CORDARONE) 450 mg in dextrose 5 % 250 mL infusion ([id number])  1 mg/min IntraVENous Continuous    [Held by provider] amiodarone (CORDARONE) tablet 200 mg  200 mg Oral Daily    [Held by provider] metoprolol succinate (TOPROL XL) extended release tablet 25 mg  25 mg Oral Daily    rivaroxaban (XARELTO) tablet 15 mg  15 mg Oral Daily with breakfast    temazepam (RESTORIL) capsule 15 mg  15 mg Oral Nightly PRN    sodium chloride

## 2025-07-31 NOTE — ICUWATCH
RRT Clinical Rounding Nurse Update    Vitals:    07/31/25 0439 07/31/25 0500 07/31/25 0530 07/31/25 0600   BP:  105/67 111/75 92/80   Pulse:  (!) 105 100 (!) 104   Resp:       Temp:       TempSrc:       SpO2:  96% 97% 97%   Weight: 85.1 kg (187 lb 9.6 oz)      Height:            DETERIORATION INDEX SCORE: 50    ASSESSMENT:  Previous outreach assessment was reviewed. There have been no significant changes since previous assessment.    PLAN:  Will follow per RRT Clinical Rounding Program protocol.    Hiral Handley RN  [facility]: [phone]  [facility]: [phone]

## 2025-08-01 VITALS
HEART RATE: 94 BPM | OXYGEN SATURATION: 82 % | DIASTOLIC BLOOD PRESSURE: 70 MMHG | SYSTOLIC BLOOD PRESSURE: 130 MMHG | RESPIRATION RATE: 31 BRPM | BODY MASS INDEX: 27.78 KG/M2 | TEMPERATURE: 97.3 F | WEIGHT: 187.6 LBS | HEIGHT: 69 IN

## 2025-08-01 LAB
BACTERIA SPEC CULT: NORMAL
FUNGITELL INTERPRETATION: NORMAL
FUNGITELL: <31.25 PG/ML
GRAM STN SPEC: NORMAL
Lab: NORMAL
REFERENCE VALUE: NORMAL
RESULT: NORMAL
SERVICE CMNT-IMP: NORMAL

## 2025-08-01 NOTE — ICUWATCH
RRT Clinical Rounding Nurse Update    Vitals:    07/31/25 2200 07/31/25 2230 07/31/25 2233 07/31/25 2241   BP: 127/74 130/70     Pulse: 97 95 97 94   Resp:       Temp:       TempSrc:       SpO2: (!) 85% (!) 86% (!) 88% (!) 82%   Weight:       Height:            DETERIORATION INDEX SCORE: 68    ASSESSMENT:  Previous outreach assessment was reviewed. Pt transitioning to COMFORT MEASURES ONLY, PRN medications available.     PLAN:  Will discharge from RRT Clinical Rounding Program per protocol. Please call if needed.    Hiral Handley RN  [facility]: [phone]  [facility]: [phone]

## 2025-08-01 NOTE — PLAN OF CARE
2030: RT called concerning pt resp status and to come assess pt 02 2233: Lizy Corcoran NP messaged concerning pt's respiratory status, NP coming to bedside. 02 84, Tachypneic, restless, Airvo maxed, earlier RT interventions had no improvement on pt status.    2241: Lizy Corcoran NP at bedside, told this RN pt will be placed on comfort care tonight. Awaiting orders.    2258: Comfort care orders in place.    2259: Tele box removed, all gtts stopped and disconnected from pt, pt repositioned and in a comfortable position, RT at bedside, family asked staff to keep Airvo on pt, awaiting medications to be verified by pharmacy.    0001: Lizy Corcoran NP pronounced death.

## 2025-08-01 NOTE — PROGRESS NOTES
Called by RN to pronounce patient. On exam, no heart or breath sounds noted after auscultation for 1 minute. There was no spontaneous chest wall rise or fall. Pupils were fixed and dilated without pupillary light reflex. Patient was pronounced dead on 8/1/2025 at 0001. Family was present at bedside and condolences offered.

## 2025-08-01 NOTE — PROGRESS NOTES
Hospitalist Discharge Summary   Admit Date:  2025 10:47 AM   DC Note date: 2025  Name:  Bertrand Stephenson   Age:  89 y.o.  Sex:  male  :  1935   MRN:  [id number]   Room:  [facility]  PCP:  Dean Navarro MD    Presenting Complaint: Shortness of Breath     Initial Admission Diagnosis: Elevated troponin [R79.89]  History of lung cancer [Z85.118]  Atrial fibrillation with rapid ventricular response (HCC) [I48.91]  Sepsis (HCC) [A41.9]  Severe sepsis (HCC) [A41.9, R65.20]  Multifocal pneumonia [J18.9]     Problem List for this Hospitalization (present on admission):    Principal Problem:    Severe sepsis (HCC)  Active Problems:    Hyperlipidemia, mixed    Paroxysmal atrial fibrillation (HCC)    Hypertension, essential    Stage 3b chronic kidney disease (CKD) (HCC)    Primary adenocarcinoma of right lung (HCC)    Multifocal pneumonia    Atrial fibrillation with rapid ventricular response (HCC)    History of lung cancer    V tach (HCC)  Resolved Problems:    * No resolved hospital problems. *      Hospital Course:  Bertrand Stephenson is a 89 y.o. male with medical history of lung cancer currently on chemotherapy, chronic kidney disease stage III, hypertension, dyslipidemia, paroxysmal atrial fibrillation, presented to the ER as he was more lethargic at home with shortness of breath.  He was admitted for sepsis secondary to neutropenic fever/multifocal pneumonia.  He was also found to have multifocal atrial tachycardia and nonsustained V. tach.  Cardiology consulted.  Patient is currently on amiodarone drip.     Patient is alert awake and answering questions appropriately.  Still with increased work of breathing still needing AirVo 50 L 70% FiO2.  No fever.  Still tachycardic with heart rate ranging from 100 to 120/min, on amiodarone drip.  Renal function worse with creatinine of 3.2 this morning from 2 yesterday.    Patient continued to deteriorate despite being maxed out on airvo. Family at bedside and

## 2025-08-01 NOTE — PROGRESS NOTES
Notified by RN that patient was maxed out on Airvo and SpO2 mid 80%s  Upon assessment, patient is diaphoretic, restless and has increased work of breathing. Wife and 2 daughters at bedside. They would like to transition patient to comfort care at this time. Orders changed accordingly.

## 2025-08-02 LAB
BACTERIA SPEC CULT: NORMAL
BACTERIA SPEC CULT: NORMAL
SERVICE CMNT-IMP: NORMAL
SERVICE CMNT-IMP: NORMAL

## 2025-08-11 NOTE — PROGRESS NOTES
Physician Progress Note      PATIENT:               SHAY ROSARIO  CSN #:                  [id number]  :                       1935  ADMIT DATE:       2025 10:47 AM  DISCH DATE:        2025 12:01 AM  RESPONDING  PROVIDER #:        Bladimir Sanchez MD          QUERY TEXT:    Please clarify in documentation the relationship, if any, between Neutropenia,   chemotherapy, infection, or malignancy. Are the conditions:  - H/P: \"Sepsis, Pneumonia likely related to active chemotherapy for   underlying cancer given patient severely neutropenic. Neutrophils noted to be   170\"  - Pulm:  Multifocal pneumonia - bilateral infiltrates in neutrapenic - pt   also could have component of radiation pneumonitis and there is some concern   that he could have amiodarone pulmonary toxicity.  Wbc only 1.3 with 25 %   neutrophils. \"  -T 100.8 - 103.1,  -121, RR 34 -38, Bp 77/58 - 79/59, spo2 87% HHFNC   100%FIO2/60L  -lactic 1.2, WBC 1.1 - 1.3 - 2.6 - 4.2, procal 0.39  -blood culture: NGTD    The clinical indicators include:  89 y.o. male with medical history of lung cancer currently on chemotherapy,   chronic kidney disease stage III, hypertension, dyslipidemia, paroxysmal   atrial fibrillation, sepsis, multifocal PNA, hypotension, wide complex   tachycardia, elevated troponin, lung ca stage 4 adenocarcinoma s/p Carbotaxol   and radiation  -Treatment: IV amiodarone, IV Zithromax, IV Maxipime, IV Lasix, IV Vanco  Options provided:  -- Neutropenia due to chemotherapy  -- Neutropenia due to sepsis  -- Neutropenia due to adenocarcinoma of the lung  -- Neutropenia due to combination of chemo, sepsis, and adenocarcinoma of the   lung  -- Other - I will add my own diagnosis  -- Disagree - Not applicable / Not valid  -- Disagree - Clinically unable to determine / Unknown  -- Refer to Clinical Documentation Reviewer    PROVIDER RESPONSE TEXT:    Neutropenia due to combination of chemo, sepsis, and adenocarcinoma of the

## (undated) DEVICE — FORCEPS BX L115CM ALGTR JAW STP DISP

## (undated) DEVICE — SINGLE USE SUCTION VALVE MAJ-209: Brand: SINGLE USE SUCTION VALVE (STERILE)

## (undated) DEVICE — KIT SURG CUST BRONCHSCP CUST SFD

## (undated) DEVICE — Device: Brand: ION

## (undated) DEVICE — BIOPSY NEEDLE, 21G: Brand: FLEXISION

## (undated) DEVICE — MOUTHPIECE ENDOSCP L CTRL OPN AND SIDE PORTS DISP

## (undated) DEVICE — Device: Brand: PORTEX

## (undated) DEVICE — VISION PROBE: Brand: ION

## (undated) DEVICE — SINGLE USE BIOPSY VALVE MAJ-210: Brand: SINGLE USE BIOPSY VALVE (STERILE)

## (undated) DEVICE — SWIVEL CONNECTOR

## (undated) DEVICE — NEEDLE ASPIR 21GA L700MM US GUID TREAT DST END FOR EFFICIENT

## (undated) DEVICE — CATHETER: Brand: ION

## (undated) DEVICE — VISION PROBE ADAPTER AND SUCTION ADAPTER

## (undated) DEVICE — CATHETER GUIDE